# Patient Record
Sex: FEMALE | Race: WHITE | Employment: FULL TIME | URBAN - METROPOLITAN AREA
[De-identification: names, ages, dates, MRNs, and addresses within clinical notes are randomized per-mention and may not be internally consistent; named-entity substitution may affect disease eponyms.]

---

## 2017-03-07 ENCOUNTER — HOSPITAL ENCOUNTER (EMERGENCY)
Age: 42
Discharge: HOME | End: 2017-03-07
Attending: EMERGENCY MEDICINE
Payer: COMMERCIAL

## 2017-03-07 ENCOUNTER — HOSPITAL ENCOUNTER (EMERGENCY)
Age: 42
Discharge: STILL ADMITTED OR ADMINISTRATIVE | End: 2017-03-07
Attending: FAMILY MEDICINE

## 2017-03-07 VITALS
HEIGHT: 66 IN | TEMPERATURE: 98.8 F | DIASTOLIC BLOOD PRESSURE: 83 MMHG | HEART RATE: 102 BPM | OXYGEN SATURATION: 99 % | WEIGHT: 195.4 LBS | SYSTOLIC BLOOD PRESSURE: 139 MMHG | RESPIRATION RATE: 16 BRPM | BODY MASS INDEX: 31.4 KG/M2

## 2017-03-07 VITALS
WEIGHT: 196.2 LBS | SYSTOLIC BLOOD PRESSURE: 143 MMHG | RESPIRATION RATE: 16 BRPM | TEMPERATURE: 98.7 F | BODY MASS INDEX: 31.53 KG/M2 | DIASTOLIC BLOOD PRESSURE: 78 MMHG | HEIGHT: 66 IN | OXYGEN SATURATION: 96 % | HEART RATE: 95 BPM

## 2017-03-07 DIAGNOSIS — R51.9 NONINTRACTABLE HEADACHE, UNSPECIFIED CHRONICITY PATTERN, UNSPECIFIED HEADACHE TYPE: ICD-10-CM

## 2017-03-07 DIAGNOSIS — J10.1 INFLUENZA B: Primary | ICD-10-CM

## 2017-03-07 LAB
ALBUMIN SERPL BCP-MCNC: 3.6 G/DL (ref 3.5–5)
ALBUMIN/GLOB SERPL: 0.9 {RATIO} (ref 1.1–2.2)
ALP SERPL-CCNC: 54 U/L (ref 45–117)
ALT SERPL-CCNC: 49 U/L (ref 12–78)
ANION GAP BLD CALC-SCNC: 7 MMOL/L (ref 5–15)
APPEARANCE UR: CLEAR
AST SERPL W P-5'-P-CCNC: 38 U/L (ref 15–37)
BACTERIA URNS QL MICRO: NEGATIVE /HPF
BASOPHILS # BLD AUTO: 0.1 K/UL (ref 0–0.1)
BASOPHILS # BLD: 1 % (ref 0–1)
BILIRUB SERPL-MCNC: 0.7 MG/DL (ref 0.2–1)
BILIRUB UR QL: NEGATIVE
BUN SERPL-MCNC: 13 MG/DL (ref 6–20)
BUN/CREAT SERPL: 14 (ref 12–20)
CALCIUM SERPL-MCNC: 8.7 MG/DL (ref 8.5–10.1)
CHLORIDE SERPL-SCNC: 100 MMOL/L (ref 97–108)
CO2 SERPL-SCNC: 24 MMOL/L (ref 21–32)
COLOR UR: ABNORMAL
CREAT SERPL-MCNC: 0.91 MG/DL (ref 0.55–1.02)
DIFFERENTIAL METHOD BLD: ABNORMAL
EOSINOPHIL # BLD: 0.2 K/UL (ref 0–0.4)
EOSINOPHIL NFR BLD: 3 % (ref 0–7)
EPITH CASTS URNS QL MICRO: ABNORMAL /LPF
ERYTHROCYTE [DISTWIDTH] IN BLOOD BY AUTOMATED COUNT: 12.3 % (ref 11.5–14.5)
FLUAV AG NPH QL IA: NEGATIVE
FLUBV AG NOSE QL IA: POSITIVE
GLOBULIN SER CALC-MCNC: 3.9 G/DL (ref 2–4)
GLUCOSE SERPL-MCNC: 87 MG/DL (ref 65–100)
GLUCOSE UR STRIP.AUTO-MCNC: NEGATIVE MG/DL
HCT VFR BLD AUTO: 41.3 % (ref 35–47)
HGB BLD-MCNC: 14.2 G/DL (ref 11.5–16)
HGB UR QL STRIP: NEGATIVE
HYALINE CASTS URNS QL MICRO: ABNORMAL /LPF (ref 0–5)
KETONES UR QL STRIP.AUTO: ABNORMAL MG/DL
LEUKOCYTE ESTERASE UR QL STRIP.AUTO: NEGATIVE
LYMPHOCYTES # BLD AUTO: 7 % (ref 12–49)
LYMPHOCYTES # BLD: 0.5 K/UL (ref 0.8–3.5)
MCH RBC QN AUTO: 30.7 PG (ref 26–34)
MCHC RBC AUTO-ENTMCNC: 34.4 G/DL (ref 30–36.5)
MCV RBC AUTO: 89.4 FL (ref 80–99)
MONOCYTES # BLD: 0.8 K/UL (ref 0–1)
MONOCYTES NFR BLD AUTO: 12 % (ref 5–13)
NEUTS SEG # BLD: 5.2 K/UL (ref 1.8–8)
NEUTS SEG NFR BLD AUTO: 77 % (ref 32–75)
NITRITE UR QL STRIP.AUTO: NEGATIVE
PH UR STRIP: 6 [PH] (ref 5–8)
PLATELET # BLD AUTO: 192 K/UL (ref 150–400)
POTASSIUM SERPL-SCNC: 4.1 MMOL/L (ref 3.5–5.1)
PROT SERPL-MCNC: 7.5 G/DL (ref 6.4–8.2)
PROT UR STRIP-MCNC: NEGATIVE MG/DL
RBC # BLD AUTO: 4.62 M/UL (ref 3.8–5.2)
RBC #/AREA URNS HPF: ABNORMAL /HPF (ref 0–5)
RBC MORPH BLD: ABNORMAL
SODIUM SERPL-SCNC: 131 MMOL/L (ref 136–145)
SP GR UR REFRACTOMETRY: 1.01 (ref 1–1.03)
UROBILINOGEN UR QL STRIP.AUTO: 0.2 EU/DL (ref 0.2–1)
WBC # BLD AUTO: 6.8 K/UL (ref 3.6–11)
WBC URNS QL MICRO: ABNORMAL /HPF (ref 0–4)

## 2017-03-07 PROCEDURE — 85025 COMPLETE CBC W/AUTO DIFF WBC: CPT | Performed by: EMERGENCY MEDICINE

## 2017-03-07 PROCEDURE — 81001 URINALYSIS AUTO W/SCOPE: CPT | Performed by: EMERGENCY MEDICINE

## 2017-03-07 PROCEDURE — 99284 EMERGENCY DEPT VISIT MOD MDM: CPT

## 2017-03-07 PROCEDURE — 96361 HYDRATE IV INFUSION ADD-ON: CPT

## 2017-03-07 PROCEDURE — 80053 COMPREHEN METABOLIC PANEL: CPT | Performed by: EMERGENCY MEDICINE

## 2017-03-07 PROCEDURE — 74011000250 HC RX REV CODE- 250: Performed by: EMERGENCY MEDICINE

## 2017-03-07 PROCEDURE — 87804 INFLUENZA ASSAY W/OPTIC: CPT | Performed by: EMERGENCY MEDICINE

## 2017-03-07 PROCEDURE — 96375 TX/PRO/DX INJ NEW DRUG ADDON: CPT

## 2017-03-07 PROCEDURE — 96374 THER/PROPH/DIAG INJ IV PUSH: CPT

## 2017-03-07 PROCEDURE — 74011250636 HC RX REV CODE- 250/636: Performed by: EMERGENCY MEDICINE

## 2017-03-07 RX ORDER — OSELTAMIVIR PHOSPHATE 75 MG/1
75 CAPSULE ORAL 2 TIMES DAILY
Qty: 10 CAP | Refills: 0 | Status: SHIPPED | OUTPATIENT
Start: 2017-03-07 | End: 2019-11-11 | Stop reason: SDUPTHER

## 2017-03-07 RX ORDER — PROCHLORPERAZINE EDISYLATE 5 MG/ML
10 INJECTION INTRAMUSCULAR; INTRAVENOUS
Status: DISCONTINUED | OUTPATIENT
Start: 2017-03-07 | End: 2017-03-07 | Stop reason: SDUPTHER

## 2017-03-07 RX ORDER — OSELTAMIVIR PHOSPHATE 75 MG/1
75 CAPSULE ORAL 2 TIMES DAILY
Qty: 10 CAP | Refills: 0 | Status: SHIPPED | OUTPATIENT
Start: 2017-03-07 | End: 2017-03-12

## 2017-03-07 RX ORDER — DIPHENHYDRAMINE HYDROCHLORIDE 50 MG/ML
25 INJECTION, SOLUTION INTRAMUSCULAR; INTRAVENOUS
Status: COMPLETED | OUTPATIENT
Start: 2017-03-07 | End: 2017-03-07

## 2017-03-07 RX ORDER — KETOROLAC TROMETHAMINE 30 MG/ML
30 INJECTION, SOLUTION INTRAMUSCULAR; INTRAVENOUS
Status: COMPLETED | OUTPATIENT
Start: 2017-03-07 | End: 2017-03-07

## 2017-03-07 RX ADMIN — SODIUM CHLORIDE 10 MG: 9 INJECTION INTRAMUSCULAR; INTRAVENOUS; SUBCUTANEOUS at 21:32

## 2017-03-07 RX ADMIN — SODIUM CHLORIDE 1000 ML: 900 INJECTION, SOLUTION INTRAVENOUS at 21:31

## 2017-03-07 RX ADMIN — DIPHENHYDRAMINE HYDROCHLORIDE 25 MG: 50 INJECTION, SOLUTION INTRAMUSCULAR; INTRAVENOUS at 21:32

## 2017-03-07 RX ADMIN — KETOROLAC TROMETHAMINE 30 MG: 30 INJECTION, SOLUTION INTRAMUSCULAR at 21:32

## 2017-03-08 ENCOUNTER — TELEPHONE (OUTPATIENT)
Dept: INTERNAL MEDICINE CLINIC | Age: 42
End: 2017-03-08

## 2017-03-08 NOTE — DISCHARGE INSTRUCTIONS
Influenza (Flu): Care Instructions  Your Care Instructions  Influenza (flu) is an infection in the lungs and breathing passages. It is caused by the influenza virus. There are different strains, or types, of the flu virus from year to year. Unlike the common cold, the flu comes on suddenly and the symptoms, such as a cough, congestion, fever, chills, fatigue, aches, and pains, are more severe. These symptoms may last up to 10 days. Although the flu can make you feel very sick, it usually doesn't cause serious health problems. Home treatment is usually all you need for flu symptoms. But your doctor may prescribe antiviral medicine to prevent other health problems, such as pneumonia, from developing. Older people and those who have a long-term health condition, such as lung disease, are most at risk for having pneumonia or other health problems. Follow-up care is a key part of your treatment and safety. Be sure to make and go to all appointments, and call your doctor if you are having problems. Its also a good idea to know your test results and keep a list of the medicines you take. How can you care for yourself at home? · Get plenty of rest.  · Drink plenty of fluids, enough so that your urine is light yellow or clear like water. If you have kidney, heart, or liver disease and have to limit fluids, talk with your doctor before you increase the amount of fluids you drink. · Take an over-the-counter pain medicine if needed, such as acetaminophen (Tylenol), ibuprofen (Advil, Motrin), or naproxen (Aleve), to relieve fever, headache, and muscle aches. Read and follow all instructions on the label. No one younger than 20 should take aspirin. It has been linked to Reye syndrome, a serious illness. · Do not smoke. Smoking can make the flu worse. If you need help quitting, talk to your doctor about stop-smoking programs and medicines. These can increase your chances of quitting for good.   · Breathe moist air from a hot shower or from a sink filled with hot water to help clear a stuffy nose. · Before you use cough and cold medicines, check the label. These medicines may not be safe for young children or for people with certain health problems. · If the skin around your nose and lips becomes sore, put some petroleum jelly on the area. · To ease coughing:  ¨ Drink fluids to soothe a scratchy throat. ¨ Suck on cough drops or plain hard candy. ¨ Take an over-the-counter cough medicine that contains dextromethorphan to help you get some sleep. Read and follow all instructions on the label. ¨ Raise your head at night with an extra pillow. This may help you rest if coughing keeps you awake. · Take any prescribed medicine exactly as directed. Call your doctor if you think you are having a problem with your medicine. To avoid spreading the flu  · Wash your hands regularly, and keep your hands away from your face. · Stay home from school, work, and other public places until you are feeling better and your fever has been gone for at least 24 hours. The fever needs to have gone away on its own without the help of medicine. · Ask people living with you to talk to their doctors about preventing the flu. They may get antiviral medicine to keep from getting the flu from you. · To prevent the flu in the future, get a flu vaccine every fall. Encourage people living with you to get the vaccine. · Cover your mouth when you cough or sneeze. When should you call for help? Call 911 anytime you think you may need emergency care. For example, call if:  · You have severe trouble breathing. Call your doctor now or seek immediate medical care if:  · You have new or worse trouble breathing. · You seem to be getting much sicker. · You feel very sleepy or confused. · You have a new or higher fever. · You get a new rash.   Watch closely for changes in your health, and be sure to contact your doctor if:  · You begin to get better and then get worse. · You are not getting better after 1 week. Where can you learn more? Go to http://temi-james.info/. Enter B181 in the search box to learn more about \"Influenza (Flu): Care Instructions. \"  Current as of: May 23, 2016  Content Version: 11.1  © 7054-5385 [employer]. Care instructions adapted under license by [employer] (which disclaims liability or warranty for this information). If you have questions about a medical condition or this instruction, always ask your healthcare professional. [address] any warranty or liability for your use of this information. Headache: Care Instructions  Your Care Instructions    Headaches have many possible causes. Most headaches aren't a sign of a more serious problem, and they will get better on their own. Home treatment may help you feel better faster. The doctor has checked you carefully, but problems can develop later. If you notice any problems or new symptoms, get medical treatment right away. Follow-up care is a key part of your treatment and safety. Be sure to make and go to all appointments, and call your doctor if you are having problems. It's also a good idea to know your test results and keep a list of the medicines you take. How can you care for yourself at home? · Do not drive if you have taken a prescription pain medicine. · Rest in a quiet, dark room until your headache is gone. Close your eyes and try to relax or go to sleep. Don't watch TV or read. · Put a cold, moist cloth or cold pack on the painful area for 10 to 20 minutes at a time. Put a thin cloth between the cold pack and your skin. · Use a warm, moist towel or a heating pad set on low to relax tight shoulder and neck muscles. · Have someone gently massage your neck and shoulders. · Take pain medicines exactly as directed.   ¨ If the doctor gave you a prescription medicine for pain, take it as prescribed. ¨ If you are not taking a prescription pain medicine, ask your doctor if you can take an over-the-counter medicine. · Be careful not to take pain medicine more often than the instructions allow, because you may get worse or more frequent headaches when the medicine wears off. · Do not ignore new symptoms that occur with a headache, such as a fever, weakness or numbness, vision changes, or confusion. These may be signs of a more serious problem. To prevent headaches  · Keep a headache diary so you can figure out what triggers your headaches. Avoiding triggers may help you prevent headaches. Record when each headache began, how long it lasted, and what the pain was like (throbbing, aching, stabbing, or dull). Write down any other symptoms you had with the headache, such as nausea, flashing lights or dark spots, or sensitivity to bright light or loud noise. Note if the headache occurred near your period. List anything that might have triggered the headache, such as certain foods (chocolate, cheese, wine) or odors, smoke, bright light, stress, or lack of sleep. · Find healthy ways to deal with stress. Headaches are most common during or right after stressful times. Take time to relax before and after you do something that has caused a headache in the past.  · Try to keep your muscles relaxed by keeping good posture. Check your jaw, face, neck, and shoulder muscles for tension, and try relaxing them. When sitting at a desk, change positions often, and stretch for 30 seconds each hour. · Get plenty of sleep and exercise. · Eat regularly and well. Long periods without food can trigger a headache. · Treat yourself to a massage. Some people find that regular massages are very helpful in relieving tension. · Limit caffeine by not drinking too much coffee, tea, or soda. But don't quit caffeine suddenly, because that can also give you headaches.   · Reduce eyestrain from computers by blinking frequently and looking away from the computer screen every so often. Make sure you have proper eyewear and that your monitor is set up properly, about an arm's length away. · Seek help if you have depression or anxiety. Your headaches may be linked to these conditions. Treatment can both prevent headaches and help with symptoms of anxiety or depression. When should you call for help? Call 911 anytime you think you may need emergency care. For example, call if:  · You have signs of a stroke. These may include:  ¨ Sudden numbness, paralysis, or weakness in your face, arm, or leg, especially on only one side of your body. ¨ Sudden vision changes. ¨ Sudden trouble speaking. ¨ Sudden confusion or trouble understanding simple statements. ¨ Sudden problems with walking or balance. ¨ A sudden, severe headache that is different from past headaches. Call your doctor now or seek immediate medical care if:  · You have a new or worse headache. · Your headache gets much worse. Where can you learn more? Go to http://temi-james.info/. Enter M271 in the search box to learn more about \"Headache: Care Instructions. \"  Current as of: February 19, 2016  Content Version: 11.1  © 2839-0281 [employer]. Care instructions adapted under license by [employer] (which disclaims liability or warranty for this information). If you have questions about a medical condition or this instruction, always ask your healthcare professional. [place] [address] any warranty or liability for your use of this information. We hope that we have addressed all of your medical concerns. The examination and treatment you received in the Emergency Department were for an emergent problem and were not intended as complete care. It is important that you follow up with your healthcare provider(s) for ongoing care.  If your symptoms worsen or do not improve as expected, and you are unable to reach your usual health care provider(s), you should return to the Emergency Department. Today's healthcare is undergoing tremendous change, and patient satisfaction surveys are one of the many tools to assess the quality of medical care. You may receive a survey from the [employer] regarding your experience in the Emergency Department. I hope that your experience has been completely positive, particularly the medical care that I provided. As such, please participate in the survey; anything less than excellent does not meet my expectations or intentions. [facility] and [facility] participate in nationally recognized quality of care measures. If your blood pressure is greater than 120/80, as reported below, we urge that you seek medical care to address the potential of high blood pressure, commonly known as hypertension. Hypertension can be hereditary or can be caused by certain medical conditions, pain, stress, or \"white coat syndrome. \"       Please make an appointment with your health care provider(s) for follow up of your Emergency Department visit. VITALS:   Patient Vitals for the past 8 hrs:   Temp Pulse Resp BP SpO2   03/07/17 2211 98.7 °F (37.1 °C) 95 16 143/78 96 %   03/07/17 2138 99.7 °F (37.6 °C) 99 20 138/79 97 %   03/07/17 1923 98.8 °F (37.1 °C) (!) 114 18 - 99 %          Thank you for allowing us to provide you with medical care today. We realize that you have many choices for your emergency care needs. Please choose us in the future for any continued health care needs.       Elle Armstrong MD    [facility], [facility].   Office: [phone]            Recent Results (from the past 24 hour(s))   CBC WITH AUTOMATED DIFF    Collection Time: 03/07/17  7:58 PM   Result Value Ref Range    WBC 6.8 3.6 - 11.0 K/uL    RBC 4.62 3.80 - 5.20 M/uL    HGB 14.2 11.5 - 16.0 g/dL    HCT 41.3 35.0 - 47.0 %    MCV 89.4 80.0 - 99.0 FL    MCH 30.7 26.0 - 34.0 PG    MCHC 34.4 30.0 - 36.5 g/dL    RDW 12.3 11.5 - 14.5 %    PLATELET 554 113 - 019 K/uL    NEUTROPHILS 77 (H) 32 - 75 %    LYMPHOCYTES 7 (L) 12 - 49 %    MONOCYTES 12 5 - 13 %    EOSINOPHILS 3 0 - 7 %    BASOPHILS 1 0 - 1 %    ABS. NEUTROPHILS 5.2 1.8 - 8.0 K/UL    ABS. LYMPHOCYTES 0.5 (L) 0.8 - 3.5 K/UL    ABS. MONOCYTES 0.8 0.0 - 1.0 K/UL    ABS. EOSINOPHILS 0.2 0.0 - 0.4 K/UL    ABS. BASOPHILS 0.1 0.0 - 0.1 K/UL    DF SMEAR SCANNED      RBC COMMENTS NORMOCYTIC, NORMOCHROMIC     METABOLIC PANEL, COMPREHENSIVE    Collection Time: 03/07/17  7:58 PM   Result Value Ref Range    Sodium 131 (L) 136 - 145 mmol/L    Potassium 4.1 3.5 - 5.1 mmol/L    Chloride 100 97 - 108 mmol/L    CO2 24 21 - 32 mmol/L    Anion gap 7 5 - 15 mmol/L    Glucose 87 65 - 100 mg/dL    BUN 13 6 - 20 MG/DL    Creatinine 0.91 0.55 - 1.02 MG/DL    BUN/Creatinine ratio 14 12 - 20      GFR est AA >60 >60 ml/min/1.73m2    GFR est non-AA >60 >60 ml/min/1.73m2    Calcium 8.7 8.5 - 10.1 MG/DL    Bilirubin, total 0.7 0.2 - 1.0 MG/DL    ALT (SGPT) 49 12 - 78 U/L    AST (SGOT) 38 (H) 15 - 37 U/L    Alk.  phosphatase 54 45 - 117 U/L    Protein, total 7.5 6.4 - 8.2 g/dL    Albumin 3.6 3.5 - 5.0 g/dL    Globulin 3.9 2.0 - 4.0 g/dL    A-G Ratio 0.9 (L) 1.1 - 2.2     INFLUENZA A & B AG (RAPID TEST)    Collection Time: 03/07/17  9:38 PM   Result Value Ref Range    Influenza A Antigen NEGATIVE  NEG      Influenza B Antigen POSITIVE (A) NEG     URINALYSIS W/MICROSCOPIC    Collection Time: 03/07/17 10:12 PM   Result Value Ref Range    Color YELLOW/STRAW      Appearance CLEAR CLEAR      Specific gravity 1.011 1.003 - 1.030      pH (UA) 6.0 5.0 - 8.0      Protein NEGATIVE  NEG mg/dL    Glucose NEGATIVE  NEG mg/dL    Ketone TRACE (A) NEG mg/dL    Bilirubin NEGATIVE  NEG      Blood NEGATIVE  NEG      Urobilinogen 0.2 0.2 - 1.0 EU/dL    Nitrites NEGATIVE  NEG      Leukocyte Esterase NEGATIVE  NEG      WBC 0-4 0 - 4 /hpf    RBC 0-5 0 - 5 /hpf    Epithelial cells FEW FEW /lpf    Bacteria NEGATIVE  NEG /hpf    Hyaline cast 0-2 0 - 5 /lpf       No results found.

## 2017-03-08 NOTE — ED PROVIDER NOTES
HPI Comments: 36 y.o. female with past medical history significant for MS, HTN, EDGARD virus positive, chronic pain, double compound of fx who presents with chief complaint of headache. Pt reports onset of a dull headache 21.5 hours ago that had progressed to a sharper more migraine-like headache overnight and not relieved by advil and aleve which she was alternating throughout today, not the worst of her life. She describes it as \"like a drill is going through my head\" and that it is worse with light exposure, movement and that when she sits up she feels \"unstable, but not dizzy like vertigo, because I've had that before\". Pt also reports nausea, sore throat, body aches, and chills that began this afternoon. She states that she called her neurologist who is concerned about pt's immunocompromised state due to MS meds, and wanted pt to come to an ED. Pt reports hx of optic neuritis in her right eye and scarring in her left eye. She also notes right leg swelling that is chronic due to multiple compound leg fx in the right leg. Pt denies rash, vomiting, vision changes, difficulty urinating, abdominal pain, fever, cough, diarrhea. There are no other acute medical concerns at this time. PCP: Trisha García MD  Neurologist: Jake Marie MD    Note written by Jessy Cristina, as dictated by Yahaira Torres MD 9:24 PM      The history is provided by the patient.         Past Medical History:   Diagnosis Date    Ankle fracture     Asthma     Autoimmune disease ([address]     MS    Chronic pain     MS    Depression     Diverticulosis of sigmoid colon     Dysplastic colon polyp     Dr. Lenora Alex - last colonoscopy 11/7/12 - single polyp    Essential hypertension     Gestasional    GERD (gastroesophageal reflux disease)     EDGARD virus antibody positive     Kidney stones     Miscarriage     11/13    MS (multiple sclerosis) (HCC)     Dr. Tanisha Yusuf Ovarian cyst     Routine gynecological examination      [facility] Sleep apnea     pt states she no longer has the problem since wt loss - intentional wt loss    Tubular adenoma of colon 16    Uterine fibroid     Vitamin D deficiency 2011       Past Surgical History:   Procedure Laterality Date    HX ANKLE FRACTURE TX      double compound fx of right lower leg and dislocated heel - has a plate and 13 screws    HX  SECTION      2015    HX GYN      fibroid tumor ovarian cyst     HX ORTHOPAEDIC      1992 Left shoulder surgery         Family History:   Problem Relation Age of Onset    Cancer Father      appendix/cancerous colon polyps    Heart Disease Father     Cancer Maternal Uncle      prostate/melanoma    Cancer Maternal Grandmother      cancerous colon polyps    Cancer Maternal Grandfather      prostate    Heart Disease Paternal Grandmother     Cancer Paternal Grandmother      cancerous colon polyps    No Known Problems Daughter     Colon Polyps Mother      precancerous    Heart Disease Paternal Aunt        Social History     Social History    Marital status:      Spouse name: N/A    Number of children: N/A    Years of education: N/A     Occupational History    Not on file. Social History Main Topics    Smoking status: Former Smoker     Quit date: 2000    Smokeless tobacco: Former User    Alcohol use No      Comment: rare    Drug use: No    Sexual activity: No     Other Topics Concern    Not on file     Social History Narrative         ALLERGIES: Latex and Adhesive    Review of Systems   Constitutional: Positive for chills. Negative for fever. HENT: Positive for sore throat. Negative for facial swelling. Eyes: Negative for visual disturbance. Respiratory: Negative for chest tightness. Cardiovascular: Negative for chest pain. Gastrointestinal: Positive for nausea. Negative for abdominal pain. Genitourinary: Negative for dysuria. Musculoskeletal: Positive for myalgias (body aches).  Negative for arthralgias. Right leg swelling (chronic)   Skin: Negative for rash. Neurological: Positive for headaches. Negative for dizziness. Hematological: Negative for adenopathy. Psychiatric/Behavioral: Negative for suicidal ideas. All other systems reviewed and are negative. Vitals:    03/07/17 1923 03/07/17 2138   BP:  138/79   Pulse: (!) 114 99   Resp: 18 20   Temp: 98.8 °F (37.1 °C) 99.7 °F (37.6 °C)   SpO2: 99% 97%   Weight: 89 kg (196 lb 3.2 oz)    Height: 5' 6\" (1.676 m)             Physical Exam   Constitutional: She is oriented to person, place, and time. She appears well-developed and well-nourished. No distress. HENT:   Head: Normocephalic and atraumatic. Mouth/Throat: Oropharynx is clear and moist.   Eyes: Pupils are equal, round, and reactive to light. No scleral icterus. Neck: Normal range of motion. Neck supple. No thyromegaly present. Cardiovascular: Normal rate, regular rhythm, normal heart sounds and intact distal pulses. No murmur heard. Pulmonary/Chest: Effort normal and breath sounds normal. No respiratory distress. Abdominal: Soft. Bowel sounds are normal. She exhibits no distension. There is no tenderness. Musculoskeletal: Normal range of motion. She exhibits no edema. Neurological: She is alert and oriented to person, place, and time. Skin: Skin is warm and dry. No rash noted. She is not diaphoretic. Nursing note and vitals reviewed. Note written by Jessy Magana, as dictated by Deanna Brady MD 9:24 PM      [facility]  ED Course       Procedures    A:  45yo F with chills, body aches, and headache. No fever documented. No known sick contacts. Pt is immunocompromised on medications for MS. No meningeal signs or worrisome signs of meningitis. P:  Labs  toradol  ivf  Compazine  Benadryl  Reassess    10:38 PM  Patient resting comfortably with no complaints at this time. VS remain stable. Repeat physical exam is unremarkable.   Pt ambulatory without difficulty and tolerating po's well.

## 2017-03-08 NOTE — TELEPHONE ENCOUNTER
Pt called back checking the status of the Tamiflu prescription for her daughter and . Informed the pt that the message was submitted pt would like if  could please call the prescription's into [facility] [place] there # [phone]. Pharmacy informed pt that a lot of the pharmacies are out of it and that [facility] is getting in a shipment of the Tamiflu today. Pt would like a call once prescription has been submitted pt's # [phone].

## 2017-03-08 NOTE — TELEPHONE ENCOUNTER
Pt was seen last night at [facility] ER pt tested positive for the flu. Pt was given Tamiflu pt would like to know if it would benefit for her  and two year old son to be prescribed Tamiflu. Pt use's the [employer] there # [phone] pt's # [phone].

## 2017-03-08 NOTE — TELEPHONE ENCOUNTER
Patient called this morning requesting tamiflu for her  ,  Sirisha Domínguez   1983    & daughter     Brian Harp  2016    To the [facility] listed int he chart

## 2017-03-08 NOTE — ED TRIAGE NOTES
TRIAGE NOTE:  Patient arrives with c/o \"I've had an extreme migraine that's been getting worse, and i'm having body aches and nausea\".

## 2017-03-12 ENCOUNTER — TELEPHONE (OUTPATIENT)
Dept: INTERNAL MEDICINE CLINIC | Age: 42
End: 2017-03-12

## 2017-03-13 NOTE — TELEPHONE ENCOUNTER
On call note - late entry    Called 7:40 am 3/11/17. Pt reports dx influenza and on tamiflu    But, sx persist including a HA that has only been mildly responsive to ibuprofen. Pt reports hx migraine and that current HA is c/w her migraines. But, pt has not previously taken any migraine specific meds, ie triptans. Nor has she ever taken fioricet. Agreed to call in fioricet for relief and encouraged to complete tamiflu. Reviewed other OTC symptom relief options as well    If HAs recur or persist, plan to follow up to review further migraine/HA management.

## 2017-05-10 ENCOUNTER — HOSPITAL ENCOUNTER (EMERGENCY)
Age: 42
Discharge: HOME | End: 2017-05-10
Attending: FAMILY MEDICINE

## 2017-05-10 ENCOUNTER — HOSPITAL ENCOUNTER (OUTPATIENT)
Dept: LAB | Age: 42
Discharge: HOME | End: 2017-05-10

## 2017-05-10 VITALS
SYSTOLIC BLOOD PRESSURE: 143 MMHG | OXYGEN SATURATION: 97 % | WEIGHT: 200 LBS | RESPIRATION RATE: 18 BRPM | DIASTOLIC BLOOD PRESSURE: 86 MMHG | HEIGHT: 66 IN | TEMPERATURE: 98.7 F | HEART RATE: 87 BPM | BODY MASS INDEX: 32.14 KG/M2

## 2017-05-10 DIAGNOSIS — B34.9 VIRAL SYNDROME: Primary | ICD-10-CM

## 2017-05-10 LAB — S PYO AG THROAT QL: NEGATIVE

## 2017-05-10 PROCEDURE — 87070 CULTURE OTHR SPECIMN AEROBIC: CPT | Performed by: FAMILY MEDICINE

## 2017-05-10 RX ORDER — METHYLPREDNISOLONE 4 MG/1
TABLET ORAL
Qty: 1 DOSE PACK | Refills: 0 | Status: SHIPPED | OUTPATIENT
Start: 2017-05-10 | End: 2017-09-27

## 2017-05-11 NOTE — DISCHARGE INSTRUCTIONS

## 2017-05-11 NOTE — UC PROVIDER NOTE
Patient is a 36 y.o. female presenting with sore throat. The history is provided by the patient. Sore Throat    This is a new problem. The current episode started more than 2 days ago. The problem has not changed since onset. Patient reports a subjective fever - was not measured. Associated symptoms include congestion. Pertinent negatives include no plugged ear sensation, no swollen glands, no trouble swallowing and no cough. Associated symptoms comments: Generalized body ache and achy- tired. She has had no exposure to strep. She has tried nothing for the symptoms.         Past Medical History:   Diagnosis Date    Ankle fracture     Asthma     Autoimmune disease ([address]     MS    Chronic pain     MS    Depression     Diverticulosis of sigmoid colon     Dysplastic colon polyp     Dr. Raj Parisi - last colonoscopy 12 - single polyp    Essential hypertension     Gestasional    GERD (gastroesophageal reflux disease)     Influenza B 2017    EDGARD virus antibody positive     Kidney stones     Miscarriage         MS (multiple sclerosis) (HCC)     Dr. Tico Gee Ovarian cyst     Routine gynecological examination     Dr. Ferro Dural Sleep apnea     pt states she no longer has the problem since wt loss - intentional wt loss    Tubular adenoma of colon 16    Uterine fibroid     Vitamin D deficiency 2011        Past Surgical History:   Procedure Laterality Date    HX ANKLE FRACTURE TX      double compound fx of right lower leg and dislocated heel - has a plate and 13 screws    HX  SECTION          HX GYN      fibroid tumor ovarian cyst     HX ORTHOPAEDIC      1992 Left shoulder surgery         Family History   Problem Relation Age of Onset    Cancer Father      appendix/cancerous colon polyps    Heart Disease Father     Cancer Maternal Uncle      prostate/melanoma    Cancer Maternal Grandmother      cancerous colon polyps    Cancer Maternal Grandfather      prostate    Heart Disease Paternal Grandmother     Cancer Paternal Grandmother      cancerous colon polyps    No Known Problems Daughter     Colon Polyps Mother      precancerous    Heart Disease Paternal Aunt         Social History     Social History    Marital status:      Spouse name: N/A    Number of children: N/A    Years of education: N/A     Occupational History    Not on file. Social History Main Topics    Smoking status: Former Smoker     Quit date: 7/20/2000    Smokeless tobacco: Former User    Alcohol use No      Comment: rare    Drug use: No    Sexual activity: No     Other Topics Concern    Not on file     Social History Narrative                ALLERGIES: Latex and Adhesive    Review of Systems   HENT: Positive for congestion and sore throat. Negative for trouble swallowing. Respiratory: Negative for cough. Vitals:    05/10/17 1938   BP: 143/86   Pulse: 87   Resp: 18   Temp: 98.7 °F (37.1 °C)   SpO2: 97%   Weight: 90.7 kg (200 lb)   Height: 5' 6\" (1.676 m)       Physical Exam   Constitutional: No distress. HENT:   Right Ear: Tympanic membrane and ear canal normal.   Left Ear: Tympanic membrane and ear canal normal.   Nose: Nose normal.   Mouth/Throat: No oropharyngeal exudate, posterior oropharyngeal edema or posterior oropharyngeal erythema. Eyes: Conjunctivae are normal. Right eye exhibits no discharge. Left eye exhibits no discharge. Neck: Neck supple. Pulmonary/Chest: Effort normal and breath sounds normal. No respiratory distress. She has no wheezes. She has no rales. Lymphadenopathy:     She has no cervical adenopathy. Skin: No rash noted. Nursing note and vitals reviewed.       MDM     Differential Diagnosis; Clinical Impression; Plan:     CLINICAL IMPRESSION:  Viral syndrome  (primary encounter diagnosis)        Plan:    Fluids/ gargles  Claritin/ allegra   Tylenol cold-sinus - max strength 1-2 tab 4 times/ day    with Advil as needed    If not better in 4-5 days - may use medrol    If throat culture + start antibiotics      Amount and/or Complexity of Data Reviewed:   Clinical lab tests:  Ordered and reviewed   Review and summarize past medical records:  Yes  Risk of Significant Complications, Morbidity, and/or Mortality:   Presenting problems:  Low  Diagnostic procedures:  Low  Management options:  Low  Progress:   Patient progress:  Stable      Procedures

## 2017-05-13 LAB
BACTERIA SPEC CULT: NORMAL
SERVICE CMNT-IMP: NORMAL

## 2017-05-15 ENCOUNTER — OFFICE VISIT (OUTPATIENT)
Dept: INTERNAL MEDICINE CLINIC | Age: 42
End: 2017-05-15

## 2017-05-15 VITALS
OXYGEN SATURATION: 97 % | DIASTOLIC BLOOD PRESSURE: 87 MMHG | TEMPERATURE: 98.1 F | RESPIRATION RATE: 20 BRPM | WEIGHT: 201.2 LBS | HEART RATE: 84 BPM | SYSTOLIC BLOOD PRESSURE: 136 MMHG | BODY MASS INDEX: 32.33 KG/M2 | HEIGHT: 66 IN

## 2017-05-15 DIAGNOSIS — J01.00 ACUTE NON-RECURRENT MAXILLARY SINUSITIS: Primary | ICD-10-CM

## 2017-05-15 DIAGNOSIS — D84.9 IMMUNOSUPPRESSED STATUS (HCC): ICD-10-CM

## 2017-05-15 DIAGNOSIS — R05.9 COUGH: ICD-10-CM

## 2017-05-15 DIAGNOSIS — J30.1 SEASONAL ALLERGIC RHINITIS DUE TO POLLEN: ICD-10-CM

## 2017-05-15 RX ORDER — AMOXICILLIN AND CLAVULANATE POTASSIUM 875; 125 MG/1; MG/1
1 TABLET, FILM COATED ORAL EVERY 12 HOURS
Qty: 20 TAB | Refills: 0 | Status: SHIPPED | OUTPATIENT
Start: 2017-05-15 | End: 2017-05-25

## 2017-05-15 RX ORDER — BENZONATATE 100 MG/1
100 CAPSULE ORAL
Qty: 16 CAP | Refills: 0 | Status: SHIPPED | OUTPATIENT
Start: 2017-05-15 | End: 2017-05-22

## 2017-05-15 NOTE — PROGRESS NOTES
ACUTE VISIT     HPI:   Juan José Karimi is a 36 y.o. female, she presents today for:     Has been having congestion for ~ 1 month. Started taking zyrtec and prn advil ~ 2 weeks ago. Seen last Tuesday at urgent care. Advised that she had a viral infection. Was given prednisone to help with throat pain. Wonders if she has flu because neurologist mentioned that. Cough is mostly dry, sometimes productive, feels like she is constantly coughing. 2year old with \"allergy stuff\" that responded to singularie and zyrtec. ROS: Tmax: 99 , + body aches, + cough, no specific headache. Medications used for acute illness: zyrtec, prdnisone    Current Outpatient Prescriptions on File Prior to Visit   Medication Sig    methylPREDNISolone (MEDROL, JOSE,) 4 mg tablet As directed    AUBAGIO 14 mg tab     albuterol (PROVENTIL HFA, VENTOLIN HFA, PROAIR HFA) 90 mcg/actuation inhaler Take 2 Puffs by inhalation every four (4) hours as needed for Wheezing or Shortness of Breath.  cholecalciferol, vitamin D3, (VITAMIN D3) 2,000 unit tab Take  by mouth.  biotin 10,000 mcg cap Take  by mouth.  LO LOESTRIN FE 1 mg-10 mcg (24)/10 mcg (2) tab      No current facility-administered medications on file prior to visit. Allergies   Allergen Reactions    Latex Rash    Adhesive Rash       PMH/PSH/FH: reviewed and updated    Sochx:   reports that she quit smoking about 16 years ago. She has quit using smokeless tobacco. She reports that she does not drink alcohol or use illicit drugs. PE:  Blood pressure 136/87, pulse 84, temperature 98.1 °F (36.7 °C), temperature source Oral, resp. rate 20, height 5' 6\" (1.676 m), weight 201 lb 3.2 oz (91.3 kg), last menstrual period 04/27/2017, SpO2 97 %, not currently breastfeeding. Body mass index is 32.47 kg/(m^2). Physical Exam   Constitutional: She is oriented to person, place, and time. She appears well-developed and well-nourished. No distress.    HENT: Head: Normocephalic. Mouth/Throat: Oropharynx is clear and moist.   Left TM retracted, right TM wnl. OP with cobblestoning, no erythema. + bilateral nasal edema with drainage. Eyes: Conjunctivae and EOM are normal.   Neck: Neck supple. Cardiovascular: Normal rate, regular rhythm and normal heart sounds. No murmur heard. Pulmonary/Chest: Effort normal and breath sounds normal. No respiratory distress. She has no wheezes. Neurological: She is alert and oriented to person, place, and time. Skin: Skin is warm and dry. Nursing note and vitals reviewed. A/P  Oris [place]. Shree Bae was seen today for had concerns including Cough; Generalized Body Aches; Chills; and Nasal Discharge. .  The diagnosis and plan was discussed including:        ICD-10-CM ICD-9-CM    1. Acute non-recurrent maxillary sinusitis J01.00 461.0 amoxicillin-clavulanate (AUGMENTIN) 875-125 mg per tablet   2. Cough R05 786.2 benzonatate (TESSALON) 100 mg capsule     Acute bacterial sinusitis, with underlying allergic rhinitis: continue zyrtec, add in antibiotic since symptoms ongoing for > 2 weeks. Cough, with some relief with albuterol. Continue using as needed, trial benzonatate to help with pm cough and rest.     - I advised her to call back or return to office if symptoms worsen/change/persist.  - She was given AVS and expressed understanding with the diagnosis and plan as discussed. Follow-up Disposition:  Return if symptoms worsen or fail to improve.

## 2017-05-15 NOTE — PROGRESS NOTES
Rm 1    Chief Complaint   Patient presents with    Cough     occassionally productivre    Generalized Body Aches    Chills    Nasal Discharge         1. Have you been to the ER, urgent care clinic since your last visit? Hospitalized since your last visit? Went to urgent care clinic for chills, cold symptoms. 2. Have you seen or consulted any other health care providers outside of the [address] [place] [address] since your last visit? Include any pap smears or colon screening.  No

## 2017-05-15 NOTE — MR AVS SNAPSHOT
Visit Information Date & Time Provider Department Dept. Phone Encounter #  
 5/15/2017  9:45 AM Reema Galvan MD [facility] [phone] [id number] Follow-up Instructions Return if symptoms worsen or fail to improve. Upcoming Health Maintenance Date Due INFLUENZA AGE 9 TO ADULT 8/1/2017 PAP AKA CERVICAL CYTOLOGY 3/23/2019 COLONOSCOPY 4/18/2021 DTaP/Tdap/Td series (2 - Td) 7/20/2021 Allergies as of 5/15/2017  Review Complete On: 5/15/2017 By: Garret Gayle Severity Noted Reaction Type Reactions Latex High 07/20/2011    Rash Adhesive  10/20/2012    Rash Current Immunizations  Reviewed on 12/29/2013 Name Date Influenza Vaccine PF 12/1/2015, 12/29/2013  1:30 PM  
 Influenza Vaccine Split 10/19/2011 Pneumococcal Polysaccharide (PPSV-23) 6/24/2016 TDAP Vaccine 7/20/2011 Not reviewed this visit You Were Diagnosed With   
  
 Codes Comments Acute non-recurrent maxillary sinusitis    -  Primary ICD-10-CM: J01.00 ICD-9-CM: 461.0 Cough     ICD-10-CM: R05 ICD-9-CM: 095. 2 Vitals BP Pulse Temp Resp Height(growth percentile) Weight(growth percentile) 136/87 84 98.1 °F (36.7 °C) (Oral) 20 5' 6\" (1.676 m) 201 lb 3.2 oz (91.3 kg) LMP SpO2 BMI OB Status Smoking Status 04/27/2017 97% 32.47 kg/m2 Having regular periods Former Smoker Vitals History BMI and BSA Data Body Mass Index Body Surface Area  
 32.47 kg/m 2 2.06 m 2 Preferred Pharmacy Pharmacy Name Phone [facility] [address] [facility], [address] [place] [address] Avenue [phone] Your Updated Medication List  
  
   
This list is accurate as of: 5/15/17 10:31 AM.  Always use your most recent med list.  
  
  
  
  
 albuterol 90 mcg/actuation inhaler Commonly known as:  PROVENTIL HFA, VENTOLIN HFA, PROAIR HFA Take 2 Puffs by inhalation every four (4) hours as needed for Wheezing or Shortness of Breath. amoxicillin-clavulanate 875-125 mg per tablet Commonly known as:  AUGMENTIN Take 1 Tab by mouth every twelve (12) hours for 10 days. AUBAGIO 14 mg Tab Generic drug:  teriflunomide  
  
 benzonatate 100 mg capsule Commonly known as:  TESSALON Take 1 Cap by mouth nightly as needed for Cough for up to 7 days. biotin 10,000 mcg Cap Take  by mouth. LO LOESTRIN FE 1 mg-10 mcg (24)/10 mcg (2) Tab Generic drug:  norethindrone-e.estradiol-iron  
  
 methylPREDNISolone 4 mg tablet Commonly known as:  MEDROL (JOSE) As directed VITAMIN D3 2,000 unit Tab Generic drug:  cholecalciferol (vitamin D3) Take  by mouth. Prescriptions Sent to Pharmacy Refills  
 amoxicillin-clavulanate (AUGMENTIN) 875-125 mg per tablet 0 Sig: Take 1 Tab by mouth every twelve (12) hours for 10 days. Class: Normal  
 Pharmacy: [place] [address], [address] [facility] Ph #: [phone] Route: Oral  
 benzonatate (TESSALON) 100 mg capsule 0 Sig: Take 1 Cap by mouth nightly as needed for Cough for up to 7 days. Class: Normal  
 Pharmacy: [place] [address], [address] [facility] Ph #: [phone] Route: Oral  
  
Follow-up Instructions Return if symptoms worsen or fail to improve. Patient Instructions Start augmentin. Continue zyrtec and as needed albuterol. Add benzonatate tablet as needed for cough. Sinusitis: Care Instructions Your Care Instructions Sinusitis is an infection of the lining of the sinus cavities in your head. Sinusitis often follows a cold. It causes pain and pressure in your head and face. In most cases, sinusitis gets better on its own in 1 to 2 weeks. But some mild symptoms may last for several weeks. Sometimes antibiotics are needed. Follow-up care is a key part of your treatment and safety.  Be sure to make and go to all appointments, and call your doctor if you are having problems. It's also a good idea to know your test results and keep a list of the medicines you take. How can you care for yourself at home? · Take an over-the-counter pain medicine, such as acetaminophen (Tylenol), ibuprofen (Advil, Motrin), or naproxen (Aleve). Read and follow all instructions on the label. · If the doctor prescribed antibiotics, take them as directed. Do not stop taking them just because you feel better. You need to take the full course of antibiotics. · Be careful when taking over-the-counter cold or flu medicines and Tylenol at the same time. Many of these medicines have acetaminophen, which is Tylenol. Read the labels to make sure that you are not taking more than the recommended dose. Too much acetaminophen (Tylenol) can be harmful. · Breathe warm, moist air from a steamy shower, a hot bath, or a sink filled with hot water. Avoid cold, dry air. Using a humidifier in your home may help. Follow the directions for cleaning the machine. · Use saline (saltwater) nasal washes to help keep your nasal passages open and wash out mucus and bacteria. You can buy saline nose drops at a grocery store or drugstore. Or you can make your own at home by adding 1 teaspoon of salt and 1 teaspoon of baking soda to 2 cups of distilled water. If you make your own, fill a bulb syringe with the solution, insert the tip into your nostril, and squeeze gently. Abbie Sine your nose. · Put a hot, wet towel or a warm gel pack on your face 3 or 4 times a day for 5 to 10 minutes each time. · Try a decongestant nasal spray like oxymetazoline (Afrin). Do not use it for more than 3 days in a row. Using it for more than 3 days can make your congestion worse. When should you call for help? Call your doctor now or seek immediate medical care if: 
· You have new or worse swelling or redness in your face or around your eyes. · You have a new or higher fever. Watch closely for changes in your health, and be sure to contact your doctor if: 
· You have new or worse facial pain. · The mucus from your nose becomes thicker (like pus) or has new blood in it. · You are not getting better as expected. Where can you learn more? Go to http://temi-james.info/. Enter Z875 in the search box to learn more about \"Sinusitis: Care Instructions. \" Current as of: July 29, 2016 Content Version: 11.2 © 1410-9987 [employer]. Care instructions adapted under license by [employer] (which disclaims liability or warranty for this information). If you have questions about a medical condition or this instruction, always ask your healthcare professional. [address] any warranty or liability for your use of this information. Introducing [facility]! Dear Tanesha De Jesus: 
Thank you for requesting a [employer] account. Our records indicate that you already have an active [employer] account. You can access your account anytime at [email or web address] Did you know that you can access your hospital and ER discharge instructions at any time in [employer]? You can also review all of your test results from your hospital stay or ER visit. Additional Information If you have questions, please visit the Frequently Asked Questions section of the [employer] website at [email or web address] Remember, [employer] is NOT to be used for urgent needs. For medical emergencies, dial 911. Now available from your iPhone and Android! Please provide this summary of care documentation to your next provider. Your primary care clinician is listed as [address] If you have any questions after today's visit, please call [phone].

## 2017-05-15 NOTE — PATIENT INSTRUCTIONS
Start augmentin. Continue zyrtec and as needed albuterol. Add benzonatate tablet as needed for cough. Sinusitis: Care Instructions  Your Care Instructions    Sinusitis is an infection of the lining of the sinus cavities in your head. Sinusitis often follows a cold. It causes pain and pressure in your head and face. In most cases, sinusitis gets better on its own in 1 to 2 weeks. But some mild symptoms may last for several weeks. Sometimes antibiotics are needed. Follow-up care is a key part of your treatment and safety. Be sure to make and go to all appointments, and call your doctor if you are having problems. It's also a good idea to know your test results and keep a list of the medicines you take. How can you care for yourself at home? · Take an over-the-counter pain medicine, such as acetaminophen (Tylenol), ibuprofen (Advil, Motrin), or naproxen (Aleve). Read and follow all instructions on the label. · If the doctor prescribed antibiotics, take them as directed. Do not stop taking them just because you feel better. You need to take the full course of antibiotics. · Be careful when taking over-the-counter cold or flu medicines and Tylenol at the same time. Many of these medicines have acetaminophen, which is Tylenol. Read the labels to make sure that you are not taking more than the recommended dose. Too much acetaminophen (Tylenol) can be harmful. · Breathe warm, moist air from a steamy shower, a hot bath, or a sink filled with hot water. Avoid cold, dry air. Using a humidifier in your home may help. Follow the directions for cleaning the machine. · Use saline (saltwater) nasal washes to help keep your nasal passages open and wash out mucus and bacteria. You can buy saline nose drops at a grocery store or drugstore. Or you can make your own at home by adding 1 teaspoon of salt and 1 teaspoon of baking soda to 2 cups of distilled water.  If you make your own, fill a bulb syringe with the solution, insert the tip into your nostril, and squeeze gently. Berneice Isabella your nose. · Put a hot, wet towel or a warm gel pack on your face 3 or 4 times a day for 5 to 10 minutes each time. · Try a decongestant nasal spray like oxymetazoline (Afrin). Do not use it for more than 3 days in a row. Using it for more than 3 days can make your congestion worse. When should you call for help? Call your doctor now or seek immediate medical care if:  · You have new or worse swelling or redness in your face or around your eyes. · You have a new or higher fever. Watch closely for changes in your health, and be sure to contact your doctor if:  · You have new or worse facial pain. · The mucus from your nose becomes thicker (like pus) or has new blood in it. · You are not getting better as expected. Where can you learn more? Go to http://temi-james.info/. Enter T432 in the search box to learn more about \"Sinusitis: Care Instructions. \"  Current as of: July 29, 2016  Content Version: 11.2  © 0875-6816 [employer]. Care instructions adapted under license by [employer] (which disclaims liability or warranty for this information). If you have questions about a medical condition or this instruction, always ask your healthcare professional. [address] any warranty or liability for your use of this information.

## 2017-09-27 ENCOUNTER — HOSPITAL ENCOUNTER (EMERGENCY)
Age: 42
Discharge: HOME | End: 2017-09-27
Attending: FAMILY MEDICINE

## 2017-09-27 VITALS
WEIGHT: 213 LBS | OXYGEN SATURATION: 97 % | BODY MASS INDEX: 34.23 KG/M2 | DIASTOLIC BLOOD PRESSURE: 91 MMHG | TEMPERATURE: 98.4 F | RESPIRATION RATE: 18 BRPM | HEIGHT: 66 IN | SYSTOLIC BLOOD PRESSURE: 178 MMHG | HEART RATE: 93 BPM

## 2017-09-27 DIAGNOSIS — J45.20 RAD (REACTIVE AIRWAY DISEASE), MILD INTERMITTENT, UNCOMPLICATED: ICD-10-CM

## 2017-09-27 DIAGNOSIS — J06.9 VIRAL URI WITH COUGH: Primary | ICD-10-CM

## 2017-09-27 RX ORDER — METHYLPREDNISOLONE 4 MG/1
TABLET ORAL
Qty: 1 DOSE PACK | Refills: 0 | Status: SHIPPED | OUTPATIENT
Start: 2017-09-27 | End: 2018-03-06 | Stop reason: ALTCHOICE

## 2017-09-27 RX ORDER — ALBUTEROL SULFATE 90 UG/1
2 AEROSOL, METERED RESPIRATORY (INHALATION)
Qty: 1 INHALER | Refills: 1 | Status: SHIPPED | OUTPATIENT
Start: 2017-09-27

## 2017-09-27 RX ORDER — CODEINE PHOSPHATE AND GUAIFENESIN 10; 100 MG/5ML; MG/5ML
5 SOLUTION ORAL
Qty: 120 ML | Refills: 0 | Status: SHIPPED | OUTPATIENT
Start: 2017-09-27 | End: 2017-10-24

## 2017-09-27 RX ORDER — BENZONATATE 200 MG/1
200 CAPSULE ORAL
Qty: 21 CAP | Refills: 0 | Status: SHIPPED | OUTPATIENT
Start: 2017-09-27 | End: 2017-10-04

## 2017-09-27 NOTE — DISCHARGE INSTRUCTIONS
Saline Nasal Washes: Care Instructions  Your Care Instructions  Saline nasal washes help keep the nasal passages open by washing out thick or dried mucus. This simple remedy can help relieve symptoms of allergies, sinusitis, and colds. It also can make the nose feel more comfortable by keeping the mucous membranes moist. You may notice a little burning sensation in your nose the first few times you use the solution, but this usually gets better in a few days. Follow-up care is a key part of your treatment and safety. Be sure to make and go to all appointments, and call your doctor if you are having problems. It's also a good idea to know your test results and keep a list of the medicines you take. How can you care for yourself at home? · You can buy premixed saline solution in a squeeze bottle or other sinus rinse products at a drugstore. Read and follow the instructions on the label. · You also can make your own saline solution by adding 1 teaspoon of salt and 1 teaspoon of baking soda to 2 cups of distilled water. · If you use a homemade solution, pour a small amount into a clean bowl. Using a rubber bulb syringe, squeeze the syringe and place the tip in the salt water. Pull a small amount of the salt water into the syringe by relaxing your hand. · Sit down with your head tilted slightly back. Do not lie down. Put the tip of the bulb syringe or the squeeze bottle a little way into one of your nostrils. Gently drip or squirt a few drops into the nostril. Repeat with the other nostril. Some sneezing and gagging are normal at first.  · Gently blow your nose. · Wipe the syringe or bottle tip clean after each use. · Repeat this 2 or 3 times a day. · Use nasal washes gently if you have nosebleeds often. When should you call for help? Watch closely for changes in your health, and be sure to contact your doctor if:  · You often get nosebleeds. · You have problems doing the nasal washes.   Where can you learn more? Go to http://temi-james.info/. Enter 071 [id number] [id number] in the search box to learn more about \"Saline Nasal Washes: Care Instructions. \"  Current as of: May 4, 2017  Content Version: 11.3  © 4880-5722 Publ[employer]. Care instructions adapted under license by [employer] (which disclaims liability or warranty for this information). If you have questions about a medical condition or this instruction, always ask your healthcare professional. [address] any warranty or liability for your use of this information. Viral Respiratory Infection: Care Instructions  Your Care Instructions  Viruses are very small organisms. They grow in number after they enter your body. There are many types that cause different illnesses, such as colds and the mumps. The symptoms of a viral respiratory infection often start quickly. They include a fever, sore throat, and runny nose. You may also just not feel well. Or you may not want to eat much. Most viral respiratory infections are not serious. They usually get better with time and self-care. Antibiotics are not used to treat a viral infection. That's because antibiotics will not help cure a viral illness. In some cases, antiviral medicine can help your body fight a serious viral infection. Follow-up care is a key part of your treatment and safety. Be sure to make and go to all appointments, and call your doctor if you are having problems. It's also a good idea to know your test results and keep a list of the medicines you take. How can you care for yourself at home? · Rest as much as possible until you feel better. · Be safe with medicines. Take your medicine exactly as prescribed. Call your doctor if you think you are having a problem with your medicine. You will get more details on the specific medicine your doctor prescribes.   · Take an over-the-counter pain medicine, such as acetaminophen (Tylenol), ibuprofen (Advil, Motrin), or naproxen (Aleve), as needed for pain and fever. Read and follow all instructions on the label. Do not give aspirin to anyone younger than 20. It has been linked to Reye syndrome, a serious illness. · Drink plenty of fluids, enough so that your urine is light yellow or clear like water. Hot fluids, such as tea or soup, may help relieve congestion in your nose and throat. If you have kidney, heart, or liver disease and have to limit fluids, talk with your doctor before you increase the amount of fluids you drink. · Try to clear mucus from your lungs by breathing deeply and coughing. · Gargle with warm salt water once an hour. This can help reduce swelling and throat pain. Use 1 teaspoon of salt mixed in 1 cup of warm water. · Do not smoke or allow others to smoke around you. If you need help quitting, talk to your doctor about stop-smoking programs and medicines. These can increase your chances of quitting for good. To avoid spreading the virus  · Cough or sneeze into a tissue. Then throw the tissue away. · If you don't have a tissue, use your hand to cover your cough or sneeze. Then clean your hand. You can also cough into your sleeve. · Wash your hands often. Use soap and warm water. Wash for 15 to 20 seconds each time. · If you don't have soap and water near you, you can clean your hands with alcohol wipes or gel. When should you call for help? Call your doctor now or seek immediate medical care if:  · You have a new or higher fever. · Your fever lasts more than 48 hours. · You have trouble breathing. · You have a fever with a stiff neck or a severe headache. · You are sensitive to light. · You feel very sleepy or confused. Watch closely for changes in your health, and be sure to contact your doctor if:  · You do not get better as expected. Where can you learn more? Go to http://niall.info/.   Enter F376 in the search box to learn more about \"Viral Respiratory Infection: Care Instructions. \"  Current as of: March 25, 2017  Content Version: 11.3  © 1377-7914 [employer]Dealio. Care instructions adapted under license by [employer] (which disclaims liability or warranty for this information). If you have questions about a medical condition or this instruction, always ask your healthcare professional. [address] any warranty or liability for your use of this information.

## 2017-09-27 NOTE — UC PROVIDER NOTE
Patient is a 36 y.o. female presenting with cough. The history is provided by the patient. Cough   This is a new problem. The current episode started more than 2 days ago. The problem occurs every few minutes. The problem has not changed since onset. The cough is productive of sputum. There has been no fever. Pertinent negatives include no chest pain, no chills, no rhinorrhea, no sore throat, no shortness of breath and no wheezing. She has tried nothing for the symptoms. She is not a smoker. Her past medical history is significant for bronchitis and asthma.         Past Medical History:   Diagnosis Date    Ankle fracture     Asthma     Autoimmune disease ([facility] [address]     MS    Chronic pain     MS    Depression     Diverticulosis of sigmoid colon     Dysplastic colon polyp     Dr. Rodger Louis - last colonoscopy 12 - single polyp    Essential hypertension     Gestasional    GERD (gastroesophageal reflux disease)     Influenza B 2017    EDGARD virus antibody positive     Kidney stones     Miscarriage         MS (multiple sclerosis) ([facility] [address]     Dr. Vadnana Francis Ovarian cyst     Routine gynecological examination     Dr. Topete Situ Sleep apnea     pt states she no longer has the problem since wt loss - intentional wt loss    Tubular adenoma of colon 16    Uterine fibroid     Vitamin D deficiency 2011        Past Surgical History:   Procedure Laterality Date    HX ANKLE FRACTURE TX      double compound fx of right lower leg and dislocated heel - has a plate and 13 screws    HX  SECTION          HX GYN      fibroid tumor ovarian cyst     HX ORTHOPAEDIC      1992 Left shoulder surgery         Family History   Problem Relation Age of Onset    Cancer Father      appendix/cancerous colon polyps    Heart Disease Father     Cancer Maternal Uncle      prostate/melanoma    Cancer Maternal Grandmother      cancerous colon polyps    Cancer Maternal Grandfather      prostate    Heart Disease Paternal Grandmother     Cancer Paternal Grandmother      cancerous colon polyps    No Known Problems Daughter     Colon Polyps Mother      precancerous    Heart Disease Paternal Aunt         Social History     Social History    Marital status:      Spouse name: N/A    Number of children: N/A    Years of education: N/A     Occupational History    Not on file. Social History Main Topics    Smoking status: Former Smoker     Quit date: 7/20/2000    Smokeless tobacco: Former User    Alcohol use No      Comment: rare    Drug use: No    Sexual activity: No     Other Topics Concern    Not on file     Social History Narrative                ALLERGIES: Latex and Adhesive    Review of Systems   Constitutional: Negative for chills. HENT: Negative for rhinorrhea and sore throat. Respiratory: Positive for cough. Negative for shortness of breath and wheezing. Cardiovascular: Negative for chest pain. All other systems reviewed and are negative. Vitals:    09/27/17 0821   BP: (!) 178/91   Pulse: 93   Resp: 18   Temp: 98.4 °F (36.9 °C)   SpO2: 97%   Weight: 96.6 kg (213 lb)   Height: 5' 6\" (1.676 m)       Physical Exam   Constitutional: No distress. HENT:   Right Ear: Tympanic membrane and ear canal normal.   Left Ear: Tympanic membrane and ear canal normal.   Nose: Nose normal.   Mouth/Throat: No oropharyngeal exudate, posterior oropharyngeal edema or posterior oropharyngeal erythema. Eyes: Conjunctivae are normal. Right eye exhibits no discharge. Left eye exhibits no discharge. Neck: Neck supple. Pulmonary/Chest: Effort normal and breath sounds normal. No respiratory distress. She has no wheezes. She has no rales. Lymphadenopathy:     She has no cervical adenopathy. Skin: No rash noted. Nursing note and vitals reviewed.       MDM     Differential Diagnosis; Clinical Impression; Plan:     CLINICAL IMPRESSION:  Viral URI with cough  (primary encounter diagnosis)  RAD (reactive airway disease), mild intermittent, uncomplicated      DDX    Plan:    Fluids/ gargles  Claritin/ allegra   Tylenol cold-sinus - max strength 1-2 tab 4 times/ day    with Advil as needed    If not better in 4-5 days - may use prednisone  Tessalon and robitussin AC   Use Mucinex DM twice a day  Amount and/or Complexity of Data Reviewed:    Review and summarize past medical records:  Yes  Risk of Significant Complications, Morbidity, and/or Mortality:   Presenting problems: Moderate  Management options:   Moderate  Progress:   Patient progress:  Stable      Procedures

## 2017-10-24 ENCOUNTER — OFFICE VISIT (OUTPATIENT)
Dept: INTERNAL MEDICINE CLINIC | Age: 42
End: 2017-10-24

## 2017-10-24 VITALS
HEART RATE: 106 BPM | TEMPERATURE: 97.4 F | WEIGHT: 211 LBS | DIASTOLIC BLOOD PRESSURE: 89 MMHG | RESPIRATION RATE: 16 BRPM | BODY MASS INDEX: 33.91 KG/M2 | SYSTOLIC BLOOD PRESSURE: 149 MMHG | OXYGEN SATURATION: 96 % | HEIGHT: 66 IN

## 2017-10-24 DIAGNOSIS — J06.9 VIRAL URI: Primary | ICD-10-CM

## 2017-10-24 LAB
FLUAV+FLUBV AG NOSE QL IA.RAPID: NEGATIVE POS/NEG
FLUAV+FLUBV AG NOSE QL IA.RAPID: NEGATIVE POS/NEG
VALID INTERNAL CONTROL?: YES

## 2017-10-24 RX ORDER — GABAPENTIN 300 MG/1
600 CAPSULE ORAL 2 TIMES DAILY
COMMUNITY
Start: 2017-10-20

## 2017-10-24 RX ORDER — DICLOFENAC SODIUM 75 MG/1
TABLET, DELAYED RELEASE ORAL
COMMUNITY
Start: 2017-10-02 | End: 2019-08-02 | Stop reason: SINTOL

## 2017-10-24 NOTE — MR AVS SNAPSHOT
Visit Information Date & Time Provider Department Dept. Phone Encounter #  
 10/24/2017 11:15 AM Rogers Ruiz MD [address] [facility] [phone] [id number] Follow-up Instructions Return if symptoms worsen or fail to improve. Upcoming Health Maintenance Date Due Pneumococcal 19-64 Highest Risk (2 of 3 - PCV13) 6/24/2017 INFLUENZA AGE 9 TO ADULT 8/1/2017 PAP AKA CERVICAL CYTOLOGY 3/23/2019 COLONOSCOPY 4/18/2021 DTaP/Tdap/Td series (2 - Td) 7/20/2021 Allergies as of 10/24/2017  Review Complete On: 10/24/2017 By: Jakob Land LPN Severity Noted Reaction Type Reactions Latex High 07/20/2011    Rash Adhesive  10/20/2012    Rash Current Immunizations  Reviewed on 12/29/2013 Name Date Influenza Vaccine PF 12/1/2015, 12/29/2013  1:30 PM  
 Influenza Vaccine Split 10/19/2011 Pneumococcal Polysaccharide (PPSV-23) 6/24/2016 TDAP Vaccine 7/20/2011 Not reviewed this visit You Were Diagnosed With   
  
 Codes Comments Viral URI    -  Primary ICD-10-CM: J06.9, B97.89 ICD-9-CM: 465.9 Vitals BP Pulse Temp Resp Height(growth percentile) Weight(growth percentile) 149/89 (BP 1 Location: Left arm, BP Patient Position: Sitting) (!) 106 97.4 °F (36.3 °C) (Temporal) 16 5' 6\" (1.676 m) 211 lb (95.7 kg) LMP SpO2 BMI OB Status Smoking Status 09/12/2017 (Exact Date) 96% 34.06 kg/m2 Having regular periods Former Smoker BMI and BSA Data Body Mass Index Body Surface Area 34.06 kg/m 2 2.11 m 2 Preferred Pharmacy Pharmacy Name Phone [id number] [address] [facility] [phone] Your Updated Medication List  
  
   
This list is accurate as of: 10/24/17 12:30 PM.  Always use your most recent med list.  
  
  
  
  
 albuterol 90 mcg/actuation inhaler Commonly known as:  PROVENTIL HFA, VENTOLIN HFA, PROAIR HFA  
 Take 2 Puffs by inhalation every four (4) hours as needed for Wheezing or Shortness of Breath. AUBAGIO 14 mg Tab Generic drug:  teriflunomide  
  
 biotin 10,000 mcg Cap Take  by mouth. diclofenac EC 75 mg EC tablet Commonly known as:  VOLTAREN  
  
 gabapentin 300 mg capsule Commonly known as:  NEURONTIN  
  
 guaiFENesin-dextromethorphan -30 mg per tablet Commonly known as:  HUMIBID DM Take 1 Tab by mouth every twelve (12) hours as needed for Cough or Congestion. LO LOESTRIN FE 1 mg-10 mcg (24)/10 mcg (2) Tab Generic drug:  norethindrone-e.estradiol-iron  
  
 methylPREDNISolone 4 mg tablet Commonly known as:  MEDROL (JOSE) As directed VITAMIN D3 2,000 unit Tab Generic drug:  cholecalciferol (vitamin D3) Take  by mouth. Prescriptions Sent to Pharmacy Refills  
 guaiFENesin-dextromethorphan SR (HUMIBID DM) 600-30 mg per tablet 0 Sig: Take 1 Tab by mouth every twelve (12) hours as needed for Cough or Congestion. Class: Normal  
 Pharmacy: [id number] [address] [place] [address] #: [phone] Route: Oral  
  
We Performed the Following AMB POC BETO INFLUENZA A/B TEST [14855 CPT(R)] Follow-up Instructions Return if symptoms worsen or fail to improve. Patient Instructions Upper Respiratory Infection (Cold): Care Instructions Your Care Instructions An upper respiratory infection, or URI, is an infection of the nose, sinuses, or throat. URIs are spread by coughs, sneezes, and direct contact. The common cold is the most frequent kind of URI. The flu and sinus infections are other kinds of URIs. Almost all URIs are caused by viruses. Antibiotics won't cure them. But you can treat most infections with home care. This may include drinking lots of fluids and taking over-the-counter pain medicine. You will probably feel better in 4 to 10 days. The doctor has checked you carefully, but problems can develop later. If you notice any problems or new symptoms, get medical treatment right away. Follow-up care is a key part of your treatment and safety. Be sure to make and go to all appointments, and call your doctor if you are having problems. It's also a good idea to know your test results and keep a list of the medicines you take. How can you care for yourself at home? · To prevent dehydration, drink plenty of fluids, enough so that your urine is light yellow or clear like water. Choose water and other caffeine-free clear liquids until you feel better. If you have kidney, heart, or liver disease and have to limit fluids, talk with your doctor before you increase the amount of fluids you drink. · Take an over-the-counter pain medicine, such as acetaminophen (Tylenol), ibuprofen (Advil, Motrin), or naproxen (Aleve). Read and follow all instructions on the label. · Before you use cough and cold medicines, check the label. These medicines may not be safe for young children or for people with certain health problems. · Be careful when taking over-the-counter cold or flu medicines and Tylenol at the same time. Many of these medicines have acetaminophen, which is Tylenol. Read the labels to make sure that you are not taking more than the recommended dose. Too much acetaminophen (Tylenol) can be harmful. · Get plenty of rest. 
· Do not smoke or allow others to smoke around you. If you need help quitting, talk to your doctor about stop-smoking programs and medicines. These can increase your chances of quitting for good. When should you call for help? Call 911 anytime you think you may need emergency care. For example, call if: 
· You have severe trouble breathing. Call your doctor now or seek immediate medical care if: 
· You seem to be getting much sicker. · You have new or worse trouble breathing. · You have a new or higher fever. · You have a new rash. Watch closely for changes in your health, and be sure to contact your doctor if: 
· You have a new symptom, such as a sore throat, an earache, or sinus pain. · You cough more deeply or more often, especially if you notice more mucus or a change in the color of your mucus. · You do not get better as expected. Where can you learn more? Go to http://temi-james.info/. Enter J998 in the search box to learn more about \"Upper Respiratory Infection (Cold): Care Instructions. \" Current as of: March 25, 2017 Content Version: 11.3 © 2265-0975 Websense. Care instructions adapted under license by [employer] (which disclaims liability or warranty for this information). If you have questions about a medical condition or this instruction, always ask your healthcare professional. [address] any warranty or liability for your use of this information. Introducing [facility]! Dear Alanis Alston: 
Thank you for requesting a Yugma account. Our records indicate that you already have an active Yugma account. You can access your account anytime at [email or web address]. [email or web address] Did you know that you can access your hospital and ER discharge instructions at any time in Yugma? You can also review all of your test results from your hospital stay or ER visit. Additional Information If you have questions, please visit the Frequently Asked Questions section of the Yugma website at [email or web address] Remember, Yugma is NOT to be used for urgent needs. For medical emergencies, dial 911. Now available from your iPhone and Android! Please provide this summary of care documentation to your next provider. Your primary care clinician is listed as [id number] If you have any questions after today's visit, please call [phone].

## 2017-10-24 NOTE — PROGRESS NOTES
ACUTE VISIT     HPI:   Nerissa Breaux is a 36 y.o. female, she presents today for:     Daughter (3year old) sick for 1 week with congestion and sore thorat. Then amy started feeling sick in last few days. Notes cough and congestion. Also some anterior throat pain. Fever yesterday to 80 F.     ROS: no N/V/D, no rash, no shortness of breath, no chest pain    Medications used for acute illness: pseudoephedrine, last dose yesterday    Current Outpatient Prescriptions on File Prior to Visit   Medication Sig    albuterol (PROVENTIL HFA, VENTOLIN HFA, PROAIR HFA) 90 mcg/actuation inhaler Take 2 Puffs by inhalation every four (4) hours as needed for Wheezing or Shortness of Breath.  AUBAGIO 14 mg tab     cholecalciferol, vitamin D3, (VITAMIN D3) 2,000 unit tab Take  by mouth.  biotin 10,000 mcg cap Take  by mouth.  LO LOESTRIN FE 1 mg-10 mcg (24)/10 mcg (2) tab     guaiFENesin-codeine (ROBITUSSIN AC) 100-10 mg/5 mL solution Take 5 mL by mouth three (3) times daily as needed for Cough. Max Daily Amount: 15 mL.  methylPREDNISolone (MEDROL, JOSE,) 4 mg tablet As directed     No current facility-administered medications on file prior to visit. Allergies   Allergen Reactions    Latex Rash    Adhesive Rash     PMH/PSH/FH: reviewed and updated    Sochx:   reports that she quit smoking about 17 years ago. She has quit using smokeless tobacco. She reports that she does not drink alcohol or use illicit drugs. PE:  Blood pressure 149/89, pulse (!) 106, temperature 97.4 °F (36.3 °C), temperature source Temporal, resp. rate 16, height 5' 6\" (1.676 m), weight 211 lb (95.7 kg), last menstrual period 09/12/2017, SpO2 96 %, not currently breastfeeding. Body mass index is 34.06 kg/(m^2). Physical Exam   Constitutional: She is oriented to person, place, and time. She appears well-developed and well-nourished. No distress. HENT:   Head: Normocephalic.    Mouth/Throat: Oropharynx is clear and moist.   TM wnl bilaterally   Eyes: Conjunctivae and EOM are normal.   Neck: Neck supple. No thyromegaly present. Cardiovascular: Normal rate, regular rhythm and normal heart sounds. Pulmonary/Chest: Effort normal and breath sounds normal. No respiratory distress. She has no wheezes. Abdominal: She exhibits no distension. Lymphadenopathy:     She has no cervical adenopathy. Neurological: She is alert and oriented to person, place, and time. Skin: Skin is warm and dry. Nursing note and vitals reviewed. Labs:  No results found for any visits on 10/24/17. A/P  Lugo Sessions. Wang Beal was seen today for had concerns including Cold. .  The diagnosis and plan was discussed including:        ICD-10-CM ICD-9-CM    1. Viral URI J06.9 465.9 AMB POC BETO INFLUENZA A/B TEST    B97.89  guaiFENesin-dextromethorphan SR (HUMIBID DM) 600-30 mg per tablet     Flu test negative. Discussed supportive care. - I advised her to call back or return to office if symptoms worsen/change/persist.  - She was given AVS and expressed understanding with the diagnosis and plan as discussed. Follow-up Disposition:  Return if symptoms worsen or fail to improve.

## 2017-10-24 NOTE — PROGRESS NOTES
Rm#2  Chief Complaint   Patient presents with    Cold     sore throat, gland swelling, fever-taking tylenol, non-productive, post nasal drainage, nasal drainage, hot/cold chills     1. Have you been to the ER, urgent care clinic since your last visit? Hospitalized since your last visit? No    2. Have you seen or consulted any other health care providers outside of the [facility] since your last visit? Include any pap smears or colon screening.  No  Health Maintenance Due   Topic Date Due    Pneumococcal 19-64 Highest Risk (2 of 3 - PCV13) 06/24/2017    INFLUENZA AGE 9 TO ADULT  08/01/2017

## 2017-10-24 NOTE — PATIENT INSTRUCTIONS

## 2017-10-25 ENCOUNTER — TELEPHONE (OUTPATIENT)
Dept: INTERNAL MEDICINE CLINIC | Age: 42
End: 2017-10-25

## 2017-10-25 DIAGNOSIS — B96.89 ACUTE BACTERIAL SINUSITIS: Primary | ICD-10-CM

## 2017-10-25 DIAGNOSIS — J01.90 ACUTE BACTERIAL SINUSITIS: Primary | ICD-10-CM

## 2017-10-25 RX ORDER — AMOXICILLIN AND CLAVULANATE POTASSIUM 875; 125 MG/1; MG/1
1 TABLET, FILM COATED ORAL EVERY 12 HOURS
Qty: 20 TAB | Refills: 0 | Status: SHIPPED | OUTPATIENT
Start: 2017-10-25 | End: 2017-11-04

## 2017-10-25 NOTE — TELEPHONE ENCOUNTER
Called patient back. Reports that she started having blood streaked in her mucous starting around 5 hours ago. Is concerned that she may have sinus infection. Taking prescribed medicines and ibuprofen/acetaminophen. Given blood streaked in mucous sent in rx for augmentin. Discussed with patient that this does not treat viral infection, which she definitely already has.      Navi Zeng MD

## 2017-10-25 NOTE — TELEPHONE ENCOUNTER
Patient is following up with email sent earlier this morning. Would like a nurse or Dr. Natalie Jasso to call her back in regards to \"being able to taste infection\" and is not able to blow or cough any mucus. Patient can be reached at: [phone].

## 2018-03-06 ENCOUNTER — OFFICE VISIT (OUTPATIENT)
Dept: INTERNAL MEDICINE CLINIC | Age: 43
End: 2018-03-06

## 2018-03-06 VITALS
HEIGHT: 66 IN | RESPIRATION RATE: 16 BRPM | DIASTOLIC BLOOD PRESSURE: 94 MMHG | TEMPERATURE: 98.6 F | WEIGHT: 218 LBS | SYSTOLIC BLOOD PRESSURE: 145 MMHG | BODY MASS INDEX: 35.03 KG/M2 | HEART RATE: 99 BPM | OXYGEN SATURATION: 97 %

## 2018-03-06 DIAGNOSIS — E55.9 VITAMIN D DEFICIENCY: ICD-10-CM

## 2018-03-06 DIAGNOSIS — I10 HYPERTENSION, UNSPECIFIED TYPE: Primary | ICD-10-CM

## 2018-03-06 DIAGNOSIS — R00.2 PALPITATIONS: ICD-10-CM

## 2018-03-06 DIAGNOSIS — G35 MS (MULTIPLE SCLEROSIS) (HCC): ICD-10-CM

## 2018-03-06 DIAGNOSIS — R06.09 DOE (DYSPNEA ON EXERTION): ICD-10-CM

## 2018-03-06 RX ORDER — DESOGESTREL AND ETHINYL ESTRADIOL 0.15-0.03
KIT ORAL
Refills: 4 | COMMUNITY
Start: 2018-03-04 | End: 2018-06-21 | Stop reason: ALTCHOICE

## 2018-03-06 RX ORDER — METOPROLOL SUCCINATE 25 MG/1
25 TABLET, EXTENDED RELEASE ORAL DAILY
Qty: 30 TAB | Refills: 5 | Status: SHIPPED | OUTPATIENT
Start: 2018-03-06 | End: 2018-04-24 | Stop reason: SDUPTHER

## 2018-03-06 NOTE — MR AVS SNAPSHOT
[address] [address] [place] [address] 
[phone] Patient: Merissa Beckett MRN: [id number] OGR:0/38/1922 Visit Information Date & Time Provider Department Dept. Phone Encounter #  
 3/6/2018  1:30 PM [facility] [phone] [id number] Follow-up Instructions Return in about 3 weeks (around 3/27/2018), or if symptoms worsen or fail to improve, for blood pressure. Upcoming Health Maintenance Date Due Pneumococcal 19-64 Highest Risk (3 of 3 - PCV13) 3/6/2019 PAP AKA CERVICAL CYTOLOGY 3/23/2019 COLONOSCOPY 4/18/2021 DTaP/Tdap/Td series (2 - Td) 7/20/2021 Allergies as of 3/6/2018  Review Complete On: 3/6/2018 By: Jesse Ag MD  
  
 Severity Noted Reaction Type Reactions Latex High 07/20/2011    Rash Adhesive  10/20/2012    Rash Current Immunizations  Reviewed on 3/6/2018 Name Date Influenza Vaccine 11/18/2017 Influenza Vaccine PF 12/1/2015, 12/29/2013  1:30 PM  
 Influenza Vaccine Split 10/19/2011 Pneumococcal Polysaccharide (PPSV-23) 6/24/2016 TDAP Vaccine 7/20/2011 Reviewed by Jesse Ag MD on 3/6/2018 at  2:18 PM  
You Were Diagnosed With   
  
 Codes Comments Hypertension, unspecified type    -  Primary ICD-10-CM: I10 
ICD-9-CM: 401.9 OWENS (dyspnea on exertion)     ICD-10-CM: R06.09 
ICD-9-CM: 786.09 Palpitations     ICD-10-CM: R00.2 ICD-9-CM: 785.1 Vitamin D deficiency     ICD-10-CM: E55.9 ICD-9-CM: 268.9 MS [facility]multiple [facility] ([facility]     ICD-10-CM: G35 
ICD-9-CM: 176 Vitals BP Pulse Temp Resp Height(growth percentile) Weight(growth percentile) (!) 145/94 (BP 1 Location: Right arm, BP Patient Position: Sitting) 99 98.6 °F (37 °C) (Oral) 16 5' 6\" (1.676 m) 218 lb (98.9 kg) SpO2 BMI OB Status Smoking Status 97% 35.19 kg/m2 Having regular periods Former Smoker BMI and BSA Data Body Mass Index Body Surface Area  
 35.19 kg/m 2 2.15 m 2 Preferred Pharmacy [facility]acy Name [id number] Viry S[address], [address] [phone] Your Updated Medication List  
  
   
This list is accurate as of 3/6/18  2:22 PM.  Always use your most recent med list.  
  
  
  
  
 albuterol 90 mcg/actuation inhaler Commonly known as:  PROVENTIL HFA, VENTOLIN HFA, PROAIR HFA Take 2 Puffs by inhalation every four (4) hours as needed for Wheezing or Shortness of Breath. AUBAGIO 14 mg Tab Generic drug:  teriflunomide  
  
 biotin 10,000 mcg Cap Take  by mouth. diclofenac EC 75 mg EC tablet Commonly known as:  VOLTAREN  
  
 ENSKYCE 0.15-0.03 mg Tab Generic drug:  desogestrel-ethinyl estradiol TAKE ONE TABLET BY MOUTH ONE TIME DAILY  
  
 gabapentin 300 mg capsule Commonly known as:  NEURONTIN  
  
 guaiFENesin-dextromethorphan -30 mg per tablet Commonly known as:  HUMIBID DM Take 1 Tab by mouth every twelve (12) hours as needed for Cough or Congestion. metoprolol succinate 25 mg XL tablet Commonly known as:  TOPROL-XL Take 1 Tab by mouth daily. VITAMIN D3 2,000 unit Tab Generic drug:  cholecalciferol (vitamin D3) Take  by mouth. Prescriptions Sent to Pharmacy Refills  
 metoprolol succinate (TOPROL-XL) 25 mg XL tablet 5 Sig: Take 1 Tab by mouth daily. Class: Normal  
 Pharmacy: [id number] [address] [place] [address] #: [phone] Route: Oral  
  
We Performed the Following AMB POC EKG ROUTINE W/ 12 LEADS, INTER & REP [19027 CPT(R)] C REACTIVE PROTEIN, QT [22250 CPT(R)] CBC WITH AUTOMATED DIFF [21397 CPT(R)] CORTISOL [id number] CPT(R)] MAGNESIUM [id number] CPT(R)] METABOLIC PANEL, COMPREHENSIVE [51615 CPT(R)] SED RATE (ESR) [id number] CPT(R)] T4, FREE [id number] CPT(R)] THYROID PEROXIDASE (TPO) AB [42441 CPT(R)] TSH 3RD GENERATION [33031 CPT(R)] VITAMIN D, 25 HYDROXY [id number] CPT(R)] Follow-up Instructions Return in about 3 weeks (around 3/27/2018), or if symptoms worsen or fail to improve, for blood pressure. To-Do List   
 03/13/2018 ECHO:  2D ECHO COMPLETE ADULT (TTE) W OR WO CONTR Introducing [facility]! Dear Julius Barbosa: 
Thank you for requesting a [employer] account. Our records indicate that you already have an active [employer] account. You can access your account anytime at [email or web address] Did you know that you can access your hospital and ER discharge instructions at any time in [employer]? You can also review all of your test results from your hospital stay or ER visit. Additional Information If you have questions, please visit the Frequently Asked Questions section of the [employer] website at [email or web address] Remember, [employer] is NOT to be used for urgent needs. For medical emergencies, dial 911. Now available from your iPhone and Android! Please provide this summary of care documentation to your next provider. Your primary care clinician is listed as [address] If you have any questions after today's visit, please call [phone].

## 2018-03-06 NOTE — PROGRESS NOTES
HPI:  Presents for f/u elevated blood pressures    BPs in the 140-150's/'s   At home and at drug store    +palpitations and OWENS as well  No chest pain    Pt taking diclofenac bid recently - x a month or so    No recent systemic steroid therapy   Last epidural steroid injection in 12/2017    Regular menstrual cycles  Taking OCP  But, +heat intolerance vs hot flashes    +family hist thyroid disease - sister and father    Asthma stable - no albuterol in > 6 months    Mother has HTN. Past medical, Social, and Family history reviewed    Prior to Admission medications    Medication Sig Start Date End Date Taking? Authorizing Provider   gabapentin (NEURONTIN) 300 mg capsule  10/20/17  Yes Historical Provider   diclofenac EC (VOLTAREN) 75 mg EC tablet  10/2/17  Yes Historical Provider   albuterol (PROVENTIL HFA, VENTOLIN HFA, PROAIR HFA) 90 mcg/actuation inhaler Take 2 Puffs by inhalation every four (4) hours as needed for Wheezing or Shortness of Breath. 9/27/17  Yes Thom Bustamante MD   AUBAGIO 14 mg tab  12/17/16  Yes Historical Provider   cholecalciferol, vitamin D3, (VITAMIN D3) 2,000 unit tab Take  by mouth. Yes Historical Provider   biotin 10,000 mcg cap Take  by mouth. Yes Historical Provider   LO LOESTRIN FE 1 mg-10 mcg (24)/10 mcg (2) tab  5/19/16  Yes Historical Provider   guaiFENesin-dextromethorphan SR (HUMIBID DM) 600-30 mg per tablet Take 1 Tab by mouth every twelve (12) hours as needed for Cough or Congestion. 10/24/17   Any Lynn MD   methylPREDNISolone (MEDROL, JOSE,) 4 mg tablet As directed 9/27/17   Thom Bustamante MD          ROS  Complete ROS reviewed and negative or stable except as noted in HPI. Physical Exam   Constitutional: She is oriented to person, place, and time. She appears well-nourished. No distress. HENT:   Head: Normocephalic and atraumatic. Mouth/Throat: Oropharynx is clear and moist.   Eyes: EOM are normal. Pupils are equal, round, and reactive to light.  No scleral icterus. Neck: Normal range of motion. Neck supple. No JVD present. Cardiovascular: Normal rate, regular rhythm and normal heart sounds. Exam reveals no gallop and no friction rub. No murmur heard. Pulmonary/Chest: Effort normal and breath sounds normal. No respiratory distress. She has no wheezes. She has no rales. Abdominal: Soft. Bowel sounds are normal. She exhibits no distension. There is no tenderness. Musculoskeletal: Normal range of motion. She exhibits no edema. Lymphadenopathy:     She has no cervical adenopathy. Neurological: She is alert and oriented to person, place, and time. She exhibits normal muscle tone. Skin: Skin is warm. No rash noted. Psychiatric: She has a normal mood and affect. Nursing note and vitals reviewed. Prior labs reviewed. EKG - reviewed - normal    Assessment/Plan:  HTN - NSAIDs likely contributing  R/o other source    ICD-10-CM ICD-9-CM    1. Hypertension, unspecified type I10 401.9 CBC WITH AUTOMATED DIFF      METABOLIC PANEL, COMPREHENSIVE      TSH 3RD GENERATION      T4, FREE      CORTISOL      THYROID PEROXIDASE (TPO) AB      2D ECHO COMPLETE ADULT (TTE) W OR WO CONTR      metoprolol succinate (TOPROL-XL) 25 mg XL tablet      ECG HOLTER MONITOR, ANALYSIS 48 HR   2. OWENS (dyspnea on exertion) R06.09 786.09 2D ECHO COMPLETE ADULT (TTE) W OR WO CONTR      ECG HOLTER MONITOR, ANALYSIS 48 HR   3. Palpitations R00.2 785.1 CBC WITH AUTOMATED DIFF      METABOLIC PANEL, COMPREHENSIVE      TSH 3RD GENERATION      T4, FREE      THYROID PEROXIDASE (TPO) AB      AMB POC EKG ROUTINE W/ 12 LEADS, INTER & REP      MAGNESIUM      2D ECHO COMPLETE ADULT (TTE) W OR WO CONTR      metoprolol succinate (TOPROL-XL) 25 mg XL tablet      ECG HOLTER MONITOR, ANALYSIS 48 HR   4.  Vitamin D deficiency E55.9 268.9 VITAMIN D, 25 HYDROXY   5. MS (multiple sclerosis) ([facility]) G35 340 SED RATE (ESR)      C REACTIVE PROTEIN, QT     Follow-up Disposition:  Return in about 3 weeks (around 3/27/2018), or if symptoms worsen or fail to improve, for blood pressure.   results and schedule of future studies reviewed with patient  reviewed diet, exercise and weight   cardiovascular risk and specific lipid/LDL goals reviewed  reviewed medications and side effects in detail   Check labs and thyroid  Echo   Holter monitor  Start beta blocker   Reduce NSAID if able  Titrate gabapentin to allow for less diclofenac

## 2018-03-06 NOTE — PROGRESS NOTES
RM 13    Chief Complaint   Patient presents with    Blood Pressure Check     occuring over past 2 weeks      1. Have you been to the ER, urgent care clinic since your last visit? Hospitalized since your last visit? No    2. Have you seen or consulted any other health care providers outside of the [address] [place] [address] since your last visit? Include any pap smears or colon screening.     [facility]th[facility], Oct/2017 for herniated disc  Health Maintenance Due   Topic Date Due    Pneumococcal 19-64 Highest Risk (2 of 3 - PCV13) 06/24/2017    Influenza Age 9 to Adult  08/01/2017   Pt received flu shot 11/2017    Learning Assessment 3/6/2018   PRIMARY LEARNER Patient   HIGHEST LEVEL OF EDUCATION - PRIMARY LEARNER  4 YEARS OF COLLEGE   BARRIERS PRIMARY LEARNER NONE   PRIMARY LANGUAGE ENGLISH   LEARNER PREFERENCE PRIMARY VIDEOS     READING   ANSWERED BY patient   RELATIONSHIP SELF

## 2018-03-07 LAB
25(OH)D3+25(OH)D2 SERPL-MCNC: 55 NG/ML (ref 30–100)
ALBUMIN SERPL-MCNC: 3.9 G/DL (ref 3.5–5.5)
ALBUMIN/GLOB SERPL: 1.4 {RATIO} (ref 1.2–2.2)
ALP SERPL-CCNC: 49 IU/L (ref 39–117)
ALT SERPL-CCNC: 31 IU/L (ref 0–32)
AST SERPL-CCNC: 19 IU/L (ref 0–40)
BASOPHILS # BLD AUTO: 0 X10E3/UL (ref 0–0.2)
BASOPHILS NFR BLD AUTO: 1 %
BILIRUB SERPL-MCNC: 0.4 MG/DL (ref 0–1.2)
BUN SERPL-MCNC: 14 MG/DL (ref 6–24)
BUN/CREAT SERPL: 21 (ref 9–23)
CALCIUM SERPL-MCNC: 9.4 MG/DL (ref 8.7–10.2)
CHLORIDE SERPL-SCNC: 103 MMOL/L (ref 96–106)
CO2 SERPL-SCNC: 24 MMOL/L (ref 18–29)
CORTIS SERPL-MCNC: 14.9 UG/DL
CREAT SERPL-MCNC: 0.68 MG/DL (ref 0.57–1)
CRP SERPL-MCNC: 12.5 MG/L (ref 0–4.9)
EOSINOPHIL # BLD AUTO: 0.3 X10E3/UL (ref 0–0.4)
EOSINOPHIL NFR BLD AUTO: 4 %
ERYTHROCYTE [DISTWIDTH] IN BLOOD BY AUTOMATED COUNT: 13.6 % (ref 12.3–15.4)
ERYTHROCYTE [SEDIMENTATION RATE] IN BLOOD BY WESTERGREN METHOD: 22 MM/HR (ref 0–32)
GLOBULIN SER CALC-MCNC: 2.7 G/DL (ref 1.5–4.5)
GLUCOSE SERPL-MCNC: 107 MG/DL (ref 65–99)
HCT VFR BLD AUTO: 40.5 % (ref 34–46.6)
HGB BLD-MCNC: 13.6 G/DL (ref 11.1–15.9)
IMM GRANULOCYTES # BLD: 0 X10E3/UL (ref 0–0.1)
IMM GRANULOCYTES NFR BLD: 0 %
LYMPHOCYTES # BLD AUTO: 2.4 X10E3/UL (ref 0.7–3.1)
LYMPHOCYTES NFR BLD AUTO: 31 %
MAGNESIUM SERPL-MCNC: 2.2 MG/DL (ref 1.6–2.3)
MCH RBC QN AUTO: 30 PG (ref 26.6–33)
MCHC RBC AUTO-ENTMCNC: 33.6 G/DL (ref 31.5–35.7)
MCV RBC AUTO: 89 FL (ref 79–97)
MONOCYTES # BLD AUTO: 0.8 X10E3/UL (ref 0.1–0.9)
MONOCYTES NFR BLD AUTO: 10 %
NEUTROPHILS # BLD AUTO: 4.3 X10E3/UL (ref 1.4–7)
NEUTROPHILS NFR BLD AUTO: 54 %
PLATELET # BLD AUTO: 259 X10E3/UL (ref 150–379)
POTASSIUM SERPL-SCNC: 3.9 MMOL/L (ref 3.5–5.2)
PROT SERPL-MCNC: 6.6 G/DL (ref 6–8.5)
RBC # BLD AUTO: 4.54 X10E6/UL (ref 3.77–5.28)
SODIUM SERPL-SCNC: 144 MMOL/L (ref 134–144)
T4 FREE SERPL-MCNC: 1.05 NG/DL (ref 0.82–1.77)
THYROPEROXIDASE AB SERPL-ACNC: 20 IU/ML (ref 0–34)
TSH SERPL DL<=0.005 MIU/L-ACNC: 0.84 UIU/ML (ref 0.45–4.5)
WBC # BLD AUTO: 7.8 X10E3/UL (ref 3.4–10.8)

## 2018-03-08 NOTE — PROGRESS NOTES
Inflammatory markers are slightly elevated, but this is non-specific. Other labs are all normal.  Continue current medication for now.

## 2018-03-27 ENCOUNTER — HOSPITAL ENCOUNTER (OUTPATIENT)
Dept: NON INVASIVE DIAGNOSTICS | Age: 43
Discharge: HOME | End: 2018-03-27
Attending: INTERNAL MEDICINE
Payer: COMMERCIAL

## 2018-03-27 DIAGNOSIS — R00.2 PALPITATIONS: ICD-10-CM

## 2018-03-27 DIAGNOSIS — R06.09 DOE (DYSPNEA ON EXERTION): ICD-10-CM

## 2018-03-27 DIAGNOSIS — I10 HYPERTENSION, UNSPECIFIED TYPE: ICD-10-CM

## 2018-03-27 PROCEDURE — 93306 TTE W/DOPPLER COMPLETE: CPT

## 2018-04-24 ENCOUNTER — OFFICE VISIT (OUTPATIENT)
Dept: INTERNAL MEDICINE CLINIC | Age: 43
End: 2018-04-24

## 2018-04-24 VITALS
HEIGHT: 66 IN | SYSTOLIC BLOOD PRESSURE: 143 MMHG | HEART RATE: 75 BPM | WEIGHT: 218.8 LBS | BODY MASS INDEX: 35.17 KG/M2 | OXYGEN SATURATION: 98 % | RESPIRATION RATE: 16 BRPM | TEMPERATURE: 97.9 F | DIASTOLIC BLOOD PRESSURE: 75 MMHG

## 2018-04-24 DIAGNOSIS — E55.9 VITAMIN D DEFICIENCY: ICD-10-CM

## 2018-04-24 DIAGNOSIS — G35 MS (MULTIPLE SCLEROSIS) (HCC): ICD-10-CM

## 2018-04-24 DIAGNOSIS — I10 HYPERTENSION, UNSPECIFIED TYPE: ICD-10-CM

## 2018-04-24 DIAGNOSIS — F41.8 DEPRESSION WITH ANXIETY: ICD-10-CM

## 2018-04-24 DIAGNOSIS — T22.211A PARTIAL THICKNESS BURN OF RIGHT FOREARM, INITIAL ENCOUNTER: ICD-10-CM

## 2018-04-24 DIAGNOSIS — R00.2 PALPITATIONS: ICD-10-CM

## 2018-04-24 DIAGNOSIS — I10 ESSENTIAL HYPERTENSION: Primary | ICD-10-CM

## 2018-04-24 RX ORDER — METOPROLOL SUCCINATE 50 MG/1
50 TABLET, EXTENDED RELEASE ORAL DAILY
Qty: 30 TAB | Refills: 5 | Status: SHIPPED | OUTPATIENT
Start: 2018-04-24 | End: 2018-05-21 | Stop reason: SDUPTHER

## 2018-04-24 RX ORDER — ESCITALOPRAM OXALATE 10 MG/1
10 TABLET ORAL DAILY
Qty: 30 TAB | Refills: 5 | Status: SHIPPED | OUTPATIENT
Start: 2018-04-24 | End: 2018-10-21 | Stop reason: SDUPTHER

## 2018-04-24 RX ORDER — SILVER SULFADIAZINE 10 G/1000G
CREAM TOPICAL 2 TIMES DAILY
Qty: 50 G | Refills: 1 | Status: SHIPPED | OUTPATIENT
Start: 2018-04-24 | End: 2019-08-09 | Stop reason: ALTCHOICE
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
## 2018-04-24 NOTE — PATIENT INSTRUCTIONS
Learning About Living Hildred Faster  What is a living will? A living will is a legal form you use to write down the kind of care you want at the end of your life. It is used by the health professionals who will treat you if you aren't able to decide for yourself. If you put your wishes in writing, your loved ones and others will know what kind of care you want. They won't need to guess. This can ease your mind and be helpful to others. A living will is not the same as an estate or property will. An estate will explains what you want to happen with your money and property after you die. Is a living will a legal document? A living will is a legal document. Each state has its own laws about living urias. If you move to another state, make sure that your living will is legal in the state where you now live. Or you might use a universal form that has been approved by many states. This kind of form can sometimes be completed and stored online. Your electronic copy will then be available wherever you have a connection to the Internet. In most cases, doctors will respect your wishes even if you have a form from a different state. · You don't need an  to complete a living will. But legal advice can be helpful if your state's laws are unclear, your health history is complicated, or your family can't agree on what should be in your living will. · You can change your living will at any time. Some people find that their wishes about end-of-life care change as their health changes. · In addition to making a living will, think about completing a medical power of  form. This form lets you name the person you want to make end-of-life treatment decisions for you (your \"health care agent\") if you're not able to. Many hospitals and nursing homes will give you the forms you need to complete a living will and a medical power of .   · Your living will is used only if you can't make or communicate decisions for yourself anymore. If you become able to make decisions again, you can accept or refuse any treatment, no matter what you wrote in your living will. · Your state may offer an online registry. This is a place where you can store your living will online so the doctors and nurses who need to treat you can find it right away. What should you think about when creating a living will? Talk about your end-of-life wishes with your family members and your doctor. Let them know what you want. That way the people making decisions for you won't be surprised by your choices. Think about these questions as you make your living will:  · Do you know enough about life support methods that might be used? If not, talk to your doctor so you know what might be done if you can't breathe on your own, your heart stops, or you're unable to swallow. · What things would you still want to be able to do after you receive life-support methods? Would you want to be able to walk? To speak? To eat on your own? To live without the help of machines? · If you have a choice, where do you want to be cared for? In your home? At a hospital or nursing home? · Do you want certain Yazdanism practices performed if you become very ill? · If you have a choice at the end of your life, where would you prefer to die? At home? In a hospital or nursing home? Somewhere else? · Would you prefer to be buried or cremated? · Do you want your organs to be donated after you die? What should you do with your living will? · Make sure that your family members and your health care agent have copies of your living will. · Give your doctor a copy of your living will to keep in your medical record. If you have more than one doctor, make sure that each one has a copy. · You may want to put a copy of your living will where it can be easily found. Where can you learn more? Go to http://temi-james.info/.   Enter U296 in the search box to learn more about \"Learning About Living [place]. \"  Current as of: September 24, 2016  Content Version: 11.4  © 9016-9474 Healthwise, Incorporated. Care instructions adapted under license by [employer] (which disclaims liability or warranty for this information). If you have questions about a medical condition or this instruction, always ask your healthcare professional. [address] any warranty or liability for your use of this information.

## 2018-04-24 NOTE — PROGRESS NOTES
RM 14    Pt is fasting this morning    Pt has 2 weeks of BP reading   Chief Complaint   Patient presents with    Hypertension     follow up       1. Have you been to the ER, urgent care clinic since your last visit? Hospitalized since your last visit? No    2. Have you seen or consulted any other health care providers outside of the [facility] since your last visit? Include any pap smears or colon screening.  No    Living Will sent to pt AVS
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
## 2018-04-25 LAB
25(OH)D3+25(OH)D2 SERPL-MCNC: 67.6 NG/ML (ref 30–100)
ALBUMIN SERPL-MCNC: 4.1 G/DL (ref 3.5–5.5)
ALBUMIN/GLOB SERPL: 1.6 {RATIO} (ref 1.2–2.2)
ALP SERPL-CCNC: 45 IU/L (ref 39–117)
ALT SERPL-CCNC: 17 IU/L (ref 0–32)
AST SERPL-CCNC: 18 IU/L (ref 0–40)
BASOPHILS # BLD AUTO: 0 X10E3/UL (ref 0–0.2)
BASOPHILS NFR BLD AUTO: 0 %
BILIRUB SERPL-MCNC: 0.5 MG/DL (ref 0–1.2)
BUN SERPL-MCNC: 11 MG/DL (ref 6–24)
BUN/CREAT SERPL: 15 (ref 9–23)
CALCIUM SERPL-MCNC: 9.2 MG/DL (ref 8.7–10.2)
CHLORIDE SERPL-SCNC: 104 MMOL/L (ref 96–106)
CHOLEST SERPL-MCNC: 188 MG/DL (ref 100–199)
CO2 SERPL-SCNC: 23 MMOL/L (ref 18–29)
CREAT SERPL-MCNC: 0.74 MG/DL (ref 0.57–1)
EOSINOPHIL # BLD AUTO: 0.2 X10E3/UL (ref 0–0.4)
EOSINOPHIL NFR BLD AUTO: 3 %
ERYTHROCYTE [DISTWIDTH] IN BLOOD BY AUTOMATED COUNT: 14 % (ref 12.3–15.4)
EST. AVERAGE GLUCOSE BLD GHB EST-MCNC: 94 MG/DL
GLOBULIN SER CALC-MCNC: 2.5 G/DL (ref 1.5–4.5)
GLUCOSE SERPL-MCNC: 76 MG/DL (ref 65–99)
HBA1C MFR BLD: 4.9 % (ref 4.8–5.6)
HCT VFR BLD AUTO: 40.3 % (ref 34–46.6)
HDLC SERPL-MCNC: 70 MG/DL
HGB BLD-MCNC: 13.1 G/DL (ref 11.1–15.9)
IMM GRANULOCYTES # BLD: 0 X10E3/UL (ref 0–0.1)
IMM GRANULOCYTES NFR BLD: 0 %
LDLC SERPL CALC-MCNC: 65 MG/DL (ref 0–99)
LYMPHOCYTES # BLD AUTO: 2.1 X10E3/UL (ref 0.7–3.1)
LYMPHOCYTES NFR BLD AUTO: 29 %
MCH RBC QN AUTO: 29.2 PG (ref 26.6–33)
MCHC RBC AUTO-ENTMCNC: 32.5 G/DL (ref 31.5–35.7)
MCV RBC AUTO: 90 FL (ref 79–97)
MONOCYTES # BLD AUTO: 0.5 X10E3/UL (ref 0.1–0.9)
MONOCYTES NFR BLD AUTO: 7 %
NEUTROPHILS # BLD AUTO: 4.4 X10E3/UL (ref 1.4–7)
NEUTROPHILS NFR BLD AUTO: 61 %
PLATELET # BLD AUTO: 246 X10E3/UL (ref 150–379)
POTASSIUM SERPL-SCNC: 4.4 MMOL/L (ref 3.5–5.2)
PROT SERPL-MCNC: 6.6 G/DL (ref 6–8.5)
RBC # BLD AUTO: 4.49 X10E6/UL (ref 3.77–5.28)
SODIUM SERPL-SCNC: 141 MMOL/L (ref 134–144)
TRIGL SERPL-MCNC: 267 MG/DL (ref 0–149)
VLDLC SERPL CALC-MCNC: 53 MG/DL (ref 5–40)
WBC # BLD AUTO: 7.2 X10E3/UL (ref 3.4–10.8)

## 2018-04-26 NOTE — PROGRESS NOTES
Triglycerides are up a little, but the rest of the cholesterol panel is OK. Work on a low saturated fat diet and exercise to lower this.   Other labs are normal.

## 2018-05-02 ENCOUNTER — TELEPHONE (OUTPATIENT)
Dept: INTERNAL MEDICINE CLINIC | Age: 43
End: 2018-05-02

## 2018-05-02 NOTE — TELEPHONE ENCOUNTER
----- Message from Tresa Lord LPN sent at 6/4/4360  9:30 AM EDT -----  Regarding: FW: Visit Follow-Up Question  Contact: [phone]      ----- Message -----     From: Bill Hinojosa     Sent: 5/2/2018   2:07 AM       To: [facility] Nurse Pool  Subject: Visit Follow-Up Question                         I sent a message on 4/24/2018 in reference to a follow up appointment. I attempted to get scheduled prior to leaving and you requested a 4 week follow-up. That would have been around 5/23. The earliest that they can get me in for a follow-up in the morning, before noon is after June 18!! Thus far I haven't seen much difference in my blood pressure with increasing the medicine, so I'm not sure I should wait that long to come back. Please let me know what I should do. I may be contacted at [phone].

## 2018-05-03 NOTE — TELEPHONE ENCOUNTER
Please review schedule and arrange for pt appt around the week of may 21-25 or the following week. It appears that there should be available appts around 11 am each day to accommodate my original recommendation to f/u in 1 month.

## 2018-05-21 ENCOUNTER — OFFICE VISIT (OUTPATIENT)
Dept: INTERNAL MEDICINE CLINIC | Age: 43
End: 2018-05-21

## 2018-05-21 VITALS
BODY MASS INDEX: 35.74 KG/M2 | DIASTOLIC BLOOD PRESSURE: 86 MMHG | OXYGEN SATURATION: 99 % | RESPIRATION RATE: 16 BRPM | TEMPERATURE: 98.3 F | SYSTOLIC BLOOD PRESSURE: 136 MMHG | WEIGHT: 222.4 LBS | HEIGHT: 66 IN | HEART RATE: 70 BPM

## 2018-05-21 DIAGNOSIS — G35 MS (MULTIPLE SCLEROSIS) (HCC): ICD-10-CM

## 2018-05-21 DIAGNOSIS — F41.8 DEPRESSION WITH ANXIETY: ICD-10-CM

## 2018-05-21 DIAGNOSIS — D12.6 TUBULAR ADENOMA OF COLON: ICD-10-CM

## 2018-05-21 DIAGNOSIS — I10 HYPERTENSION, UNSPECIFIED TYPE: Primary | ICD-10-CM

## 2018-05-21 DIAGNOSIS — R00.2 PALPITATIONS: ICD-10-CM

## 2018-05-21 RX ORDER — METOPROLOL SUCCINATE 100 MG/1
100 TABLET, EXTENDED RELEASE ORAL DAILY
Qty: 30 TAB | Refills: 5 | Status: SHIPPED | OUTPATIENT
Start: 2018-05-21 | End: 2018-11-21 | Stop reason: SDUPTHER

## 2018-05-21 NOTE — PROGRESS NOTES
HPI:  Presents for f/u HTN, mood    BP at home generally 140/90's  Occasionally higher into 150-160 range    Pt more calm and mood improved with lexapro  Pt notices improvement. Pt reports her  has an eye condition with potential progression to blindness. Following with [facility]    This and other life stressors contribute to her mood and BP. Going to [facility] for exercise. OCP dc'd x 1 month to see how much influence it may have on BP    Seeing Dr. Rik Yap, GYN  Had mammogram and biopsy - benign. Past medical, Social, and Family history reviewed    Prior to Admission medications    Medication Sig Start Date End Date Taking? Authorizing Provider   escitalopram oxalate (LEXAPRO) 10 mg tablet Take 1 Tab by mouth daily. 4/24/18  Yes Hafsa Sifuentes MD   metoprolol succinate (TOPROL-XL) 50 mg XL tablet Take 1 Tab by mouth daily. 4/24/18  Yes Hafsa Sifuentes MD   silver sulfADIAZINE (SILVADENE) 1 % topical cream Apply  to affected area two (2) times a day. 4/24/18  Yes Hafsa Sifuentes MD   gabapentin (NEURONTIN) 300 mg capsule  10/20/17  Yes Historical Provider   diclofenac EC (VOLTAREN) 75 mg EC tablet  10/2/17  Yes Historical Provider   albuterol (PROVENTIL HFA, VENTOLIN HFA, PROAIR HFA) 90 mcg/actuation inhaler Take 2 Puffs by inhalation every four (4) hours as needed for Wheezing or Shortness of Breath. 9/27/17  Yes Moncho Lal MD   AUBAGIO 14 mg tab  12/17/16  Yes Historical Provider   cholecalciferol, vitamin D3, (VITAMIN D3) 2,000 unit tab Take  by mouth. Yes Historical Provider   biotin 10,000 mcg cap Take  by mouth. Yes Historical Provider   ENSKYCE 0.15-0.03 mg tab TAKE ONE TABLET BY MOUTH ONE TIME DAILY 3/4/18   Historical Provider   guaiFENesin-dextromethorphan SR (HUMIBID DM) 600-30 mg per tablet Take 1 Tab by mouth every twelve (12) hours as needed for Cough or Congestion.  10/24/17   Nils Jimenez MD          ROS  Complete ROS reviewed and negative or stable except as noted in HPI. Physical Exam   Constitutional: She is oriented to person, place, and time. She appears well-nourished. No distress. HENT:   Head: Normocephalic and atraumatic. Mouth/Throat: Oropharynx is clear and moist.   Eyes: EOM are normal. Pupils are equal, round, and reactive to light. No scleral icterus. Neck: Normal range of motion. Neck supple. No JVD present. Cardiovascular: Normal rate, regular rhythm and normal heart sounds. Exam reveals no gallop and no friction rub. No murmur heard. Pulmonary/Chest: Effort normal and breath sounds normal. No respiratory distress. She has no wheezes. She has no rales. Abdominal: Soft. Bowel sounds are normal. She exhibits no distension. There is no tenderness. Musculoskeletal: Normal range of motion. She exhibits no edema. Lymphadenopathy:     She has no cervical adenopathy. Neurological: She is alert and oriented to person, place, and time. She exhibits normal muscle tone. Skin: Skin is warm. No rash noted. Psychiatric: She has a normal mood and affect. Nursing note and vitals reviewed. Prior labs reviewed. Assessment/Plan:    ICD-10-CM ICD-9-CM    1. Hypertension, unspecified type I10 401.9 metoprolol succinate (TOPROL-XL) 100 mg tablet   2. Palpitations R00.2 785.1    3. [facility] G35 340    4. Tubular adenoma of colon D12.6 211.3    5. Depression with anxiety F41.8 300.4      Follow-up Disposition:  Return in about 1 month (around 6/21/2018), or if symptoms worsen or fail to improve, for blood pressure.   results and schedule of future studies reviewed with patient  reviewed diet, exercise and weight    reviewed medications and side effects in detail   Continue lexapro at current dose  Increase Toprol XL to 100 mg

## 2018-05-21 NOTE — PROGRESS NOTES
Rm 14    Chief Complaint   Patient presents with    Blood Pressure Check    Follow-up     Mood     1. Have you been to the ER, urgent care clinic since your last visit? Hospitalized since your last visit? No    2. Have you seen or consulted any other health care providers outside of the [address] [place] [address] since your last visit? Include any pap smears or colon screening. No    There are no preventive care reminders to display for this patient.     Learning Assessment 3/6/2018   PRIMARY LEARNER Patient   HIGHEST LEVEL OF EDUCATION - PRIMARY LEARNER  4 YEARS OF COLLEGE   BARRIERS PRIMARY LEARNER NONE   PRIMARY LANGUAGE ENGLISH   LEARNER PREFERENCE PRIMARY VIDEOS     READING   ANSWERED BY patient   RELATIONSHIP SELF

## 2018-05-21 NOTE — MR AVS SNAPSHOT
[facility] [address] [facility] [address] 
[phone] Patient: Emeli Portillo MRN: [id number] BTP:[id number]/38/[id number] Visit Information Date & Time Provider Department Dept. Phone Encounter #  
 5/21/2018 10:30 AM Nico Calderon MD [facility] Pediatric[facility] and Internal Medicine [phone] [id number] Follow-up Instructions Return in about 1 month (around 6/21/2018), or if symptoms worsen or fail to improve, for blood pressure. Upcoming Health Maintenance Date Due Influenza Age 5 to Adult 8/1/2018 Pneumococcal 19-64 Highest Risk (3 of 3 - PCV13) 3/6/2019 PAP AKA CERVICAL CYTOLOGY 3/23/2019 COLONOSCOPY 4/18/2021 DTaP/Tdap/Td series (2 - Td) 7/20/2021 Allergies as of 5/21/2018  Review Complete On: 5/21/2018 By: Nico Calderon MD  
  
 Severity Noted Reaction Type Reactions Latex High 07/20/2011    Rash Adhesive  10/20/2012    Rash Current Immunizations  Reviewed on 3/6/2018 Name Date Influenza Vaccine 11/18/2017 Influenza Vaccine PF 12/1/2015, 12/29/2013  1:30 PM  
 Influenza Vaccine Split 10/19/2011 Pneumococcal Polysaccharide (PPSV-23) 6/24/2016 TDAP Vaccine 7/20/2011 Not reviewed this visit You Were Diagnosed With   
  
 Codes Comments Hypertension, unspecified type    -  Primary ICD-10-CM: I10 
ICD-9-CM: 401.9 Palpitations     ICD-10-CM: R00.2 ICD-9-CM: 833. 1 MS [facility]multiple sclerosis) ([facility]     ICD-10-CM: G35 
ICD-9-CM: 193 Tubular adenoma of colon     ICD-10-CM: D12.6 ICD-9-CM: 211.3 Depression with anxiety     ICD-10-CM: F41.8 ICD-9-CM: 300.4 Vitals BP Pulse Temp Resp Height(growth percentile) Weight(growth percentile) 136/86 70 98.3 °F (36.8 °C) (Oral) 16 5' 6\" (1.676 m) 222 lb 6.4 oz (100.9 kg) SpO2 BMI OB Status Smoking Status 99% 35.9 kg/m2 Having regular periods Former Smoker Vitals History BMI and BSA Data Body Mass Index Body Surface Area 35.9 kg/m 2 2.17 m 2 Preferred Pharmacy Pharmacy Name Phone [id number] [address] [facility] [phone] Your Updated Medication List  
  
   
This list is accurate as of 5/21/18 11:22 AM.  Always use your most recent med list.  
  
  
  
  
 albuterol 90 mcg/actuation inhaler Commonly known as:  PROVENTIL HFA, VENTOLIN HFA, PROAIR HFA Take 2 Puffs by inhalation every four (4) hours as needed for Wheezing or Shortness of Breath. AUBAGIO 14 mg Tab Generic drug:  teriflunomide  
  
 biotin 10,000 mcg Cap Take  by mouth. diclofenac EC 75 mg EC tablet Commonly known as:  VOLTAREN  
  
 ENSKYCE 0.15-0.03 mg Tab Generic drug:  desogestrel-ethinyl estradiol TAKE ONE TABLET BY MOUTH ONE TIME DAILY  
  
 escitalopram oxalate 10 mg tablet Commonly known as:  Jordon Beers Take 1 Tab by mouth daily. gabapentin 300 mg capsule Commonly known as:  NEURONTIN  
  
 guaiFENesin-dextromethorphan -30 mg per tablet Commonly known as:  HUMIBID DM Take 1 Tab by mouth every twelve (12) hours as needed for Cough or Congestion. metoprolol succinate 100 mg tablet Commonly known as:  TOPROL-XL Take 1 Tab by mouth daily. silver sulfADIAZINE 1 % topical cream  
Commonly known as:  SILVADENE Apply  to affected area two (2) times a day. VITAMIN D3 2,000 unit Tab Generic drug:  cholecalciferol (vitamin D3) Take  by mouth. Prescriptions Sent to Pharmacy Refills  
 metoprolol succinate (TOPROL-XL) 100 mg tablet 5 Sig: Take 1 Tab by mouth daily. Class: Normal  
 Pharmacy: [id number] [address] [place] [address] #: [phone] Route: Oral  
  
Follow-up Instructions Return in about 1 month (around 6/21/2018), or if symptoms worsen or fail to improve, for blood pressure. Introducing [facility]! Dear Fer Pisano: 
Thank you for requesting a [facility] account. Our records indicate that you already have an active [facility] account. You can access your account anytime at [email or web address] Did you know that you can access your hospital and ER discharge instructions at any time in [facility]? You can also review all of your test results from your hospital stay or ER visit. Additional Information If you have questions, please visit the Frequently Asked Questions section of the [facility] website at [email or web address] Remember, [facility] is NOT to be used for urgent needs. For medical emergencies, dial 911. Now available from your iPhone and Android! Please provide this summary of care documentation to your next provider. Your primary care clinician is listed as [id number] If you have any questions after today's visit, please call [phone].

## 2018-06-21 ENCOUNTER — OFFICE VISIT (OUTPATIENT)
Dept: INTERNAL MEDICINE CLINIC | Age: 43
End: 2018-06-21

## 2018-06-21 VITALS
RESPIRATION RATE: 16 BRPM | WEIGHT: 223.4 LBS | OXYGEN SATURATION: 97 % | DIASTOLIC BLOOD PRESSURE: 84 MMHG | HEART RATE: 74 BPM | TEMPERATURE: 98.8 F | HEIGHT: 66 IN | BODY MASS INDEX: 35.9 KG/M2 | SYSTOLIC BLOOD PRESSURE: 139 MMHG

## 2018-06-21 DIAGNOSIS — J45.909 UNCOMPLICATED ASTHMA, UNSPECIFIED ASTHMA SEVERITY, UNSPECIFIED WHETHER PERSISTENT: ICD-10-CM

## 2018-06-21 DIAGNOSIS — I10 ESSENTIAL HYPERTENSION: Primary | ICD-10-CM

## 2018-06-21 DIAGNOSIS — G35 MS (MULTIPLE SCLEROSIS) (HCC): ICD-10-CM

## 2018-06-21 DIAGNOSIS — E55.9 VITAMIN D DEFICIENCY: ICD-10-CM

## 2018-06-21 DIAGNOSIS — F41.8 DEPRESSION WITH ANXIETY: ICD-10-CM

## 2018-06-21 RX ORDER — FLUCONAZOLE 100 MG/1
TABLET ORAL
Refills: 0 | COMMUNITY
Start: 2018-06-16 | End: 2018-11-13 | Stop reason: ALTCHOICE

## 2018-06-21 RX ORDER — AMOXICILLIN AND CLAVULANATE POTASSIUM 875; 125 MG/1; MG/1
TABLET, FILM COATED ORAL
Refills: 0 | COMMUNITY
Start: 2018-06-16 | End: 2019-04-22 | Stop reason: ALTCHOICE

## 2018-06-21 RX ORDER — MINERAL OIL
180 ENEMA (ML) RECTAL EVERY EVENING
COMMUNITY

## 2018-06-21 NOTE — PROGRESS NOTES
HPI:  Presents for f/u HTN, etc    BPs better at home - log reviewed  Tolerating increased toprol    Off OCP at this point. Stressors are at least stable. Seen at [facility] for resp sx and dx sinusitis  On augmentin  Pt acknowledges increased allergy sx  Increased after grass cut    Using allegra and flonase  Acknowledges not using flonase as regularly as she should. Past medical, Social, and Family history reviewed    Prior to Admission medications    Medication Sig Start Date End Date Taking? Authorizing Provider   amoxicillin-clavulanate (AUGMENTIN) 875-125 mg per tablet TAKE ONE TABLET BY MOUTH EVERY 12 HOURS WITH MEAL(S) FOR 10 DAYS 6/16/18  Yes Historical Provider   fluconazole (DIFLUCAN) 100 mg tablet TAKE 1 TABLET BY MOUTH ON THE FIRST DAY OF ANTIBIOTIC AND TAKE 1 TABLET ON THE LAST DAY OF ANTIBIOTIC 6/16/18  Yes Historical Provider   metoprolol succinate (TOPROL-XL) 100 mg tablet Take 1 Tab by mouth daily. 5/21/18  Yes Shoaib Dickson MD   escitalopram oxalate (LEXAPRO) 10 mg tablet Take 1 Tab by mouth daily. 4/24/18  Yes Shoaib Dickson MD   silver sulfADIAZINE (SILVADENE) 1 % topical cream Apply  to affected area two (2) times a day. 4/24/18  Yes Shoaib Dickson MD   ENSKYCE 0.15-0.03 mg tab TAKE ONE TABLET BY MOUTH ONE TIME DAILY 3/4/18  Yes Historical Provider   gabapentin (NEURONTIN) 300 mg capsule  10/20/17  Yes Historical Provider   diclofenac EC (VOLTAREN) 75 mg EC tablet  10/2/17  Yes Historical Provider   guaiFENesin-dextromethorphan SR (HUMIBID DM) 600-30 mg per tablet Take 1 Tab by mouth every twelve (12) hours as needed for Cough or Congestion. 10/24/17  Yes Layla Murray MD   albuterol (PROVENTIL HFA, VENTOLIN HFA, PROAIR HFA) 90 mcg/actuation inhaler Take 2 Puffs by inhalation every four (4) hours as needed for Wheezing or Shortness of Breath.  9/27/17  Yes Marge Stern MD   AUBAGIO 14 mg tab  12/17/16  Yes Historical Provider   cholecalciferol, vitamin D3, (VITAMIN D3) 2,000 unit tab Take  by mouth. Yes Historical Provider   biotin 10,000 mcg cap Take  by mouth. Yes Historical Provider          ROS  Complete ROS reviewed and negative or stable except as noted in HPI. Physical Exam   Constitutional: She is oriented to person, place, and time. She appears well-nourished. No distress. HENT:   Head: Normocephalic and atraumatic. Mouth/Throat: Oropharynx is clear and moist.   Eyes: EOM are normal. Pupils are equal, round, and reactive to light. No scleral icterus. Neck: Normal range of motion. Neck supple. No JVD present. Cardiovascular: Normal rate, regular rhythm and normal heart sounds. Exam reveals no gallop and no friction rub. No murmur heard. Pulmonary/Chest: Effort normal and breath sounds normal. No respiratory distress. She has no wheezes. She has no rales. Abdominal: Soft. Bowel sounds are normal. She exhibits no distension. There is no tenderness. Musculoskeletal: Normal range of motion. She exhibits no edema. Lymphadenopathy:     She has no cervical adenopathy. Neurological: She is alert and oriented to person, place, and time. She exhibits normal muscle tone. Skin: Skin is warm. No rash noted. Psychiatric: She has a normal mood and affect. Nursing note and vitals reviewed. Prior labs reviewed. Assessment/Plan:    ICD-10-CM ICD-9-CM    1. Essential hypertension I10 401.9 [facility] FL[facility]   2. MS (multiple sclerosis) ([facility]) G35 340    3. Depression with anxiety F41.8 300.4    4. Uncomplicated asthma, unspecified asthma severity, unspecified whether persistent J45.909 493.90    5. Vitamin D deficiency E55.9 268.9      Follow-up Disposition:  Return in about 4 months (around 10/21/2018), or if symptoms worsen or fail to improve, for blood pressure, cholesterol.     results and schedule of future  studies reviewed with patient  reviewed diet, exercise and weight   cardiovascular risk and specific lipid/LDL goals reviewed  reviewed medications and side effects in detail   Encouraged daily dosing of flonase  Complete abx course  Continue current medication dosing  Encouraged exercise

## 2018-06-21 NOTE — PROGRESS NOTES
Rm 13    Prudence Augustine is a 39 y.o. female    Chief Complaint   Patient presents with    Hypertension     1. Have you been to the ER, urgent care clinic since your last visit? Hospitalized since your last visit? No     2. Have you seen or consulted any other health care providers outside of the [address] [place] [address] since your last visit? Include any pap smears or colon screening.   No      Visit Vitals    /84    Pulse 74    Temp 98.8 °F (37.1 °C) (Oral)    Resp 16    Ht 5' 6\" (1.676 m)    Wt 223 lb 6.4 oz (101.3 kg)    SpO2 97%    BMI 36.06 kg/m2

## 2018-06-21 NOTE — MR AVS SNAPSHOT
[address]  [address] [facility] [address] 
[phone] Patient: Fabio Moore MRN: [id number] EIM:3/51/7742 Visit Information Date & Time Provider Department Dept. Phone Encounter #  
 6/21/2018 11:00 AM Hafsa Sifuentes, [address] [place] [address], Ne and Internal Medicine [phone] [id number] Follow-up Instructions Return in about 4 months (around 10/21/2018), or if symptoms worsen or fail to improve, for blood pressure, cholesterol. Your Appointments 7/6/2018  8:15 AM  
ROUTINE CARE with Hafsa Sifuentes MD  
[facility] Pediatrics and Internal Medicine [address] [facility]) Appt Note: BP f/u  
 [address] [facility] [address] [facility] [address]  
[address] [facility] [address] Upcoming Health Maintenance Date Due Influenza Age 5 to Adult 8/1/2018 PAP AKA CERVICAL CYTOLOGY 3/23/2019 BREAST CANCER SCRN MAMMOGRAM 5/9/2019 COLONOSCOPY 4/18/2021 DTaP/Tdap/Td series (2 - Td) 7/20/2021 Allergies as of 6/21/2018  Review Complete On: 6/21/2018 By: Hafsa Sifuentes MD  
  
 Severity Noted Reaction Type Reactions Latex High 07/20/2011    Rash Adhesive  10/20/2012    Rash Current Immunizations  Reviewed on 3/6/2018 Name Date Influenza Vaccine 11/18/2017 Influenza Vaccine PF 12/1/2015, 12/29/2013  1:30 PM  
 Influenza Vaccine Split 10/19/2011 Pneumococcal Polysaccharide (PPSV-23) 6/24/2016 TDAP Vaccine 7/20/2011 Not reviewed this visit You Were Diagnosed With   
  
 Codes Comments Essential hypertension    -  Primary ICD-10-CM: I10 
ICD-9-CM: 401.9 MS [facility]) ([facility]     ICD-10-CM: G35 
ICD-9-CM: 481 Depression with anxiety     ICD-10-CM: F41.8 ICD-9-CM: 300.4 Uncomplicated asthma, unspecified asthma severity, unspecified whether persistent     ICD-10-CM: J45.909 ICD-9-CM: 493.90   
 Vitamin D deficiency     ICD-10-CM: E55.9 ICD-9-CM: 268.9 Vitals BP Pulse Temp Resp Height(growth percentile) Weight(growth percentile) 139/84 74 98.8 °F (37.1 °C) (Oral) 16 5' 6\" (1.676 m) 223 lb 6.4 oz (101.3 kg) SpO2 BMI OB Status Smoking Status 97% 36.06 kg/m2 Having regular periods Former Smoker Vitals History BMI and BSA Data Body Mass Index Body Surface Area 36.06 kg/m 2 2.17 m 2 Preferred Pharmacy Pharmacy Name Phone [id number]y Hillman [facility] [phone] Your Updated Medication List  
  
   
This list is accurate as of 6/21/18 11:19 AM.  Always use your most recent med list.  
  
  
  
  
 albuterol 90 mcg/actuation inhaler Commonly known as:  PROVENTIL HFA, VENTOLIN HFA, PROAIR HFA Take 2 Puffs by inhalation every four (4) hours as needed for Wheezing or Shortness of Breath. amoxicillin-clavulanate 875-125 mg per tablet Commonly known as:  AUGMENTIN  
TAKE ONE TABLET BY MOUTH EVERY 12 HOURS WITH MEAL(S) FOR 10 DAYS AUBAGIO 14 mg Tab Generic drug:  teriflunomide  
  
 biotin 10,000 mcg Cap Take  by mouth. diclofenac EC 75 mg EC tablet Commonly known as:  VOLTAREN  
  
 escitalopram oxalate 10 mg tablet Commonly known as:  Fredick Herring Take 1 Tab by mouth daily. fexofenadine 180 mg tablet Commonly known as:  Wilburt Saunas Take  by mouth. FLONASE NA  
by Nasal route. fluconazole 100 mg tablet Commonly known as:  DIFLUCAN  
TAKE 1 TABLET BY MOUTH ON THE FIRST DAY OF ANTIBIOTIC AND TAKE 1 TABLET ON THE LAST DAY OF ANTIBIOTIC  
  
 gabapentin 300 mg capsule Commonly known as:  NEURONTIN  
  
 guaiFENesin-dextromethorphan -30 mg per tablet Commonly known as:  HUMIBID DM Take 1 Tab by mouth every twelve (12) hours as needed for Cough or Congestion. metoprolol succinate 100 mg tablet Commonly known as:  TOPROL-XL  
 Take 1 Tab by mouth daily. silver sulfADIAZINE 1 % topical cream  
Commonly known as:  SILVADENE Apply  to affected area two (2) times a day. VITAMIN D3 2,000 unit Tab Generic drug:  cholecalciferol (vitamin D3) Take  by mouth. We Performed the Following [facility] [employer] BP FLOWSHEET [[id number] CPT(R)] Follow-up Instructions Return in about 4 months (around 10/21/2018), or if symptoms worsen or fail to improve, for blood pressure, cholesterol. Introducing [facility]! Dear Fer iPsano: 
Thank you for requesting a [employer] account. Our records indicate that you already have an active [employer] account. You can access your account anytime at [email or web address] Did you know that you can access your hospital and ER discharge instructions at any time in [employer]? You can also review all of your test results from your hospital stay or ER visit. Additional Information If you have questions, please visit the Frequently Asked Questions section of the [employer] website at [email or web address] Remember, [employer] is NOT to be used for urgent needs. For medical emergencies, dial 911. Now available from your iPhone and Android! Please provide this summary of care documentation to your next provider. Your primary care clinician is listed as [address] If you have any questions after today's visit, please call [phone].

## 2018-06-30 PROBLEM — E66.01 SEVERE OBESITY (BMI 35.0-39.9): Status: ACTIVE | Noted: 2018-06-30

## 2018-09-23 ENCOUNTER — OFFICE VISIT (OUTPATIENT)
Dept: URGENT CARE | Age: 43
End: 2018-09-23

## 2018-10-21 DIAGNOSIS — F41.8 DEPRESSION WITH ANXIETY: ICD-10-CM

## 2018-10-21 RX ORDER — ESCITALOPRAM OXALATE 10 MG/1
TABLET ORAL
Qty: 30 TAB | Refills: 5 | Status: SHIPPED | OUTPATIENT
Start: 2018-10-21 | End: 2018-11-13 | Stop reason: SDUPTHER

## 2018-10-22 ENCOUNTER — OFFICE VISIT (OUTPATIENT)
Dept: INTERNAL MEDICINE CLINIC | Age: 43
End: 2018-10-22

## 2018-10-22 VITALS
SYSTOLIC BLOOD PRESSURE: 124 MMHG | WEIGHT: 228.6 LBS | BODY MASS INDEX: 36.74 KG/M2 | DIASTOLIC BLOOD PRESSURE: 86 MMHG | HEIGHT: 66 IN | TEMPERATURE: 98.2 F | RESPIRATION RATE: 16 BRPM | HEART RATE: 67 BPM | OXYGEN SATURATION: 95 %

## 2018-10-22 DIAGNOSIS — G89.29 CHRONIC BILATERAL LOW BACK PAIN WITHOUT SCIATICA: ICD-10-CM

## 2018-10-22 DIAGNOSIS — I10 ESSENTIAL HYPERTENSION: Primary | ICD-10-CM

## 2018-10-22 DIAGNOSIS — M54.50 CHRONIC BILATERAL LOW BACK PAIN WITHOUT SCIATICA: ICD-10-CM

## 2018-10-22 DIAGNOSIS — F41.8 DEPRESSION WITH ANXIETY: ICD-10-CM

## 2018-10-22 DIAGNOSIS — G35 MS (MULTIPLE SCLEROSIS) (HCC): ICD-10-CM

## 2018-10-22 DIAGNOSIS — E55.9 VITAMIN D DEFICIENCY: ICD-10-CM

## 2018-10-22 DIAGNOSIS — R73.9 HYPERGLYCEMIA: ICD-10-CM

## 2018-10-22 DIAGNOSIS — R42 VERTIGO: ICD-10-CM

## 2018-10-22 DIAGNOSIS — E78.5 DYSLIPIDEMIA: ICD-10-CM

## 2018-10-22 RX ORDER — MECLIZINE HYDROCHLORIDE 25 MG/1
25 TABLET ORAL
Qty: 30 TAB | Refills: 1 | Status: SHIPPED | OUTPATIENT
Start: 2018-10-22 | End: 2019-03-26 | Stop reason: SDUPTHER

## 2018-10-22 RX ORDER — FLUTICASONE PROPIONATE 50 MCG
2 SPRAY, SUSPENSION (ML) NASAL DAILY
Qty: 1 BOTTLE | Refills: 5 | Status: SHIPPED | OUTPATIENT
Start: 2018-10-22 | End: 2022-11-02

## 2018-10-22 NOTE — PROGRESS NOTES
HPI:  Presents for f/u HTN, dyslipidemia, etc    BPs at home 130's/70's  Taking and tolerating metoprolol    Taking lexapro  Pt feels like it is not fully controlled    Pt reports some mild vertigo  +ear fullness  Pt ?'s whether could be related to lexapro   Off flonase     Persistent, intermittent back pain  Has seen PT and PM&R in the past  Limited benefit from epidural injections  Fair benefit from PT in the past        Past medical, Social, and Family history reviewed    Prior to Admission medications    Medication Sig Start Date End Date Taking? Authorizing Provider   meclizine (ANTIVERT) 25 mg tablet Take 1 Tab by mouth three (3) times daily as needed. For vertigo 10/22/18  Yes Bria Estrada MD   fluticasone [facility]) 50 mcg/actuation nasal spray 2 Sprays by Both Nostrils route daily. 10/22/18  Yes Bria Estrada MD   escitalopram oxalate (LEXAPRO) 10 mg tablet TAKE ONE TABLET BY MOUTH ONE TIME DAILY 10/21/18  Yes Bria Estrada MD   fluconazole (DIFLUCAN) 100 mg tablet TAKE 1 TABLET BY MOUTH ON THE FIRST DAY OF ANTIBIOTIC AND TAKE 1 TABLET ON THE LAST DAY OF ANTIBIOTIC 6/16/18  Yes Provider, Historical   fexofenadine (ALLEGRA) 180 mg tablet Take  by mouth. Yes Provider, Historical   fluticasone propionate (FLONASE NA) by Nasal route. Yes Provider, Historical   metoprolol succinate (TOPROL-XL) 100 mg tablet Take 1 Tab by mouth daily. 5/21/18  Yes Bria Estrada MD   silver sulfADIAZINE (SILVADENE) 1 % topical cream Apply  to affected area two (2) times a day. 4/24/18  Yes Bria Estrada MD   gabapentin (NEURONTIN) 300 mg capsule  10/20/17  Yes Provider, Historical   diclofenac EC (VOLTAREN) 75 mg EC tablet  10/2/17  Yes Provider, Historical   guaiFENesin-dextromethorphan SR (HUMIBID DM) 600-30 mg per tablet Take 1 Tab by mouth every twelve (12) hours as needed for Cough or Congestion.  10/24/17  Yes Carolyn Garcia MD   albuterol (PROVENTIL HFA, VENTOLIN HFA, PROAIR HFA) 90 mcg/actuation inhaler Take 2 Puffs by inhalation every four (4) hours as needed for Wheezing or Shortness of Breath. 9/27/17  Yes Gema Faust MD   AUBAGIO 14 mg tab  12/17/16  Yes Provider, Historical   cholecalciferol, vitamin D3, (VITAMIN D3) 2,000 unit tab Take  by mouth. Yes Provider, Historical   biotin 10,000 mcg cap Take  by mouth. Yes Provider, Historical   omega 3-dha-epa-fish oil 183.3 mg-75 mg -91.6 mg-306 mg cap Take 4 Caps by mouth daily. 10/25/18   Vivian Soto MD   amoxicillin-clavulanate (AUGMENTIN) 875-125 mg per tablet TAKE ONE TABLET BY MOUTH EVERY 12 HOURS WITH MEAL(S) FOR 10 DAYS 6/16/18   Provider, Historical          ROS  Complete ROS reviewed and negative or stable except as noted in HPI. Physical Exam   Constitutional: She is oriented to person, place, and time. She appears well-nourished. No distress. HENT:   Head: Normocephalic and atraumatic. Left Ear: A middle ear effusion (serous) is present. Mouth/Throat: Oropharynx is clear and moist.   Eyes: EOM are normal. Pupils are equal, round, and reactive to light. No scleral icterus. Neck: Normal range of motion. Neck supple. No JVD present. Cardiovascular: Normal rate, regular rhythm and normal heart sounds. Exam reveals no gallop and no friction rub. No murmur heard. Pulmonary/Chest: Effort normal and breath sounds normal. No respiratory distress. She has no wheezes. She has no rales. Abdominal: Soft. Bowel sounds are normal. She exhibits no distension. There is no tenderness. Musculoskeletal: Normal range of motion. She exhibits no edema. Lymphadenopathy:     She has no cervical adenopathy. Neurological: She is alert and oriented to person, place, and time. She exhibits normal muscle tone. Skin: Skin is warm. No rash noted. Psychiatric: She has a normal mood and affect. Nursing note and vitals reviewed. Prior labs reviewed. Assessment/Plan:    ICD-10-CM ICD-9-CM    1.  Essential hypertension I10 401.9    2. Vitamin D deficiency E55.9 268.9 VITAMIN D, 25 HYDROXY   3. MS (multiple sclerosis) (HCC) G35 340 CBC WITH AUTOMATED DIFF   4. Depression with anxiety F41.8 300.4    5. Vertigo R42 780.4 CBC WITH AUTOMATED DIFF      meclizine (ANTIVERT) 25 mg tablet   6. Dyslipidemia E78.5 272.4 LIPID PANEL      METABOLIC PANEL, COMPREHENSIVE   7. Hyperglycemia R73.9 790.29 HEMOGLOBIN A1C WITH EAG   8. Chronic bilateral low back pain without sciatica M54.5 724.2 REFERRAL TO PHYSICAL THERAPY    G89.29 338.29      Follow-up Disposition:  Return in about 6 months (around 4/22/2019), or if symptoms worsen or fail to improve, for blood pressure, cholesterol.   results and schedule of future studies reviewed with patient  reviewed diet, exercise and weight  cardiovascular risk and specific lipid/LDL goals reviewed  reviewed medications and side effects in detail   Resume flonase  Antihistamine  Meclizine prn  Consider increase in lexapro - pt to try 20mg for 1-2 weeks  If tolerated, then re-Rx at higher 20mg dose  Fasting labs  Ref to PT

## 2018-10-22 NOTE — PROGRESS NOTES
Rm 15    Chief Complaint   Patient presents with    Blood Pressure Check     f/u    Cholesterol Problem     f/u   pt is fasting    1. Have you been to the ER, urgent care clinic since your last visit? Hospitalized since your last visit? UC, last month, Upper respiratory infection. 2. Have you seen or consulted any other health care providers outside of the [address] [place] [address] since your last visit? Include any pap smears or colon screening. No    There are no preventive care reminders to display for this patient.      Learning Assessment 3/6/2018   PRIMARY LEARNER Patient   HIGHEST LEVEL OF EDUCATION - PRIMARY LEARNER  4 YEARS OF COLLEGE   BARRIERS PRIMARY LEARNER NONE   PRIMARY LANGUAGE ENGLISH   LEARNER PREFERENCE PRIMARY VIDEOS     READING   ANSWERED BY patient   RELATIONSHIP SELF

## 2018-10-23 LAB
25(OH)D3+25(OH)D2 SERPL-MCNC: 43.5 NG/ML (ref 30–100)
ALBUMIN SERPL-MCNC: 4.4 G/DL (ref 3.5–5.5)
ALBUMIN/GLOB SERPL: 1.8 {RATIO} (ref 1.2–2.2)
ALP SERPL-CCNC: 68 IU/L (ref 39–117)
ALT SERPL-CCNC: 31 IU/L (ref 0–32)
AST SERPL-CCNC: 19 IU/L (ref 0–40)
BASOPHILS # BLD AUTO: 0 X10E3/UL (ref 0–0.2)
BASOPHILS NFR BLD AUTO: 1 %
BILIRUB SERPL-MCNC: 0.5 MG/DL (ref 0–1.2)
BUN SERPL-MCNC: 11 MG/DL (ref 6–24)
BUN/CREAT SERPL: 13 (ref 9–23)
CALCIUM SERPL-MCNC: 10 MG/DL (ref 8.7–10.2)
CHLORIDE SERPL-SCNC: 102 MMOL/L (ref 96–106)
CHOLEST SERPL-MCNC: 213 MG/DL (ref 100–199)
CO2 SERPL-SCNC: 23 MMOL/L (ref 20–29)
CREAT SERPL-MCNC: 0.82 MG/DL (ref 0.57–1)
EOSINOPHIL # BLD AUTO: 0.4 X10E3/UL (ref 0–0.4)
EOSINOPHIL NFR BLD AUTO: 6 %
ERYTHROCYTE [DISTWIDTH] IN BLOOD BY AUTOMATED COUNT: 13.5 % (ref 12.3–15.4)
EST. AVERAGE GLUCOSE BLD GHB EST-MCNC: 103 MG/DL
GLOBULIN SER CALC-MCNC: 2.5 G/DL (ref 1.5–4.5)
GLUCOSE SERPL-MCNC: 86 MG/DL (ref 65–99)
HBA1C MFR BLD: 5.2 % (ref 4.8–5.6)
HCT VFR BLD AUTO: 41 % (ref 34–46.6)
HDLC SERPL-MCNC: 63 MG/DL
HGB BLD-MCNC: 14 G/DL (ref 11.1–15.9)
IMM GRANULOCYTES # BLD: 0 X10E3/UL (ref 0–0.1)
IMM GRANULOCYTES NFR BLD: 0 %
LDLC SERPL CALC-MCNC: 85 MG/DL (ref 0–99)
LYMPHOCYTES # BLD AUTO: 2.1 X10E3/UL (ref 0.7–3.1)
LYMPHOCYTES NFR BLD AUTO: 31 %
MCH RBC QN AUTO: 29.5 PG (ref 26.6–33)
MCHC RBC AUTO-ENTMCNC: 34.1 G/DL (ref 31.5–35.7)
MCV RBC AUTO: 86 FL (ref 79–97)
MONOCYTES # BLD AUTO: 0.7 X10E3/UL (ref 0.1–0.9)
MONOCYTES NFR BLD AUTO: 11 %
NEUTROPHILS # BLD AUTO: 3.5 X10E3/UL (ref 1.4–7)
NEUTROPHILS NFR BLD AUTO: 51 %
PLATELET # BLD AUTO: 218 X10E3/UL (ref 150–379)
POTASSIUM SERPL-SCNC: 4.7 MMOL/L (ref 3.5–5.2)
PROT SERPL-MCNC: 6.9 G/DL (ref 6–8.5)
RBC # BLD AUTO: 4.75 X10E6/UL (ref 3.77–5.28)
SODIUM SERPL-SCNC: 142 MMOL/L (ref 134–144)
TRIGL SERPL-MCNC: 327 MG/DL (ref 0–149)
VLDLC SERPL CALC-MCNC: 65 MG/DL (ref 5–40)
WBC # BLD AUTO: 6.8 X10E3/UL (ref 3.4–10.8)

## 2018-10-25 DIAGNOSIS — E78.1 HYPERTRIGLYCERIDEMIA: Primary | ICD-10-CM

## 2018-10-26 NOTE — PROGRESS NOTES
Triglycerides are high. Take omega 3 fatty acid/fish oil - 4 pills per day to lower this.   Other labs are normal.  Continue your other current medications

## 2018-10-28 PROBLEM — I10 ESSENTIAL HYPERTENSION: Status: ACTIVE | Noted: 2018-10-28

## 2018-11-13 DIAGNOSIS — F41.8 DEPRESSION WITH ANXIETY: ICD-10-CM

## 2018-11-13 RX ORDER — ESCITALOPRAM OXALATE 20 MG/1
20 TABLET ORAL DAILY
Qty: 30 TAB | Refills: 5 | Status: SHIPPED | OUTPATIENT
Start: 2018-11-13 | End: 2019-05-04 | Stop reason: SDUPTHER

## 2018-11-21 DIAGNOSIS — I10 HYPERTENSION, UNSPECIFIED TYPE: ICD-10-CM

## 2018-11-21 RX ORDER — METOPROLOL SUCCINATE 100 MG/1
TABLET, EXTENDED RELEASE ORAL
Qty: 30 TAB | Refills: 5 | Status: SHIPPED | OUTPATIENT
Start: 2018-11-21 | End: 2019-03-26

## 2019-03-26 DIAGNOSIS — I10 HYPERTENSION, UNSPECIFIED TYPE: ICD-10-CM

## 2019-03-26 DIAGNOSIS — R42 VERTIGO: ICD-10-CM

## 2019-03-26 RX ORDER — METOPROLOL SUCCINATE 100 MG/1
150 TABLET, EXTENDED RELEASE ORAL DAILY
Qty: 45 TAB | Refills: 5 | Status: SHIPPED | OUTPATIENT
Start: 2019-03-26 | End: 2019-09-23 | Stop reason: SDUPTHER

## 2019-03-26 RX ORDER — MECLIZINE HYDROCHLORIDE 25 MG/1
25 TABLET ORAL
Qty: 30 TAB | Refills: 2 | Status: SHIPPED | OUTPATIENT
Start: 2019-03-26 | End: 2020-05-24

## 2019-04-22 ENCOUNTER — OFFICE VISIT (OUTPATIENT)
Dept: INTERNAL MEDICINE CLINIC | Age: 44
End: 2019-04-22

## 2019-04-22 VITALS
SYSTOLIC BLOOD PRESSURE: 115 MMHG | WEIGHT: 237.8 LBS | HEIGHT: 66 IN | BODY MASS INDEX: 38.22 KG/M2 | HEART RATE: 71 BPM | OXYGEN SATURATION: 97 % | TEMPERATURE: 98.4 F | DIASTOLIC BLOOD PRESSURE: 80 MMHG | RESPIRATION RATE: 19 BRPM

## 2019-04-22 DIAGNOSIS — J45.20 MILD INTERMITTENT ASTHMA WITHOUT COMPLICATION: ICD-10-CM

## 2019-04-22 DIAGNOSIS — N92.6 IRREGULAR MENSES: ICD-10-CM

## 2019-04-22 DIAGNOSIS — E55.9 VITAMIN D DEFICIENCY: ICD-10-CM

## 2019-04-22 DIAGNOSIS — D84.9 IMMUNOSUPPRESSED STATUS (HCC): ICD-10-CM

## 2019-04-22 DIAGNOSIS — R73.9 HYPERGLYCEMIA: ICD-10-CM

## 2019-04-22 DIAGNOSIS — I10 ESSENTIAL HYPERTENSION: Primary | ICD-10-CM

## 2019-04-22 DIAGNOSIS — R42 VERTIGO: ICD-10-CM

## 2019-04-22 DIAGNOSIS — G35 MS (MULTIPLE SCLEROSIS) (HCC): ICD-10-CM

## 2019-04-22 DIAGNOSIS — H69.80 DYSFUNCTION OF EUSTACHIAN TUBE, UNSPECIFIED LATERALITY: ICD-10-CM

## 2019-04-22 DIAGNOSIS — J30.2 SEASONAL ALLERGIC RHINITIS, UNSPECIFIED TRIGGER: ICD-10-CM

## 2019-04-22 DIAGNOSIS — E66.01 SEVERE OBESITY WITH BODY MASS INDEX (BMI) OF 35.0 TO 39.9 WITH SERIOUS COMORBIDITY (HCC): ICD-10-CM

## 2019-04-22 DIAGNOSIS — F41.8 DEPRESSION WITH ANXIETY: ICD-10-CM

## 2019-04-22 NOTE — PROGRESS NOTES
HPI:  Presents for f/u HTN, etc    Pt reports some irregularity to menstrual cycles. Pt suspects hormonal and noel-menopausal change may be related to fluctuating BPs. Variable BP  BP seems to have higher BP in the AM   But, also some higher BP int he afternoon as well    Stopped NSAID to test whether it was involved. But, no improvement. +vertigo  Wondering re: ears  Has seen ENT - dx eustachian tube dysfunction    Not currently using flonase    Taking 4 fish oil daily    Functional status re: MS is no better, but not substantially worse either. Seeing neurologist.    Past medical, Social, and Family history reviewed    Prior to Admission medications    Medication Sig Start Date End Date Taking? Authorizing Provider   meclizine (ANTIVERT) 25 mg tablet Take 1 Tab by mouth three (3) times daily as needed (vertigo). For vertigo 3/26/19  Yes Caitlyn Lynn MD   metoprolol succinate (TOPROL-XL) 100 mg tablet Take 1.5 Tabs by mouth daily. 3/26/19  Yes Caitlyn Lynn MD   escitalopram oxalate (LEXAPRO) 20 mg tablet Take 1 Tab by mouth daily. 11/13/18  Yes Caitlyn Lynn MD   omega 9-drw-cix-fish oil 183.3 mg-75 mg -91.6 mg-306 mg cap Take 4 Caps by mouth daily. 10/25/18  Yes Caitlyn Lynn MD   fluticasone (FLONASE) 50 mcg/actuation nasal spray 2 Sprays by Both Nostrils route daily. 10/22/18  Yes Caitlyn Lynn MD   fexofenadine (ALLEGRA) 180 mg tablet Take  by mouth. Yes Provider, Historical   fluticasone propionate (FLONASE NA) by Nasal route. Yes Provider, Historical   silver sulfADIAZINE (SILVADENE) 1 % topical cream Apply  to affected area two (2) times a day.  4/24/18  Yes Caitlyn Lynn MD   gabapentin (NEURONTIN) 300 mg capsule  10/20/17  Yes Provider, Historical   diclofenac EC (VOLTAREN) 75 mg EC tablet  10/2/17  Yes Provider, Historical   albuterol (PROVENTIL HFA, VENTOLIN HFA, PROAIR HFA) 90 mcg/actuation inhaler Take 2 Puffs by inhalation every four (4) hours as needed for Wheezing or Shortness of Breath. 9/27/17  Yes Eudora Bamberger, MD   AUBAGIO 14 mg tab  12/17/16  Yes Provider, Historical   cholecalciferol, vitamin D3, (VITAMIN D3) 2,000 unit tab Take  by mouth. Yes Provider, Historical   biotin 10,000 mcg cap Take  by mouth. Yes Provider, Historical          ROS  Complete ROS reviewed and negative or stable except as noted in HPI. Physical Exam   Constitutional: She is oriented to person, place, and time. She appears well-nourished. No distress. HENT:   Head: Normocephalic and atraumatic. Mouth/Throat: Oropharynx is clear and moist.   Eyes: Pupils are equal, round, and reactive to light. EOM are normal. No scleral icterus. Neck: Normal range of motion. Neck supple. No JVD present. Cardiovascular: Normal rate, regular rhythm and normal heart sounds. Exam reveals no gallop and no friction rub. No murmur heard. Pulmonary/Chest: Effort normal and breath sounds normal. No respiratory distress. She has no wheezes. She has no rales. Abdominal: Soft. Bowel sounds are normal. She exhibits no distension. There is no tenderness. Musculoskeletal: Normal range of motion. She exhibits no edema. Lymphadenopathy:     She has no cervical adenopathy. Neurological: She is alert and oriented to person, place, and time. She exhibits normal muscle tone. Skin: Skin is warm. No rash noted. Psychiatric: She has a normal mood and affect. Nursing note and vitals reviewed. Prior labs reviewed. Assessment/Plan:  Agree that BP fluctuations may be noel-menopausal  Consider KHANG    ICD-10-CM ICD-9-CM    1. Essential hypertension I10 401.9 CBC WITH AUTOMATED DIFF      LIPID PANEL      METABOLIC PANEL, COMPREHENSIVE   2. MS (multiple sclerosis) ([facility]) G35 340    3. Vitamin D deficiency E55.9 268.9 VITAMIN D, 25 HYDROXY   4. Mild intermittent asthma without complication N29.57 638.37    5. Depression with anxiety F41.8 300.4    6.  Hyperglycemia R73.9 790.29 HEMOGLOBIN A1C WITH EAG   7. Irregular menses N92.6 626.4 TSH 3RD GENERATION      T4, FREE      CORTISOL      ESTRADIOL      FSH AND LH      PROLACTIN   8. Immunosuppressed status ([address] D89.9 279.9    9. Severe obesity with body mass index (BMI) of 35.0 to 39.9 with serious comorbidity (HCC) E66.01 278.01    10. Vertigo R42 780.4    11. Dysfunction of Eustachian tube, unspecified laterality H69.80 381.81    12. Seasonal allergic rhinitis, unspecified trigger J30.2 477.9      Follow-up and Dispositions    · Return in about 3 months (around 7/22/2019), or if symptoms worsen or fail to improve, for blood pressure. results and schedule of future studies reviewed with patient  reviewed diet, exercise and weight   cardiovascular risk and specific lipid/LDL goals reviewed  reviewed medications and side effects in detail   Resume flonase   Continue allegra   Consider seeing ENT again  Consider allergy testing - pt has been on allergy shots in the past  Continue current medications but change to 1 toprol XL in the am and 0.5 tab in the evening. Check fasting labs and hormone levels.

## 2019-04-22 NOTE — PROGRESS NOTES
[facility]  [facility] a 38 y.[facility]   Pt is here for BP check  Pt does measure BP consistently and states that it is within 140/80 AM before taking medication  BP during rest of day is as follows according to Pt:  Noon with meds on board- 116/70  [id number]  Time frame : range of 140-150/80-90  Pt states that she stopped taking Voltaren in hopes of getting BP down, and after 12 days states there is no difference. Pt still having dizzy spells, states they are more in early morning or late at night. Pt would like to discuss hereditary probabilities of future AFIB since mother has it, and her last EKG showed some signs       Chief Complaint   Patient presents with    Blood Pressure Check     follow up    Labs     fasting     Visit Vitals  /80 (BP 1 Location: Left arm, BP Patient Position: Sitting)   Pulse 71   Temp 98.4 °F (36.9 °C) (Oral)   Resp 19   Ht 5' 6\" (1.676 m)   Wt 237 lb 12.8 oz (107.9 kg)   SpO2 97%   BMI 38.38 kg/m²     1. Have you been to the ER, urgent care clinic since your last visit? Hospitalized since your last visit? No    2. Have you seen or consulted any other health care providers outside of the [address] [place] [address] since your last visit? Include any pap smears or colon screening.  No  Health Maintenance Due   Topic Date Due    PAP AKA CERVICAL CYTOLOGY  03/23/2019    BREAST CANCER SCRN MAMMOGRAM  05/09/2019     Learning Assessment 3/6/2018   PRIMARY LEARNER Patient   HIGHEST LEVEL OF EDUCATION - PRIMARY LEARNER  4 YEARS OF COLLEGE   BARRIERS PRIMARY LEARNER NONE   PRIMARY LANGUAGE ENGLISH   LEARNER PREFERENCE PRIMARY VIDEOS     READING   ANSWERED BY patient   RELATIONSHIP SELF

## 2019-04-23 LAB
25(OH)D3+25(OH)D2 SERPL-MCNC: 53.9 NG/ML (ref 30–100)
ALBUMIN SERPL-MCNC: 4 G/DL (ref 3.5–5.5)
ALBUMIN/GLOB SERPL: 1.6 {RATIO} (ref 1.2–2.2)
ALP SERPL-CCNC: 71 IU/L (ref 39–117)
ALT SERPL-CCNC: 25 IU/L (ref 0–32)
AST SERPL-CCNC: 17 IU/L (ref 0–40)
BASOPHILS # BLD AUTO: 0 X10E3/UL (ref 0–0.2)
BASOPHILS NFR BLD AUTO: 1 %
BILIRUB SERPL-MCNC: 0.4 MG/DL (ref 0–1.2)
BUN SERPL-MCNC: 11 MG/DL (ref 6–24)
BUN/CREAT SERPL: 15 (ref 9–23)
CALCIUM SERPL-MCNC: 9 MG/DL (ref 8.7–10.2)
CHLORIDE SERPL-SCNC: 105 MMOL/L (ref 96–106)
CHOLEST SERPL-MCNC: 209 MG/DL (ref 100–199)
CO2 SERPL-SCNC: 24 MMOL/L (ref 20–29)
CORTIS SERPL-MCNC: 5.4 UG/DL
CREAT SERPL-MCNC: 0.71 MG/DL (ref 0.57–1)
EOSINOPHIL # BLD AUTO: 0.3 X10E3/UL (ref 0–0.4)
EOSINOPHIL NFR BLD AUTO: 4 %
ERYTHROCYTE [DISTWIDTH] IN BLOOD BY AUTOMATED COUNT: 13.9 % (ref 12.3–15.4)
EST. AVERAGE GLUCOSE BLD GHB EST-MCNC: 108 MG/DL
ESTRADIOL SERPL-MCNC: 49.3 PG/ML
FSH SERPL-ACNC: 4.6 MIU/ML
GLOBULIN SER CALC-MCNC: 2.5 G/DL (ref 1.5–4.5)
GLUCOSE SERPL-MCNC: 94 MG/DL (ref 65–99)
HBA1C MFR BLD: 5.4 % (ref 4.8–5.6)
HCT VFR BLD AUTO: 39 % (ref 34–46.6)
HDLC SERPL-MCNC: 54 MG/DL
HGB BLD-MCNC: 13 G/DL (ref 11.1–15.9)
IMM GRANULOCYTES # BLD AUTO: 0 X10E3/UL (ref 0–0.1)
IMM GRANULOCYTES NFR BLD AUTO: 0 %
LDLC SERPL CALC-MCNC: 110 MG/DL (ref 0–99)
LH SERPL-ACNC: 4.5 MIU/ML
LYMPHOCYTES # BLD AUTO: 2.2 X10E3/UL (ref 0.7–3.1)
LYMPHOCYTES NFR BLD AUTO: 34 %
MCH RBC QN AUTO: 28.7 PG (ref 26.6–33)
MCHC RBC AUTO-ENTMCNC: 33.3 G/DL (ref 31.5–35.7)
MCV RBC AUTO: 86 FL (ref 79–97)
MONOCYTES # BLD AUTO: 0.5 X10E3/UL (ref 0.1–0.9)
MONOCYTES NFR BLD AUTO: 9 %
NEUTROPHILS # BLD AUTO: 3.3 X10E3/UL (ref 1.4–7)
NEUTROPHILS NFR BLD AUTO: 52 %
PLATELET # BLD AUTO: 229 X10E3/UL (ref 150–379)
POTASSIUM SERPL-SCNC: 4.3 MMOL/L (ref 3.5–5.2)
PROLACTIN SERPL-MCNC: 11.9 NG/ML (ref 4.8–23.3)
PROT SERPL-MCNC: 6.5 G/DL (ref 6–8.5)
RBC # BLD AUTO: 4.53 X10E6/UL (ref 3.77–5.28)
SODIUM SERPL-SCNC: 142 MMOL/L (ref 134–144)
T4 FREE SERPL-MCNC: 0.8 NG/DL (ref 0.82–1.77)
TRIGL SERPL-MCNC: 223 MG/DL (ref 0–149)
TSH SERPL DL<=0.005 MIU/L-ACNC: 1.34 UIU/ML (ref 0.45–4.5)
VLDLC SERPL CALC-MCNC: 45 MG/DL (ref 5–40)
WBC # BLD AUTO: 6.3 X10E3/UL (ref 3.4–10.8)

## 2019-04-24 NOTE — PROGRESS NOTES
Not fully menopausal yet based on hormone levels. LDL cholesterol has increased. Work on reducing saturated fat in the diet along with weight loss to lower this again. But, triglycerides are a little better with the fish oil supplements. Continue the fish oil.   Other labs are normal.  Continue your current medications

## 2019-05-04 DIAGNOSIS — F41.8 DEPRESSION WITH ANXIETY: ICD-10-CM

## 2019-05-04 RX ORDER — ESCITALOPRAM OXALATE 20 MG/1
TABLET ORAL
Qty: 30 TAB | Refills: 5 | Status: SHIPPED | OUTPATIENT
Start: 2019-05-04 | End: 2019-09-26 | Stop reason: SDUPTHER

## 2019-06-27 ENCOUNTER — TELEPHONE (OUTPATIENT)
Dept: INTERNAL MEDICINE CLINIC | Age: 44
End: 2019-06-27

## 2019-06-27 NOTE — TELEPHONE ENCOUNTER
Pt helping at S this morning. Pt had to run down the valero for something, after she ran down the valero, pt got a sharp pain in her back and neck which lasted 2 - 3 minutes. Pt still has a headache; even though the sharp pain has gone away. Pt took BP after she got home, BP was 147/96; previous BP has been 117 - 126 / 79-80. Pt states she has high BP, MS and herniated disk, so pt is unsure which of these may have caused the sharp pain. Pt also states she has been retaining fluid which is unusual for her; pt is drinking 64 oz / day. Please advise pt what she should do.  Please call pt, ph# [phone]

## 2019-06-27 NOTE — TELEPHONE ENCOUNTER
Spoke with pt in regards to symptoms. Providers recommendations were given to patient and understanding voiced.

## 2019-06-27 NOTE — TELEPHONE ENCOUNTER
Please advise patient to avoid sodium and sweet beverages (for fluid retention) should just drink water. If pain is persistent/returns then she should be evaluated in the office. Otherwise, it sounds like she is doing a good job of caring for herself.      Maria Del Carmen Owusu MD

## 2019-08-01 ENCOUNTER — HOSPITAL ENCOUNTER (OUTPATIENT)
Age: 44
Setting detail: OUTPATIENT SURGERY
Discharge: HOME | End: 2019-08-01
Attending: INTERNAL MEDICINE | Admitting: INTERNAL MEDICINE
Payer: COMMERCIAL

## 2019-08-01 ENCOUNTER — ANESTHESIA EVENT (OUTPATIENT)
Dept: ENDOSCOPY | Age: 44
End: 2019-08-01
Payer: COMMERCIAL

## 2019-08-01 ENCOUNTER — ANESTHESIA (OUTPATIENT)
Dept: ENDOSCOPY | Age: 44
End: 2019-08-01
Payer: COMMERCIAL

## 2019-08-01 VITALS
BODY MASS INDEX: 37.21 KG/M2 | WEIGHT: 231.5 LBS | HEART RATE: 73 BPM | RESPIRATION RATE: 16 BRPM | DIASTOLIC BLOOD PRESSURE: 86 MMHG | SYSTOLIC BLOOD PRESSURE: 118 MMHG | OXYGEN SATURATION: 98 % | TEMPERATURE: 98.2 F | HEIGHT: 66 IN

## 2019-08-01 PROCEDURE — C1726 CATH, BAL DIL, NON-VASCULAR: HCPCS | Performed by: INTERNAL MEDICINE

## 2019-08-01 PROCEDURE — 88305 TISSUE EXAM BY PATHOLOGIST: CPT

## 2019-08-01 PROCEDURE — 77030018712 HC DEV BLLN INFL BSC -B: Performed by: INTERNAL MEDICINE

## 2019-08-01 PROCEDURE — 76060000032 HC ANESTHESIA 0.5 TO 1 HR: Performed by: INTERNAL MEDICINE

## 2019-08-01 PROCEDURE — 74011250636 HC RX REV CODE- 250/636: Performed by: INTERNAL MEDICINE

## 2019-08-01 PROCEDURE — 74011250636 HC RX REV CODE- 250/636: Performed by: NURSE ANESTHETIST, CERTIFIED REGISTERED

## 2019-08-01 PROCEDURE — 77030021593 HC FCPS BIOP ENDOSC BSC -A: Performed by: INTERNAL MEDICINE

## 2019-08-01 PROCEDURE — 76040000007: Performed by: INTERNAL MEDICINE

## 2019-08-01 RX ORDER — LIDOCAINE HYDROCHLORIDE 20 MG/ML
INJECTION, SOLUTION EPIDURAL; INFILTRATION; INTRACAUDAL; PERINEURAL AS NEEDED
Status: DISCONTINUED | OUTPATIENT
Start: 2019-08-01 | End: 2019-08-01 | Stop reason: HOSPADM

## 2019-08-01 RX ORDER — DEXTROMETHORPHAN/PSEUDOEPHED 2.5-7.5/.8
1.2 DROPS ORAL
Status: DISCONTINUED | OUTPATIENT
Start: 2019-08-01 | End: 2019-08-01 | Stop reason: HOSPADM

## 2019-08-01 RX ORDER — NALOXONE HYDROCHLORIDE 0.4 MG/ML
0.4 INJECTION, SOLUTION INTRAMUSCULAR; INTRAVENOUS; SUBCUTANEOUS
Status: DISCONTINUED | OUTPATIENT
Start: 2019-08-01 | End: 2019-08-01 | Stop reason: HOSPADM

## 2019-08-01 RX ORDER — MIDAZOLAM HYDROCHLORIDE 1 MG/ML
.25-5 INJECTION, SOLUTION INTRAMUSCULAR; INTRAVENOUS
Status: DISCONTINUED | OUTPATIENT
Start: 2019-08-01 | End: 2019-08-01 | Stop reason: HOSPADM

## 2019-08-01 RX ORDER — FENTANYL CITRATE 50 UG/ML
25-200 INJECTION, SOLUTION INTRAMUSCULAR; INTRAVENOUS
Status: DISCONTINUED | OUTPATIENT
Start: 2019-08-01 | End: 2019-08-01 | Stop reason: HOSPADM

## 2019-08-01 RX ORDER — SODIUM CHLORIDE 0.9 % (FLUSH) 0.9 %
5-40 SYRINGE (ML) INJECTION EVERY 8 HOURS
Status: DISCONTINUED | OUTPATIENT
Start: 2019-08-01 | End: 2019-08-01 | Stop reason: HOSPADM

## 2019-08-01 RX ORDER — SODIUM CHLORIDE 9 MG/ML
INJECTION, SOLUTION INTRAVENOUS
Status: DISCONTINUED | OUTPATIENT
Start: 2019-08-01 | End: 2019-08-01 | Stop reason: HOSPADM

## 2019-08-01 RX ORDER — PROPOFOL 10 MG/ML
INJECTION, EMULSION INTRAVENOUS AS NEEDED
Status: DISCONTINUED | OUTPATIENT
Start: 2019-08-01 | End: 2019-08-01 | Stop reason: HOSPADM

## 2019-08-01 RX ORDER — SODIUM CHLORIDE 0.9 % (FLUSH) 0.9 %
5-40 SYRINGE (ML) INJECTION AS NEEDED
Status: DISCONTINUED | OUTPATIENT
Start: 2019-08-01 | End: 2019-08-01 | Stop reason: HOSPADM

## 2019-08-01 RX ORDER — EPINEPHRINE 0.1 MG/ML
1 INJECTION INTRACARDIAC; INTRAVENOUS
Status: DISCONTINUED | OUTPATIENT
Start: 2019-08-01 | End: 2019-08-01 | Stop reason: HOSPADM

## 2019-08-01 RX ORDER — ATROPINE SULFATE 0.1 MG/ML
0.5 INJECTION INTRAVENOUS
Status: DISCONTINUED | OUTPATIENT
Start: 2019-08-01 | End: 2019-08-01 | Stop reason: HOSPADM

## 2019-08-01 RX ORDER — DICYCLOMINE HYDROCHLORIDE 10 MG/1
10 CAPSULE ORAL
Qty: 30 CAP | Refills: 3 | Status: SHIPPED | OUTPATIENT
Start: 2019-08-01 | End: 2019-08-31

## 2019-08-01 RX ORDER — FLUMAZENIL 0.1 MG/ML
0.2 INJECTION INTRAVENOUS
Status: DISCONTINUED | OUTPATIENT
Start: 2019-08-01 | End: 2019-08-01 | Stop reason: HOSPADM

## 2019-08-01 RX ORDER — SODIUM CHLORIDE 9 MG/ML
50 INJECTION, SOLUTION INTRAVENOUS CONTINUOUS
Status: DISCONTINUED | OUTPATIENT
Start: 2019-08-01 | End: 2019-08-01 | Stop reason: HOSPADM

## 2019-08-01 RX ORDER — PANTOPRAZOLE SODIUM 40 MG/1
40 TABLET, DELAYED RELEASE ORAL
Qty: 90 TAB | Refills: 6 | Status: SHIPPED | OUTPATIENT
Start: 2019-08-01 | End: 2019-10-30

## 2019-08-01 RX ADMIN — SODIUM CHLORIDE: 900 INJECTION, SOLUTION INTRAVENOUS at 15:53

## 2019-08-01 RX ADMIN — PROPOFOL 50 MG: 10 INJECTION, EMULSION INTRAVENOUS at 16:14

## 2019-08-01 RX ADMIN — PROPOFOL 100 MG: 10 INJECTION, EMULSION INTRAVENOUS at 16:04

## 2019-08-01 RX ADMIN — PROPOFOL 50 MG: 10 INJECTION, EMULSION INTRAVENOUS at 16:26

## 2019-08-01 RX ADMIN — PROPOFOL 200 MG: 10 INJECTION, EMULSION INTRAVENOUS at 15:57

## 2019-08-01 RX ADMIN — PROPOFOL 50 MG: 10 INJECTION, EMULSION INTRAVENOUS at 15:58

## 2019-08-01 RX ADMIN — LIDOCAINE HYDROCHLORIDE 80 MG: 20 INJECTION, SOLUTION EPIDURAL; INFILTRATION; INTRACAUDAL; PERINEURAL at 15:56

## 2019-08-01 RX ADMIN — PROPOFOL 100 MG: 10 INJECTION, EMULSION INTRAVENOUS at 16:02

## 2019-08-01 RX ADMIN — PROPOFOL 100 MG: 10 INJECTION, EMULSION INTRAVENOUS at 16:00

## 2019-08-01 RX ADMIN — PROPOFOL 150 MG: 10 INJECTION, EMULSION INTRAVENOUS at 16:20

## 2019-08-01 NOTE — PROCEDURES
[address]      Colonoscopy Operative Report    Thania Pimentel  [id number]  1975      Procedure Type:   Colonoscopy with polypectomy (hot biopsy)     Indications:    Abdominal pain, LLQ, Abdominal pain, RLQ       Pre-operative Diagnosis: see indication above    Post-operative Diagnosis:  See findings below    :  Daniel Leach MD    Surgical Assistant: None    Implants:  None    Referring Provider: Jay Lange MD      Sedation:  MAC anesthesia Propofol      Procedure Details:  After informed consent was obtained with all risks and benefits of procedure explained and preoperative exam completed, the patient was taken to the endoscopy suite and placed in the left lateral decubitus position. Upon sequential sedation as per above, a digital rectal exam was performed demonstrating internal hemorrhoids. The Olympus videocolonoscope  was inserted in the rectum and carefully advanced to the cecum, which was identified by the ileocecal valve and appendiceal orifice, terminal ileum. The cecum was identified by the ileocecal valve and appendiceal orifice. The quality of preparation was good. The colonoscope was slowly withdrawn with careful evaluation between folds. Retroflexion in the rectum was completed . Findings:   Rectum: normal  Sigmoid: 9 mm polyp removed by hot biopsy  Descending Colon: normal  Transverse Colon: 9 mm polyp removed by hot biopsy  Ascending Colon: 9 mm polyp removed by hot biopsy  Cecum: normal  Terminal Ileum: normal      Specimen Removed:  as above    Complications: None. EBL:  None. Impression:    see findings    Recommendations: --Await pathology.       Recommendation for next colonscopy in 3 years  Bentyl prn for pain  F/u in 2 months      Signed By: Daniel Leach MD     8/1/2019  4:40 PM

## 2019-08-01 NOTE — ANESTHESIA PREPROCEDURE EVALUATION
Relevant Problems   No relevant active problems       Anesthetic History   No history of anesthetic complications            Review of Systems / Medical History  Patient summary reviewed, nursing notes reviewed and pertinent labs reviewed    Pulmonary  Within defined limits      Sleep apnea    Asthma        Neuro/Psych   Within defined limits          Comments: Multiple sclerosis Cardiovascular  Within defined limits  Hypertension                   GI/Hepatic/Renal  Within defined limits   GERD           Endo/Other  Within defined limits      Morbid obesity     Other Findings              Physical Exam    Airway  Mallampati: II  TM Distance: > 6 cm  Neck ROM: normal range of motion   Mouth opening: Normal     Cardiovascular  Regular rate and rhythm,  S1 and S2 normal,  no murmur, click, rub, or gallop             Dental  No notable dental hx       Pulmonary  Breath sounds clear to auscultation               Abdominal  GI exam deferred       Other Findings            Anesthetic Plan    ASA: 3  Anesthesia type: MAC            Anesthetic plan and risks discussed with: Patient

## 2019-08-01 NOTE — DISCHARGE INSTRUCTIONS
[address]  [place], [address]    EGD and COLON DISCHARGE INSTRUCTIONS    Timothy Rankin  [id number]  1975    Discomfort:  Redness at IV site- apply warm compress to area; if redness or soreness persist- contact your physician  There may be a slight amount of blood passed from the rectum  Gaseous discomfort- walking, belching will help relieve any discomfort  You may not operate a vehicle for 12 hours  You may not engage in an occupation involving machinery or appliances for rest of today  You may not drink alcoholic beverages for at least 12 hours  Avoid making any critical decisions for at least 24 hour  DIET:  You may resume your regular diet - however -  remember your colon is empty and a heavy meal will produce gas. Avoid these foods:  vegetables, fried / greasy foods, carbonated drinks     ACTIVITY:  You may  resume your normal daily activities it is recommended that you spend the remainder of the day resting -  avoid any strenuous activity. CALL M.D. ANY SIGN OF:   Increasing pain, nausea, vomiting  Abdominal distension (swelling)  New increased bleeding (oral or rectal)  Fever (chills)  Pain in chest area  Bloody discharge from nose or mouth  Shortness of breath      Follow-up Instructions:   Call Dr. Odilia Dillon for any questions or problems at [phone] and follow up with him in 2 months   Avoid NSAIDS   Take protonix for 6 months          ENDOSCOPY FINDINGS:   Your colonoscopy showed 3 polyps removed, rest of exam was normal   Your endoscopy showed small hiatal hernia, esophageal ring, dilated and gastric ulcers biopsied.   Telephone # [phone]      Signed By: Odilia Dillon MD     8/1/2019  4:45 PM       DISCHARGE SUMMARY from Nurse    The following personal items collected during your admission are returned to you:   Dental Appliance: Dental Appliances: None  Vision: Visual Aid: Glasses  Hearing Aid:    Jewelry:    Clothing: Other Valuables:    Valuables sent to safe:      Patient Education        Colon Polyps: Care Instructions  Your Care Instructions    Colon polyps are growths in the colon or the rectum. The cause of most colon polyps is not known, and most people who get them do not have any problems. But a certain kind can turn into cancer. For this reason, regular testing for colon polyps is important for people as they get older. It is also important for anyone who has an increased risk for colon cancer. Polyps are usually found through routine colon cancer screening tests. Although most colon polyps are not cancerous, they are usually removed and then tested for cancer. Screening for colon cancer saves lives because the cancer can usually be cured if it is caught early. If you have a polyp that is the type that can turn into cancer, you may need more tests to examine your entire colon. The doctor will remove any other polyps that he or she finds, and you will be tested more often. Follow-up care is a key part of your treatment and safety. Be sure to make and go to all appointments, and call your doctor if you are having problems. It's also a good idea to know your test results and keep a list of the medicines you take. How can you care for yourself at home? Regular exams to look for colon polyps are the best way to prevent polyps from turning into colon cancer. These can include stool tests, sigmoidoscopy, colonoscopy, and CT colonography. Talk with your doctor about a testing schedule that is right for you. To prevent polyps  There is no home treatment that can prevent colon polyps. But these steps may help lower your risk for cancer. · Stay active. Being active can help you get to and stay at a healthy weight. Try to exercise on most days of the week. Walking is a good choice. · Eat well. Choose a variety of vegetables, fruits, legumes (such as peas and beans), fish, poultry, and whole grains. · Do not smoke.  If you need help quitting, talk to your doctor about stop-smoking programs and medicines. These can increase your chances of quitting for good. · If you drink alcohol, limit how much you drink. Limit alcohol to 2 drinks a day for men and 1 drink a day for women. When should you call for help? Call your doctor now or seek immediate medical care if:    · You have severe belly pain.     · Your stools are maroon or very bloody.    Watch closely for changes in your health, and be sure to contact your doctor if:    · You have a fever.     · You have nausea or vomiting.     · You have a change in bowel habits (new constipation or diarrhea).     · Your symptoms get worse or are not improving as expected. Where can you learn more? Go to http://temi-james.info/. Enter [phone] [id number] in the search box to learn more about \"Colon Polyps: Care Instructions. \"  Current as of: December 19, 2018  Content Version: 12.1  © 2935-0423 [employer]. Care instructions adapted under license by [employer] (which disclaims liability or warranty for this information). If you have questions about a medical condition or this instruction, always ask your healthcare professional. [address] any warranty or liability for your use of this information.

## 2019-08-01 NOTE — PROCEDURES
[address]  [address] [facility]        Esophago- Gastroduodenoscopy (EGD) Procedure Note    Paty Saver  1975  [id number]      Procedure: Endoscopic Gastroduodenoscopy with biopsy, esophageal dilation    Indication:  Abdominal pain, epigastric, Dysphagia/odynophagia, GERD     Pre-operative Diagnosis: see indication above    Post-operative Diagnosis: see findings below    : Sandra Méndez MD    Surgical Assistant: None    Implants:  None    Referring Provider:  Sara Kraft MD      Anesthesia/Sedation:  MAC anesthesia Propofol        Procedure Details     After infomed consent was obtained for the procedure, with all risks and benefits of procedure explained the patient was taken to the endoscopy suite and placed in the left lateral decubitus position. Following sequential administration of sedation as per above, the endoscope was inserted into the mouth and advanced under direct vision to third portion of the duodenum. A careful inspection was made as the gastroscope was withdrawn, including a retroflexed view of the proximal stomach; findings and interventions are described below. Findings:   Esophagus:hiatal hernia 3 cm in size     schatzki ring at G-E junction, dilated with CRE balloon ( 15, 16.5 then 18 mm) kept at 18 mm for 1 minute    Random esophageal biopsies taken  Stomach: gastritis with multiple non bleeding ulcers in antrum, largest one around 1 cm, biopsied  Duodenum/jejunum: normal, random biopsies taken      Therapies:  As above    Specimens: as above         EBL: None      Complications:   None; patient tolerated the procedure well. Impression:    -See post-procedure diagnoses.     Recommendations:  -No NSAIDS, -  -protonix 40 mg/d for 6 months  Colonoscopy today    Signed By: Sandra Méndez MD     8/1/2019  4:37 PM

## 2019-08-01 NOTE — ANESTHESIA POSTPROCEDURE EVALUATION
Post-Anesthesia Evaluation and Assessment    Patient: Corey Adamson MRN: [id number]  SSN: [id number]    YOB: 1975  Age: 38 y.o. Sex: female      I have evaluated the patient and they are stable and ready for discharge from the PACU. Cardiovascular Function/Vital Signs  Visit Vitals  /66   Pulse 81   Temp 36.7 °C (98.1 °F)   Resp 14   Ht 5' 6\" (1.676 m)   Wt 105 kg (231 lb 8 oz)   SpO2 96%   Breastfeeding? No   BMI 37.37 kg/m²       Patient is status post MAC anesthesia for Procedure(s):  COLONOSCOPY/EGD (LATEX ALLERGY)  ESOPHAGOGASTRODUODENOSCOPY (EGD)  ESOPHAGOGASTRODUODENAL (EGD) BIOPSY  ESOPHAGEAL DILATION  ENDOSCOPIC POLYPECTOMY. Nausea/Vomiting: None    Postoperative hydration reviewed and adequate. Pain:  Pain Scale 1: Numeric (0 - 10) (08/01/19 1517)  Pain Intensity 1: 3 (08/01/19 1517)   Managed    Neurological Status: At baseline    Mental Status, Level of Consciousness: Alert and  oriented to person, place, and time    Pulmonary Status:   O2 Device: Nasal cannula (08/01/19 1631)   Adequate oxygenation and airway patent    Complications related to anesthesia: None    Post-anesthesia assessment completed. No concerns    Signed By: Eliceo Angel MD     August 1, 2019              Procedure(s):  COLONOSCOPY/EGD (LATEX ALLERGY)  ESOPHAGOGASTRODUODENOSCOPY (EGD)  ESOPHAGOGASTRODUODENAL (EGD) BIOPSY  ESOPHAGEAL DILATION  ENDOSCOPIC POLYPECTOMY. MAC    <BSHSIANPOST>    Vitals Value Taken Time   /70 8/1/2019  4:37 PM   Temp     Pulse 77 8/1/2019  4:38 PM   Resp 16 8/1/2019  4:38 PM   SpO2 95 % 8/1/2019  4:38 PM   Vitals shown include unvalidated device data.

## 2019-08-01 NOTE — H&P
The patient is a 38year old female who presents with a complaint of Abdominal Pain. The patient presents for new symptoms. The onset of the abdominal pain has been gradual and has been occurring for months. The abdominal pain is described as is described as being located in the lower abdomen .  The symptoms have been associated with abdominal distention, diarrhea and heartburn,  while the symptoms have not been associated with amenorrhea, anorexia, bloating, bloody stools, black stools, bulky stools, bone pain, chest pain, constipation, dark urine, dysuria, early satiety, family history of colon cancer, fever, hematemesis, hematuria, jaundice, melena, nausea, passing worms, pica, use of alcohol, vaginal bleeding, vaginal discharge, vomiting or weight loss. Note for \"Abdominal Pain\": she has [address] [facility] of colon cancer, her dad, has h/o colon polyps, last colonoscopy in , referred to me by her sister, Loy Bell. she c/o more of loose  stool and lower abdominal pain. she also c/o heartburn and intermittent dysphagia      Problem List/Past Medical (Soren Fry; 2019 10:14 AM)  Colon Cancer    Migraine Headache    Asthma    Depression    Colonic Polyps    MS  [2002]: Irritable bowel syndrome (564.1) (564.1  K58.9)    History of colonic polyps (V12.72  Z86.010)    Tubular adenoma (229.9  D36.9)      Past Surgical History (Lisseth Lozano; 2019 10:14 AM)   Delivery    Polypectomy    Shoulder Surgery  []: Right.   Fibriods Cyst  [8810]:   Elbow fracture, left (812.40  S42.402A)    Fracture of leg (827.0  S82.90XA)   Right. Allergies (Soren Fry; 2019 10:14 AM)  Latex    Adhesive Tape   Rash. Medication History (Soren Fry; 2019 10:18 AM)  Teriflunomide  (14MG Tablet, Oral daily) Active. Gabapentin  (300MG Capsule, 4 caps Oral two times daily) Active. Diclofenac Sodium  (75MG Tablet DR, Oral two times daily) Active.   Metoprolol Succinate ER  (100MG Tablet ER 24HR, Oral daily) Active. Escitalopram Oxalate  (20MG Tablet, Oral daily) Active. Vitamin D3  (5000UNIT Tablet Chewable, Oral daily) Active. Omega-3  (500MG Capsule, Oral daily) Active. Medications Reconciled     Family History (Euna Jeans; 7/18/2019 10:14 AM)  Colon Cancer   First Degree Relatives. Hypertension   Mother, First Degree Relatives. Lung Cancer   First Degree Relatives. Skin Cancer   First Degree Relatives. Thyroid Disease   Father. Crohn's Disease   Father. Cancer   First Degree Relatives. Colon Polyps   Mother. Social History (Euna Jeans; 7/18/2019 10:14 AM)  Blood Transfusion   No.  Tobacco Use   Has been smoking for 8 years, Former smoker, Light tobacco smoker. Alcohol Use   Occasional alcohol use, Drinks beer, Drinks wine. once a week  Employment status   Disabled. Marital status   . Diagnostic Studies History (Euna Jeans; 7/18/2019 10:14 AM)  Colonoscopy  [2012]: Health Maintenance History (Euna Jeans; 7/18/2019 10:14 AM)  Flu Vaccine  [2018]:  Pneumovax  [2016]:        Review of Systems (Euna Jeans; 7/18/2019 10:14 AM)  General Not Present- Chronic Fatigue, Poor Appetite, Weight Gain and Weight Loss. Skin Not Present- Itching, Rash and Skin Color Changes. HEENT Not Present- Hearing Loss and Vertigo. Respiratory Not Present- Difficulty Breathing and TB exposure. Cardiovascular Not Present- Chest Pain, Use of Antibiotics before Dental Procedures and Use of Blood Thinners. Gastrointestinal Present- See HPI. Musculoskeletal Present- Arthritis and Joint Pain. Not Present- Hip Replacement Surgery and Knee Replacement Surgery. Neurological Not Present- Weakness. Psychiatric Not Present- Depression. Endocrine Not Present- Diabetes and Thyroid Problems. Hematology Not Present- Anemia.     Vitals (Euna Jeans; 7/18/2019 10:17 AM)  7/18/2019 10:14 AM  Weight: 237.38 lb   Height: 66 in   Body Surface Area: 2.15 m²   Body Mass Index: 38.31 kg/m²    Temp.: 97.9° F (Temporal)    Pulse: 74 (Regular)    Resp. : 16 (Unlabored)     BP: 162/96 (Sitting, Left Arm, Standard)              Physical Exam Claudia Hernandez MD; 7/18/2019 6:35 PM)  General  Mental Status - Alert. General Appearance - Cooperative, Pleasant, Not in acute distress. Orientation - Oriented X3. Build & Nutrition - Well nourished and Well developed. Integumentary  General Characteristics  Overall examination of the patient's skin reveals - no rashes, no bruises and no spider angiomas. Color - normal coloration of skin. Head and Neck  Neck  Global Assessment - full range of motion and supple, no bruit auscultated on the right, no bruit auscultated on the left, non-tender, no lymphadenopathy. Thyroid  Gland Characteristics - normal size and consistency. Eye  Eyeball - Left - No Exophthalmos. Eyeball - Right - No Exophthalmos. Sclera/Conjunctiva - Left - No Jaundice. Sclera/Conjunctiva - Right - No Jaundice. Chest and Lung Exam  Chest and lung exam reveals  - quiet, even and easy respiratory effort with no use of accessory muscles. Auscultation  Breath sounds - Normal. Adventitious sounds - No Adventitious sounds. Cardiovascular  Auscultation  Rhythm - Regular, No Tachycardia, No Bradycardia . Heart Sounds - Normal heart sounds , S1 WNL and S2 WNL. Murmurs & Other Heart Sounds - Auscultation of the heart reveals - No Murmurs. Abdomen  Palpation/Percussion  Tenderness - Non-Tender. Rebound tenderness - No rebound. Rigidity (guarding) - No Rigidity. Dullness to percussion - No abnormal dullness to percussion. Liver - No hepatosplenomegaly. Abdominal Mass Palpable - No masses. Other Characteristics - No Ascites. Auscultation  Auscultation of the abdomen reveals - Bowel sounds normal, No Abdominal bruits and No Succussion splash. Rectal - Did not examine.     Peripheral Vascular  Upper Extremity  Inspection - Left - Normal - No Clubbing, No Cyanosis, No Edema, Pulses Intact. Right - Normal - No Clubbing, No Cyanosis, No Edema, Pulses Intact. Palpation - Edema - Left - No edema. Right - No edema. Lower Extremity  Inspection - Left - Inspection Normal. Right - Inspection Normal. Palpation - Edema - Left - No edema. Right - No edema. Neurologic  Neurologic evaluation reveals  - Cranial nerves grossly intact, no focal neurologic deficits. Motor  Involuntary Movements - Asterixis - not present. Musculoskeletal  Global Assessment  Gait and Station - normal gait and station. Assessment & Plan Stacey Hatfield MD; 7/18/2019 6:38 PM)  Left lower quadrant pain (789.04  R10.32)  Impression: she has FHX of colon cancer, her dad, has h/o colon polyps, last colonoscopy in 2016, referred to me by her sister, Ramiro Carbajal. she c/o more of loose stool and lower abdominal pain. she also c/o heartburn and intermittent dysphagia  colonoscopy to look for any colitis, IBD and colonic neoplasm  given sample of clenpiq  Current Plans  Colonoscopy (37245) (Discussed risks and benefits with the patient to include:; perforation, post polypectomy, or post biopsy bleeding, missed lesions, and sedation reactions.)  Pt Education - How to access health information online: discussed with patient and provided information. Patient is to call me for any questions or concerns. Bloating (787.3  R14.0)  Impression: trial of low FOADMAP diet and align for 3 months  Current Plans  CELIAC DISEASE, COMP (52271)  Heartburn (787.1  R12)  Current Plans  Pt Education - GERD-Reflux Esophagitis: discussed with patient and provided information.   Dysphagia (787.20  R13.10)  Impression: EGD to look for any esophageal stricture  Current Plans  Endoscopy (47326) (Discussed risks and benefits with the patient to include: perforation, post polypectomy, or post biopsy bleeding, missed lesions, and sedation reactions.)  Abdominal pain (789.00  R10.9)  Right lower quadrant abdominal pain (789.03 R10.31)  Tubular adenoma (229.9  D36.9)  Future Plans  3/18/2021: Colonoscopy (90351) - one time  Family history of cancer of GI tract (V16.0  Z80.0)  Current Plans  Discussed the risks and benefits of colonoscopy with the patient. Date of Surgery Update:  Domingo Nissen was seen and examined. History and physical has been reviewed. The patient has been examined.  There have been no significant clinical changes since the completion of the originally dated History and Physical.    Signed By: Sean Harp MD     August 1, 2019 3:53 PM

## 2019-08-01 NOTE — PROGRESS NOTES
CRE balloon dilatation of the esophagus   15 mm Balloon inflated to 3 ATMs and held for 5 seconds. 16.5 Balloon inflated to 4.5 ATMs and held for 6 seconds. 18 mm Balloon inflated to 7 ATMs and held for 60 seconds. No subcutaneous crepitus of the chest or cervical region was noted post dilatation.

## 2019-08-01 NOTE — PROGRESS NOTES
Endoscope was pre-cleaned at bedside immediately following procedure by [facility]. Angie Gastelum

## 2019-08-01 NOTE — ROUTINE PROCESS
Breanne Kaylie 1975 
[id number] Situation: 
Verbal report received from: Jenna Shelton RN Procedure: Procedure(s): 
COLONOSCOPY/EGD (LATEX ALLERGY) ESOPHAGOGASTRODUODENOSCOPY (EGD) ESOPHAGOGASTRODUODENAL (EGD) BIOPSY 
ESOPHAGEAL DILATION 
ENDOSCOPIC POLYPECTOMY Background: 
 
Preoperative diagnosis: CHANGE IN BOWEL HABITS Postoperative diagnosis: hiatal hernia, esophageal ring , gastritis, gastric ulcers, COLON POLYPS :  Dr. Jessica Mendoza Assistant(s): Endoscopy Technician-1: Mely Kimball Endoscopy RN-1: Genaro Earl RN Specimens:  
ID Type Source Tests Collected by Time Destination 1 : pathology Preservative Duodenum  Sabra Lopez MD 8/1/2019 1604 Pathology 2 : pathology Preservative Gastric  Sabra Lopez MD 8/1/2019 1606 Pathology 3 : RANDOM ESOPHAGUS Preservative   Sabra Lopez MD 8/1/2019 1614 Pathology 4 : PATHOLOGY Preservative Colon, Ascending  Sabra Lopez MD 8/1/2019 1627 Pathology 5 : PATHOLOGY Preservative Colon, Transverse  Sabra Lopez MD 8/1/2019 1627 Pathology 6 : PATHOLOGY Preservative Sigmoid  Sabra Lopez MD 8/1/2019 1628 Pathology H. Pylori  no Assessment: 
Intra-procedure medications Anesthesia gave intra-procedure sedation and medications, see anesthesia flow sheet yes Intravenous fluids: NS@ Lethaniel Haagensen Vital signs stable Abdominal assessment: round and soft Recommendation: 
Discharge patient per MD order. Family or Friend Permission to share finding with family or friend yes

## 2019-08-02 ENCOUNTER — OFFICE VISIT (OUTPATIENT)
Dept: INTERNAL MEDICINE CLINIC | Age: 44
End: 2019-08-02

## 2019-08-02 VITALS
WEIGHT: 233.5 LBS | HEIGHT: 66 IN | SYSTOLIC BLOOD PRESSURE: 121 MMHG | HEART RATE: 71 BPM | OXYGEN SATURATION: 98 % | TEMPERATURE: 98.5 F | RESPIRATION RATE: 16 BRPM | DIASTOLIC BLOOD PRESSURE: 85 MMHG | BODY MASS INDEX: 37.52 KG/M2

## 2019-08-02 DIAGNOSIS — R00.2 PALPITATIONS: ICD-10-CM

## 2019-08-02 DIAGNOSIS — E55.9 VITAMIN D DEFICIENCY: ICD-10-CM

## 2019-08-02 DIAGNOSIS — F41.8 DEPRESSION WITH ANXIETY: ICD-10-CM

## 2019-08-02 DIAGNOSIS — R73.9 HYPERGLYCEMIA: ICD-10-CM

## 2019-08-02 DIAGNOSIS — R09.89 LABILE BLOOD PRESSURE: Primary | ICD-10-CM

## 2019-08-02 DIAGNOSIS — M25.559 ARTHRALGIA OF HIP, UNSPECIFIED LATERALITY: ICD-10-CM

## 2019-08-02 DIAGNOSIS — D12.6 TUBULAR ADENOMA OF COLON: ICD-10-CM

## 2019-08-02 DIAGNOSIS — G35 MS (MULTIPLE SCLEROSIS) (HCC): ICD-10-CM

## 2019-08-02 DIAGNOSIS — E78.5 DYSLIPIDEMIA: ICD-10-CM

## 2019-08-02 DIAGNOSIS — R44.2 HYPNOPOMPIC HALLUCINATION: ICD-10-CM

## 2019-08-02 DIAGNOSIS — I10 ESSENTIAL HYPERTENSION: ICD-10-CM

## 2019-08-02 DIAGNOSIS — J45.909 UNCOMPLICATED ASTHMA, UNSPECIFIED ASTHMA SEVERITY, UNSPECIFIED WHETHER PERSISTENT: ICD-10-CM

## 2019-08-02 DIAGNOSIS — K27.9 PUD (PEPTIC ULCER DISEASE): ICD-10-CM

## 2019-08-02 RX ORDER — BUPROPION HYDROCHLORIDE 150 MG/1
150 TABLET ORAL
Qty: 30 TAB | Refills: 5 | Status: SHIPPED | OUTPATIENT
Start: 2019-08-02 | End: 2020-02-02

## 2019-08-02 NOTE — PROGRESS NOTES
HPI:  Presents for f/u HTN, other    Pt had EGD and colonoscopy  Dx gastric ulcers and esophagus Schatki's ring was dilated. But, pt is not reporting stomach pain. Pt had been taking diclofenac bid. [place] pain. [place]  Has gotten [place]ural injections    Pt is concerned re: the variability of her BP  She feels it is \"bouncing around\"    Pt acknowledges palpitations at times. Pt describes hypnopompic hallucinations. Past medical, Social, and Family history reviewed    Prior to Admission medications    Medication Sig Start Date End Date Taking? Authorizing Provider   pantoprazole (PROTONIX) 40 mg tablet Take 1 Tab by mouth Daily (before breakfast) for 90 days. 8/1/19 10/30/19 Yes Lisa Harding MD   dicyclomine (BENTYL) 10 mg capsule Take 1 Cap by mouth three (3) times daily as needed for Pain or Diarrhea for up to 30 days. 8/1/19 8/31/19 Yes Lisa Harding MD   escitalopram oxalate (LEXAPRO) 20 mg tablet TAKE ONE TABLET BY MOUTH ONE TIME DAILY 5/4/19  Yes Alla Hardin MD   meclizine (ANTIVERT) 25 mg tablet Take 1 Tab by mouth three (3) times daily as needed (vertigo). For vertigo 3/26/19  Yes Alla Hardin MD   metoprolol succinate (TOPROL-XL) 100 mg tablet Take 1.5 Tabs by mouth daily. 3/26/19  Yes Alla Hardin MD   omega 0-evl-dda-fish oil 183.3 mg-75 mg -91.6 mg-306 mg cap Take 4 Caps by mouth daily. 10/25/18  Yes Alla Hardin MD   fluticasone (FLONASE) 50 mcg/actuation nasal spray 2 Sprays by Both Nostrils route daily. Patient taking differently: 2 Sprays by Both Nostrils route as needed. 10/22/18  Yes Alla Hardin MD   fexofenadine (ALLEGRA) 180 mg tablet Take  by mouth. Yes Provider, Historical   gabapentin (NEURONTIN) 300 mg capsule  10/20/17  Yes Provider, Historical   albuterol (PROVENTIL HFA, VENTOLIN HFA, PROAIR HFA) 90 mcg/actuation inhaler Take 2 Puffs by inhalation every four (4) hours as needed for Wheezing or Shortness of Breath. 9/27/17  Yes Ginette Lopez MD   AUBAGIO 14 mg tab  12/17/16  Yes Provider, Historical   cholecalciferol, vitamin D3, (VITAMIN D3) 2,000 unit tab Take 5,000 Units by mouth. Yes Provider, Historical   silver sulfADIAZINE (SILVADENE) 1 % topical cream Apply  to affected area two (2) times a day. 4/24/18   Lilibeth Hardin MD   diclofenac EC (VOLTAREN) 75 mg EC tablet  10/2/17   Provider, Historical   biotin 10,000 mcg cap Take  by mouth. Provider, Historical          ROS  Complete ROS reviewed and negative or stable except as noted in HPI. Physical Exam   Constitutional: She is oriented to person, place, and time. She appears well-nourished. No distress. HENT:   Head: Normocephalic and atraumatic. Mouth/Throat: Oropharynx is clear and moist.   Eyes: Pupils are equal, round, and reactive to light. EOM are normal. No scleral icterus. Neck: Normal range of motion. Neck supple. No JVD present. Cardiovascular: Normal rate, regular rhythm and normal heart sounds. Exam reveals no gallop and no friction rub. No murmur heard. Pulmonary/Chest: Effort normal and breath sounds normal. No respiratory distress. She has no wheezes. She has no rales. Abdominal: Soft. Bowel sounds are normal. She exhibits no distension. There is no tenderness. Musculoskeletal: Normal range of motion. She exhibits no edema. Lymphadenopathy:     She has no cervical adenopathy. Neurological: She is alert and oriented to person, place, and time. She exhibits normal muscle tone. Skin: Skin is warm. No rash noted. Psychiatric: She has a normal mood and affect. Nursing note and vitals reviewed. Prior labs reviewed. Assessment/Plan:  Favor anxiety and stress induced PUD and reactive HTN    ICD-10-CM ICD-9-CM    1. Labile blood pressure R09.89 796.2 REFERRAL TO CARDIOLOGY   2. MS (multiple sclerosis) ([facility]) G35 340    3. PUD (peptic ulcer disease) K27.9 533.90    4.  Vitamin D deficiency E55.9 268.9 VITAMIN D, 25 HYDROXY   5. Tubular adenoma of colon D12.6 211.3    6. Essential hypertension I10 401.9 CBC WITH AUTOMATED DIFF   7. Depression with anxiety F41.8 300.4 buPROPion XL (WELLBUTRIN XL) 150 mg tablet   8. Uncomplicated asthma, unspecified asthma severity, unspecified whether persistent J45.909 493.90    9. Palpitations R00.2 785.1 REFERRAL TO CARDIOLOGY   10. Hyperglycemia R73.9 790.29 HEMOGLOBIN A1C WITH EAG   11. Dyslipidemia E78.5 272.4 CK      LIPID PANEL      METABOLIC PANEL, COMPREHENSIVE   12. Hypnopompic hallucination R44.2 780.1    13. Arthralgia of hip, unspecified laterality M25.559 719.45 SED RATE (ESR)      C REACTIVE PROTEIN, QT      RA + CCP ABS      KRISTIE W/REFLEX     Follow-up and Dispositions    · Return in about 3 months (around 11/2/2019), or if symptoms worsen or fail to improve, for blood pressure, other. lab results and schedule of future lab studies reviewed with patient  reviewed diet, exercise and weight control  cardiovascular risk and specific lipid/LDL goals reviewed  reviewed medications and side effects in detail   Ref to Cards   See ortho for further pain management re: back pain  see ortho hip specialist - Dr. Peg Menjivar has seen her before  Stop all NSAIDs  PPI per GI  Repeat serologies - consider Rheum as indicated.   Add wellbutrin XL  Consider lexapro reduction to limit hallucinations      >40 minutes time spent with >50% in counseling and coordination of care

## 2019-08-02 NOTE — PROGRESS NOTES
RM 13    Patient fasting at this time. Patient has concerns with seeing a cardiologist.     Chief Complaint   Patient presents with    Blood Pressure Check     follow up      1. Have you been to the ER, urgent care clinic since your last visit? Hospitalized since your last visit? Yes Reason for visit: 6/17/19, UC, URI    2. Have you seen or consulted any other health care providers outside of the [address] [place] [address] since your last visit? Include any pap smears or colon screening. Yes Reason for visit: 5/13/19[facility], [facility] -end of June for epidural       Health Maintenance Due   Topic Date Due    Pneumococcal 0-64 years (2 of 3 - PCV13) 06/24/2017    PAP AKA CERVICAL CYTOLOGY  03/23/2019    BREAST CANCER SCRN MAMMOGRAM  05/09/2019    Influenza Age 9 to Adult  08/01/2019     Abuse Screening Questionnaire 8/2/2019   Do you ever feel afraid of your partner? N   Are you in a relationship with someone who physically or mentally threatens you? N   Is it safe for you to go home?  Y       3 most recent PHQ Screens 8/2/2019   Little interest or pleasure in doing things Not at all   Feeling down, depressed, irritable, or hopeless Several days   Total Score PHQ 2 1     Learning Assessment 8/2/2019   PRIMARY LEARNER Patient   HIGHEST LEVEL OF EDUCATION - PRIMARY LEARNER  -   BARRIERS PRIMARY LEARNER NONE   PRIMARY LANGUAGE ENGLISH   LEARNER PREFERENCE PRIMARY VIDEOS     DEMONSTRATION   ANSWERED BY patient   RELATIONSHIP SELF

## 2019-08-04 LAB
25(OH)D3+25(OH)D2 SERPL-MCNC: 55.4 NG/ML (ref 30–100)
ALBUMIN SERPL-MCNC: 4.1 G/DL (ref 3.5–5.5)
ALBUMIN/GLOB SERPL: 1.7 {RATIO} (ref 1.2–2.2)
ALP SERPL-CCNC: 68 IU/L (ref 39–117)
ALT SERPL-CCNC: 31 IU/L (ref 0–32)
ANA SER QL: NEGATIVE
AST SERPL-CCNC: 19 IU/L (ref 0–40)
BASOPHILS # BLD AUTO: 0.1 X10E3/UL (ref 0–0.2)
BASOPHILS NFR BLD AUTO: 1 %
BILIRUB SERPL-MCNC: 0.5 MG/DL (ref 0–1.2)
BUN SERPL-MCNC: 9 MG/DL (ref 6–24)
BUN/CREAT SERPL: 12 (ref 9–23)
CALCIUM SERPL-MCNC: 8.9 MG/DL (ref 8.7–10.2)
CCP IGA+IGG SERPL IA-ACNC: 4 UNITS (ref 0–19)
CHLORIDE SERPL-SCNC: 105 MMOL/L (ref 96–106)
CHOLEST SERPL-MCNC: 200 MG/DL (ref 100–199)
CK SERPL-CCNC: 88 U/L (ref 24–173)
CO2 SERPL-SCNC: 22 MMOL/L (ref 20–29)
CREAT SERPL-MCNC: 0.73 MG/DL (ref 0.57–1)
CRP SERPL-MCNC: 9 MG/L (ref 0–10)
EOSINOPHIL # BLD AUTO: 0.2 X10E3/UL (ref 0–0.4)
EOSINOPHIL NFR BLD AUTO: 3 %
ERYTHROCYTE [DISTWIDTH] IN BLOOD BY AUTOMATED COUNT: 13 % (ref 12.3–15.4)
ERYTHROCYTE [SEDIMENTATION RATE] IN BLOOD BY WESTERGREN METHOD: 20 MM/HR (ref 0–32)
EST. AVERAGE GLUCOSE BLD GHB EST-MCNC: 114 MG/DL
GLOBULIN SER CALC-MCNC: 2.4 G/DL (ref 1.5–4.5)
GLUCOSE SERPL-MCNC: 97 MG/DL (ref 65–99)
HBA1C MFR BLD: 5.6 % (ref 4.8–5.6)
HCT VFR BLD AUTO: 40.6 % (ref 34–46.6)
HDLC SERPL-MCNC: 51 MG/DL
HGB BLD-MCNC: 13.3 G/DL (ref 11.1–15.9)
IMM GRANULOCYTES # BLD AUTO: 0 X10E3/UL (ref 0–0.1)
IMM GRANULOCYTES NFR BLD AUTO: 0 %
LDLC SERPL CALC-MCNC: 92 MG/DL (ref 0–99)
LYMPHOCYTES # BLD AUTO: 1.8 X10E3/UL (ref 0.7–3.1)
LYMPHOCYTES NFR BLD AUTO: 25 %
MCH RBC QN AUTO: 27.9 PG (ref 26.6–33)
MCHC RBC AUTO-ENTMCNC: 32.8 G/DL (ref 31.5–35.7)
MCV RBC AUTO: 85 FL (ref 79–97)
MONOCYTES # BLD AUTO: 0.6 X10E3/UL (ref 0.1–0.9)
MONOCYTES NFR BLD AUTO: 9 %
NEUTROPHILS # BLD AUTO: 4.4 X10E3/UL (ref 1.4–7)
NEUTROPHILS NFR BLD AUTO: 62 %
PLATELET # BLD AUTO: 228 X10E3/UL (ref 150–450)
POTASSIUM SERPL-SCNC: 4.2 MMOL/L (ref 3.5–5.2)
PROT SERPL-MCNC: 6.5 G/DL (ref 6–8.5)
RBC # BLD AUTO: 4.77 X10E6/UL (ref 3.77–5.28)
RHEUMATOID FACT SERPL-ACNC: <10 IU/ML (ref 0–13.9)
SODIUM SERPL-SCNC: 141 MMOL/L (ref 134–144)
TRIGL SERPL-MCNC: 287 MG/DL (ref 0–149)
VLDLC SERPL CALC-MCNC: 57 MG/DL (ref 5–40)
WBC # BLD AUTO: 7.1 X10E3/UL (ref 3.4–10.8)

## 2019-08-05 NOTE — PROGRESS NOTES
Labs are all normal except for elevated triglycerides. Reduce saturated fat in your diet. Continue your current medications as discussed.   See the orthopedist about the joint pain rather than a rheumatologist.

## 2019-08-09 ENCOUNTER — OFFICE VISIT (OUTPATIENT)
Dept: CARDIOLOGY CLINIC | Age: 44
End: 2019-08-09

## 2019-08-09 VITALS
DIASTOLIC BLOOD PRESSURE: 80 MMHG | SYSTOLIC BLOOD PRESSURE: 106 MMHG | OXYGEN SATURATION: 98 % | RESPIRATION RATE: 16 BRPM | HEIGHT: 66 IN | WEIGHT: 232.8 LBS | BODY MASS INDEX: 37.41 KG/M2 | HEART RATE: 64 BPM

## 2019-08-09 DIAGNOSIS — R00.2 PALPITATIONS: ICD-10-CM

## 2019-08-09 DIAGNOSIS — I10 ESSENTIAL HYPERTENSION: Primary | ICD-10-CM

## 2019-08-09 NOTE — PROGRESS NOTES
HISTORY OF PRESENT ILLNESS  Ria Hernandez is a 38 y.o. female     SUMMARY:   Problem List  Date Reviewed: 2019          Codes Class Noted    PUD (peptic ulcer disease) ICD-10-CM: K27.9  ICD-9-CM: 533.90  Unknown        Essential hypertension ICD-10-CM: I10  ICD-9-CM: 401.9  10/28/2018        Severe obesity (BMI 35.0-39. 9) ICD-10-CM: E66.01  ICD-9-CM: 278.01  2018        Depression with anxiety ICD-10-CM: F41.8  ICD-9-CM: 300.4  2018        Kidney stones ICD-10-CM: N20.0  ICD-9-CM: 592.0  Unknown        EDGARD virus antibody positive ICD-10-CM: R76.8  ICD-9-CM: 795.79  Unknown        Tubular adenoma of colon ICD-10-CM: D12.6  ICD-9-CM: 211.3  Unknown        Single delivery by  ICD-10-CM: O82  ICD-9-CM: 669.71  2015        Gestational hypertension ICD-10-CM: O13.9  ICD-9-CM: 642.30  2/10/2015        AMA (advanced maternal age) primigravida 33+ ICD-10-CM: O09.519  ICD-9-CM: 659.50  2/10/2015        Multiple sclerosis exacerbation ([facility] ICD-10-CM: G35  ICD-9-CM: 340  2013        Optic neuritis, right ICD-10-CM: H46.9  ICD-9-CM: 377.30  2013        Dehydration, moderate ICD-10-CM: E86.0  ICD-9-CM: 276.51  2013        Dysphagia ICD-10-CM: R13.10  ICD-9-CM: 787.20  2013        Meralgia paresthetica of right side ICD-10-CM: G57.11  ICD-9-CM: 355.1  2013        Migraine with aura, without mention of intractable migraine without mention of status migrainosus ICD-10-CM: G43.109  ICD-9-CM: 346.00  2013        Dysplastic colon polyp ICD-10-CM: K63.5  ICD-9-CM: 211.3  Unknown        MS (multiple sclerosis) ([facility] ICD-10-CM: G35  ICD-9-CM: 340  Unknown        Depression ICD-10-CM: F32.9  ICD-9-CM: 386  Unknown        Asthma ICD-10-CM: J45.909  ICD-9-CM: 493.90  Unknown        Elevated blood pressure ICD-10-CM: R03.0  ICD-9-CM: Grisel Agosto  2011        Vitamin D deficiency ICD-10-CM: E55.9  ICD-9-CM: 268.9  2011        Depression ICD-10-CM: F32.9  ICD-9-CM: 311  Unknown              Current Outpatient Medications on File Prior to Visit   Medication Sig    Bifidobacterium Infantis (ALIGN) 4 mg cap Take  by mouth.  buPROPion XL (WELLBUTRIN XL) 150 mg tablet Take 1 Tab by mouth every morning.  pantoprazole (PROTONIX) 40 mg tablet Take 1 Tab by mouth Daily (before breakfast) for 90 days.  dicyclomine (BENTYL) 10 mg capsule Take 1 Cap by mouth three (3) times daily as needed for Pain or Diarrhea for up to 30 days.  escitalopram oxalate (LEXAPRO) 20 mg tablet TAKE ONE TABLET BY MOUTH ONE TIME DAILY    meclizine (ANTIVERT) 25 mg tablet Take 1 Tab by mouth three (3) times daily as needed (vertigo). For vertigo    metoprolol succinate (TOPROL-XL) 100 mg tablet Take 1.5 Tabs by mouth daily.  omega 3-dha-epa-fish oil 183.3 mg-75 mg -91.6 mg-306 mg cap Take 4 Caps by mouth daily.  fluticasone (FLONASE) 50 mcg/actuation nasal spray 2 Sprays by Both Nostrils route daily. (Patient taking differently: 2 Sprays by Both Nostrils route as needed.)    fexofenadine (ALLEGRA) 180 mg tablet Take  by mouth.  gabapentin (NEURONTIN) 300 mg capsule Take 300 mg by mouth two (2) times a day. 2 caps twice a day    albuterol (PROVENTIL HFA, VENTOLIN HFA, PROAIR HFA) 90 mcg/actuation inhaler Take 2 Puffs by inhalation every four (4) hours as needed for Wheezing or Shortness of Breath.  AUBAGIO 14 mg tab     cholecalciferol, vitamin D3, (VITAMIN D3) 2,000 unit tab Take 5,000 Units by mouth.  biotin 10,000 mcg cap Take  by mouth. No current facility-administered medications on file prior to visit. CARDIOLOGY STUDIES TO DATE:  3/18 48 hr holter, 1pvc, 10 pac's  3/18 normal echo    Chief Complaint   Patient presents with    Hypertension     HPI :  Ms. Russ Gomez is a 38year-old referred by Dr. Shawn Haider for cardiac evaluation. About a year and a half ago, she started having trouble with some palpitations and high blood pressure.   She had a Holter and echocardiogram, which I have reviewed and summarized above and she was started on Metoprolol. Since the dose was increased, she is complaining of fatigue. She still has occasional palpitations, but they are very infrequent and as typical they are cyclical.  It worries her a little bit because her mom has atrial fibrillation. There is no history of diabetes. Family history is negative for premature coronary disease. She quit smoking in 2014 and her cholesterol has been okay. She has gained weight and has some mild dyspnea on exertion and occasional problems with dependent edema. She has been screened for sleep apnea and had a negative sleep study in the past.  She gets just very light exercise because of chronic back issues. She has heartburn and indigestion, fairly well controlled with Protonix and was recently diagnosed with some ulcer disease. She is in the middle of menopause. She suffers from depression.         CARDIAC ROS:   negative for chest pain, syncope, orthopnea, paroxysmal nocturnal dyspnea, exertional chest pressure/discomfort, claudication    Family History   Problem Relation Age of Onset    Cancer Father         appendix/cancerous colon polyps    Heart Disease Father     Cancer Maternal Uncle         prostate/melanoma    Cancer Maternal Grandmother         cancerous colon polyps    Cancer Maternal Grandfather         prostate    Heart Disease Paternal Grandmother     Cancer Paternal Grandmother         cancerous colon polyps    No Known Problems Daughter     Colon Polyps Mother         precancerous    Heart Disease Paternal Aunt        Past Medical History:   Diagnosis Date    Ankle fracture     Asthma     Autoimmune disease ([address]     MS    Celiac disease     Chronic pain     MS    Depression     Diverticulosis of sigmoid colon     Dysplastic colon polyp     Dr. Austyn Wright - last colonoscopy 11/7/12 - single polyp    Essential hypertension     Gestasional    GERD (gastroesophageal reflux disease)     Influenza B 03/07/2017    EDGARD virus antibody positive     Kidney stones     Miscarriage     11/13    MS [facility] sclerosis) [facility])     Dr. Noé Greene Ovarian cyst     PUD (peptic ulcer disease)     Routine gynecological examination     Dr. Boone Cotto Sleep apnea     pt states she no longer has the problem since wt loss - intentional wt loss    Tubular adenoma of colon 04/18/2016    Uterine fibroid     Vitamin D deficiency 7/20/2011       GENERAL ROS:  A comprehensive review of systems was negative except for: Neurological: positive for paresthesia    Visit Vitals  /80 (BP 1 Location: Left arm, BP Patient Position: Sitting)   Pulse 64   Resp 16   Ht 5' 6\" (1.676 m)   Wt 232 lb 12.8 oz (105.6 kg)   SpO2 98%   BMI 37.57 kg/m²       Wt Readings from Last 3 Encounters:   08/09/19 232 lb 12.8 oz (105.6 kg)   08/02/19 233 lb 8 oz (105.9 kg)   08/01/19 231 lb 8 oz (105 kg)            BP Readings from Last 3 Encounters:   08/09/19 106/80   08/02/19 121/85   08/01/19 118/86       PHYSICAL EXAM  General appearance: alert, cooperative, no distress, appears stated age  Neurologic: Alert and oriented X 3  Neck: supple, symmetrical, trachea midline, no adenopathy, no carotid bruit and no JVD  Lungs: clear to auscultation bilaterally  Heart: regular rate and rhythm, S1, S2 normal, no murmur, click, rub or gallop  Abdomen: soft, non-tender.  Bowel sounds normal. No masses,  no organomegaly  Extremities: extremities normal, atraumatic, no cyanosis or edema  Pulses: 2+ and symmetric    Lab Results   Component Value Date/Time    Cholesterol, total 200 (H) 08/02/2019 09:10 AM    Cholesterol, total 209 (H) 04/22/2019 09:40 AM    Cholesterol, total 213 (H) 10/22/2018 10:59 AM    Cholesterol, total 188 04/24/2018 11:30 AM    Cholesterol, total 160 06/15/2016 11:25 AM    HDL Cholesterol 51 08/02/2019 09:10 AM    HDL Cholesterol 54 04/22/2019 09:40 AM    HDL Cholesterol 63 10/22/2018 10:59 AM    HDL Cholesterol 70 04/24/2018 11:30 AM    HDL Cholesterol 67 06/15/2016 11:25 AM    LDL, calculated 92 08/02/2019 09:10 AM    LDL, calculated 110 (H) 04/22/2019 09:40 AM    LDL, calculated 85 10/22/2018 10:59 AM    LDL, calculated 65 04/24/2018 11:30 AM    LDL, calculated 62 06/15/2016 11:25 AM    Triglyceride 287 (H) 08/02/2019 09:10 AM    Triglyceride 223 (H) 04/22/2019 09:40 AM    Triglyceride 327 (H) 10/22/2018 10:59 AM    Triglyceride 267 (H) 04/24/2018 11:30 AM    Triglyceride 154 (H) 06/15/2016 11:25 AM     ASSESSMENT  I reassured Ms. Morales that the palpitations she has been experiencing up to this point are completely benign. That being said should they increase in frequency or duration, we can always put a 30 day monitor on her to make sure there is nothing else going on. In terms of her blood pressure and side effects, I think we should start by stopping her a.m. dose of Metoprolol. She will keep a log of her blood pressures and frequency and duration of palpitations and depending on how that all goes, we can determine whether we should decrease the Metoprolol further and add something else for blood pressure if necessary or just continue the way we are.         current treatment plan is effective, no change in therapy  lab results and schedule of future lab studies reviewed with patient  reviewed diet, exercise and weight control    Encounter Diagnoses   Name Primary?  Essential hypertension Yes    Palpitations      Orders Placed This Encounter    AMB POC EKG ROUTINE W/ 12 LEADS, INTER & REP    Bifidobacterium Infantis (ALIGN) 4 mg cap       Follow-up and Dispositions    · Return in about 1 month (around 9/6/2019).          Ana Laura Wallace MD  8/9/2019

## 2019-09-13 ENCOUNTER — OFFICE VISIT (OUTPATIENT)
Dept: CARDIOLOGY CLINIC | Age: 44
End: 2019-09-13

## 2019-09-13 VITALS
HEART RATE: 76 BPM | SYSTOLIC BLOOD PRESSURE: 135 MMHG | RESPIRATION RATE: 18 BRPM | OXYGEN SATURATION: 96 % | WEIGHT: 232 LBS | BODY MASS INDEX: 37.45 KG/M2 | DIASTOLIC BLOOD PRESSURE: 85 MMHG

## 2019-09-13 DIAGNOSIS — R00.2 PALPITATIONS: ICD-10-CM

## 2019-09-13 DIAGNOSIS — I10 ESSENTIAL HYPERTENSION: Primary | ICD-10-CM

## 2019-09-13 RX ORDER — BENZONATATE 100 MG/1
100 CAPSULE ORAL
COMMUNITY
End: 2019-11-01 | Stop reason: ALTCHOICE

## 2019-09-13 NOTE — PROGRESS NOTES
HISTORY OF PRESENT ILLNESS  Andreea Renteria is a 38 y.o. female     SUMMARY:   Problem List  Date Reviewed: 2019          Codes Class Noted    PUD (peptic ulcer disease) ICD-10-CM: K27.9  ICD-9-CM: 533.90  Unknown        Essential hypertension ICD-10-CM: I10  ICD-9-CM: 401.9  10/28/2018        Severe obesity (BMI 35.0-39. 9) ICD-10-CM: E66.01  ICD-9-CM: 278.01  2018        Depression with anxiety ICD-10-CM: F41.8  ICD-9-CM: 300.4  2018        Kidney stones ICD-10-CM: N20.0  ICD-9-CM: 592.0  Unknown        EDGARD virus antibody positive ICD-10-CM: R76.8  ICD-9-CM: 795.79  Unknown        Tubular adenoma of colon ICD-10-CM: D12.6  ICD-9-CM: 211.3  Unknown        Single delivery by  ICD-10-CM: O82  ICD-9-CM: 669.71  2015        Gestational hypertension ICD-10-CM: O13.9  ICD-9-CM: 642.30  2/10/2015        AMA (advanced maternal age) primigravida 33+ ICD-10-CM: O09.519  ICD-9-CM: 659.50  2/10/2015        [facility] ([facility] ICD-10-CM: G35  ICD-9-CM: 340  2013        Optic neuritis, right ICD-10-CM: H46.9  ICD-9-CM: 377.30  2013        Dehydration, moderate ICD-10-CM: E86.0  ICD-9-CM: 276.51  2013        Dysphagia ICD-10-CM: R13.10  ICD-9-CM: 787.20  2013        Meralgia paresthetica of right side ICD-10-CM: G57.11  ICD-9-CM: 355.1  2013        Migraine with aura, without mention of intractable migraine without mention of status migrainosus ICD-10-CM: G43.109  ICD-9-CM: 346.00  2013        Dysplastic colon polyp ICD-10-CM: K63.5  ICD-9-CM: 211.3  Unknown        MS (multiple sclerosis) ([facility] ICD-10-CM: G35  ICD-9-CM: 340  Unknown        Depression ICD-10-CM: F32.9  ICD-9-CM: 240  Unknown        Asthma ICD-10-CM: J45.909  ICD-9-CM: 493.90  Unknown        Elevated blood pressure ICD-10-CM: R03.0  ICD-9-CM: Gogo Mu  2011        Vitamin D deficiency ICD-10-CM: E55.9  ICD-9-CM: 268.9  2011        Depression ICD-10-CM: F32.9  ICD-9-CM: 311  Unknown              Current Outpatient Medications on File Prior to Visit   Medication Sig    benzonatate (TESSALON) 100 mg capsule Take 100 mg by mouth three (3) times daily as needed for Cough.  Bifidobacterium Infantis (ALIGN) 4 mg cap Take  by mouth.  buPROPion XL (WELLBUTRIN XL) 150 mg tablet Take 1 Tab by mouth every morning.  pantoprazole (PROTONIX) 40 mg tablet Take 1 Tab by mouth Daily (before breakfast) for 90 days.  escitalopram oxalate (LEXAPRO) 20 mg tablet TAKE ONE TABLET BY MOUTH ONE TIME DAILY    meclizine (ANTIVERT) 25 mg tablet Take 1 Tab by mouth three (3) times daily as needed (vertigo). For vertigo    metoprolol succinate (TOPROL-XL) 100 mg tablet Take 1.5 Tabs by mouth daily. (Patient taking differently: Take 100 mg by mouth daily.)    omega 3-dha-epa-fish oil 183.3 mg-75 mg -91.6 mg-306 mg cap Take 4 Caps by mouth daily.  fluticasone (FLONASE) 50 mcg/actuation nasal spray 2 Sprays by Both Nostrils route daily. (Patient taking differently: 2 Sprays by Both Nostrils route as needed.)    fexofenadine (ALLEGRA) 180 mg tablet Take  by mouth.  gabapentin (NEURONTIN) 300 mg capsule Take 300 mg by mouth two (2) times a day. 2 caps twice a day    albuterol (PROVENTIL HFA, VENTOLIN HFA, PROAIR HFA) 90 mcg/actuation inhaler Take 2 Puffs by inhalation every four (4) hours as needed for Wheezing or Shortness of Breath.  AUBAGIO 14 mg tab     cholecalciferol, vitamin D3, (VITAMIN D3) 2,000 unit tab Take 5,000 Units by mouth.  biotin 10,000 mcg cap Take  by mouth. No current facility-administered medications on file prior to visit. CARDIOLOGY STUDIES TO DATE:  3/18 48 hr holter, 1pvc, 10 pac's  3/18 normal echo    Chief Complaint   Patient presents with    Hypertension     HPI :  Ms. Morales definitely feels better in terms of fatigue by cutting her Metoprolol dose, but she has noticed it has resulted in a few more palpitations.   Her blood pressure is borderline today and she is getting some variable readings at home on her wrist cuff. She hopes to start more regular exercise now that the school year has started. CARDIAC ROS:   negative for chest pain, dyspnea, syncope, orthopnea, paroxysmal nocturnal dyspnea, exertional chest pressure/discomfort, claudication, lower extremity edema    Family History   Problem Relation Age of Onset    Cancer Father         appendix/cancerous colon polyps    Heart Disease Father         cabg age early 68's    Cancer Maternal Uncle         prostate/melanoma    Cancer Maternal Grandmother         cancerous colon polyps    Cancer Maternal Grandfather         prostate    Heart Disease Paternal Grandmother     Cancer Paternal Grandmother         cancerous colon polyps    No Known Problems Daughter     Colon Polyps Mother         precancerous    Atrial Fibrillation Mother     Heart Disease Paternal Aunt        Past Medical History:   Diagnosis Date    Ankle fracture     Asthma     Autoimmune disease ([facility]     MS    Celiac disease     Chronic pain     MS    Depression     Diverticulosis of sigmoid colon     Dysplastic colon polyp     Dr. Pauline Lindsay - last colonoscopy 11/7/12 - single polyp    Essential hypertension     Gestasional    GERD (gastroesophageal reflux disease)     Influenza B 03/07/2017    EDGARD virus antibody positive     Kidney stones     Miscarriage     11/13    MS (multiple sclerosis) ([facility]     Dr. Jing High    Ovarian cyst     PUD (peptic ulcer disease)     Routine gynecological examination     Dr. Jayro Pulliam Sleep apnea     pt states she no longer has the problem since wt loss - intentional wt loss    Tubular adenoma of colon 04/18/2016    Uterine fibroid     Vitamin D deficiency 7/20/2011       GENERAL ROS:  A comprehensive review of systems was negative except for that written in the HPI.     Visit Vitals  BP (!) 138/96 (BP 1 Location: Left arm, BP Patient Position: Sitting) Pulse 76   Resp 18   Wt 232 lb (105.2 kg)   SpO2 96%   BMI 37.45 kg/m²       Wt Readings from Last 3 Encounters:   09/13/19 232 lb (105.2 kg)   08/09/19 232 lb 12.8 oz (105.6 kg)   08/02/19 233 lb 8 oz (105.9 kg)            BP Readings from Last 3 Encounters:   09/13/19 (!) 138/96   08/09/19 106/80   08/02/19 121/85       PHYSICAL EXAM  General appearance: alert, cooperative, no distress, appears stated age  Neurologic: Alert and oriented X 3  Neck: supple, symmetrical, trachea midline, no adenopathy, no carotid bruit and no JVD  Lungs: clear to auscultation bilaterally  Heart: regular rate and rhythm, S1, S2 normal, no murmur, click, rub or gallop  Extremities: extremities normal, atraumatic, no cyanosis or edema    Lab Results   Component Value Date/Time    Cholesterol, total 200 (H) 08/02/2019 09:10 AM    Cholesterol, total 209 (H) 04/22/2019 09:40 AM    Cholesterol, total 213 (H) 10/22/2018 10:59 AM    Cholesterol, total 188 04/24/2018 11:30 AM    Cholesterol, total 160 06/15/2016 11:25 AM    HDL Cholesterol 51 08/02/2019 09:10 AM    HDL Cholesterol 54 04/22/2019 09:40 AM    HDL Cholesterol 63 10/22/2018 10:59 AM    HDL Cholesterol 70 04/24/2018 11:30 AM    HDL Cholesterol 67 06/15/2016 11:25 AM    LDL, calculated 92 08/02/2019 09:10 AM    LDL, calculated 110 (H) 04/22/2019 09:40 AM    LDL, calculated 85 10/22/2018 10:59 AM    LDL, calculated 65 04/24/2018 11:30 AM    LDL, calculated 62 06/15/2016 11:25 AM    Triglyceride 287 (H) 08/02/2019 09:10 AM    Triglyceride 223 (H) 04/22/2019 09:40 AM    Triglyceride 327 (H) 10/22/2018 10:59 AM    Triglyceride 267 (H) 04/24/2018 11:30 AM    Triglyceride 154 (H) 06/15/2016 11:25 AM     ASSESSMENT  I think it is reasonable to continue her Metoprolol since her fatigue is better and she can live with the palpitations. We are going to recheck her blood pressure and check her cuff and then go from there should she need additional medication.          current treatment plan is effective, no change in therapy  lab results and schedule of future lab studies reviewed with patient  reviewed diet, exercise and weight control    Encounter Diagnoses   Name Primary?  Essential hypertension Yes    Palpitations      Orders Placed This Encounter    benzonatate (TESSALON) 100 mg capsule       Follow-up and Dispositions    · Return in about 1 week (around 9/20/2019).          Emilee Kohli MD  9/13/2019

## 2019-09-23 DIAGNOSIS — I10 HYPERTENSION, UNSPECIFIED TYPE: ICD-10-CM

## 2019-09-23 RX ORDER — METOPROLOL SUCCINATE 100 MG/1
TABLET, EXTENDED RELEASE ORAL
Qty: 45 TAB | Refills: 5 | Status: SHIPPED | OUTPATIENT
Start: 2019-09-23 | End: 2020-05-24

## 2019-09-26 DIAGNOSIS — F41.8 DEPRESSION WITH ANXIETY: ICD-10-CM

## 2019-09-26 RX ORDER — ESCITALOPRAM OXALATE 20 MG/1
TABLET ORAL
Qty: 30 TAB | Refills: 5 | Status: SHIPPED | OUTPATIENT
Start: 2019-09-26 | End: 2020-04-27

## 2019-10-25 ENCOUNTER — OFFICE VISIT (OUTPATIENT)
Dept: CARDIOLOGY CLINIC | Age: 44
End: 2019-10-25

## 2019-10-25 VITALS
WEIGHT: 238 LBS | HEART RATE: 70 BPM | RESPIRATION RATE: 16 BRPM | OXYGEN SATURATION: 97 % | SYSTOLIC BLOOD PRESSURE: 118 MMHG | BODY MASS INDEX: 38.25 KG/M2 | DIASTOLIC BLOOD PRESSURE: 70 MMHG | HEIGHT: 66 IN

## 2019-10-25 DIAGNOSIS — I10 ESSENTIAL HYPERTENSION: Primary | ICD-10-CM

## 2019-10-25 DIAGNOSIS — R00.2 PALPITATIONS: ICD-10-CM

## 2019-10-25 DIAGNOSIS — F41.8 DEPRESSION WITH ANXIETY: ICD-10-CM

## 2019-10-25 RX ORDER — ESCITALOPRAM OXALATE 20 MG/1
TABLET ORAL
Qty: 30 TAB | Refills: 5 | Status: SHIPPED | OUTPATIENT
Start: 2019-10-25 | End: 2019-11-01 | Stop reason: SDUPTHER

## 2019-10-25 RX ORDER — DICYCLOMINE HYDROCHLORIDE 10 MG/1
CAPSULE ORAL
COMMUNITY
Start: 2019-09-23 | End: 2020-05-24

## 2019-10-25 NOTE — PROGRESS NOTES
HISTORY OF PRESENT ILLNESS  Mirna Berrios is a 38 y.o. female     SUMMARY:   Problem List  Date Reviewed: 10/24/2019          Codes Class Noted    PUD (peptic ulcer disease) ICD-10-CM: K27.9  ICD-9-CM: 533.90  Unknown        Essential hypertension ICD-10-CM: I10  ICD-9-CM: 401.9  10/28/2018        Severe obesity (BMI 35.0-39. 9) ICD-10-CM: E66.01  ICD-9-CM: 278.01  2018        Depression with anxiety ICD-10-CM: F41.8  ICD-9-CM: 300.4  2018        Kidney stones ICD-10-CM: N20.0  ICD-9-CM: 592.0  Unknown        EDGARD virus antibody positive ICD-10-CM: R76.8  ICD-9-CM: 795.79  Unknown        Tubular adenoma of colon ICD-10-CM: D12.6  ICD-9-CM: 211.3  Unknown        Single delivery by  ICD-10-CM: O82  ICD-9-CM: 669.71  2015        Gestational hypertension ICD-10-CM: O13.9  ICD-9-CM: 642.30  2/10/2015        AMA (advanced maternal age) primigravida 33+ ICD-10-CM: O09.519  ICD-9-CM: 659.50  2/10/2015        Multiple sclerosis exacerbation ([facility] ICD-10-CM: G35  ICD-9-CM: 340  2013        Optic neuritis, right ICD-10-CM: H46.9  ICD-9-CM: 377.30  2013        Dehydration, moderate ICD-10-CM: E86.0  ICD-9-CM: 276.51  2013        Dysphagia ICD-10-CM: R13.10  ICD-9-CM: 787.20  2013        Meralgia paresthetica of right side ICD-10-CM: G57.11  ICD-9-CM: 355.1  2013        Migraine with aura, without mention of intractable migraine without mention of status migrainosus ICD-10-CM: G43.109  ICD-9-CM: 346.00  2013        Dysplastic colon polyp ICD-10-CM: K63.5  ICD-9-CM: 211.3  Unknown        MS (multiple sclerosis) ([facility] ICD-10-CM: G35  ICD-9-CM: 340  Unknown        Depression ICD-10-CM: F32.9  ICD-9-CM: 237  Unknown        Asthma ICD-10-CM: J45.909  ICD-9-CM: 493.90  Unknown        Elevated blood pressure ICD-10-CM: R03.0  ICD-9-CM: Rachid Scooba  2011        Vitamin D deficiency ICD-10-CM: E55.9  ICD-9-CM: 268.9  2011        Depression ICD-10-CM: F32.9  ICD-9-CM: 311  Unknown              Current Outpatient Medications on File Prior to Visit   Medication Sig    dicyclomine (BENTYL) 10 mg capsule As needed    escitalopram oxalate (LEXAPRO) 20 mg tablet TAKE ONE TABLET BY MOUTH ONE TIME DAILY    metoprolol succinate (TOPROL-XL) 100 mg tablet TAKE ONE AND ONE-HALF TABLETS BY MOUTH ONE TIME DAILY (Patient taking differently: Pt reports taking 1 tab daily)    benzonatate (TESSALON) 100 mg capsule Take 100 mg by mouth three (3) times daily as needed for Cough.  Bifidobacterium Infantis (ALIGN) 4 mg cap Take  by mouth.  buPROPion XL (WELLBUTRIN XL) 150 mg tablet Take 1 Tab by mouth every morning.  pantoprazole (PROTONIX) 40 mg tablet Take 1 Tab by mouth Daily (before breakfast) for 90 days.  meclizine (ANTIVERT) 25 mg tablet Take 1 Tab by mouth three (3) times daily as needed (vertigo). For vertigo    omega 3-dha-epa-fish oil 183.3 mg-75 mg -91.6 mg-306 mg cap Take 4 Caps by mouth daily.  fluticasone (FLONASE) 50 mcg/actuation nasal spray 2 Sprays by Both Nostrils route daily. (Patient taking differently: 2 Sprays by Both Nostrils route as needed.)    fexofenadine (ALLEGRA) 180 mg tablet Take  by mouth.  gabapentin (NEURONTIN) 300 mg capsule Take 300 mg by mouth two (2) times a day. 2 caps twice a day    albuterol (PROVENTIL HFA, VENTOLIN HFA, PROAIR HFA) 90 mcg/actuation inhaler Take 2 Puffs by inhalation every four (4) hours as needed for Wheezing or Shortness of Breath.  AUBAGIO 14 mg tab     cholecalciferol, vitamin D3, (VITAMIN D3) 2,000 unit tab Take 5,000 Units by mouth.  biotin 10,000 mcg cap Take  by mouth. No current facility-administered medications on file prior to visit. CARDIOLOGY STUDIES TO DATE:  3/18 48 hr holter, 1pvc, 10 pac's  3/18 normal echo    Chief Complaint   Patient presents with    Hypertension     HPI :  She is doing pretty well. Her blood pressure is doing great.   Unfortunately, she has not really been exercising and she has actually gained a few pounds. Palpitations are only really bothering her when she tries to fall asleep at night. Still with lots of GI issues. CARDIAC ROS:   negative for chest pain, dyspnea, syncope, orthopnea, paroxysmal nocturnal dyspnea, exertional chest pressure/discomfort, claudication, lower extremity edema    Family History   Problem Relation Age of Onset    Cancer Father         appendix/cancerous colon polyps    Heart Disease Father         cabg age early 68's    Cancer Maternal Uncle         prostate/melanoma    Cancer Maternal Grandmother         cancerous colon polyps    Cancer Maternal Grandfather         prostate    Heart Disease Paternal Grandmother     Cancer Paternal Grandmother         cancerous colon polyps    No Known Problems Daughter     Colon Polyps Mother         precancerous    Atrial Fibrillation Mother     Heart Disease Paternal Aunt        Past Medical History:   Diagnosis Date    Ankle fracture     Asthma     Autoimmune disease ([facility]     MS    Celiac disease     Chronic pain     MS    Depression     Diverticulosis of sigmoid colon     Dysplastic colon polyp     Dr. Marisela Ashford - last colonoscopy 11/7/12 - single polyp    Essential hypertension     Gestasional    GERD (gastroesophageal reflux disease)     Influenza B 03/07/2017    EDGARD virus antibody positive     Kidney stones     Miscarriage     11/13    MS (multiple sclerosis) ([facility]     Dr. Art Cotter    Ovarian cyst     PUD (peptic ulcer disease)     Routine gynecological examination     Dr. Vini Jiang Sleep apnea     pt states she no longer has the problem since wt loss - intentional wt loss    Tubular adenoma of colon 04/18/2016    Uterine fibroid     Vitamin D deficiency 7/20/2011       GENERAL ROS:  A comprehensive review of systems was negative except for that written in the HPI.     Visit Vitals  /70 (BP 1 Location: Left arm, BP Patient Position: Sitting) Pulse 70   Resp 16   Ht 5' 6\" (1.676 m)   Wt 238 lb (108 kg)   SpO2 97%   BMI 38.41 kg/m²       Wt Readings from Last 3 Encounters:   10/25/19 238 lb (108 kg)   09/13/19 232 lb (105.2 kg)   08/09/19 232 lb 12.8 oz (105.6 kg)            BP Readings from Last 3 Encounters:   10/25/19 118/70   09/13/19 135/85   08/09/19 106/80       PHYSICAL EXAM  General appearance: alert, cooperative, no distress, appears stated age  Neurologic: Alert and oriented X 3  Neck: supple, symmetrical, trachea midline, no adenopathy, no carotid bruit and no JVD  Lungs: clear to auscultation bilaterally  Heart: regular rate and rhythm, S1, S2 normal, no murmur, click, rub or gallop  Extremities: extremities normal, atraumatic, no cyanosis or edema    Lab Results   Component Value Date/Time    Cholesterol, total 200 (H) 08/02/2019 09:10 AM    Cholesterol, total 209 (H) 04/22/2019 09:40 AM    Cholesterol, total 213 (H) 10/22/2018 10:59 AM    Cholesterol, total 188 04/24/2018 11:30 AM    Cholesterol, total 160 06/15/2016 11:25 AM    HDL Cholesterol 51 08/02/2019 09:10 AM    HDL Cholesterol 54 04/22/2019 09:40 AM    HDL Cholesterol 63 10/22/2018 10:59 AM    HDL Cholesterol 70 04/24/2018 11:30 AM    HDL Cholesterol 67 06/15/2016 11:25 AM    LDL, calculated 92 08/02/2019 09:10 AM    LDL, calculated 110 (H) 04/22/2019 09:40 AM    LDL, calculated 85 10/22/2018 10:59 AM    LDL, calculated 65 04/24/2018 11:30 AM    LDL, calculated 62 06/15/2016 11:25 AM    Triglyceride 287 (H) 08/02/2019 09:10 AM    Triglyceride 223 (H) 04/22/2019 09:40 AM    Triglyceride 327 (H) 10/22/2018 10:59 AM    Triglyceride 267 (H) 04/24/2018 11:30 AM    Triglyceride 154 (H) 06/15/2016 11:25 AM     ASSESSMENT  She is stable and minimally symptomatic at this point on a reasonable medical regimen and needs no cardiac testing at this time.          current treatment plan is effective, no change in therapy  lab results and schedule of future lab studies reviewed with patient  reviewed diet, exercise and weight control    Encounter Diagnoses   Name Primary?  Essential hypertension Yes    Palpitations      Orders Placed This Encounter    dicyclomine (BENTYL) 10 mg capsule       Follow-up and Dispositions    · Return in about 3 months (around 1/25/2020).          Olamide Renee MD  10/25/2019

## 2019-11-01 ENCOUNTER — OFFICE VISIT (OUTPATIENT)
Dept: INTERNAL MEDICINE CLINIC | Age: 44
End: 2019-11-01

## 2019-11-01 VITALS
HEART RATE: 65 BPM | SYSTOLIC BLOOD PRESSURE: 108 MMHG | TEMPERATURE: 97.9 F | OXYGEN SATURATION: 96 % | BODY MASS INDEX: 38.15 KG/M2 | RESPIRATION RATE: 16 BRPM | DIASTOLIC BLOOD PRESSURE: 73 MMHG | WEIGHT: 237.4 LBS | HEIGHT: 66 IN

## 2019-11-01 DIAGNOSIS — K27.9 PUD (PEPTIC ULCER DISEASE): ICD-10-CM

## 2019-11-01 DIAGNOSIS — J45.909 UNCOMPLICATED ASTHMA, UNSPECIFIED ASTHMA SEVERITY, UNSPECIFIED WHETHER PERSISTENT: ICD-10-CM

## 2019-11-01 DIAGNOSIS — G35 MS (MULTIPLE SCLEROSIS) (HCC): Primary | ICD-10-CM

## 2019-11-01 DIAGNOSIS — E55.9 VITAMIN D DEFICIENCY: ICD-10-CM

## 2019-11-01 DIAGNOSIS — J30.2 SEASONAL ALLERGIC RHINITIS, UNSPECIFIED TRIGGER: ICD-10-CM

## 2019-11-01 DIAGNOSIS — F41.8 DEPRESSION WITH ANXIETY: ICD-10-CM

## 2019-11-01 DIAGNOSIS — I10 ESSENTIAL HYPERTENSION: ICD-10-CM

## 2019-11-01 DIAGNOSIS — K20.0 EOSINOPHILIC ESOPHAGITIS: ICD-10-CM

## 2019-11-01 RX ORDER — PANTOPRAZOLE SODIUM 40 MG/1
40 TABLET, DELAYED RELEASE ORAL DAILY
COMMUNITY
End: 2021-12-13

## 2019-11-01 NOTE — PROGRESS NOTES
HPI:  Presents for f/u HTN, etc    Seeing Cards - stable. Seeing GI - Abou Assi - ?celiac dz    Seeing neuro ophtho - Dr Christelle Palafox. Pt reports stable on Aubagio  No change/new lesions on most recent MRI. Eating egg and chicken, protein based diet. No CP, SOB, new neuro sx      Past medical, Social, and Family history reviewed    Prior to Admission medications    Medication Sig Start Date End Date Taking? Authorizing Provider   pantoprazole (PROTONIX) 40 mg tablet Take 40 mg by mouth daily. Yes Provider, Historical   escitalopram oxalate (LEXAPRO) 20 mg tablet TAKE ONE TABLET BY MOUTH ONE TIME DAILY 9/26/19  Yes Keon Mccabe NP   metoprolol succinate (TOPROL-XL) 100 mg tablet TAKE ONE AND ONE-HALF TABLETS BY MOUTH ONE TIME DAILY  Patient taking differently: Pt reports taking 1 tab daily 9/23/19  Yes Keon Mccabe NP   Bifidobacterium Infantis (ALIGN) 4 mg cap Take  by mouth. Yes Provider, Historical   buPROPion XL (WELLBUTRIN XL) 150 mg tablet Take 1 Tab by mouth every morning. 8/2/19  Yes Josemanuel Jarquin MD   omega 3-bqf-zoh-fish oil 183.3 mg-75 mg -91.6 mg-306 mg cap Take 4 Caps by mouth daily. 10/25/18  Yes Josemanuel Jarquin MD   fluticasone (FLONASE) 50 mcg/actuation nasal spray 2 Sprays by Both Nostrils route daily. Patient taking differently: 2 Sprays by Both Nostrils route as needed. 10/22/18  Yes Josemanuel Jarquin MD   fexofenadine (ALLEGRA) 180 mg tablet Take  by mouth. Yes Provider, Historical   gabapentin (NEURONTIN) 300 mg capsule Take 300 mg by mouth two (2) times a day. 2 caps twice a day 10/20/17  Yes Provider, Historical   AUBAGIO 14 mg tab  12/17/16  Yes Provider, Historical   cholecalciferol, vitamin D3, (VITAMIN D3) 2,000 unit tab Take 5,000 Units by mouth. Yes Provider, Historical   dicyclomine (BENTYL) 10 mg capsule As needed 9/23/19   Provider, Historical   meclizine (ANTIVERT) 25 mg tablet Take 1 Tab by mouth three (3) times daily as needed (vertigo).  For vertigo 3/26/19 Adan Robertson MD   albuterol (PROVENTIL HFA, VENTOLIN HFA, PROAIR HFA) 90 mcg/actuation inhaler Take 2 Puffs by inhalation every four (4) hours as needed for Wheezing or Shortness of Breath. 9/27/17   Karolyn Shanks MD          ROS  Complete ROS reviewed and negative or stable except as noted in HPI. Physical Exam   Constitutional: She is oriented to person, place, and time. She appears well-nourished. No distress. HENT:   Head: Normocephalic and atraumatic. Mouth/Throat: Oropharynx is clear and moist.   Eyes: Pupils are equal, round, and reactive to light. EOM are normal. No scleral icterus. Neck: Normal range of motion. Neck supple. No JVD present. Cardiovascular: Normal rate, regular rhythm and normal heart sounds. Exam reveals no gallop and no friction rub. No murmur heard. Pulmonary/Chest: Effort normal and breath sounds normal. No respiratory distress. She has no wheezes. She has no rales. Abdominal: Soft. Bowel sounds are normal. She exhibits no distension. There is no tenderness. Musculoskeletal: Normal range of motion. She exhibits no edema. Lymphadenopathy:     She has no cervical adenopathy. Neurological: She is alert and oriented to person, place, and time. She exhibits normal muscle tone. Skin: Skin is warm. No rash noted. Psychiatric: She has a normal mood and affect. Nursing note and vitals reviewed. Prior labs reviewed. Reviewed path report from EGD 8/1/19      Assessment/Plan:    ICD-10-CM ICD-9-CM    1. MS (multiple sclerosis) ([facility] G35 340    2. PUD (peptic ulcer disease) K27.9 533.90    3. Vitamin D deficiency E55.9 268.9    4. Essential hypertension I10 401.9    5. Depression with anxiety F41.8 300.4    6. Uncomplicated asthma, unspecified asthma severity, unspecified whether persistent J45.909 493.90    7. Seasonal allergic rhinitis, unspecified trigger J30.2 477.9    8.  Eosinophilic esophagitis N82.1 530.13 REFERRAL TO ALLERGY     Follow-up and Dispositions    · Return in about 6 months (around 5/1/2020), or if symptoms worsen or fail to improve, for blood pressure, specialist follow up, cholesterol.         results and schedule of future studies reviewed with patient  reviewed diet, exercise and weight    cardiovascular risk and specific lipid/LDL goals reviewed  reviewed medications and side effects in detail  Ref to allergy  See GI as scheduled - consider esophageal budesonide - defer to GI  Continue current medications

## 2019-11-01 NOTE — PROGRESS NOTES
Room 14    Chief Complaint   Patient presents with    Hypertension     f/u     1. Have you been to the ER, urgent care clinic since your last visit? Hospitalized since your last visit? No    2. Have you seen or consulted any other health care providers outside of the [address] [place] [address] since your last visit? Include any pap smears or colon screening.  Yes When: 10/28/19 Where: Neurologist Reason for visit: f/u for MS    Health Maintenance Due   Topic Date Due    Pneumococcal 0-64 years (2 of 3 - PCV13) 06/24/2017    PAP AKA CERVICAL CYTOLOGY  03/23/2019    BREAST CANCER SCRN MAMMOGRAM  05/09/2019

## 2019-11-11 DIAGNOSIS — J11.1 INFLUENZA-LIKE ILLNESS: Primary | ICD-10-CM

## 2019-11-11 RX ORDER — OSELTAMIVIR PHOSPHATE 75 MG/1
75 CAPSULE ORAL 2 TIMES DAILY
Qty: 10 CAP | Refills: 0 | Status: SHIPPED | OUTPATIENT
Start: 2019-11-11 | End: 2019-11-16

## 2020-01-24 ENCOUNTER — OFFICE VISIT (OUTPATIENT)
Dept: CARDIOLOGY CLINIC | Age: 45
End: 2020-01-24

## 2020-01-24 VITALS
HEIGHT: 66 IN | WEIGHT: 232.6 LBS | OXYGEN SATURATION: 98 % | SYSTOLIC BLOOD PRESSURE: 126 MMHG | RESPIRATION RATE: 16 BRPM | BODY MASS INDEX: 37.38 KG/M2 | HEART RATE: 78 BPM | DIASTOLIC BLOOD PRESSURE: 84 MMHG

## 2020-01-24 DIAGNOSIS — R00.2 PALPITATIONS: Primary | ICD-10-CM

## 2020-01-24 DIAGNOSIS — I10 ESSENTIAL HYPERTENSION: ICD-10-CM

## 2020-01-24 NOTE — PROGRESS NOTES
HISTORY OF PRESENT ILLNESS  Beryle Cella is a 38 y.o. female     SUMMARY:   Problem List  Date Reviewed: 2020          Codes Class Noted    PUD (peptic ulcer disease) ICD-10-CM: K27.9  ICD-9-CM: 533.90  Unknown        Essential hypertension ICD-10-CM: I10  ICD-9-CM: 401.9  10/28/2018        Severe obesity (BMI 35.0-39. 9) ICD-10-CM: E66.01  ICD-9-CM: 278.01  2018        Depression with anxiety ICD-10-CM: F41.8  ICD-9-CM: 300.4  2018        Kidney stones ICD-10-CM: N20.0  ICD-9-CM: 592.0  Unknown        EDGARD virus antibody positive ICD-10-CM: R76.8  ICD-9-CM: 795.79  Unknown        Tubular adenoma of colon ICD-10-CM: D12.6  ICD-9-CM: 211.3  Unknown        Single delivery by  ICD-10-CM: O82  ICD-9-CM: 669.71  2015        Gestational hypertension ICD-10-CM: O13.9  ICD-9-CM: 642.30  2/10/2015        AMA (advanced maternal age) primigravida 33+ ICD-10-CM: O09.519  ICD-9-CM: 659.50  2/10/2015        Multiple sclerosis exacerbation ([facility] ICD-10-CM: G35  ICD-9-CM: 340  2013        Optic neuritis, right ICD-10-CM: H46.9  ICD-9-CM: 377.30  2013        Dehydration, moderate ICD-10-CM: E86.0  ICD-9-CM: 276.51  2013        Dysphagia ICD-10-CM: R13.10  ICD-9-CM: 787.20  2013        Meralgia paresthetica of right side ICD-10-CM: G57.11  ICD-9-CM: 355.1  2013        Migraine with aura, without mention of intractable migraine without mention of status migrainosus ICD-10-CM: G43.109  ICD-9-CM: 346.00  2013        Dysplastic colon polyp ICD-10-CM: K63.5  ICD-9-CM: 211.3  Unknown        MS (multiple sclerosis) ([facility] ICD-10-CM: G35  ICD-9-CM: 340  Unknown        Depression ICD-10-CM: F32.9  ICD-9-CM: 061  Unknown        Asthma ICD-10-CM: J45.909  ICD-9-CM: 493.90  Unknown        Elevated blood pressure ICD-10-CM: R03.0  ICD-9-CM: Katelyn Lupe  2011        Vitamin D deficiency ICD-10-CM: E55.9  ICD-9-CM: 268.9  2011        Depression ICD-10-CM: F32.9  ICD-9-CM: 311  Unknown              Current Outpatient Medications on File Prior to Visit   Medication Sig    pantoprazole (PROTONIX) 40 mg tablet Take 40 mg by mouth daily.  dicyclomine (BENTYL) 10 mg capsule As needed    escitalopram oxalate (LEXAPRO) 20 mg tablet TAKE ONE TABLET BY MOUTH ONE TIME DAILY    metoprolol succinate (TOPROL-XL) 100 mg tablet TAKE ONE AND ONE-HALF TABLETS BY MOUTH ONE TIME DAILY (Patient taking differently: Pt reports taking 1 tab daily)    Bifidobacterium Infantis (ALIGN) 4 mg cap Take  by mouth.  buPROPion XL (WELLBUTRIN XL) 150 mg tablet Take 1 Tab by mouth every morning.  meclizine (ANTIVERT) 25 mg tablet Take 1 Tab by mouth three (3) times daily as needed (vertigo). For vertigo    omega 3-dha-epa-fish oil 183.3 mg-75 mg -91.6 mg-306 mg cap Take 4 Caps by mouth daily.  fluticasone (FLONASE) 50 mcg/actuation nasal spray 2 Sprays by Both Nostrils route daily. (Patient taking differently: 2 Sprays by Both Nostrils route as needed.)    fexofenadine (ALLEGRA) 180 mg tablet Take 180 mg by mouth daily.  gabapentin (NEURONTIN) 300 mg capsule Take 300 mg by mouth two (2) times a day. 1caps twice a day    albuterol (PROVENTIL HFA, VENTOLIN HFA, PROAIR HFA) 90 mcg/actuation inhaler Take 2 Puffs by inhalation every four (4) hours as needed for Wheezing or Shortness of Breath.  AUBAGIO 14 mg tab     cholecalciferol, vitamin D3, (VITAMIN D3) 2,000 unit tab Take 5,000 Units by mouth. No current facility-administered medications on file prior to visit. CARDIOLOGY STUDIES TO DATE:  3/18 48 hr holter, 1pvc, 10 pac's  3/18 normal echo    Chief Complaint   Patient presents with    Irregular Heart Beat     HPI :  Ms. Yasmine Zelaya doing fairly well. Blood pressures just fine and she is having a few palpitations which in retrospect she thinks mostly related to stress.   She is not really exercising but she is changed her diet because she has been diagnosed with some sort of GI condition as a result she has lost a few pounds which is great  CARDIAC ROS:   negative for chest pain, dyspnea, syncope, orthopnea, paroxysmal nocturnal dyspnea, exertional chest pressure/discomfort, claudication, lower extremity edema    Family History   Problem Relation Age of Onset    Cancer Father         appendix/cancerous colon polyps    Heart Disease Father         cabg age early 68's    Cancer Maternal Uncle         prostate/melanoma    Cancer Maternal Grandmother         cancerous colon polyps    Cancer Maternal Grandfather         prostate    Heart Disease Paternal Grandmother     Cancer Paternal Grandmother         cancerous colon polyps    No Known Problems Daughter     Colon Polyps Mother         precancerous    Atrial Fibrillation Mother     Heart Disease Paternal Aunt        Past Medical History:   Diagnosis Date    Ankle fracture     Asthma     Auto[facility] disease ([facility]     MS    Celiac disease     Chronic pain     MS    Depression     Diverticulosis of sigmoid colon     Dysplastic colon polyp     Dr. Tommy Lindquist - last colonoscopy 11/7/12 - single polyp    Essential hypertension     Gestasional    GERD (gastroesophageal reflux disease)     Influenza B 03/07/2017    EDGARD virus antibody positive     Kidney stones     Miscarriage     11/13    MS (multiple sclerosis) ([facility]     Dr. Richmond Duane    Ovarian cyst     PUD (peptic ulcer disease)     Routine gynecological examination     Dr. Giselle Mays Sleep apnea     pt states she no longer has the problem since wt loss - intentional wt loss    Tubular adenoma of colon 04/18/2016    Uterine fibroid     Vitamin D deficiency 7/20/2011       GENERAL ROS:  A comprehensive review of systems was negative except for that written in the HPI.     Visit Vitals  /84 (BP 1 Location: Left arm, BP Patient Position: Sitting)   Pulse 78   Resp 16   Ht 5' 6\" (1.676 m)   Wt 232 lb 9.6 oz (105.5 kg)   SpO2 98%   BMI 37.54 kg/m²       Wt Readings from Last 3 Encounters:   01/24/20 232 lb 9.6 oz (105.5 kg)   11/01/19 237 lb 6.4 oz (107.7 kg)   10/25/19 238 lb (108 kg)            BP Readings from Last 3 Encounters:   01/24/20 126/84   11/01/19 108/73   10/25/19 118/70       PHYSICAL EXAM  General appearance: alert, cooperative, no distress, appears stated age  Neurologic: Alert and oriented X 3  Neck: supple, symmetrical, trachea midline, no adenopathy, no carotid bruit and no JVD  Lungs: clear to auscultation bilaterally  Heart: regular rate and rhythm, S1, S2 normal, no murmur, click, rub or gallop  Extremities: extremities normal, atraumatic, no cyanosis or edema    Lab Results   Component Value Date/Time    Cholesterol, total 200 (H) 08/02/2019 09:10 AM    Cholesterol, total 209 (H) 04/22/2019 09:40 AM    Cholesterol, total 213 (H) 10/22/2018 10:59 AM    Cholesterol, total 188 04/24/2018 11:30 AM    Cholesterol, total 160 06/15/2016 11:25 AM    HDL Cholesterol 51 08/02/2019 09:10 AM    HDL Cholesterol 54 04/22/2019 09:40 AM    HDL Cholesterol 63 10/22/2018 10:59 AM    HDL Cholesterol 70 04/24/2018 11:30 AM    HDL Cholesterol 67 06/15/2016 11:25 AM    LDL, calculated 92 08/02/2019 09:10 AM    LDL, calculated 110 (H) 04/22/2019 09:40 AM    LDL, calculated 85 10/22/2018 10:59 AM    LDL, calculated 65 04/24/2018 11:30 AM    LDL, calculated 62 06/15/2016 11:25 AM    Triglyceride 287 (H) 08/02/2019 09:10 AM    Triglyceride 223 (H) 04/22/2019 09:40 AM    Triglyceride 327 (H) 10/22/2018 10:59 AM    Triglyceride 267 (H) 04/24/2018 11:30 AM    Triglyceride 154 (H) 06/15/2016 11:25 AM     ASSESSMENT :      Ms. Peggy Coates is stable and minimally symptomatic on a reasonable medical regimen and needs no cardiac testing. current treatment plan is effective, no change in therapy  lab results and schedule of future lab studies reviewed with patient  reviewed diet, exercise and weight control    Encounter Diagnoses   Name Primary?     Palpitations Yes    Essential hypertension      No orders of the defined types were placed in this encounter. Follow-up and Dispositions    · Return in about 6 months (around 7/24/2020).          Patito Velez MD  1/24/2020

## 2020-01-28 DIAGNOSIS — F41.8 DEPRESSION WITH ANXIETY: ICD-10-CM

## 2020-02-02 RX ORDER — BUPROPION HYDROCHLORIDE 150 MG/1
TABLET ORAL
Qty: 30 TAB | Refills: 5 | Status: SHIPPED | OUTPATIENT
Start: 2020-02-02 | End: 2020-06-16

## 2020-03-21 ENCOUNTER — TELEPHONE (OUTPATIENT)
Dept: INTERNAL MEDICINE CLINIC | Age: 45
End: 2020-03-21

## 2020-04-27 DIAGNOSIS — F41.8 DEPRESSION WITH ANXIETY: ICD-10-CM

## 2020-04-27 RX ORDER — ESCITALOPRAM OXALATE 20 MG/1
TABLET ORAL
Qty: 30 TAB | Refills: 5 | Status: SHIPPED | OUTPATIENT
Start: 2020-04-27 | End: 2020-11-09

## 2020-05-24 ENCOUNTER — APPOINTMENT (OUTPATIENT)
Dept: CT IMAGING | Age: 45
DRG: 690 | End: 2020-05-24
Attending: EMERGENCY MEDICINE
Payer: COMMERCIAL

## 2020-05-24 ENCOUNTER — APPOINTMENT (OUTPATIENT)
Dept: ULTRASOUND IMAGING | Age: 45
DRG: 690 | End: 2020-05-24
Attending: EMERGENCY MEDICINE
Payer: COMMERCIAL

## 2020-05-24 ENCOUNTER — HOSPITAL ENCOUNTER (INPATIENT)
Age: 45
LOS: 3 days | Discharge: HOME | DRG: 690 | End: 2020-05-27
Attending: EMERGENCY MEDICINE | Admitting: STUDENT IN AN ORGANIZED HEALTH CARE EDUCATION/TRAINING PROGRAM
Payer: COMMERCIAL

## 2020-05-24 DIAGNOSIS — N23 RENAL COLIC: ICD-10-CM

## 2020-05-24 DIAGNOSIS — K85.00 IDIOPATHIC ACUTE PANCREATITIS WITHOUT INFECTION OR NECROSIS: Primary | ICD-10-CM

## 2020-05-24 DIAGNOSIS — N39.0 URINARY TRACT INFECTION WITHOUT HEMATURIA, SITE UNSPECIFIED: ICD-10-CM

## 2020-05-24 PROBLEM — R74.8 ELEVATED LIPASE: Status: ACTIVE | Noted: 2020-05-24

## 2020-05-24 LAB
ALBUMIN SERPL-MCNC: 3.5 G/DL (ref 3.5–5)
ALBUMIN/GLOB SERPL: 0.9 {RATIO} (ref 1.1–2.2)
ALP SERPL-CCNC: 80 U/L (ref 45–117)
ALT SERPL-CCNC: 30 U/L (ref 12–78)
ANION GAP SERPL CALC-SCNC: 6 MMOL/L (ref 5–15)
APPEARANCE UR: ABNORMAL
AST SERPL-CCNC: 13 U/L (ref 15–37)
BACTERIA URNS QL MICRO: ABNORMAL /HPF
BASOPHILS # BLD: 0.1 K/UL (ref 0–0.1)
BASOPHILS NFR BLD: 1 % (ref 0–1)
BILIRUB SERPL-MCNC: 0.7 MG/DL (ref 0.2–1)
BILIRUB UR QL: NEGATIVE
BUN SERPL-MCNC: 18 MG/DL (ref 6–20)
BUN/CREAT SERPL: 19 (ref 12–20)
CALCIUM SERPL-MCNC: 9.2 MG/DL (ref 8.5–10.1)
CHLORIDE SERPL-SCNC: 108 MMOL/L (ref 97–108)
CO2 SERPL-SCNC: 22 MMOL/L (ref 21–32)
COLOR UR: ABNORMAL
COMMENT, HOLDF: NORMAL
CREAT SERPL-MCNC: 0.94 MG/DL (ref 0.55–1.02)
DIFFERENTIAL METHOD BLD: ABNORMAL
EOSINOPHIL # BLD: 0 K/UL (ref 0–0.4)
EOSINOPHIL NFR BLD: 0 % (ref 0–7)
EPITH CASTS URNS QL MICRO: ABNORMAL /LPF
ERYTHROCYTE [DISTWIDTH] IN BLOOD BY AUTOMATED COUNT: 13.6 % (ref 11.5–14.5)
GLOBULIN SER CALC-MCNC: 3.8 G/DL (ref 2–4)
GLUCOSE SERPL-MCNC: 115 MG/DL (ref 65–100)
GLUCOSE UR STRIP.AUTO-MCNC: NEGATIVE MG/DL
HCG UR QL: NEGATIVE
HCT VFR BLD AUTO: 42.3 % (ref 35–47)
HGB BLD-MCNC: 13.7 G/DL (ref 11.5–16)
HGB UR QL STRIP: ABNORMAL
IMM GRANULOCYTES # BLD AUTO: 0.1 K/UL (ref 0–0.04)
IMM GRANULOCYTES NFR BLD AUTO: 1 % (ref 0–0.5)
KETONES UR QL STRIP.AUTO: NEGATIVE MG/DL
LEUKOCYTE ESTERASE UR QL STRIP.AUTO: ABNORMAL
LIPASE SERPL-CCNC: 858 U/L (ref 73–393)
LYMPHOCYTES # BLD: 1.2 K/UL (ref 0.8–3.5)
LYMPHOCYTES NFR BLD: 7 % (ref 12–49)
MCH RBC QN AUTO: 28 PG (ref 26–34)
MCHC RBC AUTO-ENTMCNC: 32.4 G/DL (ref 30–36.5)
MCV RBC AUTO: 86.5 FL (ref 80–99)
MONOCYTES # BLD: 1.2 K/UL (ref 0–1)
MONOCYTES NFR BLD: 8 % (ref 5–13)
NEUTS SEG # BLD: 13.3 K/UL (ref 1.8–8)
NEUTS SEG NFR BLD: 83 % (ref 32–75)
NITRITE UR QL STRIP.AUTO: NEGATIVE
NRBC # BLD: 0 K/UL (ref 0–0.01)
NRBC BLD-RTO: 0 PER 100 WBC
PH UR STRIP: 5 [PH] (ref 5–8)
PLATELET # BLD AUTO: 210 K/UL (ref 150–400)
PMV BLD AUTO: 11.5 FL (ref 8.9–12.9)
POTASSIUM SERPL-SCNC: 4 MMOL/L (ref 3.5–5.1)
PROT SERPL-MCNC: 7.3 G/DL (ref 6.4–8.2)
PROT UR STRIP-MCNC: ABNORMAL MG/DL
RBC # BLD AUTO: 4.89 M/UL (ref 3.8–5.2)
RBC #/AREA URNS HPF: ABNORMAL /HPF (ref 0–5)
SAMPLES BEING HELD,HOLD: NORMAL
SODIUM SERPL-SCNC: 136 MMOL/L (ref 136–145)
SP GR UR REFRACTOMETRY: 1.02 (ref 1–1.03)
UR CULT HOLD, URHOLD: NORMAL
UROBILINOGEN UR QL STRIP.AUTO: 0.2 EU/DL (ref 0.2–1)
WBC # BLD AUTO: 15.9 K/UL (ref 3.6–11)
WBC URNS QL MICRO: ABNORMAL /HPF (ref 0–4)

## 2020-05-24 PROCEDURE — 80053 COMPREHEN METABOLIC PANEL: CPT

## 2020-05-24 PROCEDURE — 87077 CULTURE AEROBIC IDENTIFY: CPT

## 2020-05-24 PROCEDURE — 74011000258 HC RX REV CODE- 258: Performed by: EMERGENCY MEDICINE

## 2020-05-24 PROCEDURE — 76705 ECHO EXAM OF ABDOMEN: CPT

## 2020-05-24 PROCEDURE — 81001 URINALYSIS AUTO W/SCOPE: CPT

## 2020-05-24 PROCEDURE — 74011636320 HC RX REV CODE- 636/320: Performed by: RADIOLOGY

## 2020-05-24 PROCEDURE — 83690 ASSAY OF LIPASE: CPT

## 2020-05-24 PROCEDURE — 99284 EMERGENCY DEPT VISIT MOD MDM: CPT

## 2020-05-24 PROCEDURE — 85025 COMPLETE CBC W/AUTO DIFF WBC: CPT

## 2020-05-24 PROCEDURE — 96376 TX/PRO/DX INJ SAME DRUG ADON: CPT

## 2020-05-24 PROCEDURE — 74011250636 HC RX REV CODE- 250/636: Performed by: EMERGENCY MEDICINE

## 2020-05-24 PROCEDURE — 87086 URINE CULTURE/COLONY COUNT: CPT

## 2020-05-24 PROCEDURE — 96365 THER/PROPH/DIAG IV INF INIT: CPT

## 2020-05-24 PROCEDURE — 87186 SC STD MICRODIL/AGAR DIL: CPT

## 2020-05-24 PROCEDURE — 36415 COLL VENOUS BLD VENIPUNCTURE: CPT

## 2020-05-24 PROCEDURE — 65660000000 HC RM CCU STEPDOWN

## 2020-05-24 PROCEDURE — 74177 CT ABD & PELVIS W/CONTRAST: CPT

## 2020-05-24 PROCEDURE — 96375 TX/PRO/DX INJ NEW DRUG ADDON: CPT

## 2020-05-24 PROCEDURE — 81025 URINE PREGNANCY TEST: CPT

## 2020-05-24 PROCEDURE — 74011250637 HC RX REV CODE- 250/637: Performed by: STUDENT IN AN ORGANIZED HEALTH CARE EDUCATION/TRAINING PROGRAM

## 2020-05-24 PROCEDURE — 74011250636 HC RX REV CODE- 250/636: Performed by: STUDENT IN AN ORGANIZED HEALTH CARE EDUCATION/TRAINING PROGRAM

## 2020-05-24 RX ORDER — BUPROPION HYDROCHLORIDE 150 MG/1
150 TABLET, EXTENDED RELEASE ORAL DAILY
Status: DISCONTINUED | OUTPATIENT
Start: 2020-05-25 | End: 2020-05-27 | Stop reason: HOSPADM

## 2020-05-24 RX ORDER — IPRATROPIUM BROMIDE AND ALBUTEROL SULFATE 2.5; .5 MG/3ML; MG/3ML
3 SOLUTION RESPIRATORY (INHALATION)
Status: DISCONTINUED | OUTPATIENT
Start: 2020-05-24 | End: 2020-05-27 | Stop reason: HOSPADM

## 2020-05-24 RX ORDER — HYDRALAZINE HYDROCHLORIDE 20 MG/ML
10 INJECTION INTRAMUSCULAR; INTRAVENOUS
Status: DISCONTINUED | OUTPATIENT
Start: 2020-05-24 | End: 2020-05-27 | Stop reason: HOSPADM

## 2020-05-24 RX ORDER — FENTANYL CITRATE 50 UG/ML
50 INJECTION, SOLUTION INTRAMUSCULAR; INTRAVENOUS
Status: COMPLETED | OUTPATIENT
Start: 2020-05-24 | End: 2020-05-24

## 2020-05-24 RX ORDER — PANTOPRAZOLE SODIUM 40 MG/1
40 TABLET, DELAYED RELEASE ORAL
Status: DISCONTINUED | OUTPATIENT
Start: 2020-05-25 | End: 2020-05-27 | Stop reason: HOSPADM

## 2020-05-24 RX ORDER — ONDANSETRON 2 MG/ML
4 INJECTION INTRAMUSCULAR; INTRAVENOUS
Status: DISCONTINUED | OUTPATIENT
Start: 2020-05-24 | End: 2020-05-27 | Stop reason: HOSPADM

## 2020-05-24 RX ORDER — SODIUM CHLORIDE 0.9 % (FLUSH) 0.9 %
5-40 SYRINGE (ML) INJECTION EVERY 8 HOURS
Status: DISCONTINUED | OUTPATIENT
Start: 2020-05-24 | End: 2020-05-27 | Stop reason: HOSPADM

## 2020-05-24 RX ORDER — SODIUM CHLORIDE 0.9 % (FLUSH) 0.9 %
5-40 SYRINGE (ML) INJECTION AS NEEDED
Status: DISCONTINUED | OUTPATIENT
Start: 2020-05-24 | End: 2020-05-27 | Stop reason: HOSPADM

## 2020-05-24 RX ORDER — NALOXONE HYDROCHLORIDE 0.4 MG/ML
0.4 INJECTION, SOLUTION INTRAMUSCULAR; INTRAVENOUS; SUBCUTANEOUS AS NEEDED
Status: DISCONTINUED | OUTPATIENT
Start: 2020-05-24 | End: 2020-05-27 | Stop reason: HOSPADM

## 2020-05-24 RX ORDER — ACETAMINOPHEN 325 MG/1
650 TABLET ORAL
Status: DISCONTINUED | OUTPATIENT
Start: 2020-05-24 | End: 2020-05-27 | Stop reason: HOSPADM

## 2020-05-24 RX ORDER — MELATONIN
5000 DAILY
Status: DISCONTINUED | OUTPATIENT
Start: 2020-05-25 | End: 2020-05-27 | Stop reason: HOSPADM

## 2020-05-24 RX ORDER — MORPHINE SULFATE 2 MG/ML
2 INJECTION, SOLUTION INTRAMUSCULAR; INTRAVENOUS
Status: DISCONTINUED | OUTPATIENT
Start: 2020-05-24 | End: 2020-05-27 | Stop reason: HOSPADM

## 2020-05-24 RX ORDER — SODIUM CHLORIDE, SODIUM LACTATE, POTASSIUM CHLORIDE, CALCIUM CHLORIDE 600; 310; 30; 20 MG/100ML; MG/100ML; MG/100ML; MG/100ML
100 INJECTION, SOLUTION INTRAVENOUS CONTINUOUS
Status: DISCONTINUED | OUTPATIENT
Start: 2020-05-24 | End: 2020-05-27 | Stop reason: HOSPADM

## 2020-05-24 RX ORDER — METOPROLOL SUCCINATE 50 MG/1
100 TABLET, EXTENDED RELEASE ORAL DAILY
Status: DISCONTINUED | OUTPATIENT
Start: 2020-05-25 | End: 2020-05-27 | Stop reason: HOSPADM

## 2020-05-24 RX ORDER — METOPROLOL SUCCINATE 100 MG/1
100 TABLET, EXTENDED RELEASE ORAL EVERY EVENING
COMMUNITY
End: 2020-07-11

## 2020-05-24 RX ORDER — SODIUM CHLORIDE 0.9 % (FLUSH) 0.9 %
10 SYRINGE (ML) INJECTION
Status: COMPLETED | OUTPATIENT
Start: 2020-05-24 | End: 2020-05-24

## 2020-05-24 RX ORDER — GABAPENTIN 300 MG/1
300 CAPSULE ORAL 2 TIMES DAILY
Status: DISCONTINUED | OUTPATIENT
Start: 2020-05-24 | End: 2020-05-27 | Stop reason: HOSPADM

## 2020-05-24 RX ORDER — SUCRALFATE 1 G/10ML
1 SUSPENSION ORAL 4 TIMES DAILY
COMMUNITY
End: 2020-07-29

## 2020-05-24 RX ADMIN — SODIUM CHLORIDE, SODIUM LACTATE, POTASSIUM CHLORIDE, AND CALCIUM CHLORIDE 100 ML/HR: 600; 310; 30; 20 INJECTION, SOLUTION INTRAVENOUS at 16:44

## 2020-05-24 RX ADMIN — Medication 5 ML: at 22:00

## 2020-05-24 RX ADMIN — ONDANSETRON 4 MG: 2 INJECTION INTRAMUSCULAR; INTRAVENOUS at 17:13

## 2020-05-24 RX ADMIN — Medication 10 ML: at 11:45

## 2020-05-24 RX ADMIN — GABAPENTIN 300 MG: 300 CAPSULE ORAL at 20:28

## 2020-05-24 RX ADMIN — SODIUM CHLORIDE 1000 ML: 900 INJECTION, SOLUTION INTRAVENOUS at 12:04

## 2020-05-24 RX ADMIN — CEFTRIAXONE SODIUM 1 G: 1 INJECTION, POWDER, FOR SOLUTION INTRAMUSCULAR; INTRAVENOUS at 14:39

## 2020-05-24 RX ADMIN — FENTANYL CITRATE 50 MCG: 50 INJECTION INTRAMUSCULAR; INTRAVENOUS at 13:19

## 2020-05-24 RX ADMIN — IOPAMIDOL 100 ML: 755 INJECTION, SOLUTION INTRAVENOUS at 11:44

## 2020-05-24 RX ADMIN — FENTANYL CITRATE 50 MCG: 50 INJECTION INTRAMUSCULAR; INTRAVENOUS at 09:58

## 2020-05-24 RX ADMIN — Medication 10 ML: at 17:17

## 2020-05-24 NOTE — PROGRESS NOTES
Primary Nurse Pat Mora RN and Gvaiota Duke RN performed a dual skin assessment on this patient No impairment noted  Maxwell score is 23.

## 2020-05-24 NOTE — H&P
HISTORY AND PHYSICAL  Gustavo Cohn MD        PCP: Miracle Joyce MD    Please note that this dictation was completed with Apps[employer], the computer voice recognition software. Quite often unanticipated grammatical, syntax, homophones, and other interpretive errors are inadvertently transcribed by the computer software. Please disregard these errors. Please excuse any errors that have escaped final proofreading. CHIEF COMPLAINTS   Abdominal pain       HISTORY OF PRESENT ILLNESS  Patient is a 58-year-old female with medical history significant for asthma, multiple sclerosis, hypertension and depression who presents to the emergency department with chief complaint of abdominal pain. Patient reports the pain began suddenly around 2:00 a.m. this morning, described as sharp, more on the left lower quadrant, constant, nonradiating, rated 6-9/10 and was associated with nausea. She also reports similar pain a week ago on the right side lasting for few minutes. She denies any associated fever, chills, vomiting, shortness of breath, chest pain, hematemesis, dark stool or melena, urinary or any other bowel complaints. In the emergency department, V/S were unremarkable. Lab work-ups: Leukocytosis with left shift but no lactic acidosis, lipase 858. CT of the abdomen was remarkable for left hydronephrosis and hydroureter, obstructing calculi 4 mm and mild left perinephric stranding.        PMH/PSH:  Past Medical History:   Diagnosis Date    Ankle fracture     Asthma     Autoimmune disease (HCC)     MS    Celiac disease     Chronic pain     MS    Congenital sucrose isomaltose malabsorption     Depression     Diverticulosis of sigmoid colon     Dysplastic colon polyp     Dr. Liv Bates - last colonoscopy 11/7/12 - single polyp    Essential hypertension     Gestasional    GERD (gastroesophageal reflux disease)     Influenza B 03/07/2017    EDGARD virus antibody positive     Kidney stones     Miscarriage     MS (multiple sclerosis) ([facility])     Dr. Iván Olivares    Ovarian cyst     PUD (peptic ulcer disease)     Routine gynecological examination     Dr. Patricia Westfall Sleep apnea     pt states she no longer has the problem since wt loss - intentional wt loss    Tubular adenoma of colon 2016    Uterine fibroid     Vitamin D deficiency 2011     Past Surgical History:   Procedure Laterality Date    COLONOSCOPY N/A 2019    COLONOSCOPY/EGD (LATEX ALLERGY) performed by Wilian Alcantar MD at [id number] [facility]      double compound fx of right lower leg and dislocated heel - has a plate and 13 screws    HX  SECTION          HX COLONOSCOPY      HX ENDOSCOPY      HX GYN      fibroid tumor ovarian cyst     HX ORTHOPAEDIC       Left shoulder surgery    UT BIOPSY OF BREAST, INCISIONAL         Home meds:   Prior to Admission medications    Medication Sig Start Date End Date Taking? Authorizing Provider   sucralfate (CARAFATE) 100 mg/mL suspension Take 1 tsp by mouth four (4) times daily. Yes Other, MD Neelima   escitalopram oxalate (LEXAPRO) 20 mg tablet TAKE ONE TABLET BY MOUTH ONE TIME DAILY 20   Modesto Cobb MD   buPROPion XL (WELLBUTRIN XL) 150 mg tablet TAKE ONE TABLET BY MOUTH EVERY MORNING 20   Baylee Lozada MD   pantoprazole (PROTONIX) 40 mg tablet Take 40 mg by mouth daily. Provider, Historical   dicyclomine (BENTYL) 10 mg capsule As needed 19   Provider, Historical   metoprolol succinate (TOPROL-XL) 100 mg tablet TAKE ONE AND ONE-HALF TABLETS BY MOUTH ONE TIME DAILY  Patient taking differently: Pt reports taking 1 tab daily 19   John Lav T, NP   Bifidobacterium Infantis (ALIGN) 4 mg cap Take  by mouth. Provider, Historical   meclizine (ANTIVERT) 25 mg tablet Take 1 Tab by mouth three (3) times daily as needed (vertigo).  For vertigo 3/26/19   Modesto Cobb MD   omega 8-dlx-kca-fish oil 183.3 mg-75 mg -91.6 mg-306 mg cap Take 4 Caps by mouth daily. 10/25/18   Kaushik Pimentel MD   fluticasone (FLONASE) 50 mcg/actuation nasal spray 2 Sprays by Both Nostrils route daily. Patient taking differently: 2 Sprays by Both Nostrils route as needed. 10/22/18   Kaushik Pimentel MD   fexofenadine (ALLEGRA) 180 mg tablet Take 180 mg by mouth daily. Provider, Historical   gabapentin (NEURONTIN) 300 mg capsule Take 300 mg by mouth two (2) times a day. 1caps twice a day 10/20/17   Provider, Historical   albuterol (PROVENTIL HFA, VENTOLIN HFA, PROAIR HFA) 90 mcg/actuation inhaler Take 2 Puffs by inhalation every four (4) hours as needed for Wheezing or Shortness of Breath. 17   Sayra Boyle MD   AUBAGIO 14 mg tab  16   Provider, Historical   cholecalciferol, vitamin D3, (VITAMIN D3) 2,000 unit tab Take 5,000 Units by mouth. Provider, Historical       Allergies: Allergies   Allergen Reactions    Latex Rash    Adhesive Rash       FH:  Family History   Problem Relation Age of Onset   [facility] Cancer Father         appendix/cancerous colon polyps    Heart Disease Father         cabg age early 68's    Cancer Maternal Uncle         prostate/melanoma    Cancer Maternal Grandmother         cancerous colon polyps    Cancer Maternal Grandfather         prostate    Heart Disease Paternal Grandmother     Cancer Paternal Grandmother         cancerous colon polyps    No Known Problems Daughter     Colon Polyps Mother         precancerous    Atrial Fibrillation Mother     Heart Disease Paternal Aunt        SH:  Social History     Tobacco Use    Smoking status: Former Smoker     Last attempt to quit: 2000     Years since quittin.8    Smokeless tobacco: Former User   Substance Use Topics    Alcohol use: No     Comment: rare       ROS: A comprehensive review of systems was negative except for that written in the HPI.       PHYSICAL EXAM:  Visit Vitals  BP (!) 150/113 (BP 1 Location: Left arm, BP Patient Position: At rest) Pulse 75   Temp 99.6 °F (37.6 °C)   Resp 18   Ht 5' 6\" (1.676 m)   Wt 104.3 kg (230 lb)   SpO2 98%   BMI 37.12 kg/m²       General:          Alert, cooperative, no distress, appears stated age. HEENT:           Atraumatic, anicteric sclerae, pink conjunctivae                          No oral ulcers, mucosa moist, throat clear, dentition fair  Neck:               Supple, symmetrical,  thyroid: non tender  Lungs:             Clear to auscultation bilaterally. No Wheezing or Rhonchi. No rales. Chest wall:      No tenderness  No Accessory muscle use. Heart:              Regular  rhythm,  No  murmur   No edema  Abdomen:        Soft, mild left lower quadrant tenderness, no rebound tenderness/rigidity/guarding. Not distended. Bowel sounds                           Normal                            Mild left CVA tenderness  Extremities:     No cyanosis. No clubbing,                            Skin turgor normal, Capillary refill normal, Radial dial pulse 2+  Skin:                Not pale. Not Jaundiced  No rashes   Psych:             Not anxious or agitated. Neurologic:     Alert and oriented to PPT, CNII-XII intact. Motor and sensory exam grossly intact.   Labs/Imaging:  Recent Results (from the past 24 hour(s))   URINALYSIS W/MICROSCOPIC    Collection Time: 05/24/20  9:31 AM   Result Value Ref Range    Color YELLOW/STRAW      Appearance CLOUDY (A) CLEAR      Specific gravity 1.020 1.003 - 1.030      pH (UA) 5.0 5.0 - 8.0      Protein TRACE (A) NEG mg/dL    Glucose Negative NEG mg/dL    Ketone Negative NEG mg/dL    Bilirubin Negative NEG      Blood SMALL (A) NEG      Urobilinogen 0.2 0.2 - 1.0 EU/dL    Nitrites Negative NEG      Leukocyte Esterase MODERATE (A) NEG      WBC 10-20 0 - 4 /hpf    RBC 0-5 0 - 5 /hpf    Epithelial cells MODERATE (A) FEW /lpf    Bacteria 4+ (A) NEG /hpf   URINE CULTURE HOLD SAMPLE    Collection Time: 05/24/20  9:31 AM   Result Value Ref Range    Urine culture hold        Urine on hold in Microbiology dept for 2 days. If unpreserved urine is submitted, it cannot be used for addtional testing after 24 hours, recollection will be required. SAMPLES BEING HELD    Collection Time: 05/24/20  9:31 AM   Result Value Ref Range    SAMPLES BEING HELD [id number]     COMMENT        Add-on orders for these samples will be processed based on acceptable specimen integrity and analyte stability, which may vary by analyte. CBC WITH AUTOMATED DIFF    Collection Time: 05/24/20  9:31 AM   Result Value Ref Range    WBC 15.9 (H) 3.6 - 11.0 K/uL    RBC 4.89 3.80 - 5.20 M/uL    HGB 13.7 11.5 - 16.0 g/dL    HCT 42.3 35.0 - 47.0 %    MCV 86.5 80.0 - 99.0 FL    MCH 28.0 26.0 - 34.0 PG    MCHC 32.4 30.0 - 36.5 g/dL    RDW 13.6 11.5 - 14.5 %    PLATELET 010 187 - 871 K/uL    MPV 11.5 8.9 - 12.9 FL    NRBC 0.0 0  WBC    ABSOLUTE NRBC 0.00 0.00 - 0.01 K/uL    NEUTROPHILS 83 (H) 32 - 75 %    LYMPHOCYTES 7 (L) 12 - 49 %    MONOCYTES 8 5 - 13 %    EOSINOPHILS 0 0 - 7 %    BASOPHILS 1 0 - 1 %    IMMATURE GRANULOCYTES 1 (H) 0.0 - 0.5 %    ABS. NEUTROPHILS 13.3 (H) 1.8 - 8.0 K/UL    ABS. LYMPHOCYTES 1.2 0.8 - 3.5 K/UL    ABS. MONOCYTES 1.2 (H) 0.0 - 1.0 K/UL    ABS. EOSINOPHILS 0.0 0.0 - 0.4 K/UL    ABS. BASOPHILS 0.1 0.0 - 0.1 K/UL    ABS. IMM. GRANS. 0.1 (H) 0.00 - 0.04 K/UL    DF AUTOMATED     METABOLIC PANEL, COMPREHENSIVE    Collection Time: 05/24/20  9:31 AM   Result Value Ref Range    Sodium 136 136 - 145 mmol/L    Potassium 4.0 3.5 - 5.1 mmol/L    Chloride 108 97 - 108 mmol/L    CO2 22 21 - 32 mmol/L    Anion gap 6 5 - 15 mmol/L    Glucose 115 (H) 65 - 100 mg/dL    BUN 18 6 - 20 MG/DL    Creatinine 0.94 0.55 - 1.02 MG/DL    BUN/Creatinine ratio 19 12 - 20      GFR est AA >60 >60 ml/min/1.73m2    GFR est non-AA >60 >60 ml/min/1.73m2    Calcium 9.2 8.5 - 10.1 MG/DL    Bilirubin, total 0.7 0.2 - 1.0 MG/DL    ALT (SGPT) 30 12 - 78 U/L    AST (SGOT) 13 (L) 15 - 37 U/L    Alk.  phosphatase 80 45 - 117 U/L    Protein, total 7.3 6.4 - 8.2 g/dL    Albumin 3.5 3.5 - 5.0 g/dL    Globulin 3.8 2.0 - 4.0 g/dL    A-G Ratio 0.9 (L) 1.1 - 2.2     LIPASE    Collection Time: 05/24/20  9:31 AM   Result Value Ref Range    Lipase 858 (H) 73 - 393 U/L   HCG URINE, QL    Collection Time: 05/24/20  9:31 AM   Result Value Ref Range    HCG urine, QL Negative NEG         Recent Labs     05/24/20  0931   WBC 15.9*   HGB 13.7   HCT 42.3        Recent Labs     05/24/20  0931      K 4.0      CO2 22   BUN 18   CREA 0.94   *   CA 9.2     Recent Labs     05/24/20  0931   SGOT 13*   ALT 30   AP 80   TBILI 0.7   TP 7.3   ALB 3.5   GLOB 3.8   LPSE 858*       No results for input(s): CPK, CKNDX, TROIQ in the last 72 hours. No lab exists for component: CPKMB    No results for input(s): INR, PTP, APTT, INREXT in the last 72 hours. No results for input(s): PH, PCO2, PO2 in the last 72 hours. US ABD LTD  Narrative: INDICATION: Right upper quadrant abdominal pain, elevated lipase. Exam: Right upper quadrant ultrasound was performed. FINDINGS:     Gallbladder: There is sludge within the gallbladder. There is no pericholecystic  fluid, gallbladder wall thickening or gallbladder distention. The common bile  duct is within normal limits measuring 4 mm in diameter. Liver: The liver is diffusely echogenic. The portal vein is patent and  demonstrates appropriate directional hepatopedal flow. Main portal vein diameter  is 8 mm. Portal vein velocity is 28.9 cm/s. Right kidney: The right kidney measures 12.1 cm in sagittal length. There is no  hydronephrosis. Right kidney is echogenic. Pancreas: Visualized portions of the pancreas are normal.   Impression: IMPRESSION:   1. Gallbladder sludge. No specific evidence of acute cholecystitis. 2. Diffuse hepatic steatosis. 3. Echogenic right kidney, suggesting intrinsic renal disease. No  hydronephrosis.   CT ABD PELV W CONT  Narrative: INDICATION: Left upper quadrant abdominal pain.    CT of the abdomen and pelvis is performed with 5 mm collimation. Study is  performed with 100 cc of nonionic Isovue 370. Sagittal and coronal reformatted  images were also performed. CT dose reduction was achieved with the use of the standardized protocol  tailored for this examination and automatic exposure control for dose  modulation. Adaptive statistical iterative reconstruction (ASIR) was utilized. Direct comparison is made to prior CT dated August 2016. Findings:    Lung bases: The visualized lung bases are clear. Liver: There is diffuse fatty infiltration of the liver. Adrenals: Adrenal glands are normal.    Pancreas: The pancreas is normal.    Gallbladder: The gallbladder is normal.    Kidneys: There is mild left perinephric stranding, left hydronephrosis and left  hydroureter to level of the distal left ureter where there is a 4 mm calculus. Spleen: The spleen is normal.    Lymph nodes. There is no zachary hepatitis, mesenteric, retroperitoneal or pelvic  lymphadenopathy. Bowel: No thickened or dilated loop of large or small bowel is visualized. Appendix: The appendix is normal.    Urinary bladder: Urinary bladder is partially filled and grossly normal.    Miscellaneous: There is no free intraperitoneal fluid or gas. There is no focal  fluid collection to suggest abscess. There is a 3.2 cm x 4.1 cm left ovarian  cyst.  Impression: IMPRESSION: Mild left perinephric stranding, left hydronephrosis and left  hydroureter to level of the distal left ureter where there is a 4 mm calculus. Assessment & Plan:  ===================  Left nephrolithiasis with hydronephrosis and hydroureter  UTI  Leukocytosis with left shift , UA remarkable for bacteriuria and leukocyte esterase  CT:Mild left perinephric stranding, left hydronephrosis and left  hydroureter to level of the distal left ureter where there is a 4 mm calculus.   IV hydration  IV ceftriaxone  Analgesics  Urology consult    Elevated lipase  Does not meet criteria for acute pancreatitis (not presenting with typical presentation of acute pancreatitis, lipase not elevated 3 times above the normal limits and imaging study does not suggest acute pancreatitis)    Hypertension  Continue home metoprolol  Hydralazine as needed    History of multiple sclerosis  Not in acute exacerbation  Continue home meds    History of asthma  Not in acute exacerbation  BT as needed    Patient's Baseline: Ambulates with walking  DVT ppx: SCD  Code status: Full   disposition: TBD  Care plan discussed with patient/family and nurse.               Signed By: Chio Alexis MD     May 24, 2020

## 2020-05-24 NOTE — PROGRESS NOTES
Admission Medication Reconciliation:        Spoke with patient by telephone @ [phone], unable to speak with patient face to face at this time due to general isolation precautions in the ED related to COVID-19 pandemic. Patient is a reliable historian. RX query is minimally available at this time. Interview included questions regarding use of:  PTA medications including prescription/OTC, vitamins, supplements, inhaled, topical, injectable, otic and ophthalmic medications  Alcohol, nicotine products, THC and related compounds, illicit drugs, stimulant use (i.e., Red Bull), agents used to assist with sleep and/or pain control issues, and whether patient uses other people's medications of any kind    Notes:  CBD: Vapes BID to control pain    Medication changes (since last review):  Revised:  Aubagio: takes nightly  Gabapentin: takes 600 mg twice daily  Metoprolol succinate: takes 100 mg qpm  O3FA/fish oil: takes 2 capsules twice daily    Deleted:  Dicyclomine  Meclizine      Thank you for allowing me to participate in the care of your patient. Dominic Bojorquez PharmD, RN #[id number] benefit data reflects medications filled and processed through the patient's insurance, however   this data does NOT capture whether the medication was picked up or is currently being taken by the patient.     Allergies:  Latex and Adhesive    Significant PMH/Disease States:   Past Medical History:   Diagnosis Date    Ankle fracture     Asthma     Autoimmune disease ([facility] [address]     MS    Celiac disease     Chronic pain     MS    Congenital sucrose isomaltose malabsorption     Depression     Diverticulosis of sigmoid colon     Dysplastic colon polyp     Dr. Mary Lou Boswell - last colonoscopy 11/7/12 - single polyp    Essential hypertension     Gestasional    GERD (gastroesophageal reflux disease)     Influenza B 03/07/2017    EDGARD virus antibody positive     Kidney stones     Miscarriage     11/13    MS (multiple sclerosis) ([facility] [address]      NOECamillaNoam    Ovarian cyst     PUD (peptic ulcer disease)     Routine gynecological examination     Dr. Vinayak Grewal    Sleep apnea     pt states she no longer has the problem since wt loss - intentional wt loss    Tubular adenoma of colon 2016    Uterine fibroid     Vitamin D deficiency 2011     Chief Complaint for this Admission:    Chief Complaint   Patient presents with    Abdominal Pain     Prior to Admission Medications:   Prior to Admission Medications   Prescriptions Last Dose Informant Taking? Bifidobacterium Infantis (ALIGN) 4 mg cap 2020 at Unknown time  Yes   Sig: Take 1 Cap by mouth nightly. albuterol (PROVENTIL HFA, VENTOLIN HFA, PROAIR HFA) 90 mcg/actuation inhaler 2020 at Unknown time  Yes   Sig: Take 2 Puffs by inhalation every four (4) hours as needed for Wheezing or Shortness of Breath. buPROPion XL (WELLBUTRIN XL) 150 mg tablet 2020 at Unknown time  Yes   Sig: TAKE ONE TABLET BY MOUTH EVERY MORNING   cholecalciferol, vitamin D3, (VITAMIN D3) 2,000 unit tab 2020 at Unknown time  Yes   Sig: Take 5,000 Units by mouth daily. escitalopram oxalate (LEXAPRO) 20 mg tablet 2020 at Unknown time  Yes   Sig: TAKE ONE TABLET BY MOUTH ONE TIME DAILY   fexofenadine (ALLEGRA) 180 mg tablet 2020 at Unknown time  Yes   Sig: Take 180 mg by mouth every evening. fluticasone (FLONASE) 50 mcg/actuation nasal spray 2020 at Unknown time  Yes   Si Sprays by Both Nostrils route daily. Patient taking differently: 2 Sprays by Both Nostrils route as needed. gabapentin (NEURONTIN) 300 mg capsule 2020 at Unknown time  Yes   Sig: Take 600 mg by mouth two (2) times a day. 1caps twice a day   metoprolol succinate (TOPROL-XL) 100 mg tablet 2020 at Unknown time  Yes   Sig: Take 100 mg by mouth every evening. omega 3-dha-epa-fish oil 183.3 mg-75 mg -91.6 mg-306 mg cap 2020 at Unknown time  Yes   Sig: Take 4 Caps by mouth daily.    Patient taking differently: Take 2 Caps by mouth two (2) times a day. pantoprazole (PROTONIX) 40 mg tablet 5/24/2020 at Unknown time  Yes   Sig: Take 40 mg by mouth daily. sucralfate (CARAFATE) 100 mg/mL suspension 5/24/2020 at Unknown time  Yes   Sig: Take 1 tsp by mouth four (4) times daily. teriflunomide (Aubagio) 14 mg tablet 5/23/2020 at Unknown time  Yes   Sig: Take 14 mg by mouth every evening. Facility-Administered Medications: None       Please contact the main inpatient pharmacy with any questions or concerns at [phone] and we will direct you to the clinical pharmacist covering this patient's care while in-house.    RICKIE Mendoza

## 2020-05-24 NOTE — ED PROVIDER NOTES
HPI .  Patient has a history of depression, sleep apnea, asthma, hypertension, ovarian cyst, fibroids, multiple sclerosis, biologic therapy, kidney stones, GERD, peptic ulcer disease, celiac disease, colonic polyps, vitamin D deficiency, open leg fracture, heel dislocation, and polypectomy. Patient presents with 7 hours of constant left upper quadrant pain that sometimes radiates to the right abdomen. Tylenol did not decrease the pain significantly. Initially the pain was sharper and more severe. Patient has some nausea. She endorses anorexia. Patient denies fever, cough, dysuria, frequency, and hematuria.   Pain is not very similar to the right renal colic patient had in the past.    Past Medical History:   Diagnosis Date    Ankle fracture     Asthma     Autoimmune disease ([facility] [address]     MS    Celiac disease     Chronic pain     MS    Congenital sucrose isomaltose malabsorption     Depression     Diverticulosis of sigmoid colon     Dysplastic colon polyp     Dr. Cyn Trevino - last colonoscopy 12 - single polyp    Essential hypertension     Gestasional    GERD (gastroesophageal reflux disease)     Influenza B 2017    EDGARD virus antibody positive     Kidney stones     Miscarriage         MS (multiple sclerosis) (HCC)     Dr. Reshma Benitez Ovarian cyst     PUD (peptic ulcer disease)     Routine gynecological examination     Dr. Remy Solomon Sleep apnea     pt states she no longer has the problem since wt loss - intentional wt loss    Tubular adenoma of colon 2016    Uterine fibroid     Vitamin D deficiency 2011       Past Surgical History:   Procedure Laterality Date    COLONOSCOPY N/A 2019    COLONOSCOPY/EGD ([facility]) performed by Pelon Yoder MD at [address] [place] [address]      double compound fx of right lower leg and dislocated heel - has a plate and 13 screws    HX  SECTION          HX COLONOSCOPY      HX ENDOSCOPY      HX GYN fibroid tumor ovarian cyst     HX ORTHOPAEDIC      1992 Left shoulder surgery    IN BIOPSY OF BREAST, INCISIONAL           Family History:   Problem Relation Age of Onset   Ti Adhikaritz Cancer Father         appendix/cancerous colon polyps    Heart Disease Father         cabg age early 68's    Cancer Maternal Uncle         prostate/melanoma    Cancer Maternal Grandmother         cancerous colon polyps    Cancer Maternal Grandfather         prostate    Heart Disease Paternal Grandmother     Cancer Paternal Grandmother         cancerous colon polyps    No Known Problems Daughter     Colon Polyps Mother         precancerous    Atrial Fibrillation Mother     Heart Disease Paternal Aunt        Social History     Socioeconomic History    Marital status:      Spouse name: Not on file    Number of children: Not on file    Years of education: Not on file    Highest education level: Not on file   Occupational History    Not on file   Social Needs    Financial resource strain: Not on file    Food insecurity     Worry: Not on file     Inability: Not on file    Transportation needs     Medical: Not on file     Non-medical: Not on file   Tobacco Use    Smoking status: Former Smoker     Last attempt to quit: 2000     Years since quittin.8    Smokeless tobacco: Former User   Substance and Sexual Activity    Alcohol use: No     Comment: rare    Drug use: No    Sexual activity: Never   Lifestyle    Physical activity     Days per week: Not on file     Minutes per session: Not on file    Stress: Not on file   Relationships    Social connections     Talks on phone: Not on file     Gets together: Not on file     Attends Yarsanism service: Not on file     Active member of club or organization: Not on file     Attends meetings of clubs or organizations: Not on file     Relationship status: Not on file    Intimate partner violence     Fear of current or ex partner: Not on file     Emotionally abused: Not on file     Physically abused: Not on file     Forced sexual activity: Not on file   Other Topics Concern    Not on file   Social History Narrative    Not on file         ALLERGIES: Latex and Adhesive    Review of Systems   Constitutional: Negative for fever. HENT: Negative for congestion and postnasal drip. Respiratory: Negative for cough. Cardiovascular: Negative for chest pain. Gastrointestinal: Positive for abdominal pain. Negative for abdominal distention. Genitourinary: Negative for difficulty urinating, dysuria, frequency and hematuria. Neurological: Negative for dizziness and speech difficulty. Psychiatric/Behavioral: Negative for agitation and behavioral problems. The patient is not nervous/anxious. Vitals:    05/24/20 0919   BP: 138/88   Pulse: 71   Resp: 16   Temp: 98.2 °F (36.8 °C)   SpO2: 96%   Weight: 104.3 kg (230 lb)   Height: 5' 6\" (1.676 m)            Physical Exam  Vitals signs and nursing note reviewed. Constitutional:       Appearance: She is well-developed. HENT:      Head: Normocephalic and atraumatic. Eyes:      Pupils: Pupils are equal, round, and reactive to light. Neck:      Musculoskeletal: Normal range of motion and neck supple. Cardiovascular:      Rate and Rhythm: Normal rate and regular rhythm. Heart sounds: Normal heart sounds. No murmur. No friction rub. No gallop. Pulmonary:      Effort: Pulmonary effort is normal. No respiratory distress. Breath sounds: No wheezing or rales. Abdominal:      Palpations: Abdomen is soft. Tenderness: There is no abdominal tenderness. There is no rebound. Comments: Mild left upper quadrant tenderness   Musculoskeletal: Normal range of motion. General: No tenderness. Skin:     Findings: No erythema. Neurological:      Mental Status: She is alert. Cranial Nerves: No cranial nerve deficit.       Comments: Motor; symmetric   Psychiatric:         Behavior: Behavior normal. MDM       Procedures      Note: Lipase is elevated. Pain is largely left-sided. Patient is not much of a drinker of ethanol and does not have symptoms consistent with biliary colic today. Yesterday patient was having some right upper quadrant pain. Urine shows pyuria and bacteriuria. CT scan shows a left ureteral stone. 4 mm. Elevated lipase may be a red herring (or from acute pancreatitis addendum 1:42 PM  ). Plan is to consult urology. 12:39 PM  Gracia Horton MD         CONSULT NOTE:  Spoke to Dr Ameena Munoz concerning the patient. The patient's history, presentation, physical findings, and results were all discussed. Renal colic and pyuria would not  necessitate admission without signs of systemic infection like fever or shivering weakness or hypotension. Plan is to give the patient IV antibiotics. Patient's lipase is markedly high and patient may have 2 acute processes simultaneously. Ultrasound right upper quadrant is pending. 1:41 PM  Gracia Horton MD      Hospitalist [place] Text for Admission  2:09 PM    ED Room Number: R37/R37  Patient Name and age:  Alissa Reyna 54 y.o.  female  Working Diagnosis:   1. Idiopathic acute pancreatitis without infection or necrosis    2. Renal colic    3.  Urinary tract infection without hematuria, site unspecified      Readmission: no  Isolation Requirements:  no  Recommended Level of Care:  med/surg  Code Status:  FULL  Other:    Department:[facility] Adult ED - 21

## 2020-05-24 NOTE — ED NOTES
TRANSFER - OUT REPORT:    Verbal report given to Brenna Conn RN (name) on Maria Elena Shelley  being transferred to [address] [place] [address] (unit) for routine progression of care       Report consisted of patients Situation, Background, Assessment and   Recommendations(SBAR). Information from the following report(s) SBAR, ED Summary and MAR was reviewed with the receiving nurse. Lines:   Peripheral IV 05/24/20 Right Hand (Active)   Site Assessment Clean, dry, & intact 5/24/2020  9:32 AM   Phlebitis Assessment 0 5/24/2020  9:32 AM   Infiltration Assessment 0 5/24/2020  9:32 AM   Dressing Status Clean, dry, & intact 5/24/2020  9:32 AM        Opportunity for questions and clarification was provided.       Patient transported with:   [employer]

## 2020-05-24 NOTE — ED TRIAGE NOTES
Arrives ambulatory with mask for c/o left sided abdominal/flank pain that awoke pt around 0200 this morning. Pain comes in waves, accompanied with nausea. Took tylenol around 0330 that took edge off. Denies fevers or urinary symptoms. Hx kidney stones.

## 2020-05-25 ENCOUNTER — APPOINTMENT (OUTPATIENT)
Dept: GENERAL RADIOLOGY | Age: 45
DRG: 690 | End: 2020-05-25
Attending: UROLOGY
Payer: COMMERCIAL

## 2020-05-25 LAB
ANION GAP SERPL CALC-SCNC: 9 MMOL/L (ref 5–15)
BASOPHILS # BLD: 0 K/UL (ref 0–0.1)
BASOPHILS NFR BLD: 0 % (ref 0–1)
BUN SERPL-MCNC: 9 MG/DL (ref 6–20)
BUN/CREAT SERPL: 11 (ref 12–20)
CALCIUM SERPL-MCNC: 8.6 MG/DL (ref 8.5–10.1)
CHLORIDE SERPL-SCNC: 103 MMOL/L (ref 97–108)
CO2 SERPL-SCNC: 22 MMOL/L (ref 21–32)
CREAT SERPL-MCNC: 0.85 MG/DL (ref 0.55–1.02)
DIFFERENTIAL METHOD BLD: ABNORMAL
EOSINOPHIL # BLD: 0 K/UL (ref 0–0.4)
EOSINOPHIL NFR BLD: 0 % (ref 0–7)
ERYTHROCYTE [DISTWIDTH] IN BLOOD BY AUTOMATED COUNT: 13.7 % (ref 11.5–14.5)
GLUCOSE SERPL-MCNC: 109 MG/DL (ref 65–100)
HCT VFR BLD AUTO: 38.4 % (ref 35–47)
HGB BLD-MCNC: 12.4 G/DL (ref 11.5–16)
IMM GRANULOCYTES # BLD AUTO: 0.1 K/UL (ref 0–0.04)
IMM GRANULOCYTES NFR BLD AUTO: 1 % (ref 0–0.5)
LIPASE SERPL-CCNC: 100 U/L (ref 73–393)
LYMPHOCYTES # BLD: 1.2 K/UL (ref 0.8–3.5)
LYMPHOCYTES NFR BLD: 10 % (ref 12–49)
MCH RBC QN AUTO: 27.8 PG (ref 26–34)
MCHC RBC AUTO-ENTMCNC: 32.3 G/DL (ref 30–36.5)
MCV RBC AUTO: 86.1 FL (ref 80–99)
MONOCYTES # BLD: 1.1 K/UL (ref 0–1)
MONOCYTES NFR BLD: 10 % (ref 5–13)
NEUTS SEG # BLD: 9.4 K/UL (ref 1.8–8)
NEUTS SEG NFR BLD: 79 % (ref 32–75)
NRBC # BLD: 0 K/UL (ref 0–0.01)
NRBC BLD-RTO: 0 PER 100 WBC
PLATELET # BLD AUTO: 155 K/UL (ref 150–400)
PMV BLD AUTO: 11 FL (ref 8.9–12.9)
POTASSIUM SERPL-SCNC: 3.8 MMOL/L (ref 3.5–5.1)
RBC # BLD AUTO: 4.46 M/UL (ref 3.8–5.2)
SODIUM SERPL-SCNC: 134 MMOL/L (ref 136–145)
WBC # BLD AUTO: 11.8 K/UL (ref 3.6–11)

## 2020-05-25 PROCEDURE — 65660000000 HC RM CCU STEPDOWN

## 2020-05-25 PROCEDURE — 83690 ASSAY OF LIPASE: CPT

## 2020-05-25 PROCEDURE — 74011250636 HC RX REV CODE- 250/636: Performed by: STUDENT IN AN ORGANIZED HEALTH CARE EDUCATION/TRAINING PROGRAM

## 2020-05-25 PROCEDURE — 80048 BASIC METABOLIC PNL TOTAL CA: CPT

## 2020-05-25 PROCEDURE — 74018 RADEX ABDOMEN 1 VIEW: CPT

## 2020-05-25 PROCEDURE — 74011000258 HC RX REV CODE- 258: Performed by: STUDENT IN AN ORGANIZED HEALTH CARE EDUCATION/TRAINING PROGRAM

## 2020-05-25 PROCEDURE — 74011250637 HC RX REV CODE- 250/637: Performed by: STUDENT IN AN ORGANIZED HEALTH CARE EDUCATION/TRAINING PROGRAM

## 2020-05-25 PROCEDURE — 85025 COMPLETE CBC W/AUTO DIFF WBC: CPT

## 2020-05-25 PROCEDURE — 36415 COLL VENOUS BLD VENIPUNCTURE: CPT

## 2020-05-25 PROCEDURE — 74011250637 HC RX REV CODE- 250/637: Performed by: UROLOGY

## 2020-05-25 RX ORDER — TAMSULOSIN HYDROCHLORIDE 0.4 MG/1
0.4 CAPSULE ORAL
Status: DISCONTINUED | OUTPATIENT
Start: 2020-05-25 | End: 2020-05-27 | Stop reason: HOSPADM

## 2020-05-25 RX ADMIN — BUPROPION HYDROCHLORIDE 150 MG: 150 TABLET, EXTENDED RELEASE ORAL at 10:27

## 2020-05-25 RX ADMIN — GABAPENTIN 300 MG: 300 CAPSULE ORAL at 10:26

## 2020-05-25 RX ADMIN — Medication 5 ML: at 06:00

## 2020-05-25 RX ADMIN — ACETAMINOPHEN 650 MG: 325 TABLET, FILM COATED ORAL at 04:20

## 2020-05-25 RX ADMIN — METOPROLOL SUCCINATE 100 MG: 50 TABLET, EXTENDED RELEASE ORAL at 10:26

## 2020-05-25 RX ADMIN — GABAPENTIN 300 MG: 300 CAPSULE ORAL at 19:45

## 2020-05-25 RX ADMIN — SODIUM CHLORIDE, SODIUM LACTATE, POTASSIUM CHLORIDE, AND CALCIUM CHLORIDE 100 ML/HR: 600; 310; 30; 20 INJECTION, SOLUTION INTRAVENOUS at 21:53

## 2020-05-25 RX ADMIN — CEFTRIAXONE SODIUM 1 G: 1 INJECTION, POWDER, FOR SOLUTION INTRAMUSCULAR; INTRAVENOUS at 16:58

## 2020-05-25 RX ADMIN — TAMSULOSIN HYDROCHLORIDE 0.4 MG: 0.4 CAPSULE ORAL at 21:53

## 2020-05-25 RX ADMIN — MELATONIN 5 TABLET: at 10:27

## 2020-05-25 RX ADMIN — PANTOPRAZOLE SODIUM 40 MG: 40 TABLET, DELAYED RELEASE ORAL at 07:10

## 2020-05-25 RX ADMIN — ACETAMINOPHEN 650 MG: 325 TABLET, FILM COATED ORAL at 16:56

## 2020-05-25 NOTE — PROGRESS NOTES
Bedside shift change report given to Kathya Ghosh (oncoming nurse) by Ambreen Reid (offgoing nurse). Report included the following information SBAR, Kardex and MAR.

## 2020-05-25 NOTE — CONSULTS
Urology Consult    Patient: Chuckie Gavin MRN: [id number]  SSN: [id number]    YOB: 1975  Age: 54 y.o. Sex: female          Date of Consultation:  May 25, 2020  Requesting Physician: Lazarus Christian MD  Reason for Consultation:    Pre-existing [facility] Patient:   Physician:               History of Present Illness:  Patient is a 54 y.o. female admitted 5/24/2020 to the hospital for UTI (urinary tract infection) [N39.0]  Elevated lipase [R74.8]. We are consulted due to left ureteral stone. She had sudden onset of left upper and left lower quadrant pain which started acutely yesterday morning and prompted ER visit. Symptoms were severe, intermittent and without modifying factors. She underwent evaluation in the emergency room and was noted to have an elevated white count as well as elevated lipase. She underwent abdominal ultrasound as well as CT. Ultrasound showed gallbladder sludge but no suggestion of cholecystitis. CT was personally reviewed and revealed a 4 mm left distal ureteral stone with mild hydronephrosis. She does have previous kidney stone history and underwent what sounds like ESWL and ureteral stent about 2 years ago for a right-sided stone. She reports subjective chills but has had no fever. Since admission the left-sided symptoms have mostly resolved but she mentions some right lower quadrant pain this morning. Urinalysis showed moderate epithelial cells, white cells and 4+ bacteria. Culture pending. On empiric antibiotics. WBC is down from 15.9-11.8 this morning. Creatinine normal.    Problem: Ureteral stone. Location: Left distal ureter. Duration: Symptoms for 24 hours. Severity: 4 mm. Modifying factors: None. Context: Previous kidney stone history. Past Medical History: Allergies   Allergen Reactions    Latex Rash    Adhesive Rash      Prior to Admission medications    Medication Sig Start Date End Date Taking?  Authorizing Provider   sucralfate (CARAFATE) 100 mg/mL suspension Take 1 tsp by mouth four (4) times daily. Yes Other, MD Neelima   teriflunomide (Aubagio) 14 mg tablet Take 14 mg by mouth every evening. Yes Provider, Historical   metoprolol succinate (TOPROL-XL) 100 mg tablet Take 100 mg by mouth every evening. Yes Provider, Historical   escitalopram oxalate (LEXAPRO) 20 mg tablet TAKE ONE TABLET BY MOUTH ONE TIME DAILY 4/27/20  Yes Nate Wheeler MD   buPROPion XL (WELLBUTRIN XL) 150 mg tablet TAKE ONE TABLET BY MOUTH EVERY MORNING 2/2/20  Yes Tammy Adkins MD   pantoprazole (PROTONIX) 40 mg tablet Take 40 mg by mouth daily. Yes Provider, Historical   Bifidobacterium Infantis (ALIGN) 4 mg cap Take 1 Cap by mouth nightly. Yes Provider, Historical   omega 3-dha-epa-fish oil 183.3 mg-75 mg -91.6 mg-306 mg cap Take 4 Caps by mouth daily. Patient taking differently: Take 2 Caps by mouth two (2) times a day. 10/25/18  Yes Nate Wheeler MD   fluticasone (FLONASE) 50 mcg/actuation nasal spray 2 Sprays by Both Nostrils route daily. Patient taking differently: 2 Sprays by Both Nostrils route as needed. 10/22/18  Yes Nate Wheeler MD   fexofenadine (ALLEGRA) 180 mg tablet Take 180 mg by mouth every evening. Yes Provider, Historical   gabapentin (NEURONTIN) 300 mg capsule Take 600 mg by mouth two (2) times a day. 1caps twice a day 10/20/17  Yes Provider, Historical   albuterol (PROVENTIL HFA, VENTOLIN HFA, PROAIR HFA) 90 mcg/actuation inhaler Take 2 Puffs by inhalation every four (4) hours as needed for Wheezing or Shortness of Breath. 9/27/17  Yes Flor Coelho MD   cholecalciferol, vitamin D3, (VITAMIN D3) 2,000 unit tab Take 5,000 Units by mouth daily.    Yes Provider, Historical      PMHx:  has a past medical history of Ankle fracture, Asthma, Autoimmune disease ([address] Celiac disease, Chronic pain, Congenital sucrose isomaltose malabsorption, Depression, Diverticulosis of sigmoid colon, Dysplastic colon polyp, Essential hypertension, GERD (gastroesophageal reflux disease), Influenza B (2017), EDGARD virus antibody positive, Kidney stones, Miscarriage, MS (multiple sclerosis) ([facility] [address] Ovarian cyst, PUD (peptic ulcer disease), Routine gynecological examination, Sleep apnea, Tubular adenoma of colon (2016), Uterine fibroid, and Vitamin D deficiency (2011). She also has no past medical history of Abnormal Pap smear, Anemia, Chlamydia, Complication of anesthesia, Genital herpes, unspecified, Gonorrhea, Heart abnormality, Herpes simplex without mention of complication, Human immunodeficiency virus (HIV) disease ([facility] [address] OTHER MEDICAL, Infertility, female, Pituitary disorder ([facility] [address] Polycystic disease, ovaries, Postpartum depression, Rhesus isoimmunization unspecified as to episode of care in pregnancy, Sickle-cell disease, unspecified, Syphilis, Trauma, Unspecified breast disorder, or Unspecified diseases of blood and blood-forming organs. PSurgHx:  has a past surgical history that includes hx gyn; hx  section; hx orthopaedic; hx ankle fracture tx; pr biopsy of breast, incisional; hx endoscopy; hx colonoscopy; and colonoscopy (N/A, 2019). PSocHx:  reports that she quit smoking about 19 years ago. She has quit using smokeless tobacco. She reports that she does not drink alcohol or use drugs. ROS:  Admission ROS by Gage Nelson MD from 2020 were reviewed with the patient and changes (other than per HPI) include: none. All 12 systems were reviewed and were otherwise negative. Physical Exam:            Vitals[de-identified]    Temp (24hrs), Av.6 °F (37 °C), Min:98 °F (36.7 °C), Max:99.6 °F (37.6 °C)   Blood pressure 101/57, pulse 71, temperature 98 °F (36.7 °C), resp. rate 16, height 5' 6\" (1.676 m), weight 104.3 kg (230 lb), SpO2 96 %. I&O's:    No intake/output data recorded.    General Well developed, NAD   Conjunctiva/Lids Normal without gross defects   Pupil/Iris Pupils equal, round, reactive   External Ears/Nose No lesions or deformities   Hearing  Grossly intact   Neck Supple without masses   Thyroid No nodules, masses, tenderness, or enlargement   Respiratory Effort Breathing easily   Chest Palpation No tactile fremitus   Abdominal Aorta No enlargement or bruits   Lower extremity No edema   Abdomen / Flank Soft, non tender, without masses, no CVA tenderness   Liver / Spleen No organomegaly   Hernia  No ventral hernia   Lymph No adenopathy   Digits/ Nails No clubbing, cyanosis, petechiae   Skin Inspection Warm and dry   Skin Palpation No subcutaneous nodularity   Senesation Grossly without deficits   Judgement / Insight intact   Mood / Affect normal     Lab Results   Component Value Date/Time    WBC 11.8 (H) 05/25/2020 04:36 AM    HCT 38.4 05/25/2020 04:36 AM    PLATELET 521 50/18/0857 04:36 AM    Sodium 134 (L) 05/25/2020 04:36 AM    Potassium 3.8 05/25/2020 04:36 AM    Chloride 103 05/25/2020 04:36 AM    CO2 22 05/25/2020 04:36 AM    BUN 9 05/25/2020 04:36 AM    Creatinine 0.85 05/25/2020 04:36 AM    Glucose 109 (H) 05/25/2020 04:36 AM    Calcium 8.6 05/25/2020 04:36 AM    Magnesium 2.2 03/06/2018 02:22 PM    INR 1.0 12/28/2013 03:44 PM       UA:   Lab Results   Component Value Date/Time    Color YELLOW/STRAW 05/24/2020 09:31 AM    Appearance CLOUDY (A) 05/24/2020 09:31 AM    Specific gravity 1.020 05/24/2020 09:31 AM    pH (UA) 5.0 05/24/2020 09:31 AM    Protein TRACE (A) 05/24/2020 09:31 AM    Glucose Negative 05/24/2020 09:31 AM    Ketone Negative 05/24/2020 09:31 AM    Bilirubin Negative 05/24/2020 09:31 AM    Urobilinogen 0.2 05/24/2020 09:31 AM    Nitrites Negative 05/24/2020 09:31 AM    Leukocyte Esterase MODERATE (A) 05/24/2020 09:31 AM    Epithelial cells MODERATE (A) 05/24/2020 09:31 AM    Bacteria 4+ (A) 05/24/2020 09:31 AM    WBC 10-20 05/24/2020 09:31 AM    RBC 0-5 05/24/2020 09:31 AM       Cultures:   Xrays:     Assessment/Plan:  4 mm left distal ureteral stone: Treatment options reviewed. I recommend trial of medical expulsive therapy. I will order KUB to have as baseline and start Flomax. Strain urine. Agree with empiric antibiotics pending urine culture. No suggestion of sepsis. Discussed possibility of ureteral stent should things change. Follow-up with me in 1 week. Call if any questions/changes.      Signed By: Patience Chester MD  - May 25, 2020

## 2020-05-25 NOTE — PROGRESS NOTES
Hospitalist Progress Note  Kaushik Schaefer MD  Answering service: [phone] OR [phone] from in house phone      Date of Service:  2020  NAME:  Jovita Dodge  :  1975  MRN:  [id number]      Admission Summary:   Patient is a 65-year-old female with medical history significant for asthma, multiple sclerosis, hypertension and depression who presents to the emergency department with chief complaint of abdominal pain. Patient reports the pain began suddenly around 2:00 a.m. this morning, described as sharp, more on the left lower quadrant, constant, nonradiating, rated 6-9/10 and was associated with nausea. She also reports similar pain a week ago on the right side lasting for few minutes. She denies any associated fever, chills, vomiting, shortness of breath, chest pain, hematemesis, dark stool or melena, urinary or any other bowel complaints. Interval history / Subjective:      Patient seen and examined this morning.  She still has the pain but way better than her arrival. She is tolerating regular diet  Pain well controlled     Assessment & Plan:     # Left nephrolithiasis with hydronephrosis and hydroureter  # UTI  - medical management of stone: will start on Flomax, IVF, encourage oral fluid intake and pain control   - appt Urology input   - follow urine culture   - continue Ceftriaxone   - will obtain KUB      # Elevated lipase  - Patient tolerating regula diet   - no alcohol intake, no gallstones on US, triglyceride moderatetly elevated   - could be related to PPI  - repeat lipase      # Hypertension  - Continue home metoprolol  - Hydralazine as needed     # History of multiple sclerosis  - Not in acute exacerbation  - Continue home meds     # History of asthma  - Not in acute exacerbation  - Nebs PRN      Patient's Baseline: Ambulates with walking  DVT ppx: SCD  Code status: Full   disposition: Today or tomorrow   Care plan discussed with patient/family and nurse. Hospital Problems  Date Reviewed: 1/23/2020          Codes Class Noted POA    UTI (urinary tract infection) ICD-10-CM: N39.0  ICD-9-CM: 599.0  5/24/2020 Unknown        Elevated lipase ICD-10-CM: R74.8  ICD-9-CM: 790.5  5/24/2020 Unknown                Review of Systems:   Pertinent positive mentioned in interval hx/HPI. Other systems reviewed and negative     Vital Signs:    Last 24hrs VS reviewed since prior progress note. Most recent are:  Visit Vitals  /57   Pulse 71   Temp 98 °F (36.7 °C)   Resp 16   Ht 5' 6\" (1.676 m)   Wt 104.3 kg (230 lb)   SpO2 96%   BMI 37.12 kg/m²         Intake/Output Summary (Last 24 hours) at 5/25/2020 1120  Last data filed at 5/24/2020 2351  Gross per 24 hour   Intake    Output 400 ml   Net -400 ml        Physical Examination:             Constitutional:  No acute distress, cooperative, pleasant    ENT:  Oral mucous moist, oropharynx benign. Neck supple,    Resp:  CTA bilaterally. No wheezing/rhonchi/rales. No accessory muscle use   CV:  Regular rhythm, normal rate, no murmurs, gallops, rubs    GI:  Soft, non distended, mild tenderness at the epigastric area and left flank. normoactive bowel sounds, no hepatosplenomegaly     Musculoskeletal:  No edema, warm, 2+ pulses throughout    Neurologic:  Moves all extremities. AAOx3, CN II-XII reviewed            Data Review:     I personally reviewed labs and imaging     Labs:     Recent Labs     05/25/20  0436 05/24/20  0931   WBC 11.8* 15.9*   HGB 12.4 13.7   HCT 38.4 42.3    210     Recent Labs     05/25/20  0436 05/24/20  0931   * 136   K 3.8 4.0    108   CO2 22 22   BUN 9 18   CREA 0.85 0.94   * 115*   CA 8.6 9.2     Recent Labs     05/24/20  0931   SGOT 13*   ALT 30   AP 80   TBILI 0.7   TP 7.3   ALB 3.5   GLOB 3.8   LPSE 858*     No results for input(s): INR, PTP, APTT, INREXT in the last 72 hours.    No results for input(s): FE, TIBC, PSAT, FERR in the last 72 hours. No results found for: FOL, RBCF   No results for input(s): PH, PCO2, PO2 in the last 72 hours. No results for input(s): CPK, CKNDX, TROIQ in the last 72 hours.     No lab exists for component: CPKMB  Lab Results   Component Value Date/Time    Cholesterol, total 200 (H) 08/02/2019 09:10 AM    HDL Cholesterol 51 08/02/2019 09:10 AM    LDL, calculated 92 08/02/2019 09:10 AM    Triglyceride 287 (H) 08/02/2019 09:10 AM     Lab Results   Component Value Date/Time    Glucose (POC) 129 (H) 12/31/2013 04:26 PM    Glucose (POC) 196 (H) 12/31/2013 11:27 AM    Glucose (POC) 148 (H) 12/31/2013 07:39 AM    Glucose (POC) 111 (H) 12/30/2013 10:38 PM    Glucose (POC) 121 (H) 12/30/2013 04:50 PM     Lab Results   Component Value Date/Time    Color YELLOW/STRAW 05/24/2020 09:31 AM    Appearance CLOUDY (A) 05/24/2020 09:31 AM    Specific gravity 1.020 05/24/2020 09:31 AM    pH (UA) 5.0 05/24/2020 09:31 AM    Protein TRACE (A) 05/24/2020 09:31 AM    Glucose Negative 05/24/2020 09:31 AM    Ketone Negative 05/24/2020 09:31 AM    Bilirubin Negative 05/24/2020 09:31 AM    Urobilinogen 0.2 05/24/2020 09:31 AM    Nitrites Negative 05/24/2020 09:31 AM    Leukocyte Esterase MODERATE (A) 05/24/2020 09:31 AM    Epithelial cells MODERATE (A) 05/24/2020 09:31 AM    Bacteria 4+ (A) 05/24/2020 09:31 AM    WBC 10-20 05/24/2020 09:31 AM    RBC 0-5 05/24/2020 09:31 AM         Medications Reviewed:     Current Facility-Administered Medications   Medication Dose Route Frequency    tamsulosin (FLOMAX) capsule 0.4 mg  0.4 mg Oral QHS    albuterol-ipratropium (DUO-NEB) 2.5 MG-0.5 MG/3 ML  3 mL Nebulization Q6H PRN    pantoprazole (PROTONIX) tablet 40 mg  40 mg Oral ACB    metoprolol succinate (TOPROL-XL) XL tablet 100 mg  100 mg Oral DAILY    gabapentin (NEURONTIN) capsule 300 mg  300 mg Oral BID    buPROPion SR (WELLBUTRIN SR) tablet 150 mg  150 mg Oral DAILY    cholecalciferol (VITAMIN D3) (1000 Units /25 mcg) tablet 5 Tab  5,000 Units Oral DAILY    sodium chloride (NS) flush 5-40 mL  5-40 mL IntraVENous Q8H    sodium chloride (NS) flush 5-40 mL  5-40 mL IntraVENous PRN    acetaminophen (TYLENOL) tablet 650 mg  650 mg Oral Q4H PRN    morphine injection 2 mg  2 mg IntraVENous Q4H PRN    ondansetron (ZOFRAN) injection 4 mg  4 mg IntraVENous Q4H PRN    naloxone (NARCAN) injection 0.4 mg  0.4 mg IntraVENous PRN    lactated Ringers infusion  100 mL/hr IntraVENous CONTINUOUS    hydrALAZINE (APRESOLINE) 20 mg/mL injection 10 mg  10 mg IntraVENous Q6H PRN    cefTRIAXone (ROCEPHIN) 1 g in 0.9% sodium chloride (MBP/ADV) 50 mL  1 g IntraVENous Q24H     ______________________________________________________________________  EXPECTED LENGTH OF STAY: - - -  ACTUAL LENGTH OF STAY:          1                 Hamida Decker MD   Patient has given Verbal permission to discuss medical care with   persons present in the room and and also with contact as listed on face sheet.

## 2020-05-25 NOTE — PROGRESS NOTES
Transition of Care Plan  RUR 8%    Home with family--Main contact   Juanito Kirkland    Medical follow up with PCP and Urologist      Reason for Admission:   Abdominal pain-- UTI-- Ureteral  Stone  Hx of MS, asthma, hypertension and depression                   RUR Score:     8%                Plan for utilizing home health:     Not indicated     PCP: First and Last name:  Celio Guadarrama  Name of Practice: [facility]   Are you a current patient: Yes/No: yes   Approximate date of last visit: 11/19                    Current Advanced Directive/Advance Care Plan: not in chart    will make decisions . Patient does not want to complete AMD at this time                          Transition of Care Plan:   Home with  and medical follow up     CM met with patient in her room to introduce self and explain role. Patient was alert and oriented  Confirmed demographics, PCP and insurance   BC[employer] of [facility]. Patient lives at home with , [employer] [phone] and 11year old daughter, Justina Libman. Patient is usually independent --still driving,. Has good family support from parents and sibling. Patient receives SSDI and has [place] [address] [facility] [place]. Patient anticipates returning home when discharged with medical follow up. Cm will assist with any needs that may arise.  will transport home when discharged      Care Management Interventions  PCP Verified by CM: Yes(every 6 months  last visit 11 /19)  Mode of Transport at Discharge: (car)  Transition of Care Consult (CM Consult): Discharge Planning  Discharge Durable Medical Equipment: No  Physical Therapy Consult: No  Occupational Therapy Consult: No  Speech Therapy Consult: No  Current Support Network: Lives with Spouse(lives with  and 11 yr old daughter. . no AMD but  to make decisions)  Confirm Follow Up Transport: Family(self)  Discharge Location  Discharge Placement: Home

## 2020-05-26 PROBLEM — N11.1 OBSTRUCTIVE PYELONEPHRITIS: Status: ACTIVE | Noted: 2020-05-26

## 2020-05-26 LAB
BACTERIA SPEC CULT: ABNORMAL
CC UR VC: ABNORMAL
SERVICE CMNT-IMP: ABNORMAL

## 2020-05-26 PROCEDURE — 74011250637 HC RX REV CODE- 250/637: Performed by: STUDENT IN AN ORGANIZED HEALTH CARE EDUCATION/TRAINING PROGRAM

## 2020-05-26 PROCEDURE — 36415 COLL VENOUS BLD VENIPUNCTURE: CPT

## 2020-05-26 PROCEDURE — 65270000029 HC RM PRIVATE

## 2020-05-26 PROCEDURE — 0107U C DIFF TOX AG DETCJ IA STOOL: CPT

## 2020-05-26 PROCEDURE — 74011000258 HC RX REV CODE- 258: Performed by: STUDENT IN AN ORGANIZED HEALTH CARE EDUCATION/TRAINING PROGRAM

## 2020-05-26 PROCEDURE — 74011250637 HC RX REV CODE- 250/637: Performed by: UROLOGY

## 2020-05-26 PROCEDURE — 87040 BLOOD CULTURE FOR BACTERIA: CPT

## 2020-05-26 PROCEDURE — 74011250636 HC RX REV CODE- 250/636: Performed by: STUDENT IN AN ORGANIZED HEALTH CARE EDUCATION/TRAINING PROGRAM

## 2020-05-26 RX ADMIN — BUPROPION HYDROCHLORIDE 150 MG: 150 TABLET, EXTENDED RELEASE ORAL at 09:08

## 2020-05-26 RX ADMIN — TAMSULOSIN HYDROCHLORIDE 0.4 MG: 0.4 CAPSULE ORAL at 20:43

## 2020-05-26 RX ADMIN — GABAPENTIN 300 MG: 300 CAPSULE ORAL at 09:08

## 2020-05-26 RX ADMIN — GABAPENTIN 300 MG: 300 CAPSULE ORAL at 18:32

## 2020-05-26 RX ADMIN — MORPHINE SULFATE 2 MG: 2 INJECTION, SOLUTION INTRAMUSCULAR; INTRAVENOUS at 10:47

## 2020-05-26 RX ADMIN — ACETAMINOPHEN 650 MG: 325 TABLET, FILM COATED ORAL at 15:55

## 2020-05-26 RX ADMIN — METOPROLOL SUCCINATE 100 MG: 50 TABLET, EXTENDED RELEASE ORAL at 09:08

## 2020-05-26 RX ADMIN — PANTOPRAZOLE SODIUM 40 MG: 40 TABLET, DELAYED RELEASE ORAL at 07:06

## 2020-05-26 RX ADMIN — ACETAMINOPHEN 650 MG: 325 TABLET, FILM COATED ORAL at 09:17

## 2020-05-26 RX ADMIN — CEFTRIAXONE SODIUM 1 G: 1 INJECTION, POWDER, FOR SOLUTION INTRAMUSCULAR; INTRAVENOUS at 14:59

## 2020-05-26 RX ADMIN — MELATONIN 5 TABLET: at 09:08

## 2020-05-26 RX ADMIN — ACETAMINOPHEN 650 MG: 325 TABLET, FILM COATED ORAL at 00:38

## 2020-05-26 RX ADMIN — SODIUM CHLORIDE, SODIUM LACTATE, POTASSIUM CHLORIDE, AND CALCIUM CHLORIDE 100 ML/HR: 600; 310; 30; 20 INJECTION, SOLUTION INTRAVENOUS at 07:06

## 2020-05-26 RX ADMIN — Medication 10 ML: at 22:00

## 2020-05-26 NOTE — PROGRESS NOTES
Urology Progress Note    Patient: Shruthi Kimball MRN: [id number]  SSN: [id number]    YOB: 1975  Age: 54 y.o. Sex: female        ADMITTED: 2020 to Henrik Barlow MD for UTI (urinary tract infection) [N39.0]  Elevated lipase [R74.8]  POD# * No surgery found *     She reports vague bilateral abdominal \"cramping\". No severe pain. Straining urine - no stone retrieved yet. Afebrile. VSS. KUB from yesterday shows small calcification in region of distal ureter c/w stone. Urine culture - >100,000 GNR    Vitals: Temp (24hrs), Av.3 °F (36.8 °C), Min:98.2 °F (36.8 °C), Max:98.5 °F (36.9 °C)                   Blood pressure 132/83, pulse 74, temperature 98.5 °F (36.9 °C), resp. rate 16, height 5' 6\" (1.676 m), weight 104.3 kg (230 lb), SpO2 96 %. Intake and Output:   1901 -  0700  In: -   Out:  [Urine:]            Labs:  Labs:   Lab Results   Component Value Date/Time    WBC 11.8 (H) 2020 04:36 AM    HGB 12.4 2020 04:36 AM    Creatinine 0.85 2020 04:36 AM    Hgb, External Negative  2014         Assessment/Plan:   Continue conservative management for stone. Symptoms minimal.   Hopefully ID and sensitivities will be back today. [id number] Yari Moreira for discharge with flomax, culture directed abx, pain meds and strainer. F/U with me Monday with KUB. She would require stent if fever or worsens.        Signed By: Christiano Barrow MD - May 26, 2020

## 2020-05-26 NOTE — PROGRESS NOTES
I agree with the charting completed by Keisha Calvillo RN. Bedside and Verbal shift change report given to ANDRE Barragan (oncoming nurse) by Keisha Calvillo RN and [facility] ANDRE LAGUNAS (offgoing nurse). Report included the following information SBAR, Kardex, Procedure Summary, Intake/Output, MAR and Recent Results.

## 2020-05-26 NOTE — ROUTINE PROCESS
Bedside shift change report given to [address] and Simran Aquino (oncoming nurse) by Mary Ellen Lopez RN (offgoing nurse). Report included the following information SBAR, Kardex, Intake/Output, MAR and Recent Results.

## 2020-05-26 NOTE — PROGRESS NOTES
Hospitalist Progress Note  Zack Fox MD  Answering service: [phone] OR [phone] from in house phone      Date of Service:  2020  NAME:  Lg Lee  :  1975  MRN:  [id number]      Admission Summary:   Patient is a 41-year-old female with medical history significant for asthma, multiple sclerosis, hypertension and depression who presents to the emergency department with chief complaint of abdominal pain. Patient reports the pain began suddenly around 2:00 a.m. this morning, described as sharp, more on the left lower quadrant, constant, nonradiating, rated 6-9/10 and was associated with nausea. She also reports similar pain a week ago on the right side lasting for few minutes. She denies any associated fever, chills, vomiting, shortness of breath, chest pain, hematemesis, dark stool or melena, urinary or any other bowel complaints. Interval history / Subjective:      Patient seen and examined this morning. Patient c/o intermittent LLQ abdominal pain. She also started to have diarrhea since last night with 6-7 times since mid night.  No fever recorded but patient c/o chills and sweating     Assessment & Plan:     # Left nephrolithiasis with hydronephrosis and hydroureter  # UTI  - medical management of stone: will start on Flomax, IVF, encourage oral fluid intake and pain control   - KUB Unchanged distal left ureteral stone  - appt Urology input   - urine culture growing GNR  - continue Ceftriaxone     # New onset diarrhea   - check C diff, enteric contact isolation      # Elevated lipase  - Patient tolerating regula diet   - no alcohol intake, no gallstones on US, triglyceride moderatetly elevated   - could be related to PPI  - repeat lipase      # Hypertension  - Continue home metoprolol  - Hydralazine as needed     # History of multiple sclerosis  - Not in acute exacerbation  - Continue home meds     # History of asthma  - Not in acute exacerbation  - Nebs PRN      Patient's Baseline: Ambulates with walking  DVT ppx: SCD  Code status: Full   disposition: Today or tomorrow   Care plan discussed with patient/family and nurse. Hospital Problems  Date Reviewed: 1/23/2020          Codes Class Noted POA    UTI (urinary tract infection) ICD-10-CM: N39.0  ICD-9-CM: 599.0  5/24/2020 Unknown        Elevated lipase ICD-10-CM: R74.8  ICD-9-CM: 790.5  5/24/2020 Unknown                Review of Systems:   Pertinent positive mentioned in interval hx/HPI. Other systems reviewed and negative     Vital Signs:    Last 24hrs VS reviewed since prior progress note. Most recent are:  Visit Vitals  /76   Pulse 80   Temp 98.7 °F (37.1 °C)   Resp 18   Ht 5' 6\" (1.676 m)   Wt 104.3 kg (230 lb)   SpO2 100%   BMI 37.12 kg/m²         Intake/Output Summary (Last 24 hours) at 5/26/2020 1154  Last data filed at 5/26/2020 0703  Gross per 24 hour   Intake    Output 2250 ml   Net -2250 ml        Physical Examination:             Constitutional:  No acute distress, cooperative, pleasant    ENT:  Oral mucous moist, oropharynx benign. Neck supple,    Resp:  CTA bilaterally. No wheezing/rhonchi/rales. No accessory muscle use   CV:  Regular rhythm, normal rate, no murmurs, gallops, rubs    GI:  Soft, non distended, mild tenderness at the epigastric area and left flank. normoactive bowel sounds, no hepatosplenomegaly     Musculoskeletal:  No edema, warm, 2+ pulses throughout    Neurologic:  Moves all extremities.   AAOx3, CN II-XII reviewed            Data Review:     I personally reviewed labs and imaging     Labs:     Recent Labs     05/25/20  0436 05/24/20  0931   WBC 11.8* 15.9*   HGB 12.4 13.7   HCT 38.4 42.3    210     Recent Labs     05/25/20  0436 05/24/20  0931   * 136   K 3.8 4.0    108   CO2 22 22   BUN 9 18   CREA 0.85 0.94   * 115*   CA 8.6 9.2     Recent Labs     05/25/20  0436 05/24/20  0931   SGOT  --  13*   ALT --  30   AP  --  80   TBILI  --  0.7   TP  --  7.3   ALB  --  3.5   GLOB  --  3.8   LPSE 100 858*     No results for input(s): INR, PTP, APTT, INREXT, INREXT in the last 72 hours. No results for input(s): FE, TIBC, PSAT, FERR in the last 72 hours. No results found for: FOL, RBCF   No results for input(s): PH, PCO2, PO2 in the last 72 hours. No results for input(s): CPK, CKNDX, TROIQ in the last 72 hours.     No lab exists for component: CPKMB  Lab Results   Component Value Date/Time    Cholesterol, total 200 (H) 08/02/2019 09:10 AM    HDL Cholesterol 51 08/02/2019 09:10 AM    LDL, calculated 92 08/02/2019 09:10 AM    Triglyceride 287 (H) 08/02/2019 09:10 AM     Lab Results   Component Value Date/Time    Glucose (POC) 129 (H) 12/31/2013 04:26 PM    Glucose (POC) 196 (H) 12/31/2013 11:27 AM    Glucose (POC) 148 (H) 12/31/2013 07:39 AM    Glucose (POC) 111 (H) 12/30/2013 10:38 PM    Glucose (POC) 121 (H) 12/30/2013 04:50 PM     Lab Results   Component Value Date/Time    Color YELLOW/STRAW 05/24/2020 09:31 AM    Appearance CLOUDY (A) 05/24/2020 09:31 AM    Specific gravity 1.020 05/24/2020 09:31 AM    pH (UA) 5.0 05/24/2020 09:31 AM    Protein TRACE (A) 05/24/2020 09:31 AM    Glucose Negative 05/24/2020 09:31 AM    Ketone Negative 05/24/2020 09:31 AM    Bilirubin Negative 05/24/2020 09:31 AM    Urobilinogen 0.2 05/24/2020 09:31 AM    Nitrites Negative 05/24/2020 09:31 AM    Leukocyte Esterase MODERATE (A) 05/24/2020 09:31 AM    Epithelial cells MODERATE (A) 05/24/2020 09:31 AM    Bacteria 4+ (A) 05/24/2020 09:31 AM    WBC 10-20 05/24/2020 09:31 AM    RBC 0-5 05/24/2020 09:31 AM         Medications Reviewed:     Current Facility-Administered Medications   Medication Dose Route Frequency    tamsulosin (FLOMAX) capsule 0.4 mg  0.4 mg Oral QHS    albuterol-ipratropium (DUO-NEB) 2.5 MG-0.5 MG/3 ML  3 mL Nebulization Q6H PRN    pantoprazole (PROTONIX) tablet 40 mg  40 mg Oral ACB    metoprolol succinate (TOPROL-XL) XL tablet 100 mg  100 mg Oral DAILY    gabapentin (NEURONTIN) capsule 300 mg  300 mg Oral BID    buPROPion SR (WELLBUTRIN SR) tablet 150 mg  150 mg Oral DAILY    cholecalciferol (VITAMIN D3) (1000 Units /25 mcg) tablet 5 Tab  5,000 Units Oral DAILY    sodium chloride (NS) flush 5-40 mL  5-40 mL IntraVENous Q8H    sodium chloride (NS) flush 5-40 mL  5-40 mL IntraVENous PRN    acetaminophen (TYLENOL) tablet 650 mg  650 mg Oral Q4H PRN    morphine injection 2 mg  2 mg IntraVENous Q4H PRN    ondansetron (ZOFRAN) injection 4 mg  4 mg IntraVENous Q4H PRN    naloxone (NARCAN) injection 0.4 mg  0.4 mg IntraVENous PRN    lactated Ringers infusion  100 mL/hr IntraVENous CONTINUOUS    hydrALAZINE (APRESOLINE) 20 mg/mL injection 10 mg  10 mg IntraVENous Q6H PRN    cefTRIAXone (ROCEPHIN) 1 g in 0.9% sodium chloride (MBP/ADV) 50 mL  1 g IntraVENous Q24H     ______________________________________________________________________  EXPECTED LENGTH OF STAY: - - -  ACTUAL LENGTH OF STAY:          2                 Hamida Decker MD   Patient has given Verbal permission to discuss medical care with   persons present in the room and and also with contact as listed on face sheet.

## 2020-05-27 VITALS
TEMPERATURE: 98.5 F | HEIGHT: 66 IN | OXYGEN SATURATION: 95 % | RESPIRATION RATE: 16 BRPM | SYSTOLIC BLOOD PRESSURE: 131 MMHG | HEART RATE: 69 BPM | DIASTOLIC BLOOD PRESSURE: 86 MMHG | BODY MASS INDEX: 36.96 KG/M2 | WEIGHT: 230 LBS

## 2020-05-27 LAB
C DIFF GDH STL QL: NEGATIVE
C DIFF TOX A+B STL QL IA: NEGATIVE
INTERPRETATION: NORMAL

## 2020-05-27 PROCEDURE — 94760 N-INVAS EAR/PLS OXIMETRY 1: CPT

## 2020-05-27 PROCEDURE — 74011250637 HC RX REV CODE- 250/637: Performed by: STUDENT IN AN ORGANIZED HEALTH CARE EDUCATION/TRAINING PROGRAM

## 2020-05-27 RX ORDER — LOPERAMIDE HCL 2 MG
2 TABLET ORAL
Qty: 40 TAB | Refills: 0 | Status: SHIPPED | OUTPATIENT
Start: 2020-05-27 | End: 2020-06-06

## 2020-05-27 RX ORDER — TAMSULOSIN HYDROCHLORIDE 0.4 MG/1
0.4 CAPSULE ORAL
Qty: 10 CAP | Refills: 0 | Status: SHIPPED | OUTPATIENT
Start: 2020-05-27 | End: 2020-06-06

## 2020-05-27 RX ORDER — CIPROFLOXACIN 500 MG/1
500 TABLET ORAL 2 TIMES DAILY
Qty: 10 TAB | Refills: 0 | Status: SHIPPED | OUTPATIENT
Start: 2020-05-27 | End: 2020-06-01

## 2020-05-27 RX ADMIN — GABAPENTIN 300 MG: 300 CAPSULE ORAL at 08:18

## 2020-05-27 RX ADMIN — PANTOPRAZOLE SODIUM 40 MG: 40 TABLET, DELAYED RELEASE ORAL at 07:02

## 2020-05-27 RX ADMIN — MELATONIN 5 TABLET: at 08:18

## 2020-05-27 RX ADMIN — METOPROLOL SUCCINATE 100 MG: 50 TABLET, EXTENDED RELEASE ORAL at 08:21

## 2020-05-27 RX ADMIN — BUPROPION HYDROCHLORIDE 150 MG: 150 TABLET, EXTENDED RELEASE ORAL at 08:18

## 2020-05-27 RX ADMIN — Medication 10 ML: at 14:00

## 2020-05-27 NOTE — DISCHARGE SUMMARY
Discharge Summary       PATIENT ID: Ibeth Pizano  MRN: [id number]   YOB: 1975    DATE OF ADMISSION: 5/24/2020  9:22 AM    DATE OF DISCHARGE: 05/27/20  PRIMARY CARE PROVIDER: Ariella Valentine MD       DISCHARGING PROVIDER: Sunshine Avelar MD    To contact this individual call [phone] and ask the  to page. If unavailable ask to be transferred the Adult Hospitalist Department. CONSULTATIONS: IP CONSULT TO UROLOGY      PROCEDURES/SURGERIES: * No surgery found *    IMAGING  Xr Abd (kub)    Result Date: 5/25/2020  IMPRESSION: 1. Unchanged distal left ureteral stone     Ct Abd Pelv W Cont    Result Date: 5/24/2020  IMPRESSION: Mild left perinephric stranding, left hydronephrosis and left hydroureter to level of the distal left ureter where there is a 4 mm calculus. [address]    Result Date: 5/24/2020  IMPRESSION: 1. Gallbladder sludge. No specific evidence of acute cholecystitis. 2. Diffuse hepatic steatosis. 3. Echogenic right kidney, suggesting intrinsic renal disease. No hydronephrosis.            [address] [facility] COURSE:   # Left nephrolithiasis with hydronephrosis and hydroureter  # UTI  - medical management of stone:on Flomax, IVF,oral fluid intake and pain control   - KUB Unchanged distal left ureteral stone  - appt Urology input   - urine culture grew KLEBSIELLA PNEUMONIAE   - on Ceftriaxone      # New onset diarrhea   - C diff negative   - start imodium      # Elevated lipase  - Patient tolerating regula diet   - no alcohol intake, no gallstones on US, triglyceride moderatetly elevated   - could be related to PPI  - repeat lipase trended down     # Hypertension  - on home metoprolol  - Hydralazine as needed     # History of multiple sclerosis  - Not in acute exacerbation  -  home meds     # History of asthma  - Not in acute exacerbation  - Nebs PRN         DISCHARGE DIAGNOSES / PLAN:    # Left nephrolithiasis with hydronephrosis and hydroureter  - Follow up with urology. Encouraged water intake and oral pain meds   - ct with flomax    # UTI  - complete oral antibiotic     # New onset diarrhea   # Elevated lipase  # Hypertension  # History of multiple sclerosis  # History of asthma    Patient Active Problem List   Diagnosis Code    MS (multiple sclerosis) ([facility] [address] G35    Depression F32.9    Asthma J45.909    Elevated blood pressure [id number]    Vitamin D deficiency E55.9    Depression F32.9    Dysplastic colon polyp K63.5    Dysphagia R13.10    Meralgia paresthetica of right side G57.11    Migraine with aura, without mention of intractable migraine without mention of status migrainosus G43. 109    Dehydration, moderate E86.0    Multiple sclerosis exacerbation (HCC) G35    Optic neuritis, right H46.9    Gestational hypertension O13.9    AMA (advanced maternal age) primigravida 35+ O46.5    Single delivery by  O80    Tubular adenoma of colon D12.6    EDGARD virus antibody positive R76.8    Kidney stones N20.0    Depression with anxiety F41.8    Severe obesity (BMI 35.0-39. 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    UTI (urinary tract infection) N39.0    Elevated lipase R74.8    Obstructive pyelonephritis N11.1               PENDING TEST RESULTS:   At the time of discharge the following test results are still pending: None     FOLLOW UP APPOINTMENTS:    Follow-up Information     Follow up With Specialties Details Why Chance Price MD Pediatrics, Internal Medicine  Keep already scheduled appointment Camden Gamez [address] [facility]      Michelle Sheppard MD Urology Schedule an appointment as soon as possible for a visit Follow up with Urology  [facility]  [address]  [facility] [address] RECOMMENDATIONS:   · It is important that you take the medication exactly as they are prescribed. · Keep your medication in the bottles provided by the pharmacist and keep a list of the medication names, dosages, and times to be taken in your wallet. · Do not take other medications without consulting your doctor. · No drinking alcohol or driving car or operating machinery if you are on narcotic pain medications. Donot take sedating mediations if you are sleepy or confused. · Fall Precautions  · Keep Well Hydrated  · Report to your medical provider if you feel you have  developed allergies to medications  · Follow up with your PCP or Consultant for medication adjustments and refills  · Monitor for signs of fevers,chills,bleeding,chest pain and seek medical attention if you do so. DIET: Regular cardiac diet     ACTIVITY: As tolerated     WOUND CARE: None    EQUIPMENT needed: None       DISCHARGE MEDICATIONS:  Current Discharge Medication List      START taking these medications    Details   ciprofloxacin HCl (CIPRO) 500 mg tablet Take 1 Tab by mouth two (2) times a day for 5 days. Qty: 10 Tab, Refills: 0      tamsulosin (FLOMAX) 0.4 mg capsule Take 1 Cap by mouth nightly for 10 days. Qty: 10 Cap, Refills: 0      loperamide (Imodium A-D) 2 mg tablet Take 1 Tab by mouth four (4) times daily as needed for Diarrhea for up to 10 days. Qty: 40 Tab, Refills: 0         CONTINUE these medications which have NOT CHANGED    Details   sucralfate (CARAFATE) 100 mg/mL suspension Take 1 tsp by mouth four (4) times daily. teriflunomide (Aubagio) 14 mg tablet Take 14 mg by mouth every evening.       metoprolol succinate (TOPROL-XL) 100 mg tablet Take 100 mg by mouth every evening.      escitalopram oxalate (LEXAPRO) 20 mg tablet TAKE ONE TABLET BY MOUTH ONE TIME DAILY  Qty: 30 Tab, Refills: 5    Associated Diagnoses: Depression with anxiety      buPROPion XL (WELLBUTRIN XL) 150 mg tablet TAKE ONE TABLET BY MOUTH EVERY MORNING  Qty: 30 Tab, Refills: 5    Associated Diagnoses: Depression with anxiety      pantoprazole (PROTONIX) 40 mg tablet Take 40 mg by mouth daily. Bifidobacterium Infantis (ALIGN) 4 mg cap Take 1 Cap by mouth nightly. omega 3-dha-epa-fish oil 183.3 mg-75 mg -91.6 mg-306 mg cap Take 4 Caps by mouth daily. Qty: 120 Cap, Refills: 5    Associated Diagnoses: Hypertriglyceridemia      fluticasone (FLONASE) 50 mcg/actuation nasal spray 2 Sprays by Both Nostrils route daily. Qty: 1 Bottle, Refills: 5      fexofenadine (ALLEGRA) 180 mg tablet Take 180 mg by mouth every evening.      gabapentin (NEURONTIN) 300 mg capsule Take 600 mg by mouth two (2) times a day. 1caps twice a day      albuterol (PROVENTIL HFA, VENTOLIN HFA, PROAIR HFA) 90 mcg/actuation inhaler Take 2 Puffs by inhalation every four (4) hours as needed for Wheezing or Shortness of Breath. Qty: 1 Inhaler, Refills: 1    Associated Diagnoses: RAD (reactive airway disease), mild intermittent, uncomplicated      cholecalciferol, vitamin D3, (VITAMIN D3) 2,000 unit tab Take 5,000 Units by mouth daily.              All Micro Results     Procedure Component Value Units Date/Time    C. DIFFICILE AG & TOXIN A/B [[id number]] Collected:  05/26/20 1115    Order Status:  Completed Specimen:  Stool Updated:  05/27/20 1032     [address] ANTIGEN Negative        C. difficile toxin Negative        INTERPRETATION       NEGATIVE FOR TOXIGENIC C. DIFFICILE          CULTURE, BLOOD, PAIRED [[id number]] Collected:  05/26/20 1205    Order Status:  Completed Specimen:  Blood Updated:  05/27/20 0541     Special Requests: NO SPECIAL REQUESTS        Culture result: NO GROWTH AFTER 16 HOURS       CULTURE, URINE [[id number]]  (Abnormal)  (Susceptibility) Collected:  05/24/20 0931    Order Status:  Completed Specimen:  Urine from Clean catch Updated:  05/26/20 1125     Special Requests: NO SPECIAL REQUESTS        [place] Count --        >100,000  COLONIES/mL       Culture result: KLEBSIELLA PNEUMONIAE       CULTURE, URINE [[id number]]     Order Status:  Canceled Specimen:  Urine from Clean catch     URINE CULTURE HOLD SAMPLE [[id number]] Collected:  05/24/20 0931    Order Status:  Completed Specimen:  Urine from Serum Updated:  05/24/20 1034     Urine culture hold       Urine on hold in Microbiology dept for 2 days. If unpreserved urine is submitted, it cannot be used for addtional testing after 24 hours, recollection will be required. Recent Results (from the past 24 hour(s))   CULTURE, BLOOD, PAIRED    Collection Time: 05/26/20 12:05 PM   Result Value Ref Range    Special Requests: NO SPECIAL REQUESTS      Culture result: NO GROWTH AFTER 16 HOURS             NOTIFY YOUR PHYSICIAN FOR ANY OF THE FOLLOWING:   Fever over 101 degrees for 24 hours. Chest pain, shortness of breath, fever, chills, nausea, vomiting, diarrhea, change in mentation, falling, weakness, bleeding. Severe pain or pain not relieved by medications. Or, any other signs or symptoms that you may have questions about. DISPOSITION:   x Home With:   OT  PT  HH  RN       Long term SNF/Inpatient Rehab    Independent/assisted living    Hospice    Other:       PATIENT CONDITION AT DISCHARGE:     Functional status    Poor     Deconditioned     Independent      Cognition     Lucid     Forgetful     Dementia      Catheters/lines (plus indication)    Martini     PICC     PEG    x None      Code status   x  Full code     DNR      PHYSICAL EXAMINATION AT DISCHARGE:  Gen:  No distress, alert  HEENT:  Normal cephalic atraumatic, extra-occular movements are intact. Neck:  Supple, No JVD  Lungs:  Clear bilaterally, no wheeze, no rales, normal effort  Heart:  Regular Rate and Rhythm, normal S1 and S2, no edema  Abdomen:  Soft, non tender, normal bowel sounds, no guarding.   Extremities:  Well perfused, no cyanosis or edema  Neurological:  Awake and alert, CN's are intact, normal strength throughout extremities  Skin:  No rashes or moles  Mental Status:  Normal thought process, does not appear anxious      CHRONIC MEDICAL DIAGNOSES:  Problem List as of 2020 Date Reviewed: 2020          Codes Class Noted - Resolved    Obstructive pyelonephritis ICD-10-CM: N11.1  ICD-9-CM: 590.80  2020 - Present        UTI (urinary tract infection) ICD-10-CM: N39.0  ICD-9-CM: 599.0  2020 - Present        Elevated lipase ICD-10-CM: R74.8  ICD-9-CM: 790.5  2020 - Present        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 9) ICD-10-CM: E66.01  ICD-9-CM: 278.01  2018 - Present        Depression with anxiety ICD-10-CM: F41.8  ICD-9-CM: 300.4  2018 - Present        Kidney stones ICD-10-CM: N20.0  ICD-9-CM: 592.0  Unknown - Present        EDGARD virus antibody positive ICD-10-CM: R76.8  ICD-9-CM: 795.79  Unknown - Present        Tubular adenoma of colon ICD-10-CM: D12.6  ICD-9-CM: 211.3  Unknown - Present        Single delivery by  ICD-10-CM: O82  ICD-9-CM: 669.71  2015 - Present        Gestational hypertension ICD-10-CM: O13.9  ICD-9-CM: 642.30  2/10/2015 - Present        AMA (advanced maternal age) primigravida 35+ ICD-10-CM: O09.519  ICD-9-CM: 659.50  2/10/2015 - Present        [facility] ICD-10-CM: G35  ICD-9-CM: 414  2013 - Present        Optic neuritis, right ICD-10-CM: H46.9  ICD-9-CM: 377.30  2013 - Present        Dehydration, moderate ICD-10-CM: E86.0  ICD-9-CM: 276.51  2013 - Present        Dysphagia ICD-10-CM: R13.10  ICD-9-CM: 787.20  2013 - Present        Meralgia paresthetica of right side ICD-10-CM: G57.11  ICD-9-CM: 355.1  2013 - Present        Migraine with aura, without mention of intractable migraine without mention of status migrainosus ICD-10-CM: G43.109  ICD-9-CM: 346.00  5/31/2013 - Present        Dysplastic colon polyp ICD-10-CM: K63.5  ICD-9-CM: 211.3  Unknown - Present        MS (multiple sclerosis) ([facility] ICD-10-CM: G35  ICD-9-CM: 340  Unknown - Present        Depression ICD-10-CM: F32.9  ICD-9-CM: 311  Unknown - Present        Asthma ICD-10-CM: J45.909  ICD-9-CM: 493.90  Unknown - Present        Elevated blood pressure ICD-10-CM: [id number]  ICD-9-CM: Anjelica Deshawn  7/20/2011 - Present        Vitamin D deficiency ICD-10-CM: E55.9  ICD-9-CM: 268.9  7/20/2011 - Present        Depression ICD-10-CM: F32.9  ICD-9-CM: 558  Unknown - Present        RESOLVED: Decreased fetal movement ICD-10-CM: Z85.7747  ICD-9-CM: 655.70  2/10/2015 - 2/11/2015        RESOLVED: Decreased fetal movement in pregnancy ICD-10-CM: F36.9795  ICD-9-CM: 655.70  2/10/2015 - 2/11/2015                Discussed with patient and family. Explained the importance of following up, Compliance with medications and recommendations on discharge,Side effect profile of medications were explained. Safety precautions at home and while taking pain medications also explained. All questions answered to the satisfaction of the patient/family. Discussed with consultant(s) who are agreeable to the discharge. Verbal and written instructions on discharge given. Explained about Discharge medications and side effect profile. Advised patient/family to followup with their pcp for medication refills and preauthorization of medications, Home health orders. checkups,screenign programs as appropriate for age. Thank you Maria L Mora MD for taking care of your patient, Please call with any questions.       Greater than 35 minutes were spent with the patient on counseling and coordination of care    Signed:   Abraham Yung MD  5/27/2020  11:33 AM

## 2020-05-27 NOTE — DISCHARGE INSTRUCTIONS
Discharge Instructions       PATIENT ID: Ion Anna  MRN: [id number]   YOB: 1975    DATE OF ADMISSION: 5/24/2020  9:22 AM    DATE OF DISCHARGE: 5/27/2020    PRIMARY CARE PROVIDER: Prema Bean MD     ATTENDING PHYSICIAN: Arturo Medina MD  DISCHARGING PROVIDER: Divina Lundberg MD    To contact this individual call [phone] and ask the  to page. If unavailable ask to be transferred the Adult Hospitalist Department. DISCHARGE DIAGNOSES   # Left nephrolithiasis with hydronephrosis and hydroureter  # Urinary tract infection   # Diarrhea     CONSULTATIONS: IP CONSULT TO UROLOGY    PROCEDURES/SURGERIES: * No surgery found *    PENDING TEST RESULTS:   At the time of discharge the following test results are still pending: None     FOLLOW UP APPOINTMENTS:   Follow-up Information     Follow up With Specialties Details Why Gwyn Ellis MD Pediatrics, Internal Medicine  Keep already scheduled appointment Camden Gamez 11 [address] [facility]      Anthony Payan MD Urology Schedule an appointment as soon as possible for a visit Follow up with Urology  [place] [address]             ADDITIONAL CARE RECOMMENDATIONS:   - Please drink plenty of water a day up to 2-3 Liter      DIET: Cardiac diet     ACTIVITY: As tolerated     WOUND CARE: None     EQUIPMENT needed: None       DISCHARGE MEDICATIONS:   See Medication Reconciliation Form    · It is important that you take the medication exactly as they are prescribed. · Keep your medication in the bottles provided by the pharmacist and keep a list of the medication names, dosages, and times to be taken in your wallet. · Do not take other medications without consulting your doctor. NOTIFY YOUR PHYSICIAN FOR ANY OF THE FOLLOWING:   Fever over 101 degrees for 24 hours.    Chest pain, shortness of breath, fever, chills, nausea, vomiting, diarrhea, change in mentation, falling, weakness, bleeding. Severe pain or pain not relieved by medications. Or, any other signs or symptoms that you may have questions about. DISPOSITION:  x  Home With:   OT  PT  HH  RN       SNF/Inpatient Rehab/LTAC    Independent/assisted living    Hospice    Other:     CDMP Checked:   Yes ***     PROBLEM LIST Updated:  Yes ***       Signed:   Greg Darling MD  5/27/2020  11:28 AM    Call [facility] [phone] to schedule visit with Dr. Dixon Check on Monday  Continue to strain urine. Call for fever >100.5, questions or concerns. Kidney Stone: Care Instructions  Your Care Instructions    Kidney stones are formed when salts, minerals, and other substances normally found in the urine clump together. They can be as small as grains of sand or, rarely, as large as golf balls. While the stone is traveling through the ureter, which is the tube that carries urine from the kidney to the bladder, you will probably feel pain. The pain may be mild or very severe. You may also have some blood in your urine. As soon as the stone reaches the bladder, any intense pain should go away. If a stone is too large to pass on its own, you may need a medical procedure to help you pass the stone. The doctor has checked you carefully, but problems can develop later. If you notice any problems or new symptoms, get medical treatment right away. Follow-up care is a key part of your treatment and safety. Be sure to make and go to all appointments, and call your doctor if you are having problems. It's also a good idea to know your test results and keep a list of the medicines you take. How can you care for yourself at home? · Drink plenty of fluids, enough so that your urine is light yellow or clear like water. If you have kidney, heart, or liver disease and have to limit fluids, talk with your doctor before you increase the amount of fluids you drink.   · Take pain medicines exactly as directed. Call your doctor if you think you are having a problem with your medicine. ? If the doctor gave you a prescription medicine for pain, take it as prescribed. ? If you are not taking a prescription pain medicine, ask your doctor if you can take an over-the-counter medicine. Read and follow all instructions on the label. · Your doctor may ask you to strain your urine so that you can collect your kidney stone when it passes. You can use a kitchen strainer or a tea strainer to catch the stone. Store it in a plastic bag until you see your doctor again. Preventing future kidney stones  Some changes in your diet may help prevent kidney stones. Depending on the cause of your stones, your doctor may recommend that you:  · Drink plenty of fluids, enough so that your urine is light yellow or clear like water. If you have kidney, heart, or liver disease and have to limit fluids, talk with your doctor before you increase the amount of fluids you drink. · Limit coffee, tea, and alcohol. Also avoid grapefruit juice. · Do not take more than the recommended daily dose of vitamins C and D.  · Avoid antacids such as Gaviscon, Maalox, Mylanta, or Tums. · Limit the amount of salt (sodium) in your diet. · Eat a balanced diet that is not too high in protein. · Limit foods that are high in a substance called oxalate, which can cause kidney stones. These foods include dark green vegetables, rhubarb, chocolate, wheat bran, nuts, cranberries, and beans. When should you call for help? Call your doctor now or seek immediate medical care if:    · You cannot keep down fluids.     · Your pain gets worse.     · You have a fever or chills.     · You have new or worse pain in your back just below your rib cage (the flank area).     · You have new or more blood in your urine.    Watch closely for changes in your health, and be sure to contact your doctor if:    · You do not get better as expected.    Where can you learn more?  Go to http://temi-james.info/  Enter H405 in the search box to learn more about \"Kidney Stone: Care Instructions. \"  Current as of: August 11, 2019Content Version: 12.4  © 2798-4772 [employer]. Care instructions adapted under license by [employer] (which disclaims liability or warranty for this information). If you have questions about a medical condition or this instruction, always ask your healthcare professional. [address] any warranty or liability for your use of this information. Diarrhea: Care Instructions  Your Care Instructions    Diarrhea is loose, watery stools (bowel movements). The exact cause is often hard to find. Sometimes diarrhea is your body's way of getting rid of what caused an upset stomach. Viruses, food poisoning, and many medicines can cause diarrhea. Some people get diarrhea in response to emotional stress, anxiety, or certain foods. Almost everyone has diarrhea now and then. It usually isn't serious, and your stools will return to normal soon. The important thing to do is replace the fluids you have lost, so you can prevent dehydration. The doctor has checked you carefully, but problems can develop later. If you notice any problems or new symptoms, get medical treatment right away. Follow-up care is a key part of your treatment and safety. Be sure to make and go to all appointments, and call your doctor if you are having problems. It's also a good idea to know your test results and keep a list of the medicines you take. How can you care for yourself at home? · Watch for signs of dehydration, which means your body has lost too much water. Dehydration is a serious condition and should be treated right away. Signs of dehydration are:  ? Increasing thirst and dry eyes and mouth. ? Feeling faint or lightheaded. ? A smaller amount of urine than normal.  · To prevent dehydration, drink plenty of fluids. Choose water and other caffeine-free clear liquids until you feel better. If you have kidney, heart, or liver disease and have to limit fluids, talk with your doctor before you increase the amount of fluids you drink. · Begin eating small amounts of mild foods the next day, if you feel like it. ? Try yogurt that has live cultures of Lactobacillus. (Check the label.)  ? Avoid spicy foods, fruits, alcohol, and caffeine until 48 hours after all symptoms are gone. ? Avoid chewing gum that contains sorbitol. ? Avoid dairy products (except for yogurt with Lactobacillus) while you have diarrhea and for 3 days after symptoms are gone. · The doctor may recommend that you take over-the-counter medicine, such as loperamide (Imodium), if you still have diarrhea after 6 hours. Read and follow all instructions on the label. Do not use this medicine if you have bloody diarrhea, a high fever, or other signs of serious illness. Call your doctor if you think you are having a problem with your medicine. When should you call for help? Call 911 anytime you think you may need emergency care. For example, call if:    · You passed out (lost consciousness).     · Your stools are maroon or very bloody.    Call your doctor now or seek immediate medical care if:    · You are dizzy or lightheaded, or you feel like you may faint.     · Your stools are black and look like tar, or they have streaks of blood.     · You have new or worse belly pain.     · You have symptoms of dehydration, such as:  ? Dry eyes and a dry mouth. ? Passing only a little dark urine. ? Feeling thirstier than usual.     · You have a new or higher fever.    Watch closely for changes in your health, and be sure to contact your doctor if:    · Your diarrhea is getting worse.     · You see pus in the diarrhea.     · You are not getting better after 2 days (48 hours). Where can you learn more?   Go to http://temi-james.info/  Enter [id number] in the search box to learn more about \"Diarrhea: Care Instructions. \"  Current as of: June 26, 2019Content Version: 12.4  © 3906-4119 [employer]. Care instructions adapted under license by [employer] (which disclaims liability or warranty for this information). If you have questions about a medical condition or this instruction, always ask your healthcare professional. [address] any warranty or liability for your use of this information.

## 2020-05-28 ENCOUNTER — PATIENT OUTREACH (OUTPATIENT)
Dept: CASE MANAGEMENT | Age: 45
End: 2020-05-28

## 2020-05-28 NOTE — PROGRESS NOTES
20 - Care transitions nurse -  Call to and reached pt - who is doing well - mild lower left abdominal pain - she has MS so her pain transmission and location can be skewed at times - she has gotten her meds - and is tx kidney stone - Flomax and UTI - Cipro -   She has follow up with Dr. Gael Funez on  - and pcp on . Patient contacted regarding recent discharge and COVID-19 risk. Discussed COVID-19 related testing which was not done at this time. Test results were not done. Patient informed of results, if available? N/a     Care Transition Nurse/ Ambulatory Care Manager contacted the patient by telephone to perform post discharge assessment. Verified name and  with patient as identifiers. Patient has following risk factors of: M.S./ UTI/ kidney stone. CTN/ACM reviewed discharge instructions, medical action plan and red flags related to discharge diagnosis. Reviewed and educated them on any new and changed medications related to discharge diagnosis. Advised obtaining a 90-day supply of all daily and as-needed medications. Education provided regarding infection prevention, and signs and symptoms of COVID-19 and when to seek medical attention with patient who verbalized understanding. Discussed exposure protocols and quarantine from [address] you at higher risk for severe illness  and given an opportunity for questions and concerns. The patient agrees to contact the COVID-19 hotline [phone] or PCP office for questions related to their healthcare. CTN/ACM provided contact information for future reference. From CDC: Are you at higher risk for severe illness?  Wash your hands often.  Avoid close contact (6 feet, which is about two arm lengths) with people who are sick.  Put distance between yourself and other people if COVID-19 is spreading in your community.  Clean and disinfect frequently touched surfaces.    Avoid all cruise travel and non-essential air travel.  Call your healthcare professional if you have concerns about COVID-19 and your underlying condition or if you are sick. For more information on steps you can take to protect yourself, see CDC's How to Protect Yourself      Patient/family/caregiver given information for GetWell Loop and agrees to enroll yes  Patient's preferred e-mail:  [email or web address]  Patient's preferred phone number: [phone] -  Kacey Frenchr  Based on Loop alert triggers, patient will be contacted by nurse care manager for worsening symptoms. Pt will be further monitored by COVID Loop Team based on severity of symptoms and risk factors. Padma Medina, RN, [facility], CCM  Care transitions nurse [phone]  [address] Coordination Team  _________________________________________________________________________________________  Urology consultation - Dr. Marva Bowen 5/25/20  Assessment/Plan:  4 mm left distal ureteral stone: Treatment options reviewed. I recommend trial of medical expulsive therapy. I will order KUB to have as baseline and start Flomax. Strain urine. Agree with empiric antibiotics pending urine culture. No suggestion of sepsis. Discussed possibility of ureteral stent should things change. Follow-up with me in 1 week. Call if any questions/changes.      Signed By: Shawn Cannon MD  - May 25, 2020    Patient lives at home with , Kacey Frenchr [phone] and 11year old daughter, Cheryl Araujo. Patient is usually independent --still driving,. Has good family support from parents and sibling. Patient receives SSDI and has [employer].

## 2020-05-31 LAB
BACTERIA SPEC CULT: NORMAL
SERVICE CMNT-IMP: NORMAL

## 2020-06-16 ENCOUNTER — VIRTUAL VISIT (OUTPATIENT)
Dept: INTERNAL MEDICINE CLINIC | Age: 45
End: 2020-06-16

## 2020-06-16 DIAGNOSIS — I10 ESSENTIAL HYPERTENSION: Primary | ICD-10-CM

## 2020-06-16 DIAGNOSIS — K80.50 BILIARY COLIC: ICD-10-CM

## 2020-06-16 DIAGNOSIS — J30.2 SEASONAL ALLERGIC RHINITIS, UNSPECIFIED TRIGGER: ICD-10-CM

## 2020-06-16 DIAGNOSIS — N95.1 PERIMENOPAUSAL SYMPTOMS: ICD-10-CM

## 2020-06-16 DIAGNOSIS — E78.5 DYSLIPIDEMIA: ICD-10-CM

## 2020-06-16 DIAGNOSIS — N23 RENAL COLIC: ICD-10-CM

## 2020-06-16 DIAGNOSIS — G35 MS (MULTIPLE SCLEROSIS) (HCC): ICD-10-CM

## 2020-06-16 DIAGNOSIS — F41.8 DEPRESSION WITH ANXIETY: ICD-10-CM

## 2020-06-16 DIAGNOSIS — E55.9 VITAMIN D DEFICIENCY: ICD-10-CM

## 2020-06-16 DIAGNOSIS — N92.6 IRREGULAR MENSES: ICD-10-CM

## 2020-06-16 DIAGNOSIS — R73.9 HYPERGLYCEMIA: ICD-10-CM

## 2020-06-16 DIAGNOSIS — R44.2 HYPNOPOMPIC HALLUCINATION: ICD-10-CM

## 2020-06-16 RX ORDER — HYDROMORPHONE HYDROCHLORIDE 2 MG/1
TABLET ORAL
Status: ON HOLD | COMMUNITY
Start: 2020-06-11 | End: 2020-06-18 | Stop reason: ALTCHOICE

## 2020-06-16 RX ORDER — BUPROPION HYDROCHLORIDE 300 MG/1
300 TABLET ORAL
Qty: 30 TAB | Refills: 5 | Status: SHIPPED | OUTPATIENT
Start: 2020-06-16 | End: 2021-01-08

## 2020-06-16 NOTE — PROGRESS NOTES
[phone]  Will have surgery to have gallbladder taken out -Dr. Lennox Sailor 8    Chief Complaint   Patient presents with   [facility] Follow Up     kidney stone left kidney, surgery 6-11-20 stent placed, removed 6-15-20. Dr. Tiffanie Rizzo w/ [facility]. in increased pain since yesterday. is taking diladid. pain level 8     1. Have you been to the ER, urgent care clinic since your last visit? Hospitalized since your last visit? Yes [facility], 5-28-20, UTI, kidney stone   Pancreatitis, sludge in gallbladder      2. Have you seen or consulted any other health care providers outside of the [address] [place] [address] since your last visit? Include any pap smears or colon screening. [id number]. Dr. Vini Aguillon  -      There are no preventive care reminders to display for this patient. 3 most recent PHQ Screens 6/16/2020   Little interest or pleasure in doing things Not at all   Feeling down, depressed, irritable, or hopeless More than half the days   Total Score PHQ 2 2       Recent Travel Screening and Travel History documentation     Travel Screening       Question Response     In the last month, have you been in contact with someone who was confirmed or suspected to have Coronavirus / COVID-19? No / Unsure     Do you have any of the following symptoms? None of these     Have you traveled internationally in the last month?  No      Travel History   Travel since 05/16/20     No documented travel since 05/16/20

## 2020-06-16 NOTE — PROGRESS NOTES
Adriana Trevino is a 54 y.o. female who was seen by synchronous (real-time) audio-video technology on 6/16/2020. Consent: Adriana Trevino, who was seen by synchronous (real-time) audio-video technology, and/or her healthcare decision maker, is aware that this patient-initiated, Telehealth encounter on 6/16/2020 is a billable service, with coverage as determined by her insurance carrier. She is aware that she may receive a bill and has provided verbal consent to proceed: Yes. I was in the office while conducting this encounter. Subjective:   Adriana Trevino was seen for Hospital Follow Up (kidney stone left kidney, surgery 6-11-20 stent placed, removed 6-15-20. Dr. Susanne agrawal/ [facility]. in increased pain since yesterday. is taking diladid. pain level 8)      Notes:  Recent kidney stone and stent placement  Stent removed yest  A lot of pain overnight  Rx'd Dilaudid  A little better this AM  Heating pad helped. Pt due for cholecystectomy - plan to see Dr. Mary Arce   Right dominant colicky abd pain    BP stable when not in pain    But, some perimenopausal irregular menses  Pt to see GYN next week. Pt inquires re: addressing hormonal, emotional changes      Nursing screenings reviewed by provider at visit. Past medical, Social, and Family history reviewed  Medications reviewed and updated. Allergies   Allergen Reactions    Latex Rash    Adhesive Rash       Prior to Admission medications    Medication Sig Start Date End Date Taking? Authorizing Provider   HYDROmorphone (DILAUDID) 2 mg tablet  6/11/20  Yes Provider, Historical   sucralfate (CARAFATE) 100 mg/mL suspension Take 1 tsp by mouth four (4) times daily. Yes Other, MD Neelima   teriflunomide (Aubagio) 14 mg tablet Take 14 mg by mouth every evening. Yes Provider, Historical   metoprolol succinate (TOPROL-XL) 100 mg tablet Take 100 mg by mouth every evening.    Yes Provider, Historical escitalopram oxalate (LEXAPRO) 20 mg tablet TAKE ONE TABLET BY MOUTH ONE TIME DAILY 4/27/20  Yes Nate Wheeler MD   buPROPion XL (WELLBUTRIN XL) 150 mg tablet TAKE ONE TABLET BY MOUTH EVERY MORNING 2/2/20  Yes Tammy Adkins MD   pantoprazole (PROTONIX) 40 mg tablet Take 40 mg by mouth daily. Yes Provider, Historical   Bifidobacterium Infantis (ALIGN) 4 mg cap Take 1 Cap by mouth nightly. Yes Provider, Historical   omega 3-dha-epa-fish oil 183.3 mg-75 mg -91.6 mg-306 mg cap Take 4 Caps by mouth daily. Patient taking differently: Take 2 Caps by mouth two (2) times a day. 10/25/18  Yes Nate Wheeler MD   fluticasone (FLONASE) 50 mcg/actuation nasal spray 2 Sprays by Both Nostrils route daily. Patient taking differently: 2 Sprays by Both Nostrils route as needed. 10/22/18  Yes Nate Wheeler MD   fexofenadine (ALLEGRA) 180 mg tablet Take 180 mg by mouth every evening. Yes Provider, Historical   gabapentin (NEURONTIN) 300 mg capsule Take 600 mg by mouth two (2) times a day. 1caps twice a day 10/20/17  Yes Provider, Historical   albuterol (PROVENTIL HFA, VENTOLIN HFA, PROAIR HFA) 90 mcg/actuation inhaler Take 2 Puffs by inhalation every four (4) hours as needed for Wheezing or Shortness of Breath. 9/27/17  Yes Flor Coelho MD   cholecalciferol, vitamin D3, (VITAMIN D3) 2,000 unit tab Take 5,000 Units by mouth daily. Yes Provider, Historical         ROS     A complete ROS was performed and negative except as noted in HPI       PHYSICAL EXAMINATION:    Vital Signs: There were no vitals taken for this visit.         Constitutional: [x] Appears well-developed and well-nourished [x] No apparent distress      Mental status: [x] Alert and awake  [x] Oriented [x] Able to follow commands       Eyes:   EOM    [x]  Normal      Sclera  [x]  Normal              Discharge [x]  None visible       HENT: [x] Normocephalic, atraumatic    [x] Mouth/Throat: Mucous membranes are moist    External Ears [x] Normal      Neck: [x] No visualized mass     Pulmonary/Chest: [x] Respiratory effort normal   [x] No visualized signs of difficulty breathing or respiratory distress    Musculoskeletal:  [x] Normal range of motion of neck    Neurological:        [x] No Facial Asymmetry (Cranial nerve 7 motor function) (limited exam due to video visit)          [x] No gaze palsy     Skin:        [x] No significant exanthematous lesions or discoloration noted on facial skin             Psychiatric:       [x] Normal Affect       Other pertinent observable physical exam findings:  None. We discussed the expected course, resolution and complications of the diagnosis(es) in detail. Medication risks, benefits, costs, interactions, and alternatives were discussed as indicated. I advised her to contact the office if her condition worsens, changes or fails to improve as anticipated. She expressed understanding with the diagnosis(es) and plan. Alissa Reyna is a 54 y.o. female who was evaluated by a video visit encounter for concerns as above. Patient identification was verified prior to start of the visit. A caregiver was present when appropriate. Due to this being a TeleHealth encounter (During [place] [address] public health emergency), evaluation of the following organ systems was limited: Vitals/Constitutional/EENT/Resp/CV/GI//MS/Neuro/Skin/Heme-Lymph-Imm. Pursuant to the emergency declaration under the [facility], [facility] waiver authority and the [employer] General Act, this Virtual  Visit was conducted, with patient's (and/or legal guardian's) consent, to reduce the patient's risk of exposure to COVID-19 and provide necessary medical care. Services were provided through a video synchronous discussion virtually to substitute for in-person clinic visit.          Assessment & Plan:   Diagnoses and all orders for this visit:      ICD-10-CM ICD-9-CM    1. Essential hypertension I10 401.9 CBC WITH AUTOMATED DIFF   2. Depression with anxiety F41.8 300.4 buPROPion XL (WELLBUTRIN XL) 300 mg XL tablet   3. MS (multiple sclerosis) ([facility] [address] G35 340    4. Vitamin D deficiency E55.9 268.9 VITAMIN D, 25 HYDROXY   5. Seasonal allergic rhinitis, unspecified trigger J30.2 477.9    6. Hyperglycemia R73.9 790.29 HEMOGLOBIN A1C WITH EAG   7. Dyslipidemia E78.5 272.4 LIPID PANEL      METABOLIC PANEL, COMPREHENSIVE   8. Irregular menses N92.6 626.4 soy aohgfvw-eqfmwas-Skpxst anthony 56- mg cap   9. Perimenopausal symptoms N95.1 627.2 soy busqfpn-pjjwrze-Ufcfcb anthony 56- mg cap   10. Renal colic W76 629.6    11. Biliary colic K43.06 474.38    12. Hypnopompic hallucination R44.2 780.1      Follow-up and Dispositions    · Return in about 6 months (around 12/16/2020), or if symptoms worsen or fail to improve, for blood pressure, cholesterol - IO.       results and schedule of future studies reviewed with patient  reviewed diet, exercise and weight   cardiovascular risk and specific lipid/LDL goals reviewed  reviewed medications and side effects in detail    continue lexapro   Increase wellbutrin XL to 300mg   Soy supplement  Continue other current medications   Follow with GYN, GI, surgeon, urology    AVS:  [x]  Sent to patient as Nextancehart message after visit. []  Mailed to patient after visit. []  Not sent to patient after visit.

## 2020-06-17 ENCOUNTER — APPOINTMENT (OUTPATIENT)
Dept: CT IMAGING | Age: 45
DRG: 872 | End: 2020-06-17
Attending: EMERGENCY MEDICINE
Payer: COMMERCIAL

## 2020-06-17 ENCOUNTER — TELEPHONE (OUTPATIENT)
Dept: SURGERY | Age: 45
End: 2020-06-17

## 2020-06-17 ENCOUNTER — HOSPITAL ENCOUNTER (INPATIENT)
Age: 45
LOS: 6 days | Discharge: HOME | DRG: 872 | End: 2020-06-23
Attending: EMERGENCY MEDICINE | Admitting: INTERNAL MEDICINE
Payer: COMMERCIAL

## 2020-06-17 ENCOUNTER — OFFICE VISIT (OUTPATIENT)
Dept: SURGERY | Age: 45
End: 2020-06-17

## 2020-06-17 ENCOUNTER — APPOINTMENT (OUTPATIENT)
Dept: GENERAL RADIOLOGY | Age: 45
DRG: 872 | End: 2020-06-17
Attending: EMERGENCY MEDICINE
Payer: COMMERCIAL

## 2020-06-17 VITALS
SYSTOLIC BLOOD PRESSURE: 127 MMHG | RESPIRATION RATE: 18 BRPM | HEIGHT: 66 IN | OXYGEN SATURATION: 95 % | TEMPERATURE: 103.1 F | HEART RATE: 89 BPM | DIASTOLIC BLOOD PRESSURE: 89 MMHG | WEIGHT: 237 LBS | BODY MASS INDEX: 38.09 KG/M2

## 2020-06-17 DIAGNOSIS — R50.9 FEVER, UNSPECIFIED FEVER CAUSE: ICD-10-CM

## 2020-06-17 DIAGNOSIS — N20.0 KIDNEY STONES: ICD-10-CM

## 2020-06-17 DIAGNOSIS — N12 PYELONEPHRITIS: Primary | ICD-10-CM

## 2020-06-17 DIAGNOSIS — K80.20 SYMPTOMATIC CHOLELITHIASIS: Primary | ICD-10-CM

## 2020-06-17 DIAGNOSIS — G35 MS (MULTIPLE SCLEROSIS) (HCC): ICD-10-CM

## 2020-06-17 DIAGNOSIS — G35 OPTIC NEURITIS DUE TO MULTIPLE SCLEROSIS (HCC): ICD-10-CM

## 2020-06-17 DIAGNOSIS — H46.9 OPTIC NEURITIS DUE TO MULTIPLE SCLEROSIS (HCC): ICD-10-CM

## 2020-06-17 DIAGNOSIS — N17.9 AKI (ACUTE KIDNEY INJURY) (HCC): ICD-10-CM

## 2020-06-17 PROBLEM — A41.9 SEPSIS (HCC): Status: ACTIVE | Noted: 2020-06-17

## 2020-06-17 LAB
ALBUMIN SERPL-MCNC: 2.9 G/DL (ref 3.5–5)
ALBUMIN/GLOB SERPL: 0.7 {RATIO} (ref 1.1–2.2)
ALP SERPL-CCNC: 77 U/L (ref 45–117)
ALT SERPL-CCNC: 26 U/L (ref 12–78)
ANION GAP SERPL CALC-SCNC: 8 MMOL/L (ref 5–15)
AST SERPL-CCNC: 15 U/L (ref 15–37)
BASOPHILS # BLD: 0.1 K/UL (ref 0–0.1)
BASOPHILS NFR BLD: 0 % (ref 0–1)
BILIRUB SERPL-MCNC: 1.1 MG/DL (ref 0.2–1)
BUN SERPL-MCNC: 18 MG/DL (ref 6–20)
BUN/CREAT SERPL: 13 (ref 12–20)
CALCIUM SERPL-MCNC: 9.1 MG/DL (ref 8.5–10.1)
CHLORIDE SERPL-SCNC: 98 MMOL/L (ref 97–108)
CO2 SERPL-SCNC: 24 MMOL/L (ref 21–32)
COMMENT, HOLDF: NORMAL
CREAT SERPL-MCNC: 1.34 MG/DL (ref 0.55–1.02)
DIFFERENTIAL METHOD BLD: ABNORMAL
EOSINOPHIL # BLD: 0 K/UL (ref 0–0.4)
EOSINOPHIL NFR BLD: 0 % (ref 0–7)
ERYTHROCYTE [DISTWIDTH] IN BLOOD BY AUTOMATED COUNT: 13.8 % (ref 11.5–14.5)
GLOBULIN SER CALC-MCNC: 4.2 G/DL (ref 2–4)
GLUCOSE SERPL-MCNC: 144 MG/DL (ref 65–100)
HCT VFR BLD AUTO: 38.5 % (ref 35–47)
HGB BLD-MCNC: 12.5 G/DL (ref 11.5–16)
IMM GRANULOCYTES # BLD AUTO: 0.1 K/UL (ref 0–0.04)
IMM GRANULOCYTES NFR BLD AUTO: 1 % (ref 0–0.5)
LACTATE SERPL-SCNC: 1.6 MMOL/L (ref 0.4–2)
LYMPHOCYTES # BLD: 1 K/UL (ref 0.8–3.5)
LYMPHOCYTES NFR BLD: 7 % (ref 12–49)
MCH RBC QN AUTO: 28 PG (ref 26–34)
MCHC RBC AUTO-ENTMCNC: 32.5 G/DL (ref 30–36.5)
MCV RBC AUTO: 86.3 FL (ref 80–99)
MONOCYTES # BLD: 1.7 K/UL (ref 0–1)
MONOCYTES NFR BLD: 12 % (ref 5–13)
NEUTS SEG # BLD: 11.1 K/UL (ref 1.8–8)
NEUTS SEG NFR BLD: 80 % (ref 32–75)
NRBC # BLD: 0 K/UL (ref 0–0.01)
NRBC BLD-RTO: 0 PER 100 WBC
PLATELET # BLD AUTO: 197 K/UL (ref 150–400)
PMV BLD AUTO: 10.8 FL (ref 8.9–12.9)
POTASSIUM SERPL-SCNC: 3.6 MMOL/L (ref 3.5–5.1)
PROT SERPL-MCNC: 7.1 G/DL (ref 6.4–8.2)
RBC # BLD AUTO: 4.46 M/UL (ref 3.8–5.2)
SAMPLES BEING HELD,HOLD: NORMAL
SODIUM SERPL-SCNC: 130 MMOL/L (ref 136–145)
WBC # BLD AUTO: 14 K/UL (ref 3.6–11)

## 2020-06-17 PROCEDURE — 85025 COMPLETE CBC W/AUTO DIFF WBC: CPT

## 2020-06-17 PROCEDURE — 99285 EMERGENCY DEPT VISIT HI MDM: CPT

## 2020-06-17 PROCEDURE — 96374 THER/PROPH/DIAG INJ IV PUSH: CPT

## 2020-06-17 PROCEDURE — 74176 CT ABD & PELVIS W/O CONTRAST: CPT

## 2020-06-17 PROCEDURE — 65270000029 HC RM PRIVATE

## 2020-06-17 PROCEDURE — 96361 HYDRATE IV INFUSION ADD-ON: CPT

## 2020-06-17 PROCEDURE — 74011250636 HC RX REV CODE- 250/636: Performed by: EMERGENCY MEDICINE

## 2020-06-17 PROCEDURE — 80053 COMPREHEN METABOLIC PANEL: CPT

## 2020-06-17 PROCEDURE — 36415 COLL VENOUS BLD VENIPUNCTURE: CPT

## 2020-06-17 PROCEDURE — 83605 ASSAY OF LACTIC ACID: CPT

## 2020-06-17 PROCEDURE — 71045 X-RAY EXAM CHEST 1 VIEW: CPT

## 2020-06-17 RX ORDER — SODIUM CHLORIDE 0.9 % (FLUSH) 0.9 %
5-40 SYRINGE (ML) INJECTION EVERY 8 HOURS
Status: DISCONTINUED | OUTPATIENT
Start: 2020-06-17 | End: 2020-06-23 | Stop reason: HOSPADM

## 2020-06-17 RX ORDER — KETOROLAC TROMETHAMINE 30 MG/ML
30 INJECTION, SOLUTION INTRAMUSCULAR; INTRAVENOUS
Status: COMPLETED | OUTPATIENT
Start: 2020-06-17 | End: 2020-06-17

## 2020-06-17 RX ORDER — SODIUM CHLORIDE 0.9 % (FLUSH) 0.9 %
5-40 SYRINGE (ML) INJECTION AS NEEDED
Status: DISCONTINUED | OUTPATIENT
Start: 2020-06-17 | End: 2020-06-23 | Stop reason: HOSPADM

## 2020-06-17 RX ORDER — SODIUM CHLORIDE 9 MG/ML
75 INJECTION, SOLUTION INTRAVENOUS ONCE
Status: COMPLETED | OUTPATIENT
Start: 2020-06-17 | End: 2020-06-18

## 2020-06-17 RX ADMIN — SODIUM CHLORIDE 1000 ML: 900 INJECTION, SOLUTION INTRAVENOUS at 22:52

## 2020-06-17 RX ADMIN — KETOROLAC TROMETHAMINE 30 MG: 30 INJECTION, SOLUTION INTRAMUSCULAR at 22:52

## 2020-06-17 NOTE — TELEPHONE ENCOUNTER
Confirmed surgery information / details with patient; patient verbalizes understanding. Letter sent. HPI


Chief Complaint:  Skin Problem


Time Seen by Provider:  02:01


Travel History


International Travel<30 days:  No


Contact w/Intl Traveler<30days:  No


Traveled to known affect area:  No





History of Present Illness


HPI


Patient comes in stating that she started to develop some hives throughout 

bilateral extremities.  When asked the patient stated that she does have a cat 

and  has just given the cat a Flea  treatment.  Patient states that the lesions 

are very itchy, she denies any fever cough nausea vomiting diarrhea or 

shortness of breath.  Also denies any changes in voice








No known drug allergy


Patient denies any significant medical or surgical history





PFSH


Past Medical History


Medical History:  Denies Significant Hx


Tetanus Vaccination:  Unknown


Pregnant?:  Not Pregnant


LMP:  18


:  0


Para:  0





Past Surgical History


Other Surgery:  Yes (ORAL SURGERY)





Social History


Alcohol Use:  Yes (RARELY)


Tobacco Use:  No


Substance Use:  No





Allergies-Medications


(Allergen,Severity, Reaction):  


Coded Allergies:  


     No Known Allergies (Verified  Allergy, Unknown, 18)


Reported Meds & Prescriptions





Reported Meds & Active Scripts


Active


No Active Prescriptions or Reported Medications    








Review of Systems


General / Constitutional:  No: Fever


Eyes:  No: Visual changes


HENT:  No: Headaches


Cardiovascular:  No: Chest Pain or Discomfort


Respiratory:  No: Shortness of Breath


Gastrointestinal:  No: Abdominal Pain


Genitourinary:  No: Dysuria


Musculoskeletal:  No: Pain


Skin:  Positive Rash, Positive Itching


Neurologic:  No: Weakness


Psychiatric:  No: Depression


Endocrine:  No: Polydipsia


Hematologic/Lymphatic:  No: Easy Bruising





Physical Exam


Narrative


GENERAL: 


SKIN: Warm and dry.  Hives noted on her right ventral aspect of her distal wrist

, she also has other insect like punctures on the left ventral aspect with 

surrounding erythema as well these are all consistent with hives secondary to 

insect bites


HEAD: Atraumatic. Normocephalic. 


EYES: Pupils equal and round. No scleral icterus. No injection or drainage. 


ENT: No nasal bleeding or discharge.  Mucous membranes pink and moist.


NECK: Trachea midline. No JVD. 


CARDIOVASCULAR: Regular rate and rhythm.  


RESPIRATORY: No accessory muscle use. Clear to auscultation. Breath sounds 

equal bilaterally. 


GASTROINTESTINAL: Abdomen soft, non-tender, nondistended. Hepatic and splenic 

margins not palpable. 


MUSCULOSKELETAL: Extremities without clubbing, cyanosis, or edema. No obvious 

deformities. 


NEUROLOGICAL: Awake and alert. No obvious cranial nerve deficits.  Motor 

grossly within normal limits. Five out of 5 muscle strength in the arms and 

legs.  Normal speech.


PSYCHIATRIC: Appropriate mood and affect; insight and judgment normal.





Data


Data


Last Documented VS





Vital Signs








  Date Time  Temp Pulse Resp B/P (MAP) Pulse Ox O2 Delivery O2 Flow Rate FiO2


 


18 01:11 98.2 77 16 142/54 (83) 100   








Orders





 Orders


Sodium Chloride 0.9% Flush (Ns Flush) (18 02:15)


Epinephrine (1:1000) Inj (Adrenalin (1:1 (18 02:15)


Dexamethasone Inj (Decadron Inj) (18 02:15)


Diphenhydramine (Benadryl) (18 02:15)


Famotidine (Pepcid) (18 02:15)








[facility]


Medical Decision Making


Medical Screen Exam Complete:  Yes


Emergency Medical Condition:  Yes


Medical Record Reviewed:  Yes


Differential Diagnosis


Cellulitis versus abscess versus hives versus angioedema


Narrative Course


Clinically the patient has no evidence of any stridor, wheezing, uvular edema, 

or any edema of lips tongue or uvula.


Clinically no evidence of angioedema


Generalized hives patient will be treated with antihistamines steroid and 

EpiPen 1


Patient will be observed and then discharged





Diagnosis





 Primary Impression:  


 Hives


Patient Instructions:  General Allergic Reaction (ED), General Instructions


Scripts


Famotidine (Pepcid) 40 Mg Tab


40 MG PO DAILY, #5 TAB 0 Refills


   Prov: Wilfredo Hogue MD         18 


Loratadine (Claritin) 10 Mg Cap


10 MG PO DAILY for Allergy Management for 5 Days, #5 CAP 0 Refills


   Prov: Wilfredo Hogue MD         18 


Methylprednisolone Dosepak (Medrol Dosepak) 4 Mg Dspk


4 MG PO AS DIRECTED, #1 DSPK 0 Refills


   Per Pharmacist direction


   Prov: Wilfredo Hogue MD         18


Disposition:  01 DISCHARGE HOME


Condition:  Stable











Wilfredo Hogue MD 2018 02:14

## 2020-06-17 NOTE — PROGRESS NOTES
1. Have you been to the ER, urgent care clinic since your last visit? Hospitalized since your last visit? No    2. Have you seen or consulted any other health care providers outside of the [address] [place] [address] since your last visit? Include any pap smears or colon screening.  No

## 2020-06-17 NOTE — LETTER
6/22/20 Patient: Adriana Trevino YOB: 1975 Date of Visit: 6/17/2020 Rachael Lesches, MD 
[address] [facility] [address] [place] [id number] VIA In Basket Dear Rachael Lesches, MD, Thank you for referring Ms. Veronica Morales to [facility] [address] [facility] for evaluation. My notes for this consultation are attached. If you have questions, please do not hesitate to call me. I look forward to following your patient along with you. Sincerely, Carolin Arroyo MD

## 2020-06-17 NOTE — LETTER
6/17/2020 4:17 PM 
 
[facility] [id number] Surgery is scheduled as follows: 
 
Surgery date: 06/25/2020      Arrival time: 9:30 AM     Start time: 11:30 AM 
 
Location: [facility] [address] Main Entrance [address], [facility], [address] DO NOT EAT ANYTHING PAST MIDNIGHT THE NIGHT BEFORE SURGERY - YOU MAY HAVE CLEAR LIQUIDS UP TO ONE HOUR PRIOR TO YOUR ARRIVAL TIME. 
______________________________________________________________________ You will be contacted by the [address] [place] [address] office to go over your Health information and medications. You may also contact them to schedule your PAT testing at [phone]. If it is determined that you will need Labwork, etc. prior to your surgical date, the Pre-Admission Testing Nurse will schedule that with you. Location: [address] [place] [address] [address]    *Located in the [facility], [address] *YOU WILL BE CONTACTED TO HAVE MANDATORY COVID 19 TESTING COMPLETED PRIOR TO YOUR SURGERY - THIS WILL BE COMPLETED 4 DAYS PRIOR TO YOUR SURGERY. PLEASE NOTE THAT IF YOU ARE UNABLE TO COMPLETE THIS TESTING PRIOR TO SURGERY, YOUR SURGERY WILL BE CANCELLED. 1st Post Operative appointment: *THIS WILL BE A VIRTUAL APPT* What to expect for your virtual appt: The  will call you around the appointment time to check you in and then you will be transferred to the nurse to get updated medications, weight, etc. The provider will then send a link via text message and the link will prompt you to do a virtual.  
 
 
07/08/2020 at 9:00 AM with Dr. Liv Carrasco, [address] Office Phone: [phone] For questions regarding this information please contact Dyan Paschal at [phone]. Pre-Operative Instructions **DO NOT EAT ANYTHING AFTER MIDNIGHT THE NIGHT BEFORE YOUR SURGERY** 
**YOU MAY HAVE CLEAR LIQUIDS UP TO ONE HOUR PRIOR TO YOUR ARRIVAL TIME -  This includes water, black coffee, Gatorade, Apple juice, and tea without cream or milk. YOU MAY DRINK UP TO 12 OUNCES OVER A PERIOD OF 4 HOURS. Please report to Patient Registration/Admitting at the time given to you by your Surgeons office  Located at the [address] [place] [address]. 
If  your physical condition changes (fever, cold, flu), please contact your Surgeon as soon as possible. DO BRING your Insurance card and a photo ID, such as a Drivers License. Valuables are to be left at home. DO NOT wear make-up, lotion, powder, perfume/cologne/aftershave, or nail polish (unless clear). You may wear deodorant. DO NOT wear metal objects such as jewelry or hair pins. Remove all piercings/body jewelry. WEAR COMFORTABLE CLOTHING THAT WILL BE EASY TO CHANGE INTO AFTER SURGERY. If you are staying overnight, please pack a bag and leave it in your vehicle until after surgery. We recommend that you pack your toiletry items, a robe/pajamas, slippers or any other items that you need for your comfort. Have a family member deliver your bag to your room after your surgery  the Hospital does not have a secure location to store these personal items. Please bring a hard-sided case for glasses, contact lenses, or hearing aids. Denture cups are provided by the [facility] AFTER YOUR SURGERY. YOU SHOULD NOT OPERATE A VEHICLE FOR 24 HOURS AFTER RECEIVING SEDATION OR ANESTHESIA. Please arrange for a family member or friend to drive you home after your surgery: You will not be allowed to take a cab or drive yourself home.  If you are discharged the day of surgery, it is recommended that you have someone stay with you until the next morning. For questions regarding the above information, please contact the [place] [address] office at [phone]. Sincerely, Aurelia Stone MD

## 2020-06-18 PROBLEM — E87.1 HYPONATREMIA: Status: ACTIVE | Noted: 2020-06-18

## 2020-06-18 LAB
ANION GAP SERPL CALC-SCNC: 10 MMOL/L (ref 5–15)
APPEARANCE UR: CLEAR
BACTERIA URNS QL MICRO: ABNORMAL /HPF
BASOPHILS # BLD: 0.1 K/UL (ref 0–0.1)
BASOPHILS NFR BLD: 0 % (ref 0–1)
BILIRUB UR QL: NEGATIVE
BUN SERPL-MCNC: 23 MG/DL (ref 6–20)
BUN/CREAT SERPL: 16 (ref 12–20)
CALCIUM SERPL-MCNC: 8.1 MG/DL (ref 8.5–10.1)
CHLORIDE SERPL-SCNC: 101 MMOL/L (ref 97–108)
CO2 SERPL-SCNC: 24 MMOL/L (ref 21–32)
COLOR UR: ABNORMAL
CREAT SERPL-MCNC: 1.46 MG/DL (ref 0.55–1.02)
DIFFERENTIAL METHOD BLD: ABNORMAL
EOSINOPHIL # BLD: 0 K/UL (ref 0–0.4)
EOSINOPHIL NFR BLD: 0 % (ref 0–7)
EPITH CASTS URNS QL MICRO: ABNORMAL /LPF
ERYTHROCYTE [DISTWIDTH] IN BLOOD BY AUTOMATED COUNT: 14 % (ref 11.5–14.5)
GLUCOSE SERPL-MCNC: 113 MG/DL (ref 65–100)
GLUCOSE UR STRIP.AUTO-MCNC: NEGATIVE MG/DL
HCT VFR BLD AUTO: 34 % (ref 35–47)
HGB BLD-MCNC: 10.9 G/DL (ref 11.5–16)
HGB UR QL STRIP: ABNORMAL
IMM GRANULOCYTES # BLD AUTO: 0.2 K/UL (ref 0–0.04)
IMM GRANULOCYTES NFR BLD AUTO: 1 % (ref 0–0.5)
KETONES UR QL STRIP.AUTO: ABNORMAL MG/DL
LEUKOCYTE ESTERASE UR QL STRIP.AUTO: ABNORMAL
LYMPHOCYTES # BLD: 0.8 K/UL (ref 0.8–3.5)
LYMPHOCYTES NFR BLD: 5 % (ref 12–49)
MCH RBC QN AUTO: 28.1 PG (ref 26–34)
MCHC RBC AUTO-ENTMCNC: 32.1 G/DL (ref 30–36.5)
MCV RBC AUTO: 87.6 FL (ref 80–99)
MONOCYTES # BLD: 1.3 K/UL (ref 0–1)
MONOCYTES NFR BLD: 8 % (ref 5–13)
NEUTS SEG # BLD: 15 K/UL (ref 1.8–8)
NEUTS SEG NFR BLD: 86 % (ref 32–75)
NITRITE UR QL STRIP.AUTO: NEGATIVE
NRBC # BLD: 0 K/UL (ref 0–0.01)
NRBC BLD-RTO: 0 PER 100 WBC
PH UR STRIP: 5.5 [PH] (ref 5–8)
PLATELET # BLD AUTO: 171 K/UL (ref 150–400)
PMV BLD AUTO: 10.9 FL (ref 8.9–12.9)
POTASSIUM SERPL-SCNC: 3.9 MMOL/L (ref 3.5–5.1)
PROT UR STRIP-MCNC: 30 MG/DL
RBC # BLD AUTO: 3.88 M/UL (ref 3.8–5.2)
RBC #/AREA URNS HPF: ABNORMAL /HPF (ref 0–5)
SODIUM SERPL-SCNC: 135 MMOL/L (ref 136–145)
SP GR UR REFRACTOMETRY: 1.02 (ref 1–1.03)
UROBILINOGEN UR QL STRIP.AUTO: 0.2 EU/DL (ref 0.2–1)
WBC # BLD AUTO: 17.3 K/UL (ref 3.6–11)
WBC URNS QL MICRO: ABNORMAL /HPF (ref 0–4)

## 2020-06-18 PROCEDURE — 74011250637 HC RX REV CODE- 250/637: Performed by: NURSE PRACTITIONER

## 2020-06-18 PROCEDURE — 94760 N-INVAS EAR/PLS OXIMETRY 1: CPT

## 2020-06-18 PROCEDURE — 74011250636 HC RX REV CODE- 250/636: Performed by: INTERNAL MEDICINE

## 2020-06-18 PROCEDURE — 87077 CULTURE AEROBIC IDENTIFY: CPT

## 2020-06-18 PROCEDURE — 74011250636 HC RX REV CODE- 250/636: Performed by: EMERGENCY MEDICINE

## 2020-06-18 PROCEDURE — 36415 COLL VENOUS BLD VENIPUNCTURE: CPT

## 2020-06-18 PROCEDURE — 65270000029 HC RM PRIVATE

## 2020-06-18 PROCEDURE — 87040 BLOOD CULTURE FOR BACTERIA: CPT

## 2020-06-18 PROCEDURE — 74011000258 HC RX REV CODE- 258: Performed by: INTERNAL MEDICINE

## 2020-06-18 PROCEDURE — 74011250637 HC RX REV CODE- 250/637: Performed by: INTERNAL MEDICINE

## 2020-06-18 PROCEDURE — 74011250636 HC RX REV CODE- 250/636: Performed by: NURSE PRACTITIONER

## 2020-06-18 PROCEDURE — 87086 URINE CULTURE/COLONY COUNT: CPT

## 2020-06-18 PROCEDURE — 85025 COMPLETE CBC W/AUTO DIFF WBC: CPT

## 2020-06-18 PROCEDURE — 87186 SC STD MICRODIL/AGAR DIL: CPT

## 2020-06-18 PROCEDURE — 81001 URINALYSIS AUTO W/SCOPE: CPT

## 2020-06-18 PROCEDURE — 80048 BASIC METABOLIC PNL TOTAL CA: CPT

## 2020-06-18 PROCEDURE — 74011000258 HC RX REV CODE- 258: Performed by: EMERGENCY MEDICINE

## 2020-06-18 RX ORDER — TAMSULOSIN HYDROCHLORIDE 0.4 MG/1
0.4 CAPSULE ORAL
Status: DISCONTINUED | OUTPATIENT
Start: 2020-06-18 | End: 2020-06-23 | Stop reason: HOSPADM

## 2020-06-18 RX ORDER — METOPROLOL SUCCINATE 50 MG/1
100 TABLET, EXTENDED RELEASE ORAL EVERY EVENING
Status: DISCONTINUED | OUTPATIENT
Start: 2020-06-18 | End: 2020-06-23 | Stop reason: HOSPADM

## 2020-06-18 RX ORDER — PANTOPRAZOLE SODIUM 40 MG/1
40 TABLET, DELAYED RELEASE ORAL DAILY
Status: DISCONTINUED | OUTPATIENT
Start: 2020-06-18 | End: 2020-06-23 | Stop reason: HOSPADM

## 2020-06-18 RX ORDER — VANCOMYCIN 2 GRAM/500 ML IN 0.9 % SODIUM CHLORIDE INTRAVENOUS
2000 ONCE
Status: COMPLETED | OUTPATIENT
Start: 2020-06-18 | End: 2020-06-18

## 2020-06-18 RX ORDER — SUCRALFATE 1 G/1
0.5 TABLET ORAL
Status: DISCONTINUED | OUTPATIENT
Start: 2020-06-18 | End: 2020-06-23 | Stop reason: HOSPADM

## 2020-06-18 RX ORDER — ESCITALOPRAM OXALATE 10 MG/1
20 TABLET ORAL DAILY
Status: DISCONTINUED | OUTPATIENT
Start: 2020-06-18 | End: 2020-06-23 | Stop reason: HOSPADM

## 2020-06-18 RX ORDER — OXYCODONE AND ACETAMINOPHEN 5; 325 MG/1; MG/1
1 TABLET ORAL
Status: DISCONTINUED | OUTPATIENT
Start: 2020-06-18 | End: 2020-06-23 | Stop reason: HOSPADM

## 2020-06-18 RX ORDER — PANTOPRAZOLE SODIUM 40 MG/1
40 TABLET, DELAYED RELEASE ORAL
Status: DISCONTINUED | OUTPATIENT
Start: 2020-06-18 | End: 2020-06-18

## 2020-06-18 RX ORDER — ACETAMINOPHEN 325 MG/1
650 TABLET ORAL
Status: DISCONTINUED | OUTPATIENT
Start: 2020-06-18 | End: 2020-06-19

## 2020-06-18 RX ORDER — BUPROPION HYDROCHLORIDE 150 MG/1
150 TABLET, EXTENDED RELEASE ORAL 2 TIMES DAILY
Status: DISCONTINUED | OUTPATIENT
Start: 2020-06-18 | End: 2020-06-23 | Stop reason: HOSPADM

## 2020-06-18 RX ORDER — SODIUM CHLORIDE, SODIUM LACTATE, POTASSIUM CHLORIDE, CALCIUM CHLORIDE 600; 310; 30; 20 MG/100ML; MG/100ML; MG/100ML; MG/100ML
75 INJECTION, SOLUTION INTRAVENOUS CONTINUOUS
Status: DISCONTINUED | OUTPATIENT
Start: 2020-06-18 | End: 2020-06-19

## 2020-06-18 RX ORDER — BUTALBITAL, ACETAMINOPHEN AND CAFFEINE 50; 325; 40 MG/1; MG/1; MG/1
2 TABLET ORAL ONCE
Status: COMPLETED | OUTPATIENT
Start: 2020-06-18 | End: 2020-06-18

## 2020-06-18 RX ORDER — GABAPENTIN 300 MG/1
600 CAPSULE ORAL 2 TIMES DAILY
Status: DISCONTINUED | OUTPATIENT
Start: 2020-06-18 | End: 2020-06-23 | Stop reason: HOSPADM

## 2020-06-18 RX ADMIN — BUPROPION HYDROCHLORIDE 150 MG: 150 TABLET, EXTENDED RELEASE ORAL at 21:11

## 2020-06-18 RX ADMIN — TAMSULOSIN HYDROCHLORIDE 0.4 MG: 0.4 CAPSULE ORAL at 21:11

## 2020-06-18 RX ADMIN — ACETAMINOPHEN 650 MG: 325 TABLET, FILM COATED ORAL at 21:12

## 2020-06-18 RX ADMIN — PANTOPRAZOLE SODIUM 40 MG: 40 TABLET, DELAYED RELEASE ORAL at 10:22

## 2020-06-18 RX ADMIN — CEFTRIAXONE SODIUM 1 G: 1 INJECTION, POWDER, FOR SOLUTION INTRAMUSCULAR; INTRAVENOUS at 23:34

## 2020-06-18 RX ADMIN — ACETAMINOPHEN 650 MG: 325 TABLET, FILM COATED ORAL at 03:04

## 2020-06-18 RX ADMIN — OXYCODONE HYDROCHLORIDE AND ACETAMINOPHEN 1 TABLET: 5; 325 TABLET ORAL at 13:26

## 2020-06-18 RX ADMIN — SUCRALFATE 0.5 G: 1 TABLET ORAL at 15:32

## 2020-06-18 RX ADMIN — SUCRALFATE 0.5 G: 1 TABLET ORAL at 21:11

## 2020-06-18 RX ADMIN — Medication 10 ML: at 21:13

## 2020-06-18 RX ADMIN — SODIUM CHLORIDE 75 ML/HR: 900 INJECTION, SOLUTION INTRAVENOUS at 01:23

## 2020-06-18 RX ADMIN — BUTALBITAL, ACETAMINOPHEN, AND CAFFEINE 2 TABLET: 50; 325; 40 TABLET ORAL at 08:49

## 2020-06-18 RX ADMIN — BUPROPION HYDROCHLORIDE 150 MG: 150 TABLET, EXTENDED RELEASE ORAL at 10:22

## 2020-06-18 RX ADMIN — ACETAMINOPHEN 650 MG: 325 TABLET, FILM COATED ORAL at 10:22

## 2020-06-18 RX ADMIN — METOPROLOL SUCCINATE 100 MG: 50 TABLET, EXTENDED RELEASE ORAL at 17:19

## 2020-06-18 RX ADMIN — ESCITALOPRAM OXALATE 20 MG: 10 TABLET ORAL at 10:22

## 2020-06-18 RX ADMIN — VANCOMYCIN HYDROCHLORIDE 2000 MG: 10 INJECTION, POWDER, LYOPHILIZED, FOR SOLUTION INTRAVENOUS at 17:19

## 2020-06-18 RX ADMIN — GABAPENTIN 600 MG: 300 CAPSULE ORAL at 21:11

## 2020-06-18 RX ADMIN — GABAPENTIN 600 MG: 300 CAPSULE ORAL at 10:21

## 2020-06-18 RX ADMIN — OXYCODONE HYDROCHLORIDE AND ACETAMINOPHEN 1 TABLET: 5; 325 TABLET ORAL at 23:39

## 2020-06-18 RX ADMIN — SUCRALFATE 0.5 G: 1 TABLET ORAL at 10:24

## 2020-06-18 RX ADMIN — SODIUM CHLORIDE, SODIUM LACTATE, POTASSIUM CHLORIDE, AND CALCIUM CHLORIDE 75 ML/HR: 600; 310; 30; 20 INJECTION, SOLUTION INTRAVENOUS at 10:24

## 2020-06-18 RX ADMIN — ACETAMINOPHEN 650 MG: 325 TABLET, FILM COATED ORAL at 15:32

## 2020-06-18 RX ADMIN — CEFTRIAXONE SODIUM 1 G: 1 INJECTION, POWDER, FOR SOLUTION INTRAMUSCULAR; INTRAVENOUS at 00:16

## 2020-06-18 NOTE — ROUTINE PROCESS
TRANSFER - OUT REPORT: 
 
Verbal report given to Tiarra (name) on J[address]  being transferred to  (unit) for routine progression of care Report consisted of patients Situation, Background, Assessment and  
Recommendations(SBAR). Information from the following report(s) SBAR, ED Summary and Recent Results was reviewed with the receiving nurse. Lines:  
Peripheral IV 06/17/20 Right; Lower Arm (Active) Site Assessment Clean, dry, & intact 6/17/2020  9:49 PM  
Phlebitis Assessment 0 6/17/2020  9:49 PM  
Infiltration Assessment 0 6/17/2020  9:49 PM  
Dressing Status Clean, dry, & intact 6/17/2020  9:49 PM  
  
 
Opportunity for questions and clarification was provided. Patient transported with: 
 [employer]

## 2020-06-18 NOTE — CONSULTS
Requesting Provider: Dar Al MD - Reason for Consultation: \"Left perinephric stranding, left hydronephrosis and left  hydroureter\"  Pre-existing [facility] Patient:   No                Patient: Tanesha Prieto MRN: [id number]  SSN: [id number]    YOB: 1975  Age: 54 y.o. Sex: female     Location: [facility]       Code Status: Full Code   PCP: Keyanna Machado MD  - [phone]   Emergency Contact:  Primary Emergency Contact: Tammy Lakhani, Home Phone: [phone]   Race/Islam/Language: [facility] / Lg Hoyt / Isaiah Price   Payor: Payor: Noemi Ojeda / Plan: Ming Germain [id number] PPO / Product Type: PPO /    Prior Admission Data: 5/27/20 [facility] 5E1 SURGICAL UNIT Darrell LOPEZ   Hospitalized:  Hospital Day: 2 - Admitted 6/17/2020  9:35 PM     CONSULTANTS  IP CONSULT TO [address] [place] [address]    ICD-10-CM ICD-9-CM   1. Pyelonephritis N12 590.80   2. Fever, unspecified fever cause R50.9 780.60   3. NORBERTO (acute kidney injury) ([facility] [address] N17.9 584.9         Assessment/Plan:       1. Pyelonephritis, UTI  2. left hydronephrosis and left hydroureter  3. S/p L CRULS on 6/11, stent pull on 6/15  4. Fever  5. Leukocytosis    CT: Left perinephric stranding, left hydronephrosis and left hydroureter. No obstructing ureteral calculus visualized. -Follow blood and urine cx, continue IV abx  -Treat clinical s/s  -US in a few days to assess for hydro     CC: Dizziness and Fever   HPI: She is a 54 y.o. female w/ hx of kidney/ureteral stone, s/p left lithotripsy on 6/11, stent pull on 6/15 that presented to the ER w/ cc of fever, left flank that began 4 days ago. Pain. CT showing Left perinephric stranding, left hydronephrosis and left hydroureter. No obstructing ureteral calculus. Visualized. Current temp 100.1, Tmax 102. WBC 17.3. Cr 1.46. UA suspicious for UTI, urine sent for cx. Blood cx also pending. Started on rocephin. Lactic acid wnl.   Pt states she is still having left flank pain. No voiding issues. Problem: Left perinephric stranding, left hydronephrosis and left hydroureter; Location: left kidney;  Severity: severe; Timin days, Context: as above in HPI; Better/Worse: abx, Associated s/s:left flank pain, fever     Temp (24hrs), Av.7 °F (38.2 °C), Min:98.5 °F (36.9 °C), Max:103.1 °F (39.5 °C)       Creatinine   Date/Time Value Ref Range Status   2020 09:47 AM 1.46 (H) 0.55 - 1.02 MG/DL Final   2020 09:45 PM 1.34 (H) 0.55 - 1.02 MG/DL Final   2020 04:36 AM 0.85 0.55 - 1.02 MG/DL Final   2020 09:31 AM 0.94 0.55 - 1.02 MG/DL Final   2019 09:10 AM 0.73 0.57 - 1.00 mg/dL Final     Current Antimicrobial Therapy (168h ago, onward)    Ordered     Start Stop    20 0150  cefTRIAXone (ROCEPHIN) 1 g in 0.9% sodium chloride (MBP/ADV) 50 mL  1 g,   IntraVENous,   EVERY 24 HOURS      20 0000 20 9869        Key Anti-Platelet Anticoagulant Meds     The patient is on no antiplatelet meds or anticoagulants.         Diet: DIET NPO Except Meds -       Labs     Lab Results   Component Value Date/Time    Lactic acid 1.6 2020 09:45 PM    WBC 17.3 (H) 2020 09:47 AM    HCT 34.0 (L) 2020 09:47 AM    PLATELET 273  09:47 AM    Sodium 135 (L) 2020 09:47 AM    Potassium 3.9 2020 09:47 AM    Chloride 101 2020 09:47 AM    CO2 24 2020 09:47 AM    BUN 23 (H) 2020 09:47 AM    Creatinine 1.46 (H) 2020 09:47 AM    Glucose 113 (H) 2020 09:47 AM    Calcium 8.1 (L) 2020 09:47 AM    Magnesium 2.2 2018 02:22 PM    INR 1.0 2013 03:44 PM     UA:   Lab Results   Component Value Date/Time    Color YELLOW/STRAW 2020 12:22 AM    Appearance CLEAR 2020 12:22 AM    Specific gravity 1.020 2020 12:22 AM    Specific gravity 1.020 2020 09:31 AM    pH (UA) 5.5 2020 12:22 AM    Protein 30 (A) 2020 12:22 AM    Glucose Negative 2020 12:22 AM    Ketone TRACE (A) 06/18/2020 12:22 AM    Bilirubin Negative 06/18/2020 12:22 AM    Urobilinogen 0.2 06/18/2020 12:22 AM    Nitrites Negative 06/18/2020 12:22 AM    Leukocyte Esterase SMALL (A) 06/18/2020 12:22 AM    Epithelial cells FEW 06/18/2020 12:22 AM    Bacteria 1+ (A) 06/18/2020 12:22 AM    WBC 5-10 06/18/2020 12:22 AM    RBC 0-5 06/18/2020 12:22 AM     Imaging     Results for orders placed during the hospital encounter of 06/17/20   CT ABD PELV WO CONT    Narrative INDICATION: Left lower quadrant pain, recent ureteral stent removed. Exam: Noncontrast CT of the abdomen and pelvis is performed with 5 mm  collimation. Sagittal and coronal reformatted images were also performed. CT dose reduction was achieved through the use of a standardized protocol  tailored for this examination and automatic exposure control for dose  modulation. Adaptive statistical iterative reconstruction (ASIR) was utilized. Direct comparison is made to prior CT dated August 2016. FINDINGS:    The visualized lung bases are clear. Abdomen:     Liver: The liver is normal on noncontrast images. Spleen: The spleen is normal on noncontrast images. Adrenals: The adrenals are normal on noncontrast images. Pancreas: The pancreas is normal on noncontrast images. Gallbladder: The gallbladder is normal on noncontrast images. Kidneys: There is left perinephric stranding, left hydronephrosis and left  hydroureter. No ureteral calculus is seen. There are several nonobstructing left  renal calculi, each measuring approximately 1 mm in size. There is no right  hydronephrosis. Bowel: No thickened or dilated loop of large or small bowel seen. Appendix: The appendix is normal.    Pelvis: Urinary bladder is partially filled and grossly normal.    Miscellaneous: There is no free intraperitoneal gas or fluid. There is no focal  fluid collection to suggest abscess.  There is a 4.1 cm x 3.4 cm left ovarian  cyst, measuring 3.4 cm x 4.4 cm in prior CT dated August 2016. Impression IMPRESSION: Left perinephric stranding, left hydronephrosis and left  hydroureter. No obstructing ureteral calculus visualized. US Results (most recent):  Results from [place] encounter on 05/24/20   US ABD LTD    Narrative INDICATION: Right upper quadrant abdominal pain, elevated lipase. Exam: Right upper quadrant ultrasound was performed. FINDINGS:     Gallbladder: There is sludge within the gallbladder. There is no pericholecystic  fluid, gallbladder wall thickening or gallbladder distention. The common bile  duct is within normal limits measuring 4 mm in diameter. Liver: The liver is diffusely echogenic. The portal vein is patent and  demonstrates appropriate directional hepatopedal flow. Main portal vein diameter  is 8 mm. Portal vein velocity is 28.9 cm/s. Right kidney: The right kidney measures 12.1 cm in sagittal length. There is no  hydronephrosis. Right kidney is echogenic. Pancreas: Visualized portions of the pancreas are normal.       Impression IMPRESSION:   1. Gallbladder sludge. No specific evidence of acute cholecystitis. 2. Diffuse hepatic steatosis. 3. Echogenic right kidney, suggesting intrinsic renal disease. No  hydronephrosis.            Cultures     All Micro Results     Procedure Component Value Units Date/Time    CULTURE, URINE [[id number]] Collected:  06/18/20 0853    Order Status:  Completed Specimen:  Urine from Clean catch Updated:  06/18/20 1051    CULTURE, BLOOD, PAIRED [[id number]] Collected:  06/18/20 0947    Order Status:  Completed Specimen:  Blood Updated:  06/18/20 1039    URINE CULTURE HOLD SAMPLE [[id number]]     Order Status:  Canceled Specimen:  Urine     CULTURE, BLOOD, PAIRED [[id number]]     Order Status:  Canceled Specimen:  Blood            Past History: (Complete 2+/3 areas)     Allergies   Allergen Reactions    Latex Rash    Adhesive Rash    Motrin [Ibuprofen] Nausea Only Current Facility-Administered Medications   Medication Dose Route Frequency    oxyCODONE-acetaminophen (PERCOCET) 5-325 mg per tablet 1 Tab  1 Tab Oral Q4H PRN    acetaminophen (TYLENOL) tablet 650 mg  650 mg Oral Q6H PRN    [START ON 6/19/2020] cefTRIAXone (ROCEPHIN) 1 g in 0.9% sodium chloride (MBP/ADV) 50 mL  1 g IntraVENous Q24H    buPROPion SR (WELLBUTRIN SR) tablet 150 mg  150 mg Oral BID    escitalopram oxalate (LEXAPRO) tablet 20 mg  20 mg Oral DAILY    gabapentin (NEURONTIN) capsule 600 mg  600 mg Oral BID    metoprolol succinate (TOPROL-XL) XL tablet 100 mg  100 mg Oral QPM    teriflunomide (AUBAGIO) tablet 14 mg (Patient Supplied)  14 mg Oral QPM    sucralfate (CARAFATE) tablet 0.5 g  0.5 g Oral AC&HS    lactated Ringers infusion  75 mL/hr IntraVENous CONTINUOUS    pantoprazole (PROTONIX) tablet 40 mg  40 mg Oral DAILY    sodium chloride (NS) flush 5-40 mL  5-40 mL IntraVENous Q8H    sodium chloride (NS) flush 5-40 mL  5-40 mL IntraVENous PRN    Prior to Admission medications    Medication Sig Start Date End Date Taking? Authorizing Provider   buPROPion XL (WELLBUTRIN XL) 300 mg XL tablet Take 1 Tab by mouth every morning. 6/16/20  Yes Franki Joel MD   teriflunomide (Aubagio) 14 mg tablet Take 14 mg by mouth every evening. Yes Provider, Historical   metoprolol succinate (TOPROL-XL) 100 mg tablet Take 100 mg by mouth every evening. Yes Provider, Historical   escitalopram oxalate (LEXAPRO) 20 mg tablet TAKE ONE TABLET BY MOUTH ONE TIME DAILY 4/27/20  Yes Franki Joel MD   pantoprazole (PROTONIX) 40 mg tablet Take 40 mg by mouth daily. Yes Provider, Historical   Bifidobacterium Infantis (ALIGN) 4 mg cap Take 1 Cap by mouth nightly. Yes Provider, Historical   fexofenadine (ALLEGRA) 180 mg tablet Take 180 mg by mouth every evening. Yes Provider, Historical   gabapentin (NEURONTIN) 300 mg capsule Take 600 mg by mouth two (2) times a day.  1caps twice a day 10/20/17  Yes Provider, Historical   soy gszsdvm-clkmrcf-Trwaai anthony 56- mg cap Take 1 Cap by mouth daily. 6/16/20   Brandee Roldan MD   sucralfate (CARAFATE) 100 mg/mL suspension Take 1 tsp by mouth four (4) times daily. Neelima Vasquez MD   omega 4-npr-mnx-fish oil 183.3 mg-75 mg -91.6 mg-306 mg cap Take 4 Caps by mouth daily. Patient taking differently: Take 2 Caps by mouth two (2) times a day. 10/25/18   Brandee Roldan MD   fluticasone (FLONASE) 50 mcg/actuation nasal spray 2 Sprays by Both Nostrils route daily. Patient taking differently: 2 Sprays by Both Nostrils route as needed. 10/22/18   Brandee Roldan MD   albuterol (PROVENTIL HFA, VENTOLIN HFA, PROAIR HFA) 90 mcg/actuation inhaler Take 2 Puffs by inhalation every four (4) hours as needed for Wheezing or Shortness of Breath. 9/27/17   Meg Mendez MD   cholecalciferol, vitamin D3, (VITAMIN D3) 2,000 unit tab Take 5,000 Units by mouth daily. Provider, Historical        PMHx:  has a past medical history of Ankle fracture, Asthma, Autoimmune disease ([address] Celiac disease, Chronic pain, Congenital sucrose isomaltose malabsorption, Depression, Diverticulosis of sigmoid colon, Dysplastic colon polyp, Essential hypertension, GERD (gastroesophageal reflux disease), Influenza B (03/07/2017), EDGARD virus antibody positive, Kidney stones, Miscarriage, MS (multiple sclerosis) ([address] Obstructive pyelonephritis (5/26/2020), Ovarian cyst, PUD (peptic ulcer disease), Routine gynecological examination, Sleep apnea, Tubular adenoma of colon (04/18/2016), Uterine fibroid, and Vitamin D deficiency (7/20/2011).  She also has no past medical history of Abnormal Pap smear, Anemia, Chlamydia, Complication of anesthesia, Genital herpes, unspecified, Gonorrhea, Heart abnormality, Herpes simplex without mention of complication, Human immunodeficiency virus (HIV) disease ([address] OTHER MEDICAL, Infertility, female, Pituitary disorder ([address] Polycystic disease, ovaries, Postpartum depression, Rhesus isoimmunization unspecified as to episode of care in pregnancy, Sickle-cell disease, unspecified, Syphilis, Trauma, Unspecified breast disorder, or Unspecified diseases of blood and blood-forming organs. PSurgHx:  has a past surgical history that includes hx gyn; hx  section; hx orthopaedic; hx ankle fracture tx; pr biopsy of breast, incisional; hx endoscopy; hx colonoscopy; and colonoscopy (N/A, 2019). PSocHx:  reports that she quit smoking about 19 years ago. She quit after 8.00 years of use. She has quit using smokeless tobacco. She reports current alcohol use. She reports that she does not use drugs.    ROS:  (Complete - 10 systems) - DENIES: Weightloss (Constitutional), Dry mouth (ENMT), Chest pain (CV), SOB (Respiratory), Constipation (GI), Weakness (MS), Pallor (Skin), TIA Sx (Neuro), Confusion (Psych), Easy bruising (Heme)    Physical Exam: (Comprehesive - 8+ 1995 Systems)     (1) Constitutional:  FIO2:   on SpO2: O2 Sat (%): 96 %  O2 Device: Room air    Patient Vitals for the past 24 hrs:   BP Temp Pulse Resp SpO2   20 0910 99/60 100.1 °F (37.8 °C) 96 18 96 %   20 0515  99.1 °F (37.3 °C)      20 0344  100.4 °F (38 °C)      20 0302 134/58 (!) 102 °F (38.9 °C) 100 18 97 %   20 0057 97/64 98.5 °F (36.9 °C) 79 16 96 %   20 2300 138/66    92 %   20 2245 134/69    92 %   20 2230 135/74    93 %   20 2215 122/62    92 %   20 2129 154/79 (!) 101.7 °F (38.7 °C) (!) 108 18 95 %          (2) ENMT:   moist mucous membranes, normal sinuses   (3) Respiratory:  breathing easily, no distress   (4) GI:  no abdominal masses, tenderness   (5) :   No funk, voiding independently, left flank pain   (6) Lymphatic:  no adenopathy, neck supple   (7) Muscloskeletal:  no gross deformity, normal ROM   (8) Skin:  no rash, warm & dry   (9) Neuro:  no focal deficits, normal speech      Signed By: Randall Chowdhury NP - June 18, 2020

## 2020-06-18 NOTE — ED PROVIDER NOTES
50-year-old female presents from home via private vehicle with a chief complaint of fever. Symptoms came on suddenly around 130 this afternoon when she presented to general surgery's office for evaluation of her gallbladder. Routine temperature check showed she had a temperature of 103. She was sent home and took Tylenol but continued to feel poorly so she came to the ER. Patient was recently discharged from [facility] after being treated for pancreatitis and the left kidney stone. She had a ureteral stent placed at that time and was discharged on antibiotics which she has since completed. 2 days ago she had the ureteral stent removed and has had left lower quadrant pain since then. She is also had nausea and vomiting that started today. She is had a mild cough but denies any shortness of breath. No diarrhea. Past medical history significant for asthma, multiple sclerosis, kidney stones, chronic pain, sleep apnea.            Past Medical History:   Diagnosis Date    Ankle fracture     Asthma     Autoimmune disease (HCC)     MS    Celiac disease     Chronic pain     MS    Congenital sucrose isomaltose malabsorption     Depression     Diverticulosis of sigmoid colon     Dysplastic colon polyp     Dr. Keller [place] - last colonoscopy 11/7/12 - single polyp    Essential hypertension     Gestasional    GERD (gastroesophageal reflux disease)     Influenza B 03/07/2017    EDGARD virus antibody positive     Kidney stones     Miscarriage     11/13    MS (multiple sclerosis) ([facility] [address]     Dr. Jose Stewart    Obstructive pyelonephritis 5/26/2020    Ovarian cyst     PUD (peptic ulcer disease)     Routine gynecological examination     Dr. Ho Stevenson Sleep apnea     pt states she no longer has the problem since wt loss - intentional wt loss    Tubular adenoma of colon 04/18/2016    Uterine fibroid     Vitamin D deficiency 7/20/2011       Past Surgical History:   Procedure Laterality Date    COLONOSCOPY N/A 2019    COLONOSCOPY/EGD ([facility]EX ALLERGY) performed by Juventino Balderas MD at [address]      double compound fx of right lower leg and dislocated heel - has a plate and 13 screws    HX  SECTION          HX COLONOSCOPY      HX ENDOSCOPY      HX GYN      fibroid tumor ovarian cyst     HX ORTHOPAEDIC      1992 Left shoulder surgery    GA BIOPSY OF BREAST, INCISIONAL           Family History:   Problem Relation Age of Onset   [place] Ezio Cancer Father         appendix/cancerous colon polyps    Heart Disease Father         cabg age early 66's   [place] Ezio Cancer Maternal Uncle         prostate/melanoma    Cancer Maternal Grandmother         cancerous colon polyps    Cancer Maternal Grandfather         prostate    Heart Disease Paternal Grandmother     Cancer Paternal Grandmother         cancerous colon polyps    No Known Problems Daughter     Colon Polyps Mother         precancerous    Atrial Fibrillation Mother     Heart Disease Paternal Aunt        Social History     Socioeconomic History    Marital status:      Spouse name: Not on file    Number of children: Not on file    Years of education: Not on file    Highest education level: Not on file   Occupational History    Not on file   Social Needs    Financial resource strain: Not on file    Food insecurity     Worry: Not on file     Inability: Not on file    Transportation needs     Medical: Not on file     Non-medical: Not on file   Tobacco Use    Smoking status: Former Smoker     Years: 8.00     Last attempt to quit: 2000     Years since quittin.9    Smokeless tobacco: Former User   Substance and Sexual Activity    Alcohol use: Yes     Comment: rare    Drug use: No    Sexual activity: Never   Lifestyle    Physical activity     Days per week: Not on file     Minutes per session: Not on file    Stress: Not on file   Relationships    Social connections     Talks on phone: Not on file     Gets together: Not on file     Attends Zoroastrian service: Not on file     Active member of club or organization: Not on file     Attends meetings of clubs or organizations: Not on file     Relationship status: Not on file    Intimate partner violence     Fear of current or ex partner: Not on file     Emotionally abused: Not on file     Physically abused: Not on file     Forced sexual activity: Not on file   Other Topics Concern    Not on file   Social History Narrative    Not on file         ALLERGIES: Latex; Adhesive; and Motrin [ibuprofen]    Review of Systems   Constitutional: Positive for fever. HENT: Negative for facial swelling. Eyes: Negative for visual disturbance. Respiratory: Negative for chest tightness. Cardiovascular: Negative for chest pain. Gastrointestinal: Positive for abdominal pain. Genitourinary: Negative for difficulty urinating and dysuria. Musculoskeletal: Negative for arthralgias. Skin: Negative for rash. Neurological: Negative for headaches. Hematological: Negative for adenopathy. Psychiatric/Behavioral: Negative for suicidal ideas. Vitals:    06/17/20 2129   BP: 154/79   Pulse: (!) 108   Resp: 18   Temp: (!) 101.7 °F (38.7 °C)   SpO2: 95%            Physical Exam  Vitals signs and nursing note reviewed. Constitutional:       General: She is not in acute distress. Appearance: She is well-developed. She is obese. She is not diaphoretic. HENT:      Head: Normocephalic and atraumatic. Eyes:      General: No scleral icterus. Pupils: Pupils are equal, round, and reactive to light. Neck:      Musculoskeletal: Normal range of motion and neck supple. Thyroid: No thyromegaly. Cardiovascular:      Rate and Rhythm: Regular rhythm. Tachycardia present. Heart sounds: Normal heart sounds. No murmur. Pulmonary:      Effort: Pulmonary effort is normal. No respiratory distress. Breath sounds: Normal breath sounds.    Abdominal:      General: Bowel sounds are normal. There is no distension. Palpations: Abdomen is soft. Tenderness: There is no abdominal tenderness. Musculoskeletal: Normal range of motion. Skin:     General: Skin is warm and dry. Findings: No rash. Neurological:      Mental Status: She is alert and oriented to person, place, and time. MDM  Number of Diagnoses or Management Options  NORBERTO (acute kidney injury) ([facility] [address]):   Fever, unspecified fever cause:   Pyelonephritis:      Amount and/or Complexity of Data Reviewed  Clinical lab tests: reviewed  Tests in the radiology section of CPT®: reviewed         MEDICAL DECISION MAKIN y.o. female with past medical history significant for gerd, htn, pancreatitis, kidney stone, MS presents with fever  Differential diagnosis includes but not limited to:  Sepsis, kidney infection, pneumonia       LABORATORY TESTS:  Labs Reviewed   CBC WITH AUTOMATED DIFF - Abnormal; Notable for the following components:       Result Value    WBC 14.0 (*)     NEUTROPHILS 80 (*)     LYMPHOCYTES 7 (*)     IMMATURE GRANULOCYTES 1 (*)     ABS. NEUTROPHILS 11.1 (*)     ABS. MONOCYTES 1.7 (*)     ABS. IMM. GRANS. 0.1 (*)     All other components within normal limits   METABOLIC PANEL, COMPREHENSIVE - Abnormal; Notable for the following components:    Sodium 130 (*)     Glucose 144 (*)     Creatinine 1.34 (*)     GFR est AA 52 (*)     GFR est non-AA 43 (*)     Bilirubin, total 1.1 (*)     Albumin 2.9 (*)     Globulin 4.2 (*)     A-G Ratio 0.7 (*)     All other components within normal limits   URINE CULTURE HOLD SAMPLE   CULTURE, URINE   SAMPLES BEING HELD   LACTIC ACID   URINALYSIS W/MICROSCOPIC       IMAGING RESULTS:  Ct Abd Pelv Wo Cont    Result Date: 2020  INDICATION: Left lower quadrant pain, recent ureteral stent removed. Exam: Noncontrast CT of the abdomen and pelvis is performed with 5 mm collimation. Sagittal and coronal reformatted images were also performed.  CT dose reduction was achieved through the use of a standardized protocol tailored for this examination and automatic exposure control for dose modulation. Adaptive statistical iterative reconstruction (ASIR) was utilized. Direct comparison is made to prior CT dated August 2016. FINDINGS: The visualized lung bases are clear. Abdomen: Liver: The liver is normal on noncontrast images. Spleen: The spleen is normal on noncontrast images. Adrenals: The adrenals are normal on noncontrast images. Pancreas: The pancreas is normal on noncontrast images. Gallbladder: The gallbladder is normal on noncontrast images. Kidneys: There is left perinephric stranding, left hydronephrosis and left hydroureter. No ureteral calculus is seen. There are several nonobstructing left renal calculi, each measuring approximately 1 mm in size. There is no right hydronephrosis. Bowel: No thickened or dilated loop of large or small bowel seen. Appendix: The appendix is normal. Pelvis: Urinary bladder is partially filled and grossly normal. Miscellaneous: There is no free intraperitoneal gas or fluid. There is no focal fluid collection to suggest abscess. There is a 4.1 cm x 3.4 cm left ovarian cyst, measuring 3.4 cm x 4.4 cm in prior CT dated August 2016. IMPRESSION: Left perinephric stranding, left hydronephrosis and left hydroureter. No obstructing ureteral calculus visualized. Xr Chest Port    Result Date: 6/17/2020  PORTABLE CHEST RADIOGRAPH/S: 6/17/2020 10:32 PM INDICATION: Dyspnea. COMPARISON: 12/23/2009. TECHNIQUE: Portable frontal upright radiograph/s of the chest. FINDINGS: The lungs are clear. The central airways are patent. No pneumothorax or pleural effusion. IMPRESSION: Clear lungs.       MEDICATIONS GIVEN:  Medications   cefTRIAXone (ROCEPHIN) 1 g in 0.9% sodium chloride (MBP/ADV) 50 mL (has no administration in time range)   sodium chloride 0.9 % bolus infusion 1,000 mL (1,000 mL IntraVENous New Bag 6/17/20 4967)   ketorolac (TORADOL) injection 30 mg (30 mg IntraVENous Given 6/17/20 3026)       ED COURSE:  Triage note reviewed  Medical Records Reviewed  Vital signs reviewed  ECG reviewed  Lab work reviewed  Radiology report reviewed  IVF given  Antibiotics given  Discussed results and reviewed plan with the patient  Admitted    IMPRESSION:  1. Pyelonephritis    2. Fever, unspecified fever cause    3. NORBERTO (acute kidney injury) ([facility] [address]        PLAN:  - Admit to hospitalist    CONDITION:  stable    Total critical care time spent exclusive of procedures:  45 minutes    Bereket Trevino MD          Hospitalist Perfect Serve for Admission  11:25 PM    ED Room Number: ER17/17  Patient Name and age:  Franci Guo 54 y.o.  female  Working Diagnosis:   1. Pyelonephritis    2. Fever, unspecified fever cause    3. NORBERTO (acute kidney injury) ([facility]        COVID-19 Suspicion:  no    Code Status:  Full Code  Readmission: yes  Isolation Requirements:  no  Recommended Level of Care:  telemetry  Department:[facility] Adult ED - 21   Other:  Recently at uretel stent removed. Now with fever and abd pain. CXR clear.       Procedures

## 2020-06-18 NOTE — PROGRESS NOTES
Pharmacist Note - Vancomycin Dosing    Consult provided for this 54 y.o. female for indication of sepsis/pyelonephritis. Antibiotic regimen(s): Vanc + CTX    Recent Labs     20  0947 20  2145   WBC 17.3* 14.0*   CREA 1.46* 1.34*   BUN 23* 18     Height: 168 cm  Weight: 108 kg  Est CrCl: 60 ml/min  Temp (24hrs), Av.6 °F (38.1 °C), Min:98.5 °F (36.9 °C), Max:102.9 °F (39.4 °C)    Cultures pending  Goal trough = 15 - 20 mcg/mL    Therapy will be initiated with a loading dose of 2000 mg IV x 1   Due to NORBERTO, next dose vs level to be determined after review of am labs  Pharmacy to follow patient daily and order levels / make dose adjustments as appropriate.

## 2020-06-18 NOTE — ED TRIAGE NOTES
Arrived from home, reporting sudden onset fever 104.9, that started around 1400, accompanied by dizziness. Patient was seen by gen surg, to be evaluated, however, pt's fever was too high. Self medicated tylenol around 2030.      Pt hx gallbladder issues

## 2020-06-18 NOTE — PROGRESS NOTES
COLLIN:  1. Urology consult pending. 2. Home with  when stable. Care Management Interventions  PCP Verified by CM: Yes  Palliative Care Criteria Met (RRAT>21 & CHF Dx)?: No  Mode of Transport at Discharge: Other (see comment)  MyChart Signup: Yes  Discharge Durable Medical Equipment: No  Health Maintenance Reviewed: Yes  Physical Therapy Consult: No  Occupational Therapy Consult: No  Speech Therapy Consult: No  Current Support Network: Lives with Spouse  Confirm Follow Up Transport: Family  The Plan for Transition of Care is Related to the Following Treatment Goals : Urology consult, home when stable  The Patient and/or Patient Representative was Provided with a Choice of Provider and Agrees with the Discharge Plan?: Yes  [place] Resource Information Provided?: No  Discharge Location  Discharge Placement: Home with family assistance    Reason for Readmission:     Pyelonephritis        RUR Score/Risk Level:     10%, Level 1    PCP: First and Last name:  Dr. Magdy Liang    Name of Practice:    Are you a current patient: Yes/No:  Yes    Approximate date of last visit: 6/16/20, virtual    Can you participate in a virtual visit with your PCP:  Yes     Is a Care Conference indicated:   No indication at this time. Did you attend your follow up appointment (s): If not, why not:       Yes  Resources/supports as identified by patient/family:   Her , Gabo Starks facing patient (as identified by patient/family and CM): Finances/Medication cost?    She has [employer] insurance    Transportation      Her    Support system or lack thereof? See above. Living arrangements? Lives with her  and her 11year old daughter. Self-care/ADLs/Cognition? Independent with ADLs and IADLs prior to admission. Current Advanced Directive/Advance Care Plan:   Not on file. Plan for utilizing home health:   No indication at this time.              Transition of Care Plan:    Based on readmission, the patient's previous Plan of Care   has been evaluated and/or modified. The current Transition of Care Plan is:       Reviewed chart for transitions of care,and discussed in rounds. CM met with patient at bedside to explain role and offer support. Patient is alert and oriented x4, and confirmed demographics. Patient is independent with ADLs and IADLS prior to admission, no DME use. She lives with her supportive  and child. She recieves SSDI and has [employer] She plans to return home on discharge. CM available if any needs arise.     Arsen Estrada [facility]

## 2020-06-18 NOTE — PROGRESS NOTES
Hospitalist Progress Note          Marko Garcia NP  Please call  and page for questions. Call physician on-call through the  7pm-7am    Daily Progress Note: 6/18/2020    Primary care Tay Da Silva MD    Date of admission: 6/17/2020  9:35 PM    Admission Summary and Hospital Course:      From H&P 6/18/2020:  \"Veronica Lozada is a 54 y.o. female with h/o HTN presents to the ED c/o fever and left flank pain. She was today at her surgeon office when the temp was taken and it was 103. She was sent home but despite taking tylenol she was not feeling well. For the last several days she has been experiencing worsening left flank pain. She recently had a left urter stent removal and was doing well until 4 days ago. She has been taking dilaudid prn for pain control. In the ed she was found to be febrile. CT of the abd/pelv showed hydronephrosis, and perinephric stranding of the left kidneysAnd small kidney stone. She was started on IV antibiotics with ceftriaxone IV. \"      Subjective:   Pt seen today in NAD. She reports a continued headache that she had present on admission that has not been helped with tylenol. She has a history of migraines but does not believe this is one. She reports having had a ureteral stent removed last Monday and that it caused quite a bit of pain during that procedure. She is having bilateral lower abd tenderness, bilateral CVA tenderness L>R and headache but otherwise denies visual changes, dizziness, CP, sob, cough, abd pain, n/v/d. She also states she has a hx of MS and has been a bit unsteady as of yesterday. Assessment/Plan:     Encounter Diagnoses     ICD-10-CM ICD-9-CM   1. Pyelonephritis N12 590.80   2. Fever, unspecified fever cause R50.9 780.60   3.  NORBERTO (acute kidney injury) ([facility] [address] N17.9 584.9      Sepsis: POA temp 101.7/WBCs 14/abnormal UA  -Received IVF in ED  -BC pending, urine culture pending  -Received rocephin in ED; continue  -Urology consulted d/t recent stent removal    NORBERTO: POA creat/GFR 1.34/43 (baseline normal kidney function)  -cont w/ IVF  -trend creat  -avoid nephrotoxic agents    Hyponatremia  -Mild, asymptomatic; likely 2/2 above  -Monitor labs    Headache  -No dizziness, visual changes or aura  -Continue PRN tylenol-so far not effective  -One time dose 2 tabs of fioricet    Hx HTN  -Continue metoprolol    Hx Peptic ulcer dx: recently elevated lipase  -continue carafate, protonix    Hx MS  -Monitor for flare 2/2 infection  -continue Aubagio, neurontin    Hx depression  -Continue wellbutrin, lexapro    See orders for other plans. Diet: NPO pending urology consult  VTE prophylaxis:  SCDs  Code status: FULL  Discussed plan of care with: Patient/Family and Nurse. Pre-admission: lived at home. Discharge planning: pending. Emergency Contact(s):  Extended Emergency Contact Information  Primary Emergency Contact: Ailin Valente Dr Phone: [phone]  Relation: Spouse  Secondary Emergency Contact: Jasmin Oconnor  Address: [address] [place] [address] Phone: [phone]  Mobile Phone: [phone]  Relation: Mother          Review of Systems:     Full ROS complete with pertinent positives and negatives as per HPI, otherwise negative  Objective:   Physical Exam:     Visit Vitals  BP 99/60   Pulse 96   Temp 100.1 °F (37.8 °C)   Resp 18   LMP 2020   SpO2 96%   Breastfeeding No      O2 Device: Room air    Temp (24hrs), Av.7 °F (38.2 °C), Min:98.5 °F (36.9 °C), Max:103.1 °F (39.5 °C)    No intake/output data recorded. No intake/output data recorded.       General:  Alert, cooperative, no distress, appears stated age, morbidly obese   Lungs:   Clear lung sounds with diminished bases   Heart:  Regular rate and rhythm, S1, S2 normal, no murmur, click, rub or gallop; distant heart sounds   Abdomen:   Soft, non-distended. Bowel sounds normal; BLQ tenderness to palp; Bilat CVA tenderness   Extremities: Extremities normal, atraumatic, no cyanosis or edema. Skin: Skin color, texture, turgor normal. No rashes or lesions   Neurologic: CNII-XII intact. Data Review:       Recent Days:  Recent Labs     06/17/20  2145   WBC 14.0*   HGB 12.5   HCT 38.5        Recent Labs     06/17/20  2145   *   K 3.6   CL 98   CO2 24   *   BUN 18   CREA 1.34*   CA 9.1   ALB 2.9*   ALT 26     No results for input(s): PH, PCO2, PO2, HCO3, FIO2 in the last 72 hours. 24 Hour Results:  Recent Results (from the past 24 hour(s))   SAMPLES BEING HELD    Collection Time: 06/17/20  9:45 PM   Result Value Ref Range    SAMPLES BEING HELD 1red,1blu,2bc(1lav,1blu)1BC(SILV)     COMMENT        Add-on orders for these samples will be processed based on acceptable specimen integrity and analyte stability, which may vary by analyte. CBC WITH AUTOMATED DIFF    Collection Time: 06/17/20  9:45 PM   Result Value Ref Range    WBC 14.0 (H) 3.6 - 11.0 K/uL    RBC 4.46 3.80 - 5.20 M/uL    HGB 12.5 11.5 - 16.0 g/dL    HCT 38.5 35.0 - 47.0 %    MCV 86.3 80.0 - 99.0 FL    MCH 28.0 26.0 - 34.0 PG    MCHC 32.5 30.0 - 36.5 g/dL    RDW 13.8 11.5 - 14.5 %    PLATELET 538 364 - 969 K/uL    MPV 10.8 8.9 - 12.9 FL    NRBC 0.0 0  WBC    ABSOLUTE NRBC 0.00 0.00 - 0.01 K/uL    NEUTROPHILS 80 (H) 32 - 75 %    LYMPHOCYTES 7 (L) 12 - 49 %    MONOCYTES 12 5 - 13 %    EOSINOPHILS 0 0 - 7 %    BASOPHILS 0 0 - 1 %    IMMATURE GRANULOCYTES 1 (H) 0.0 - 0.5 %    ABS. NEUTROPHILS 11.1 (H) 1.8 - 8.0 K/UL    ABS. LYMPHOCYTES 1.0 0.8 - 3.5 K/UL    ABS. MONOCYTES 1.7 (H) 0.0 - 1.0 K/UL    ABS. EOSINOPHILS 0.0 0.0 - 0.4 K/UL    ABS. BASOPHILS 0.1 0.0 - 0.1 K/UL    ABS. IMM.  GRANS. 0.1 (H) 0.00 - 0.04 K/UL    DF AUTOMATED     METABOLIC PANEL, COMPREHENSIVE    Collection Time: 06/17/20  9:45 PM   Result Value Ref Range    Sodium 130 (L) 136 - 145 mmol/L Potassium 3.6 3.5 - 5.1 mmol/L    Chloride 98 97 - 108 mmol/L    CO2 24 21 - 32 mmol/L    Anion gap 8 5 - 15 mmol/L    Glucose 144 (H) 65 - 100 mg/dL    BUN 18 6 - 20 MG/DL    Creatinine 1.34 (H) 0.55 - 1.02 MG/DL    BUN/Creatinine ratio 13 12 - 20      GFR est AA 52 (L) >60 ml/min/1.73m2    GFR est non-AA 43 (L) >60 ml/min/1.73m2    Calcium 9.1 8.5 - 10.1 MG/DL    Bilirubin, total 1.1 (H) 0.2 - 1.0 MG/DL    ALT (SGPT) 26 12 - 78 U/L    AST (SGOT) 15 15 - 37 U/L    Alk.  phosphatase 77 45 - 117 U/L    Protein, total 7.1 6.4 - 8.2 g/dL    Albumin 2.9 (L) 3.5 - 5.0 g/dL    Globulin 4.2 (H) 2.0 - 4.0 g/dL    A-G Ratio 0.7 (L) 1.1 - 2.2     LACTIC ACID    Collection Time: 06/17/20  9:45 PM   Result Value Ref Range    Lactic acid 1.6 0.4 - 2.0 MMOL/L   URINALYSIS W/MICROSCOPIC    Collection Time: 06/18/20 12:22 AM   Result Value Ref Range    Color YELLOW/STRAW      Appearance CLEAR CLEAR      Specific gravity 1.020 1.003 - 1.030      pH (UA) 5.5 5.0 - 8.0      Protein 30 (A) NEG mg/dL    Glucose Negative NEG mg/dL    Ketone TRACE (A) NEG mg/dL    Bilirubin Negative NEG      Blood MODERATE (A) NEG      Urobilinogen 0.2 0.2 - 1.0 EU/dL    Nitrites Negative NEG      Leukocyte Esterase SMALL (A) NEG      WBC 5-10 0 - 4 /hpf    RBC 0-5 0 - 5 /hpf    Epithelial cells FEW FEW /lpf    Bacteria 1+ (A) NEG /hpf       Problem List:  Problem List as of 6/18/2020 Date Reviewed: 6/16/2020          Codes Class Noted - Resolved    Hyponatremia ICD-10-CM: E87.1  ICD-9-CM: 276.1  6/18/2020 - Present        * (Principal) Pyelonephritis ICD-10-CM: N12  ICD-9-CM: 590.80  6/17/2020 - Present        Sepsis ([address] ICD-10-CM: A41.9  ICD-9-CM: 038.9, 995.91  6/17/2020 - Present        Obstructive pyelonephritis ICD-10-CM: N11.1  ICD-9-CM: 590.80  5/26/2020 - Present        UTI (urinary tract infection) ICD-10-CM: N39.0  ICD-9-CM: 599.0  5/24/2020 - Present        Elevated lipase ICD-10-CM: R74.8  ICD-9-CM: 790.5  5/24/2020 - Present 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 9) ICD-10-CM: E66.01  ICD-9-CM: 278.01  2018 - Present        Depression with anxiety ICD-10-CM: F41.8  ICD-9-CM: 300.4  2018 - Present        Kidney stones ICD-10-CM: N20.0  ICD-9-CM: 592.0  Unknown - Present        EDGARD virus antibody positive ICD-10-CM: R76.8  ICD-9-CM: 795.79  Unknown - Present        Tubular adenoma of colon ICD-10-CM: D12.6  ICD-9-CM: 211.3  Unknown - Present        Single delivery by  ICD-10-CM: O82  ICD-9-CM: 669.71  2015 - Present        Gestational hypertension ICD-10-CM: O13.9  ICD-9-CM: 642.30  2/10/2015 - Present        AMA (advanced maternal age) primigravida 35+ ICD-10-CM: O09.519  ICD-9-CM: 659.50  2/10/2015 - Present        Multiple sclerosis exacerbation ([facility] ICD-10-CM: G35  ICD-9-CM: 235  2013 - Present        Optic neuritis, right ICD-10-CM: H46.9  ICD-9-CM: 377.30  2013 - Present        Dehydration, moderate ICD-10-CM: E86.0  ICD-9-CM: 276.51  2013 - Present        Dysphagia ICD-10-CM: R13.10  ICD-9-CM: 787.20  2013 - Present        Meralgia paresthetica of right side ICD-10-CM: G57.11  ICD-9-CM: 355.1  2013 - Present        Migraine with aura, without mention of intractable migraine without mention of status migrainosus ICD-10-CM: G43.109  ICD-9-CM: 346.00  2013 - Present        Dysplastic colon polyp ICD-10-CM: K63.5  ICD-9-CM: 211.3  Unknown - Present        MS (multiple sclerosis) ([facility] ICD-10-CM: G35  ICD-9-CM: 340  Unknown - Present        Depression ICD-10-CM: F32.9  ICD-9-CM: 311  Unknown - Present        Asthma ICD-10-CM: J45.909  ICD-9-CM: 493.90  Unknown - Present        Elevated blood pressure ICD-10-CM: AWZ4878  ICD-9-CM: GTZ4951  7/20/2011 - Present        Vitamin D deficiency ICD-10-CM: E55.9  ICD-9-CM: 268.9  7/20/2011 - Present        Depression ICD-10-CM: F32.9  ICD-9-CM: 257  Unknown - Present        RESOLVED: Decreased fetal movement ICD-10-CM: O36.8190  ICD-9-CM: 655.70  2/10/2015 - 2/11/2015        RESOLVED: Decreased fetal movement in pregnancy ICD-10-CM: O36.8190  ICD-9-CM: 655.70  2/10/2015 - 2/11/2015              Medications reviewed  Current Facility-Administered Medications   Medication Dose Route Frequency    oxyCODONE-acetaminophen (PERCOCET) 5-325 mg per tablet 1 Tab  1 Tab Oral Q4H PRN    acetaminophen (TYLENOL) tablet 650 mg  650 mg Oral Q6H PRN    [START ON 6/19/2020] cefTRIAXone (ROCEPHIN) 1 g in 0.9% sodium chloride (MBP/ADV) 50 mL  1 g IntraVENous Q24H    buPROPion SR (WELLBUTRIN SR) tablet 150 mg  150 mg Oral BID    escitalopram oxalate (LEXAPRO) tablet 20 mg  20 mg Oral DAILY    gabapentin (NEURONTIN) capsule 600 mg  600 mg Oral BID    metoprolol succinate (TOPROL-XL) XL tablet 100 mg  100 mg Oral QPM    . PHARMACY TO SUBSTITUTE PER PROTOCOL (Reordered from: teriflunomide (Aubagio) 14 mg tablet)    Per Protocol    sucralfate (CARAFATE) tablet 0.5 g  0.5 g Oral AC&HS    lactated Ringers infusion  75 mL/hr IntraVENous CONTINUOUS    pantoprazole (PROTONIX) tablet 40 mg  40 mg Oral DAILY    sodium chloride (NS) flush 5-40 mL  5-40 mL IntraVENous Q8H    sodium chloride (NS) flush 5-40 mL  5-40 mL IntraVENous PRN       Care Plan discussed with: Patient/family, nurse      Rodolfo Goel NP  Hospitalist  Providers can reach me on Perfect[employer]

## 2020-06-18 NOTE — ED NOTES
Bedside and Verbal shift change report given to rBian Callejas RN (oncoming nurse) by Jillian Jonas RN (offgoing nurse). Report included the following information SBAR, ED Summary, MAR and Recent Results.

## 2020-06-18 NOTE — H&P
[address] [facility] Adult  Hospitalist Group  History and Physical    Primary Care Provider: Isabel Coleman MD  Date of Service:  6/18/2020    Subjective:     Fátima Cote is a 54 y.o. female with h/o HTN presents to the ED c/o fever and left flank pain. She was today at her surgeon office when the temp was taken and it was 103. She was sent home but despite taking tylenol she was not feeling well. For the last several days she has been experiencing worsening left flank pain. She recently had a left urter stent removal and was doing well until 4 days ago. She has been taking dilaudid prn for pain control. In the ed she was found to be febrile. CT of the abd/pelv showed hydronephrosis, and perinephric stranding of the left kidneysAnd small kidney stone. She was started on IV antibiotics with ceftriaxone IV. Review of Systems:    Review of Systems   Constitutional: Positive for fever and malaise/fatigue. Negative for chills and weight loss. HENT: Negative for congestion, ear discharge and hearing loss. Eyes: Negative for blurred vision and double vision. Respiratory: Negative for cough, sputum production, shortness of breath and wheezing. Cardiovascular: Negative for chest pain, palpitations, orthopnea, leg swelling and PND. Gastrointestinal: Positive for abdominal pain. Negative for constipation, diarrhea, melena, nausea and vomiting. Genitourinary: Positive for flank pain. Negative for dysuria, frequency, hematuria and urgency. Musculoskeletal: Negative for back pain, joint pain and myalgias. Skin: Negative for itching and rash. Neurological: Negative for dizziness, sensory change, speech change, focal weakness, weakness and headaches. Endo/Heme/Allergies: Negative for polydipsia. Does not bruise/bleed easily.        Past Medical History:   Diagnosis Date    Ankle fracture     Asthma     Autoimmune disease ([facility] [address]     MS    Celiac disease     Chronic pain     MS    Congenital sucrose isomaltose malabsorption     Depression     Diverticulosis of sigmoid colon     Dysplastic colon polyp     Dr. Amandeep Venegas - last colonoscopy 12 - single polyp    Essential hypertension     Gestasional    GERD (gastroesophageal reflux disease)     Influenza B 2017    EDGARD virus antibody positive     Kidney stones     Miscarriage         MS (multiple sclerosis) ([facility] [address]     Dr. Lamont Prakash    Obstructive pyelonephritis 2020    Ovarian cyst     PUD (peptic ulcer disease)     Routine gynecological examination     Dr. Jazmin Schwartz Sleep apnea     pt states she no longer has the problem since wt loss - intentional wt loss    Tubular adenoma of colon 2016    Uterine fibroid     Vitamin D deficiency 2011      Past Surgical History:   Procedure Laterality Date    COLONOSCOPY N/A 2019    COLONOSCOPY/EGD (LATEX ALLERGY) performed by Stefany Cortez MD at [address] [place] [address]      double compound fx of right lower leg and dislocated heel - has a plate and 13 screws    HX  SECTION          HX COLONOSCOPY      HX ENDOSCOPY      HX GYN      fibroid tumor ovarian cyst     HX ORTHOPAEDIC      1992 Left shoulder surgery    MN BIOPSY OF BREAST, INCISIONAL       Prior to Admission medications    Medication Sig Start Date End Date Taking? Authorizing Provider   buPROPion XL (WELLBUTRIN XL) 300 mg XL tablet Take 1 Tab by mouth every morning. 20  Yes Palak Kimball MD   teriflunomide (Aubagio) 14 mg tablet Take 14 mg by mouth every evening. Yes Provider, Historical   metoprolol succinate (TOPROL-XL) 100 mg tablet Take 100 mg by mouth every evening. Yes Provider, Historical   escitalopram oxalate (LEXAPRO) 20 mg tablet TAKE ONE TABLET BY MOUTH ONE TIME DAILY 20  Yes Palak Kimball MD   pantoprazole (PROTONIX) 40 mg tablet Take 40 mg by mouth daily.    Yes Provider, Historical   Bifidobacterium Infantis (ALIGN) 4 mg cap Take 1 Cap by mouth nightly. Yes Provider, Historical   fexofenadine (ALLEGRA) 180 mg tablet Take 180 mg by mouth every evening. Yes Provider, Historical   gabapentin (NEURONTIN) 300 mg capsule Take 600 mg by mouth two (2) times a day. 1caps twice a day 10/20/17  Yes Provider, Historical   HYDROmorphone (DILAUDID) 2 mg tablet  6/11/20   Provider, Historical   soy isxfujm-iavbmrl-Zsoycv anthony 56- mg cap Take 1 Cap by mouth daily. 6/16/20   Shivani Ferrer MD   sucralfate (CARAFATE) 100 mg/mL suspension Take 1 tsp by mouth four (4) times daily. Other, MD Neelima   omega 3-nyj-ahw-fish oil 183.3 mg-75 mg -91.6 mg-306 mg cap Take 4 Caps by mouth daily. Patient taking differently: Take 2 Caps by mouth two (2) times a day. 10/25/18   Shivani Ferrer MD   fluticasone (FLONASE) 50 mcg/actuation nasal spray 2 Sprays by Both Nostrils route daily. Patient taking differently: 2 Sprays by Both Nostrils route as needed. 10/22/18   Shivani Ferrer MD   albuterol (PROVENTIL HFA, VENTOLIN HFA, PROAIR HFA) 90 mcg/actuation inhaler Take 2 Puffs by inhalation every four (4) hours as needed for Wheezing or Shortness of Breath. 9/27/17   Marilyn Garcia MD   cholecalciferol, vitamin D3, (VITAMIN D3) 2,000 unit tab Take 5,000 Units by mouth daily.     Provider, Historical     Allergies   Allergen Reactions    Latex Rash    Adhesive Rash    Motrin [Ibuprofen] Nausea Only      Family History   Problem Relation Age of Onset    Cancer Father         appendix/cancerous colon polyps    Heart Disease Father         cabg age early 68's    Cancer Maternal Uncle         prostate/melanoma    Cancer Maternal Grandmother         cancerous colon polyps    Cancer Maternal Grandfather         prostate    Heart Disease Paternal Grandmother     Cancer Paternal Grandmother         cancerous colon polyps    No Known Problems Daughter     Colon Polyps Mother         precancerous    Atrial Fibrillation Mother     Heart Disease Paternal Aunt         SOCIAL HISTORY:  Patient resides at home  Patient ambulates independently   Smoking history: none  Alcohol history: none        Objective:       Physical Exam:   Patient Vitals for the past 12 hrs:   Temp Pulse Resp BP SpO2   06/17/20 2300    138/66 92 %   06/17/20 2245    134/69 92 %   06/17/20 2230    135/74 93 %   06/17/20 2215    122/62 92 %   06/17/20 2129 (!) 101.7 °F (38.7 °C) (!) 108 18 154/79 95 %     GEN APPEARANCE: Patient resting in bed in NAD  HEENT: Conjunctiva Clear  CVS: RRR, S1, S2; No M/G/R  LUNGS: CTAB; No Wheezes; No Rhonchi: No rales  ABD: Soft; No TTP; + Normoactive BS. +CVA tenderness on the left  EXT: WWP, no edema   Skin exam: No gross lesions noted on exposed skin surfaces  MENTAL STATUS: Answers questions appropriately, responds to commands. Neuro:  No gross motor or sensory deficits      Data Review:   Recent Results (from the past 24 hour(s))   SAMPLES BEING HELD    Collection Time: 06/17/20  9:45 PM   Result Value Ref Range    SAMPLES BEING HELD 1red,1blu,2bc(1lav,1blu)1BC(SILV)     COMMENT        Add-on orders for these samples will be processed based on acceptable specimen integrity and analyte stability, which may vary by analyte. CBC WITH AUTOMATED DIFF    Collection Time: 06/17/20  9:45 PM   Result Value Ref Range    WBC 14.0 (H) 3.6 - 11.0 K/uL    RBC 4.46 3.80 - 5.20 M/uL    HGB 12.5 11.5 - 16.0 g/dL    HCT 38.5 35.0 - 47.0 %    MCV 86.3 80.0 - 99.0 FL    MCH 28.0 26.0 - 34.0 PG    MCHC 32.5 30.0 - 36.5 g/dL    RDW 13.8 11.5 - 14.5 %    PLATELET 028 264 - 418 K/uL    MPV 10.8 8.9 - 12.9 FL    NRBC 0.0 0  WBC    ABSOLUTE NRBC 0.00 0.00 - 0.01 K/uL    NEUTROPHILS 80 (H) 32 - 75 %    LYMPHOCYTES 7 (L) 12 - 49 %    MONOCYTES 12 5 - 13 %    EOSINOPHILS 0 0 - 7 %    BASOPHILS 0 0 - 1 %    IMMATURE GRANULOCYTES 1 (H) 0.0 - 0.5 %    ABS. NEUTROPHILS 11.1 (H) 1.8 - 8.0 K/UL    ABS. LYMPHOCYTES 1.0 0.8 - 3.5 K/UL    ABS.  MONOCYTES 1.7 (H) 0.0 - 1.0 K/UL    ABS. EOSINOPHILS 0.0 0.0 - 0.4 K/UL    ABS. BASOPHILS 0.1 0.0 - 0.1 K/UL    ABS. IMM. GRANS. 0.1 (H) 0.00 - 0.04 K/UL    DF AUTOMATED     METABOLIC PANEL, COMPREHENSIVE    Collection Time: 06/17/20  9:45 PM   Result Value Ref Range    Sodium 130 (L) 136 - 145 mmol/L    Potassium 3.6 3.5 - 5.1 mmol/L    Chloride 98 97 - 108 mmol/L    CO2 24 21 - 32 mmol/L    Anion gap 8 5 - 15 mmol/L    Glucose 144 (H) 65 - 100 mg/dL    BUN 18 6 - 20 MG/DL    Creatinine 1.34 (H) 0.55 - 1.02 MG/DL    BUN/Creatinine ratio 13 12 - 20      GFR est AA 52 (L) >60 ml/min/1.73m2    GFR est non-AA 43 (L) >60 ml/min/1.73m2    Calcium 9.1 8.5 - 10.1 MG/DL    Bilirubin, total 1.1 (H) 0.2 - 1.0 MG/DL    ALT (SGPT) 26 12 - 78 U/L    AST (SGOT) 15 15 - 37 U/L    Alk. phosphatase 77 45 - 117 U/L    Protein, total 7.1 6.4 - 8.2 g/dL    Albumin 2.9 (L) 3.5 - 5.0 g/dL    Globulin 4.2 (H) 2.0 - 4.0 g/dL    A-G Ratio 0.7 (L) 1.1 - 2.2     LACTIC ACID    Collection Time: 06/17/20  9:45 PM   Result Value Ref Range    Lactic acid 1.6 0.4 - 2.0 MMOL/L   URINALYSIS W/MICROSCOPIC    Collection Time: 06/18/20 12:22 AM   Result Value Ref Range    Color YELLOW/STRAW      Appearance CLEAR CLEAR      Specific gravity 1.020 1.003 - 1.030      pH (UA) 5.5 5.0 - 8.0      Protein 30 (A) NEG mg/dL    Glucose Negative NEG mg/dL    Ketone TRACE (A) NEG mg/dL    Bilirubin Negative NEG      Blood MODERATE (A) NEG      Urobilinogen 0.2 0.2 - 1.0 EU/dL    Nitrites Negative NEG      Leukocyte Esterase SMALL (A) NEG      WBC 5-10 0 - 4 /hpf    RBC 0-5 0 - 5 /hpf    Epithelial cells FEW FEW /lpf    Bacteria 1+ (A) NEG /hpf       Assessment:     Active Problems:    Pyelonephritis (6/17/2020)      Sepsis ([address] (6/17/2020)        Plan:     1. Sepsis (POA): due to pyelonephritis  - Rocephin 1gm every 24 hours IV  - F/u blood culture and urine culture  - Urology consult given recent urological procedure  - sepsis reassessment completed.      2. NORBERTO; mild creatinine elevation. Likely due to acute infenction  - iv fluids NS 75ml/hr x1 L  - monitor renal function     3. HTN: stable. Restart home medication.      DVT prophy: scd  Code status: full code    FUNCTIONAL STATUS PRIOR TO HOSPITALIZATION Ambulates Independently     Signed By: Asif Arauz MD     June 18, 2020

## 2020-06-19 LAB
ANION GAP SERPL CALC-SCNC: 7 MMOL/L (ref 5–15)
BASOPHILS # BLD: 0 K/UL (ref 0–0.1)
BASOPHILS NFR BLD: 0 % (ref 0–1)
BUN SERPL-MCNC: 19 MG/DL (ref 6–20)
BUN/CREAT SERPL: 13 (ref 12–20)
CALCIUM SERPL-MCNC: 7.7 MG/DL (ref 8.5–10.1)
CHLORIDE SERPL-SCNC: 101 MMOL/L (ref 97–108)
CO2 SERPL-SCNC: 23 MMOL/L (ref 21–32)
CREAT SERPL-MCNC: 1.45 MG/DL (ref 0.55–1.02)
DIFFERENTIAL METHOD BLD: ABNORMAL
EOSINOPHIL # BLD: 0 K/UL (ref 0–0.4)
EOSINOPHIL NFR BLD: 0 % (ref 0–7)
ERYTHROCYTE [DISTWIDTH] IN BLOOD BY AUTOMATED COUNT: 13.7 % (ref 11.5–14.5)
GLUCOSE SERPL-MCNC: 137 MG/DL (ref 65–100)
HCT VFR BLD AUTO: 31.1 % (ref 35–47)
HGB BLD-MCNC: 10 G/DL (ref 11.5–16)
IMM GRANULOCYTES # BLD AUTO: 0.1 K/UL (ref 0–0.04)
IMM GRANULOCYTES NFR BLD AUTO: 1 % (ref 0–0.5)
LYMPHOCYTES # BLD: 1.3 K/UL (ref 0.8–3.5)
LYMPHOCYTES NFR BLD: 10 % (ref 12–49)
MCH RBC QN AUTO: 28.1 PG (ref 26–34)
MCHC RBC AUTO-ENTMCNC: 32.2 G/DL (ref 30–36.5)
MCV RBC AUTO: 87.4 FL (ref 80–99)
MONOCYTES # BLD: 1.2 K/UL (ref 0–1)
MONOCYTES NFR BLD: 9 % (ref 5–13)
NEUTS SEG # BLD: 10.4 K/UL (ref 1.8–8)
NEUTS SEG NFR BLD: 80 % (ref 32–75)
NRBC # BLD: 0 K/UL (ref 0–0.01)
NRBC BLD-RTO: 0 PER 100 WBC
PLATELET # BLD AUTO: 147 K/UL (ref 150–400)
PMV BLD AUTO: 10.7 FL (ref 8.9–12.9)
POTASSIUM SERPL-SCNC: 3.5 MMOL/L (ref 3.5–5.1)
RBC # BLD AUTO: 3.56 M/UL (ref 3.8–5.2)
SODIUM SERPL-SCNC: 131 MMOL/L (ref 136–145)
WBC # BLD AUTO: 13 K/UL (ref 3.6–11)

## 2020-06-19 PROCEDURE — 74011250637 HC RX REV CODE- 250/637: Performed by: NURSE PRACTITIONER

## 2020-06-19 PROCEDURE — 94760 N-INVAS EAR/PLS OXIMETRY 1: CPT

## 2020-06-19 PROCEDURE — 36415 COLL VENOUS BLD VENIPUNCTURE: CPT

## 2020-06-19 PROCEDURE — 65270000029 HC RM PRIVATE

## 2020-06-19 PROCEDURE — 74011250636 HC RX REV CODE- 250/636: Performed by: NURSE PRACTITIONER

## 2020-06-19 PROCEDURE — 74011250636 HC RX REV CODE- 250/636: Performed by: INTERNAL MEDICINE

## 2020-06-19 PROCEDURE — 74011000258 HC RX REV CODE- 258: Performed by: INTERNAL MEDICINE

## 2020-06-19 PROCEDURE — 85025 COMPLETE CBC W/AUTO DIFF WBC: CPT

## 2020-06-19 PROCEDURE — 80048 BASIC METABOLIC PNL TOTAL CA: CPT

## 2020-06-19 RX ORDER — ACETAMINOPHEN 325 MG/1
650 TABLET ORAL
Status: DISCONTINUED | OUTPATIENT
Start: 2020-06-19 | End: 2020-06-23 | Stop reason: HOSPADM

## 2020-06-19 RX ORDER — ONDANSETRON 4 MG/1
4 TABLET, ORALLY DISINTEGRATING ORAL
Status: DISCONTINUED | OUTPATIENT
Start: 2020-06-19 | End: 2020-06-23 | Stop reason: HOSPADM

## 2020-06-19 RX ORDER — VANCOMYCIN HYDROCHLORIDE
1250
Status: DISCONTINUED | OUTPATIENT
Start: 2020-06-19 | End: 2020-06-20

## 2020-06-19 RX ORDER — BACLOFEN 10 MG/1
10 TABLET ORAL
Status: DISCONTINUED | OUTPATIENT
Start: 2020-06-19 | End: 2020-06-23 | Stop reason: HOSPADM

## 2020-06-19 RX ORDER — IBUPROFEN 400 MG/1
400 TABLET ORAL
Status: DISCONTINUED | OUTPATIENT
Start: 2020-06-19 | End: 2020-06-23 | Stop reason: HOSPADM

## 2020-06-19 RX ORDER — BUTALBITAL, ACETAMINOPHEN AND CAFFEINE 50; 325; 40 MG/1; MG/1; MG/1
1 TABLET ORAL
Status: DISCONTINUED | OUTPATIENT
Start: 2020-06-19 | End: 2020-06-23 | Stop reason: HOSPADM

## 2020-06-19 RX ADMIN — SUCRALFATE 0.5 G: 1 TABLET ORAL at 21:03

## 2020-06-19 RX ADMIN — GABAPENTIN 600 MG: 300 CAPSULE ORAL at 21:04

## 2020-06-19 RX ADMIN — TAMSULOSIN HYDROCHLORIDE 0.4 MG: 0.4 CAPSULE ORAL at 21:03

## 2020-06-19 RX ADMIN — Medication 10 ML: at 21:04

## 2020-06-19 RX ADMIN — BUTALBITAL, ACETAMINOPHEN, AND CAFFEINE 1 TABLET: 50; 325; 40 TABLET ORAL at 14:31

## 2020-06-19 RX ADMIN — METOPROLOL SUCCINATE 100 MG: 50 TABLET, EXTENDED RELEASE ORAL at 17:31

## 2020-06-19 RX ADMIN — IBUPROFEN 400 MG: 400 TABLET, FILM COATED ORAL at 05:19

## 2020-06-19 RX ADMIN — BUPROPION HYDROCHLORIDE 150 MG: 150 TABLET, EXTENDED RELEASE ORAL at 08:42

## 2020-06-19 RX ADMIN — SUCRALFATE 0.5 G: 1 TABLET ORAL at 10:48

## 2020-06-19 RX ADMIN — SUCRALFATE 0.5 G: 1 TABLET ORAL at 16:22

## 2020-06-19 RX ADMIN — CEFTRIAXONE SODIUM 1 G: 1 INJECTION, POWDER, FOR SOLUTION INTRAMUSCULAR; INTRAVENOUS at 23:48

## 2020-06-19 RX ADMIN — ESCITALOPRAM OXALATE 20 MG: 10 TABLET ORAL at 08:42

## 2020-06-19 RX ADMIN — BUPROPION HYDROCHLORIDE 150 MG: 150 TABLET, EXTENDED RELEASE ORAL at 21:03

## 2020-06-19 RX ADMIN — SODIUM CHLORIDE, SODIUM LACTATE, POTASSIUM CHLORIDE, AND CALCIUM CHLORIDE 1000 ML: 600; 310; 30; 20 INJECTION, SOLUTION INTRAVENOUS at 22:02

## 2020-06-19 RX ADMIN — BUTALBITAL, ACETAMINOPHEN, AND CAFFEINE 1 TABLET: 50; 325; 40 TABLET ORAL at 22:01

## 2020-06-19 RX ADMIN — SODIUM CHLORIDE, SODIUM LACTATE, POTASSIUM CHLORIDE, AND CALCIUM CHLORIDE 75 ML/HR: 600; 310; 30; 20 INJECTION, SOLUTION INTRAVENOUS at 01:50

## 2020-06-19 RX ADMIN — ACETAMINOPHEN 650 MG: 325 TABLET, FILM COATED ORAL at 01:54

## 2020-06-19 RX ADMIN — ACETAMINOPHEN 650 MG: 325 TABLET, FILM COATED ORAL at 11:41

## 2020-06-19 RX ADMIN — GABAPENTIN 600 MG: 300 CAPSULE ORAL at 08:41

## 2020-06-19 RX ADMIN — ONDANSETRON 4 MG: 4 TABLET, ORALLY DISINTEGRATING ORAL at 05:19

## 2020-06-19 RX ADMIN — BACLOFEN 10 MG: 10 TABLET ORAL at 21:03

## 2020-06-19 RX ADMIN — IBUPROFEN 400 MG: 400 TABLET, FILM COATED ORAL at 23:48

## 2020-06-19 RX ADMIN — VANCOMYCIN HYDROCHLORIDE 1250 MG: 10 INJECTION, POWDER, LYOPHILIZED, FOR SOLUTION INTRAVENOUS at 11:42

## 2020-06-19 RX ADMIN — IBUPROFEN 400 MG: 400 TABLET, FILM COATED ORAL at 14:30

## 2020-06-19 RX ADMIN — SUCRALFATE 0.5 G: 1 TABLET ORAL at 06:40

## 2020-06-19 RX ADMIN — ACETAMINOPHEN 650 MG: 325 TABLET, FILM COATED ORAL at 21:04

## 2020-06-19 RX ADMIN — PANTOPRAZOLE SODIUM 40 MG: 40 TABLET, DELAYED RELEASE ORAL at 08:41

## 2020-06-19 NOTE — PROGRESS NOTES
Pharmacy Note - Re:  Vancomycin Dosing    Day #2 of Vancomycin  Indication: sepsis/pyelonephritis  Current regimen: received 2g IV x1 yesterday evening  Abx regimen: vanc, ceftriaxone  ID Following ?: NO  Concomitant nephrotoxic drugs (requires more frequent monitoring): NSAIDs (prn ibuprofen)  Frequency of BMP?: daily through     Recent Labs     20  0154 20  0947 20  2145   WBC 13.0* 17.3* 14.0*   CREA 1.45* 1.46* 1.34*   BUN 19 23* 18     Est CrCl: 60.2 ml/min; UOP: not yet documented today, RN reports pt voiding fine  Temp (24hrs), Av.3 °F (37.9 °C), Min:98.3 °F (36.8 °C), Max:102.9 °F (39.4 °C)    Cultures:    urine cx (clean catch): poss Enterococcus species predominating, mixed urogenital juanita 10K col/ml (prelim)   paired blood cx: ngsf (prelim)    Goal trough = 15 - 20 mcg/mL    Recent trough history (date/time/level/dose/action taken):  None    Plan:  Begin maintenance dose of 1.25g IV q16hr. Will check trough at steady state.     Conner MirelesD

## 2020-06-19 NOTE — PROGRESS NOTES
Progress Note    Patient: Blanche Juarez MRN: [id number]  SSN: [id number]    YOB: 1975  Age: 54 y.o. Sex: female        ADMITTED:  2020 to Tino Brandon MD  for Sepsis [facility]) [A41.9]  Pyelonephritis [N12]         1. Pyelonephritis, UTI  2. left hydronephrosis and left hydroureter  3. S/p L CRULS on , stent pull on 6/15  4. Fever  5. Leukocytosis    CT: Left perinephric stranding, left hydronephrosis and left hydroureter. No obstructing ureteral calculus visualized. UCX: ENTEROCOCCUS FAECALIS (PREDOMINATING) SENSITIVITY TO FOLLOW    Pt reports fever/chills overnight, medicated w/ Tylenol and Motrin alternatively. WBC improving, 17.3-->13.0  +Rocephin/Vanc  Cr 1.34-->1.46-->1.45  No voiding issues      Vitals:  Temp (24hrs), Av.3 °F (37.9 °C), Min:98.3 °F (36.8 °C), Max:102.9 °F (39.4 °C)     Blood pressure 101/67, pulse 83, temperature 98.4 °F (36.9 °C), resp. rate 17, last menstrual period 2020, SpO2 98 %, not currently breastfeeding. I&O's:   1901 -  0700  In: -   Out: 300 [Urine:300]   No intake/output data recorded.      Exam:    Appearance: Well-developed no acute distress  Conjunctiva: Conjunctiva and lids normal  External ears/nose: Normal no lesions or deformities  Hearing: Grossly intact  Respiratory effort: Breathing easily  Abdomen/flank: Soft, nontender, without masses, no CVA tenderness  Digits/nails: No clubbing cyanosis or petechiae  Skin inspection: Warm and dry  Sensation: No sensory deficits  Judgment/Insight: intact  Mood/Affect: normal     Labs:   Recent Labs     20  0154 20  0947 20  2145   WBC 13.0* 17.3* 14.0*   HGB 10.0* 10.9* 12.5   HCT 31.1* 34.0* 38.5   * 171 197     Recent Labs     20  0154 20  0947 20  2145   * 135* 130*   K 3.5 3.9 3.6    101 98   CO2 23 24 24   * 113* 144*   BUN 19 23* 18   CREA 1.45* 1.46* 1.34*   CA 7. 7* 8.1* 9.1        Cultures:   UCX POSSIBLE ENTEROCOCCUS SPECIES+  BCX NG2D   Imaging:  Reviewed     Assessment:       Principal Problem:    Pyelonephritis (6/17/2020)    Active Problems:    MS (multiple sclerosis) (HCC) ()      Kidney stones ()      Essential hypertension (10/28/2018)      Sepsis ([facility] [address] (6/17/2020)      Hyponatremia (6/18/2020)        Plan:     -Continue abx, follow cx, cx driven abx. WBC improving. Expect cyclical fever as is typical with pyelo. -Monitor creatinine. 1.34-->1.46-->1.45. No  surgical intervention @ this time  -Repeat upper tract imaging prior to DC once clinically improving to ensure resolution of hydronephrosis. -If she were not responding to antibiotic therapy in the next 48-72 hours could consider repeat CT A/P imaging with & without IV contrast to rule out renal abscess, although no sign of that based on CT.       Signed By: Clemente Frankel, NP - June 19, 2020

## 2020-06-19 NOTE — PROGRESS NOTES
Problem: Pain  Goal: *Control of Pain  Outcome: Progressing Towards Goal     Problem: Discharge Planning  Goal: *Discharge to safe environment  Description: See cm note.     Gale Kohli ANDREW [facility]     Outcome: Progressing Towards Goal

## 2020-06-19 NOTE — PROGRESS NOTES
Hospitalist Progress Note          Miguel A Lua NP  Please call  and page for questions. Call physician on-call through the  7pm-7am    Daily Progress Note: 6/19/2020    Primary care Bishop Martinez MD    Date of admission: 6/17/2020  9:35 PM    Admission Summary and Hospital Course:      From H&P 6/18/2020:  \"Veronica Pat is a 54 y.o. female with h/o HTN presents to the ED c/o fever and left flank pain. She was today at her surgeon office when the temp was taken and it was 103. She was sent home but despite taking tylenol she was not feeling well. For the last several days she has been experiencing worsening left flank pain. She recently had a left urter stent removal and was doing well until 4 days ago. She has been taking dilaudid prn for pain control. In the ed she was found to be febrile. CT of the abd/pelv showed hydronephrosis, and perinephric stranding of the left kidneysAnd small kidney stone. She was started on IV antibiotics with ceftriaxone IV. \"      Subjective:   Pt seen today in NAD. Improved HA and some abd pain/cramping; otherwise denies visual changes, dizziness, CP, sob, cough, abd pain, n/v/d.      Assessment/Plan:       Sepsis: POA temp 101.7/WBCs 14/abnormal UA  -Received IVF in ED  -BC NGTD, urine culture +ENTEROCOCCUS FAECALIS  -Received rocephin in ED; continue rocephin and vanc pending sensitivity  -Seen by urology; repeat upper tract imagine prior to DC; if not responding to abx 48-72h get CT A/P imaging W WO to r/o renal abscess     NORBERTO: POA creat/GFR 1.34/43 (baseline normal kidney function)  -cont w/ IVF  -trend creat  -avoid nephrotoxic agents    Hyponatremia  -Mild, asymptomatic; likely 2/2 above  -Monitor labs    Headache  -Improved  -No dizziness, visual changes or aura  -Continue PRN tylenol-so far not effective  -PRN fioricet    Hx HTN  -Continue metoprolol    Hx Peptic ulcer dx: recently elevated lipase  -continue carafate, protonix    Hx MS  -Monitor for flare 2/2 infection  -continue Aubagio, neurontin  -Prn baclofen    Hx depression  -Continue wellbutrin, lexapro    See orders for other plans. Diet: AHA  VTE prophylaxis:  SCDs, ambulatory  Code status: FULL  Discussed plan of care with: Patient/Family and Nurse. Pre-admission: lived at home. Discharge planning: pending. Emergency Contact(s):  Extended Emergency Contact Information  Primary Emergency Contact: Ailin Valente  Phone: [phone]  Relation: Spouse  Secondary Emergency Contact: Jasmin Oconnor  Address: [address] [place] [address], [facility] [address] [place] [address] Phone: [phone]  Mobile Phone: [phone]  Relation: Mother          Review of Systems:     Full ROS complete with pertinent positives and negatives as per HPI, otherwise negative  Objective:   Physical Exam:     Visit Vitals  /67 (BP 1 Location: Right arm, BP Patient Position: At rest)   Pulse 83   Temp (!) 100.7 °F (38.2 °C)   Resp 17   LMP 2020   SpO2 98%   Breastfeeding No      O2 Device: Room air    Temp (24hrs), Av.3 °F (37.9 °C), Min:98.3 °F (36.8 °C), Max:102.9 °F (39.4 °C)    No intake/output data recorded.  1901 -  0700  In: -   Out: 300 [Urine:300]      General:  Alert, cooperative, no distress, appears stated age, morbidly obese   Lungs:   Clear lung sounds with diminished bases   Heart:  Regular rate and rhythm, S1, S2 normal, no murmur, click, rub or gallop; distant heart sounds   Abdomen:   Soft, non-distended. Bowel sounds normal; BLQ tenderness to palp; Bilat CVA tenderness   Extremities: Extremities normal, atraumatic, no cyanosis or edema. Skin: Skin color, texture, turgor normal. No rashes or lesions   Neurologic: CNII-XII intact.       Data Review:       Recent Days:  Recent Labs     20  0154 20  0947 20  2145   WBC 13.0* 17.3* 14.0*   HGB 10.0* 10.9* 12.5   HCT 31.1* 34.0* 38.5   * 171 197     Recent Labs     06/19/20  0154 06/18/20  0947 06/17/20  2145   * 135* 130*   K 3.5 3.9 3.6    101 98   CO2 23 24 24   * 113* 144*   BUN 19 23* 18   CREA 1.45* 1.46* 1.34*   CA 7.7* 8.1* 9.1   ALB  --   --  2.9*   ALT  --   --  26     No results for input(s): PH, PCO2, PO2, HCO3, FIO2 in the last 72 hours. 24 Hour Results:  Recent Results (from the past 24 hour(s))   CBC WITH AUTOMATED DIFF    Collection Time: 06/19/20  1:54 AM   Result Value Ref Range    WBC 13.0 (H) 3.6 - 11.0 K/uL    RBC 3.56 (L) 3.80 - 5.20 M/uL    HGB 10.0 (L) 11.5 - 16.0 g/dL    HCT 31.1 (L) 35.0 - 47.0 %    MCV 87.4 80.0 - 99.0 FL    MCH 28.1 26.0 - 34.0 PG    MCHC 32.2 30.0 - 36.5 g/dL    RDW 13.7 11.5 - 14.5 %    PLATELET 469 (L) 578 - 400 K/uL    MPV 10.7 8.9 - 12.9 FL    NRBC 0.0 0  WBC    ABSOLUTE NRBC 0.00 0.00 - 0.01 K/uL    NEUTROPHILS 80 (H) 32 - 75 %    LYMPHOCYTES 10 (L) 12 - 49 %    MONOCYTES 9 5 - 13 %    EOSINOPHILS 0 0 - 7 %    BASOPHILS 0 0 - 1 %    IMMATURE GRANULOCYTES 1 (H) 0.0 - 0.5 %    ABS. NEUTROPHILS 10.4 (H) 1.8 - 8.0 K/UL    ABS. LYMPHOCYTES 1.3 0.8 - 3.5 K/UL    ABS. MONOCYTES 1.2 (H) 0.0 - 1.0 K/UL    ABS. EOSINOPHILS 0.0 0.0 - 0.4 K/UL    ABS. BASOPHILS 0.0 0.0 - 0.1 K/UL    ABS. IMM.  GRANS. 0.1 (H) 0.00 - 0.04 K/UL    DF AUTOMATED     METABOLIC PANEL, BASIC    Collection Time: 06/19/20  1:54 AM   Result Value Ref Range    Sodium 131 (L) 136 - 145 mmol/L    Potassium 3.5 3.5 - 5.1 mmol/L    Chloride 101 97 - 108 mmol/L    CO2 23 21 - 32 mmol/L    Anion gap 7 5 - 15 mmol/L    Glucose 137 (H) 65 - 100 mg/dL    BUN 19 6 - 20 MG/DL    Creatinine 1.45 (H) 0.55 - 1.02 MG/DL    BUN/Creatinine ratio 13 12 - 20      GFR est AA 47 (L) >60 ml/min/1.73m2    GFR est non-AA 39 (L) >60 ml/min/1.73m2    Calcium 7.7 (L) 8.5 - 10.1 MG/DL       Problem List:  Problem List as of 6/19/2020 Date Reviewed: 6/16/2020          Codes Class Noted - Resolved Hyponatremia ICD-10-CM: E87.1  ICD-9-CM: 276.1  2020 - Present        * (Principal) Pyelonephritis ICD-10-CM: N12  ICD-9-CM: 590.80  2020 - Present        Sepsis ([address] ICD-10-CM: A41.9  ICD-9-CM: 038.9, 995.91  2020 - Present        Obstructive pyelonephritis ICD-10-CM: N11.1  ICD-9-CM: 590.80  2020 - Present        UTI (urinary tract infection) ICD-10-CM: N39.0  ICD-9-CM: 599.0  2020 - Present        Elevated lipase ICD-10-CM: R74.8  ICD-9-CM: 790.5  2020 - Present        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 9) ICD-10-CM: E66.01  ICD-9-CM: 278.01  2018 - Present        Depression with anxiety ICD-10-CM: F41.8  ICD-9-CM: 300.4  2018 - Present        Kidney stones ICD-10-CM: N20.0  ICD-9-CM: 592.0  Unknown - Present        EDGARD virus antibody positive ICD-10-CM: R76.8  ICD-9-CM: 795.79  Unknown - Present        Tubular adenoma of colon ICD-10-CM: D12.6  ICD-9-CM: 211.3  Unknown - Present        Single delivery by  ICD-10-CM: O82  ICD-9-CM: 669.71  2015 - Present        Gestational hypertension ICD-10-CM: O13.9  ICD-9-CM: 642.30  2/10/2015 - Present        AMA (advanced maternal age) primigravida 35+ ICD-10-CM: O09.519  ICD-9-CM: 659.50  2/10/2015 - Present        [facility] ICD-10-CM: G35  ICD-9-CM: 702  2013 - Present        Optic neuritis, right ICD-10-CM: H46.9  ICD-9-CM: 377.30  2013 - Present        Dehydration, moderate ICD-10-CM: E86.0  ICD-9-CM: 276.51  2013 - Present        Dysphagia ICD-10-CM: R13.10  ICD-9-CM: 787.20  2013 - Present        Meralgia paresthetica of right side ICD-10-CM: G57.11  ICD-9-CM: 355.1  2013 - Present Migraine with aura, without mention of intractable migraine without mention of status migrainosus ICD-10-CM: G43.109  ICD-9-CM: 346.00  5/31/2013 - Present        Dysplastic colon polyp ICD-10-CM: K63.5  ICD-9-CM: 211.3  Unknown - Present        [facility] ICD-10-CM: G35  ICD-9-CM: 340  Unknown - Present        Depression ICD-10-CM: F32.9  ICD-9-CM: 311  Unknown - Present        Asthma ICD-10-CM: J45.909  ICD-9-CM: 493.90  Unknown - Present        Elevated blood pressure ICD-10-CM: DDR5055  ICD-9-CM: [place]  7/20/2011 - Present        Vitamin D deficiency ICD-10-CM: E55.9  ICD-9-CM: 268.9  7/20/2011 - Present        Depression ICD-10-CM: F32.9  ICD-9-CM: 563  Unknown - Present        RESOLVED: Decreased fetal movement ICD-10-CM: O36.8190  ICD-9-CM: 655.70  2/10/2015 - 2/11/2015        RESOLVED: Decreased fetal movement in pregnancy ICD-10-CM: O36.8190  ICD-9-CM: 655.70  2/10/2015 - 2/11/2015              Medications reviewed  Current Facility-Administered Medications   Medication Dose Route Frequency    acetaminophen (TYLENOL) tablet 650 mg  650 mg Oral Q4H PRN    ibuprofen (MOTRIN) tablet 400 mg  400 mg Oral Q6H PRN    ondansetron (ZOFRAN ODT) tablet 4 mg  4 mg Oral Q6H PRN    vancomycin (VANCOCIN) 1250 mg in  ml infusion  1,250 mg IntraVENous Q16H    oxyCODONE-acetaminophen (PERCOCET) 5-325 mg per tablet 1 Tab  1 Tab Oral Q4H PRN    cefTRIAXone (ROCEPHIN) 1 g in 0.9% sodium chloride (MBP/ADV) 50 mL  1 g IntraVENous Q24H    buPROPion SR (WELLBUTRIN SR) tablet 150 mg  150 mg Oral BID    escitalopram oxalate (LEXAPRO) tablet 20 mg  20 mg Oral DAILY    gabapentin (NEURONTIN) capsule 600 mg  600 mg Oral BID    metoprolol succinate (TOPROL-XL) XL tablet 100 mg  100 mg Oral QPM    teriflunomide (AUBAGIO) tablet 14 mg (Patient Supplied)  14 mg Oral QPM    sucralfate (CARAFATE) tablet 0.5 g  0.5 g Oral AC&HS    lactated Ringers infusion  75 mL/hr IntraVENous CONTINUOUS    pantoprazole (PROTONIX) tablet 40 mg  40 mg Oral DAILY    Vancomycin- pharmacy to dose   Other Rx Dosing/Monitoring    tamsulosin (FLOMAX) capsule 0.4 mg  0.4 mg Oral QHS    sodium chloride (NS) flush 5-40 mL  5-40 mL IntraVENous Q8H    sodium chloride (NS) flush 5-40 mL  5-40 mL IntraVENous PRN       Care Plan discussed with: Patient/family, nurse      Eloina Robison, NP  Hospitalist  Providers can reach me on [employer]

## 2020-06-19 NOTE — PROGRESS NOTES
Problem: Pain  Goal: *Control of Pain  Outcome: Progressing Towards Goal     Problem: Discharge Planning  Goal: *Discharge to safe environment  Description: See cm note.     David Lundberg ANDREW [facility]     Outcome: Progressing Towards Goal

## 2020-06-20 ENCOUNTER — APPOINTMENT (OUTPATIENT)
Dept: CT IMAGING | Age: 45
DRG: 872 | End: 2020-06-20
Attending: NURSE PRACTITIONER
Payer: COMMERCIAL

## 2020-06-20 LAB
ANION GAP SERPL CALC-SCNC: 7 MMOL/L (ref 5–15)
BACTERIA SPEC CULT: ABNORMAL
BACTERIA SPEC CULT: ABNORMAL
BASOPHILS # BLD: 0 K/UL (ref 0–0.1)
BASOPHILS NFR BLD: 1 % (ref 0–1)
BUN SERPL-MCNC: 16 MG/DL (ref 6–20)
BUN/CREAT SERPL: 14 (ref 12–20)
CALCIUM SERPL-MCNC: 8.3 MG/DL (ref 8.5–10.1)
CC UR VC: ABNORMAL
CHLORIDE SERPL-SCNC: 106 MMOL/L (ref 97–108)
CO2 SERPL-SCNC: 24 MMOL/L (ref 21–32)
CREAT SERPL-MCNC: 1.15 MG/DL (ref 0.55–1.02)
DIFFERENTIAL METHOD BLD: ABNORMAL
EOSINOPHIL # BLD: 0 K/UL (ref 0–0.4)
EOSINOPHIL NFR BLD: 0 % (ref 0–7)
ERYTHROCYTE [DISTWIDTH] IN BLOOD BY AUTOMATED COUNT: 13.9 % (ref 11.5–14.5)
GLUCOSE SERPL-MCNC: 103 MG/DL (ref 65–100)
HCT VFR BLD AUTO: 30.2 % (ref 35–47)
HGB BLD-MCNC: 9.4 G/DL (ref 11.5–16)
IMM GRANULOCYTES # BLD AUTO: 0.1 K/UL (ref 0–0.04)
IMM GRANULOCYTES NFR BLD AUTO: 1 % (ref 0–0.5)
LYMPHOCYTES # BLD: 1 K/UL (ref 0.8–3.5)
LYMPHOCYTES NFR BLD: 12 % (ref 12–49)
MCH RBC QN AUTO: 27.4 PG (ref 26–34)
MCHC RBC AUTO-ENTMCNC: 31.1 G/DL (ref 30–36.5)
MCV RBC AUTO: 88 FL (ref 80–99)
MONOCYTES # BLD: 1.1 K/UL (ref 0–1)
MONOCYTES NFR BLD: 13 % (ref 5–13)
NEUTS SEG # BLD: 6.1 K/UL (ref 1.8–8)
NEUTS SEG NFR BLD: 73 % (ref 32–75)
NRBC # BLD: 0 K/UL (ref 0–0.01)
NRBC BLD-RTO: 0 PER 100 WBC
PLATELET # BLD AUTO: 146 K/UL (ref 150–400)
PMV BLD AUTO: 11.1 FL (ref 8.9–12.9)
POTASSIUM SERPL-SCNC: 3.8 MMOL/L (ref 3.5–5.1)
RBC # BLD AUTO: 3.43 M/UL (ref 3.8–5.2)
SERVICE CMNT-IMP: ABNORMAL
SODIUM SERPL-SCNC: 137 MMOL/L (ref 136–145)
WBC # BLD AUTO: 8.3 K/UL (ref 3.6–11)

## 2020-06-20 PROCEDURE — 74011250636 HC RX REV CODE- 250/636: Performed by: INTERNAL MEDICINE

## 2020-06-20 PROCEDURE — 74011250637 HC RX REV CODE- 250/637: Performed by: NURSE PRACTITIONER

## 2020-06-20 PROCEDURE — 36415 COLL VENOUS BLD VENIPUNCTURE: CPT

## 2020-06-20 PROCEDURE — 80048 BASIC METABOLIC PNL TOTAL CA: CPT

## 2020-06-20 PROCEDURE — 74011000258 HC RX REV CODE- 258: Performed by: INTERNAL MEDICINE

## 2020-06-20 PROCEDURE — 85025 COMPLETE CBC W/AUTO DIFF WBC: CPT

## 2020-06-20 PROCEDURE — 74011250636 HC RX REV CODE- 250/636: Performed by: NURSE PRACTITIONER

## 2020-06-20 PROCEDURE — 65270000029 HC RM PRIVATE

## 2020-06-20 RX ORDER — HEPARIN SODIUM 5000 [USP'U]/ML
5000 INJECTION, SOLUTION INTRAVENOUS; SUBCUTANEOUS EVERY 8 HOURS
Status: DISCONTINUED | OUTPATIENT
Start: 2020-06-20 | End: 2020-06-23 | Stop reason: HOSPADM

## 2020-06-20 RX ORDER — SODIUM CHLORIDE 0.9 % (FLUSH) 0.9 %
10 SYRINGE (ML) INJECTION
Status: ACTIVE | OUTPATIENT
Start: 2020-06-20 | End: 2020-06-21

## 2020-06-20 RX ADMIN — Medication 10 ML: at 13:32

## 2020-06-20 RX ADMIN — SUCRALFATE 0.5 G: 1 TABLET ORAL at 18:15

## 2020-06-20 RX ADMIN — ACETAMINOPHEN 650 MG: 325 TABLET, FILM COATED ORAL at 09:45

## 2020-06-20 RX ADMIN — BUPROPION HYDROCHLORIDE 150 MG: 150 TABLET, EXTENDED RELEASE ORAL at 08:00

## 2020-06-20 RX ADMIN — SUCRALFATE 0.5 G: 1 TABLET ORAL at 10:59

## 2020-06-20 RX ADMIN — ACETAMINOPHEN 650 MG: 325 TABLET, FILM COATED ORAL at 18:14

## 2020-06-20 RX ADMIN — ESCITALOPRAM OXALATE 20 MG: 10 TABLET ORAL at 08:01

## 2020-06-20 RX ADMIN — BUPROPION HYDROCHLORIDE 150 MG: 150 TABLET, EXTENDED RELEASE ORAL at 21:17

## 2020-06-20 RX ADMIN — SUCRALFATE 0.5 G: 1 TABLET ORAL at 22:00

## 2020-06-20 RX ADMIN — GABAPENTIN 600 MG: 300 CAPSULE ORAL at 21:17

## 2020-06-20 RX ADMIN — PANTOPRAZOLE SODIUM 40 MG: 40 TABLET, DELAYED RELEASE ORAL at 08:00

## 2020-06-20 RX ADMIN — Medication 10 ML: at 05:00

## 2020-06-20 RX ADMIN — IBUPROFEN 400 MG: 400 TABLET, FILM COATED ORAL at 23:28

## 2020-06-20 RX ADMIN — BACLOFEN 10 MG: 10 TABLET ORAL at 06:59

## 2020-06-20 RX ADMIN — METOPROLOL SUCCINATE 100 MG: 50 TABLET, EXTENDED RELEASE ORAL at 18:14

## 2020-06-20 RX ADMIN — HEPARIN SODIUM 5000 UNITS: 5000 INJECTION INTRAVENOUS; SUBCUTANEOUS at 21:16

## 2020-06-20 RX ADMIN — VANCOMYCIN HYDROCHLORIDE 1250 MG: 10 INJECTION, POWDER, LYOPHILIZED, FOR SOLUTION INTRAVENOUS at 04:00

## 2020-06-20 RX ADMIN — TAMSULOSIN HYDROCHLORIDE 0.4 MG: 0.4 CAPSULE ORAL at 21:16

## 2020-06-20 RX ADMIN — PIPERACILLIN AND TAZOBACTAM 3.38 G: 3; .375 INJECTION, POWDER, LYOPHILIZED, FOR SOLUTION INTRAVENOUS at 15:34

## 2020-06-20 RX ADMIN — GABAPENTIN 600 MG: 300 CAPSULE ORAL at 08:01

## 2020-06-20 RX ADMIN — Medication 10 ML: at 21:20

## 2020-06-20 RX ADMIN — SUCRALFATE 0.5 G: 1 TABLET ORAL at 06:59

## 2020-06-20 RX ADMIN — PIPERACILLIN AND TAZOBACTAM 3.38 G: 3; .375 INJECTION, POWDER, LYOPHILIZED, FOR SOLUTION INTRAVENOUS at 23:11

## 2020-06-20 RX ADMIN — IBUPROFEN 400 MG: 400 TABLET, FILM COATED ORAL at 08:00

## 2020-06-20 RX ADMIN — SODIUM CHLORIDE, SODIUM LACTATE, POTASSIUM CHLORIDE, AND CALCIUM CHLORIDE 1000 ML: 600; 310; 30; 20 INJECTION, SOLUTION INTRAVENOUS at 13:34

## 2020-06-20 RX ADMIN — ONDANSETRON 4 MG: 4 TABLET, ORALLY DISINTEGRATING ORAL at 08:00

## 2020-06-20 RX ADMIN — HEPARIN SODIUM 5000 UNITS: 5000 INJECTION INTRAVENOUS; SUBCUTANEOUS at 13:29

## 2020-06-20 RX ADMIN — BACLOFEN 10 MG: 10 TABLET ORAL at 18:52

## 2020-06-20 NOTE — PROGRESS NOTES
Hospitalist Progress Note          Janeen Zepeda NP  Please call  and page for questions. Call physician on-call through the  7pm-7am    Daily Progress Note: 6/20/2020    Primary care Javid Gao MD    Date of admission: 6/17/2020  9:35 PM    Admission Summary and Hospital Course:      From H&P 6/18/2020:  \"Veronica Hogan is a 54 y.o. female with h/o HTN presents to the ED c/o fever and left flank pain. She was today at her surgeon office when the temp was taken and it was 103. She was sent home but despite taking tylenol she was not feeling well. For the last several days she has been experiencing worsening left flank pain. She recently had a left urter stent removal and was doing well until 4 days ago. She has been taking dilaudid prn for pain control. In the ed she was found to be febrile. CT of the abd/pelv showed hydronephrosis, and perinephric stranding of the left kidneysAnd small kidney stone. She was started on IV antibiotics with ceftriaxone IV. \"      Subjective:   Pt seen today in NAD. No headache today. Improved abd and flank pain. Reports some tremors and is concerned her MS is starting to flair. Requests neurology consult. Again spiking fevers up to 102. Otherwise denies visual changes, dizziness, CP, sob, cough, abd pain, n/v/d.      Assessment/Plan:       Sepsis: POA temp 101.7/WBCs 14/abnormal UA  -d/t pyelo  -BC NGTD, urine culture +ENTEROCOCCUS FAECALIS  -Received rocephin in ED; continue rocephin and vanc pending sensitivity  -Seen by urology; repeat upper tract imagine prior to DC; if not responding to abx 48-72h get CT A/P imaging W WO to r/o renal abscess  -plan to get CT A/P W WO today  -Consult ID for further recs     NORBERTO: POA creat/GFR 1.34/43 (baseline normal kidney function)  -improving  -IVF bolus today  -trend creat  -avoid nephrotoxic agents    Hyponatremia  -Resolved    Headache  -Resolved  -Continue PRN tylenol-so far not effective  -PRN fioricet    Hx HTN  -Stable  -Continue metoprolol    Hx Peptic ulcer dx: recently elevated lipase  -continue carafate, protonix    Hx MS  -Monitor for flare 2/2 infection  -continue Aubagio, neurontin  -Prn baclofen  -Neuro consult    Hx depression  -Continue wellbutrin, lexapro    See orders for other plans. Diet: AHA  VTE prophylaxis:  heparin  Code status: FULL  Discussed plan of care with: Patient/Family and Nurse. Pre-admission: lived at home. Discharge planning: pending. Emergency Contact(s):  Extended Emergency Contact Information  Primary Emergency Contact: Ailin Ambrosio Moreira Phone: [phone]  Relation: Spouse  Secondary Emergency Contact: Jasmin Oconnor  Address: [address] [place] [address] Phone: [phone]  Mobile Phone: [phone]  Relation: Mother          Review of Systems:     Full ROS complete with pertinent positives and negatives as per HPI, otherwise negative  Objective:   Physical Exam:     Visit Vitals  BP (!) 183/91 (BP 1 Location: Right arm, BP Patient Position: At rest)   Pulse 96   Temp 98.9 °F (37.2 °C)   Resp 19   LMP 2020   SpO2 97%   Breastfeeding No      O2 Device: Room air    Temp (24hrs), Av.1 °F (37.3 °C), Min:97.5 °F (36.4 °C), Max:102.4 °F (39.1 °C)    701 - 1900  In: 240 [P.O.:240]  Out: -    1901 -  0700  In: -   Out: 1200 [Urine:1200]      General:  Alert, cooperative, no distress, appears stated age, morbidly obese   Lungs:   Clear lung sounds with diminished bases   Heart:  Regular rate and rhythm, S1, S2 normal, no murmur, click, rub or gallop; distant heart sounds   Abdomen:   Soft, non-distended. Bowel sounds normal; BLQ tenderness to palp; Bilat CVA tenderness   Extremities: Extremities normal, atraumatic, no cyanosis or edema.    Skin: Skin color, texture, turgor normal. No rashes or lesions Neurologic: CNII-XII intact. Data Review:       Recent Days:  Recent Labs     06/20/20  0237 06/19/20  0154 06/18/20  0947   WBC 8.3 13.0* 17.3*   HGB 9.4* 10.0* 10.9*   HCT 30.2* 31.1* 34.0*   * 147* 171     Recent Labs     06/20/20  0237 06/19/20  0154 06/18/20  0947 06/17/20  2145    131* 135* 130*   K 3.8 3.5 3.9 3.6    101 101 98   CO2 24 23 24 24   * 137* 113* 144*   BUN 16 19 23* 18   CREA 1.15* 1.45* 1.46* 1.34*   CA 8.3* 7.7* 8.1* 9.1   ALB  --   --   --  2.9*   ALT  --   --   --  26     No results for input(s): PH, PCO2, PO2, HCO3, FIO2 in the last 72 hours. 24 Hour Results:  Recent Results (from the past 24 hour(s))   CBC WITH AUTOMATED DIFF    Collection Time: 06/20/20  2:37 AM   Result Value Ref Range    WBC 8.3 3.6 - 11.0 K/uL    RBC 3.43 (L) 3.80 - 5.20 M/uL    HGB 9.4 (L) 11.5 - 16.0 g/dL    HCT 30.2 (L) 35.0 - 47.0 %    MCV 88.0 80.0 - 99.0 FL    MCH 27.4 26.0 - 34.0 PG    MCHC 31.1 30.0 - 36.5 g/dL    RDW 13.9 11.5 - 14.5 %    PLATELET 305 (L) 511 - 400 K/uL    MPV 11.1 8.9 - 12.9 FL    NRBC 0.0 0  WBC    ABSOLUTE NRBC 0.00 0.00 - 0.01 K/uL    NEUTROPHILS 73 32 - 75 %    LYMPHOCYTES 12 12 - 49 %    MONOCYTES 13 5 - 13 %    EOSINOPHILS 0 0 - 7 %    BASOPHILS 1 0 - 1 %    IMMATURE GRANULOCYTES 1 (H) 0.0 - 0.5 %    ABS. NEUTROPHILS 6.1 1.8 - 8.0 K/UL    ABS. LYMPHOCYTES 1.0 0.8 - 3.5 K/UL    ABS. MONOCYTES 1.1 (H) 0.0 - 1.0 K/UL    ABS. EOSINOPHILS 0.0 0.0 - 0.4 K/UL    ABS. BASOPHILS 0.0 0.0 - 0.1 K/UL    ABS. IMM.  GRANS. 0.1 (H) 0.00 - 0.04 K/UL    DF AUTOMATED     METABOLIC PANEL, BASIC    Collection Time: 06/20/20  2:37 AM   Result Value Ref Range    Sodium 137 136 - 145 mmol/L    Potassium 3.8 3.5 - 5.1 mmol/L    Chloride 106 97 - 108 mmol/L    CO2 24 21 - 32 mmol/L    Anion gap 7 5 - 15 mmol/L    Glucose 103 (H) 65 - 100 mg/dL    BUN 16 6 - 20 MG/DL    Creatinine 1.15 (H) 0.55 - 1.02 MG/DL    BUN/Creatinine ratio 14 12 - 20      GFR est AA >60 >60 ml/min/1.73m2    GFR est non-AA 51 (L) >60 ml/min/1.73m2    Calcium 8.3 (L) 8.5 - 10.1 MG/DL       Problem List:  Problem List as of 2020 Date Reviewed: 2020          Codes Class Noted - Resolved    Hyponatremia ICD-10-CM: E87.1  ICD-9-CM: 276.1  2020 - Present        * (Principal) Pyelonephritis ICD-10-CM: N12  ICD-9-CM: 590.80  2020 - Present        Sepsis ([address] ICD-10-CM: A41.9  ICD-9-CM: 038.9, 995.91  2020 - Present        Obstructive pyelonephritis ICD-10-CM: N11.1  ICD-9-CM: 590.80  2020 - Present        UTI (urinary tract infection) ICD-10-CM: N39.0  ICD-9-CM: 599.0  2020 - Present        Elevated lipase ICD-10-CM: R74.8  ICD-9-CM: 790.5  2020 - Present        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 9) ICD-10-CM: E66.01  ICD-9-CM: 278.01  2018 - Present        Depression with anxiety ICD-10-CM: F41.8  ICD-9-CM: 300.4  2018 - Present        Kidney stones ICD-10-CM: N20.0  ICD-9-CM: 592.0  Unknown - Present        EDGARD virus antibody positive ICD-10-CM: R76.8  ICD-9-CM: 795.79  Unknown - Present        Tubular adenoma of colon ICD-10-CM: D12.6  ICD-9-CM: 211.3  Unknown - Present        Single delivery by  ICD-10-CM: O82  ICD-9-CM: 669.71  2015 - Present        Gestational hypertension ICD-10-CM: O13.9  ICD-9-CM: 642.30  2/10/2015 - Present        AMA (advanced maternal age) primigravida 35+ ICD-10-CM: O09.519  ICD-9-CM: 659.50  2/10/2015 - Present        Multiple sclerosis exacerbation ([facility] ICD-10-CM: G35  ICD-9-CM: 584  2013 - Present        Optic neuritis, right ICD-10-CM: H46.9  ICD-9-CM: 377.30  2013 - Present        Dehydration, moderate ICD-10-CM: E86.0  ICD-9-CM: 276.51 8/12/2013 - Present        Dysphagia ICD-10-CM: R13.10  ICD-9-CM: 787.20  5/31/2013 - Present        Meralgia paresthetica of right side ICD-10-CM: G57.11  ICD-9-CM: 355.1  5/31/2013 - Present        Migraine with aura, without mention of intractable migraine without mention of status migrainosus ICD-10-CM: G43.109  ICD-9-CM: 346.00  5/31/2013 - Present        Dysplastic colon polyp ICD-10-CM: K63.5  ICD-9-CM: 211.3  Unknown - Present        MS (multiple sclerosis) ([facility] ICD-10-CM: G35  ICD-9-CM: 340  Unknown - Present        Depression ICD-10-CM: F32.9  ICD-9-CM: 311  Unknown - Present        Asthma ICD-10-CM: J45.909  ICD-9-CM: 493.90  Unknown - Present        Elevated blood pressure ICD-10-CM: OWD9153  ICD-9-CM: Delora Linn  7/20/2011 - Present        Vitamin D deficiency ICD-10-CM: E55.9  ICD-9-CM: 268.9  7/20/2011 - Present        Depression ICD-10-CM: F32.9  ICD-9-CM: 991  Unknown - Present        RESOLVED: Decreased fetal movement ICD-10-CM: W53.2574  ICD-9-CM: 655.70  2/10/2015 - 2/11/2015        RESOLVED: Decreased fetal movement in pregnancy ICD-10-CM: O36.8190  ICD-9-CM: 655.70  2/10/2015 - 2/11/2015              Medications reviewed  Current Facility-Administered Medications   Medication Dose Route Frequency    acetaminophen (TYLENOL) tablet 650 mg  650 mg Oral Q4H PRN    ibuprofen (MOTRIN) tablet 400 mg  400 mg Oral Q6H PRN    ondansetron (ZOFRAN ODT) tablet 4 mg  4 mg Oral Q6H PRN    vancomycin (VANCOCIN) 1250 mg in  ml infusion  1,250 mg IntraVENous Q16H    baclofen (LIORESAL) tablet 10 mg  10 mg Oral TID PRN    butalbital-acetaminophen-caffeine (FIORICET, ESGIC) -40 mg per tablet 1 Tab  1 Tab Oral Q4H PRN    oxyCODONE-acetaminophen (PERCOCET) 5-325 mg per tablet 1 Tab  1 Tab Oral Q4H PRN    cefTRIAXone (ROCEPHIN) 1 g in 0.9% sodium chloride (MBP/ADV) 50 mL  1 g IntraVENous Q24H    buPROPion SR (WELLBUTRIN SR) tablet 150 mg  150 mg Oral BID    escitalopram oxalate (LEXAPRO) tablet 20 mg  20 mg Oral DAILY    gabapentin (NEURONTIN) capsule 600 mg  600 mg Oral BID    metoprolol succinate (TOPROL-XL) XL tablet 100 mg  100 mg Oral QPM    teriflunomide (AUBAGIO) tablet 14 mg (Patient Supplied)  14 mg Oral QPM    sucralfate (CARAFATE) tablet 0.5 g  0.5 g Oral AC&HS    pantoprazole (PROTONIX) tablet 40 mg  40 mg Oral DAILY    Vancomycin- pharmacy to dose   Other Rx Dosing/Monitoring    tamsulosin (FLOMAX) capsule 0.4 mg  0.4 mg Oral QHS    sodium chloride (NS) flush 5-40 mL  5-40 mL IntraVENous Q8H    sodium chloride (NS) flush 5-40 mL  5-40 mL IntraVENous PRN       Care Plan discussed with: Patient/family, nurse      Timmy Awan NP  Hospitalist  Providers can reach me on [employer]

## 2020-06-20 NOTE — PROGRESS NOTES
Feeling poorly this morning, had fever spike to 102. Flank pain on left improved, mild right flank pain now. Visit Vitals  BP (!) 183/91 (BP 1 Location: Right arm, BP Patient Position: At rest)   Pulse (!) 103   Temp (!) 102 °F (38.9 °C)   Resp 19   LMP 06/01/2020   SpO2 97%   Breastfeeding No     A&O  Sym chest rise  abd nondistended    Recent Results (from the past 12 hour(s))   CBC WITH AUTOMATED DIFF    Collection Time: 06/20/20  2:37 AM   Result Value Ref Range    WBC 8.3 3.6 - 11.0 K/uL    RBC 3.43 (L) 3.80 - 5.20 M/uL    HGB 9.4 (L) 11.5 - 16.0 g/dL    HCT 30.2 (L) 35.0 - 47.0 %    MCV 88.0 80.0 - 99.0 FL    MCH 27.4 26.0 - 34.0 PG    MCHC 31.1 30.0 - 36.5 g/dL    RDW 13.9 11.5 - 14.5 %    PLATELET 979 (L) 203 - 400 K/uL    MPV 11.1 8.9 - 12.9 FL    NRBC 0.0 0  WBC    ABSOLUTE NRBC 0.00 0.00 - 0.01 K/uL    NEUTROPHILS 73 32 - 75 %    LYMPHOCYTES 12 12 - 49 %    MONOCYTES 13 5 - 13 %    EOSINOPHILS 0 0 - 7 %    BASOPHILS 1 0 - 1 %    IMMATURE GRANULOCYTES 1 (H) 0.0 - 0.5 %    ABS. NEUTROPHILS 6.1 1.8 - 8.0 K/UL    ABS. LYMPHOCYTES 1.0 0.8 - 3.5 K/UL    ABS. MONOCYTES 1.1 (H) 0.0 - 1.0 K/UL    ABS. EOSINOPHILS 0.0 0.0 - 0.4 K/UL    ABS. BASOPHILS 0.0 0.0 - 0.1 K/UL    ABS. IMM. GRANS. 0.1 (H) 0.00 - 0.04 K/UL    DF AUTOMATED     METABOLIC PANEL, BASIC    Collection Time: 06/20/20  2:37 AM   Result Value Ref Range    Sodium 137 136 - 145 mmol/L    Potassium 3.8 3.5 - 5.1 mmol/L    Chloride 106 97 - 108 mmol/L    CO2 24 21 - 32 mmol/L    Anion gap 7 5 - 15 mmol/L    Glucose 103 (H) 65 - 100 mg/dL    BUN 16 6 - 20 MG/DL    Creatinine 1.15 (H) 0.55 - 1.02 MG/DL    BUN/Creatinine ratio 14 12 - 20      GFR est AA >60 >60 ml/min/1.73m2    GFR est non-AA 51 (L) >60 ml/min/1.73m2    Calcium 8.3 (L) 8.5 - 10.1 MG/DL       A/P: 55 yo F with left pyelonephritis after stent removal.  D/w patient cyclic fever typical with pyelo, would not replace stent. Being treated appropriately with abx, tailor to cultures. Hydrate, tylenol, may benefit from neuro consult for possible MS flair. No objection to steroids. Call with questions.

## 2020-06-20 NOTE — PROGRESS NOTES
Problem: Pain  Goal: *Control of Pain  Outcome: Progressing Towards Goal     Problem: Discharge Planning  Goal: *Discharge to safe environment  Description: See cm note. Leandra Reyes [facility]     Outcome: Progressing Towards Goal     Problem: Falls - Risk of  Goal: *Absence of Falls  Description: Document Tay Host Fall Risk and appropriate interventions in the flowsheet.   Outcome: Progressing Towards Goal  Note: Fall Risk Interventions:            Medication Interventions: Evaluate medications/consider consulting pharmacy, Patient to call before getting OOB         History of Falls Interventions: Door open when patient unattended         Problem: Patient Education: Go to Patient Education Activity  Goal: Patient/Family Education  Outcome: Progressing Towards Goal

## 2020-06-20 NOTE — PROGRESS NOTES
Day #3 of Vancomycin  Indication: sepsis/pyelonephritis  -s/p stent removal  Current regimen: vanc 1.25g q16h  Abx regimen: vanc, ceftriaxone  ID Following ?: NO  Concomitant nephrotoxic drugs (requires more frequent monitoring): NSAIDs (prn ibuprofen)  Frequency of BMP?: daily x1    Recent Labs     20  0237 20  0154 20  0947   WBC 8.3 13.0* 17.3*   CREA 1.15* 1.45* 1.46*   BUN 16 19 23*     Est CrCl: 75-80 ml/min  Temp (24hrs), Av.1 °F (37.3 °C), Min:97.5 °F (36.4 °C), Max:102.4 °F (39.1 °C)    Cultures:    urine cx (clean catch): > 100k cfu Enterococcus faecalis predominating, mixed urogenital juanita 10K col/ml (final)   paired blood cx: ng x2 days (prelim)    Goal trough = 10 - 15 mcg/mL    Recent trough history (date/time/level/dose/action taken):  None    Plan:  Continue current regimen. Continues to spike fevers but leukocytosis resolving. Continue to monitor. Will assess regimen once at steady state or sooner if clinically indicated. Noted improving renal function. Follow cultures. Rec to switch to amp 2g IV q4h for urine cx with amp (S) Enterococcus.      Caresse Dandy, [address] [facility]

## 2020-06-20 NOTE — PROGRESS NOTES
Per nursing, unable to get adequate IV access for contrast at this time d/t lack of good venous access and patient shivering from fever. Spoke to CT who will put the scan on hold and they ask that RN calls them once adequate IV access has been established. Ordered midline to be placed tomorrow. If able to get adequate IV access tonight, please go ahead and get CT, otherwise can wait until tomorrow.

## 2020-06-20 NOTE — PROGRESS NOTES
Problem: Falls - Risk of  Goal: *Absence of Falls  Description: Document Bradford Haddad Fall Risk and appropriate interventions in the flowsheet. Outcome: Progressing Towards Goal  Note: Fall Risk Interventions:     Call bell within patient reach. Side rails up x 2. Personal belongings within pt reach. Hourly rounding. Gripper sock when ambulating OOB.       Medication Interventions: Evaluate medications/consider consulting pharmacy

## 2020-06-20 NOTE — CONSULTS
Infectious Disease Consult Note    Reason for Consult: UTI, pyelonephritis, recent urological procedure  Date of Consultation: June 20, 2020  Date of Admission: 6/17/2020  Referring Provider: Stephanie Vazquez       HPI:      Ms Domingo Gramajo is a 54year old lady wt MS on Aubagio, optic neuritis, celiac disease,  Nephrolithiasis, S/P recent stent removal 6/15/20 and urological procedure/left lithotripsy on 6/11/2020. She reports after her stent was removed she experienced left-sided flank area pain. She also had some nausea. She has had a right ureteral stent as that she pain post removal.  Later on started spiking fevers as high as 103 and 104. She denied any cough or upper respiratory symptoms. She denied any travel history. She denies any exposure to known SARS-CoV-2 positive. She denied any diarrhea. She denies any other vaginal discharge. She denies any other hardware/instrumentation of the recent urological procedure. She denied having had a Mratini catheter recently. From chart review, she continues to have fevers. She presented with leukocytosis that is improved. She has mild thrombocytopenia that has developed. .  Renal function is mildly elevated but improving. LFTs were on admission. Lactate was 1.6. Urine analysis was +5 to. Negative for leukocyte esterase. A clean-catch urine culture is positive predominating Enterococcus faecalis that is ampicillin mixed urogenital juanita. Blood cultures negative so far. She had a CT scan of her abdomen pelvis and chest x-ray. Plans for repeat CT noted per primary team.    He was on IV ceftriaxone and vancomycin.       Past Medical History:  Past Medical History:   Diagnosis Date    Ankle fracture     Asthma     Autoimmune disease ([address]     MS    Celiac disease     Chronic pain     MS    Congenital sucrose isomaltose malabsorption     Depression     Diverticulosis of sigmoid colon     Dysplastic colon polyp     Dr. Juliet Martinez - last colonoscopy 12 - single polyp    Essential hypertension     Gestasional    GERD (gastroesophageal reflux disease)     Influenza B 2017    EDGARD virus antibody positive     Kidney stones     Miscarriage         [facility])     Dr. Miki Frank    Obstructive pyelonephritis 2020    Ovarian cyst     PUD (peptic ulcer disease)     Pyelonephritis     Routine gynecological examination     Dr. Rashmi Ayala Sleep apnea     pt states she no longer has the problem since wt loss - intentional wt loss    Tubular adenoma of colon 2016    Uterine fibroid     Vitamin D deficiency 2011         Surgical History:  Past Surgical History:   Procedure Laterality Date    COLONOSCOPY N/A 2019    COLONOSCOPY/EGD (LATEX ALLERGY) performed by Holly Cook MD at [address] [facility]      double compound fx of right lower leg and dislocated heel - has a plate and 13 screws    HX  SECTION          HX COLONOSCOPY      HX ENDOSCOPY      HX GYN      fibroid tumor ovarian cyst     HX ORTHOPAEDIC      1992 Left shoulder surgery    MS BIOPSY OF BREAST, INCISIONAL           Family History:   Family History   Problem Relation Age of Onset    Cancer Father         appendix/cancerous colon polyps    Heart Disease Father         cabg age early 66's   Debra Nancy Cancer Maternal Uncle         prostate/melanoma    Cancer Maternal Grandmother         cancerous colon polyps    Cancer Maternal Grandfather         prostate    Heart Disease Paternal Grandmother     Cancer Paternal Grandmother         cancerous colon polyps    No Known Problems Daughter     Colon Polyps Mother         precancerous    Atrial Fibrillation Mother     Heart Disease Paternal Aunt          Social History:     · Living Situation: At home with  and daughter, says she is able  · Tobacco: Denied  · Alcohol: Denied  · Illicit Drugs: Denied  · Sexual History: Says she has not been sexually active recently. · Travel History denied  · Exposures:   · Outdoor/Hiking: denied  · Animal/Pet: Denied  · Construction: denied  · Hot Tub: denied   · Brackish/stagnant exposure: denied      Allergies: Allergies   Allergen Reactions    Latex Rash    Adhesive Rash    Motrin [Ibuprofen] Nausea Only         Review of Systems:     Gen: Positive for fevers   HEENT: Negative for headache, vision changes, ear ache or discharge, tingling,  nasal discharge, swelling, lumps in neck, sores on tongue   CV:  Negative for chest pain, dyspnea on exertion, leg edema   Lungs: Negative for shortness of breath, cough, wheezing   Abdomen: Negative for nausea, vomiting, diarrhea, constipation, positive for  left-sided lower abdominal pain   Genitourinary: Positive left-sided flank pain   Neuro: Negative for headache, numbness, tingling, extremity weakness,  syncope, seizures    Skin: Negative for rash, sores/open wounds   Musculoskeletal: Negative for joint pain, joint swelling, joint erythema    Endocrine: Negative for high or low blood sugars, heat or cold intolerance    Psych: Negative for manic behavior     Medications:  No current facility-administered medications on file prior to encounter. Current Outpatient Medications on File Prior to Encounter   Medication Sig Dispense Refill    buPROPion XL (WELLBUTRIN XL) 300 mg XL tablet Take 1 Tab by mouth every morning. 30 Tab 5    teriflunomide (Aubagio) 14 mg tablet Take 14 mg by mouth every evening.  metoprolol succinate (TOPROL-XL) 100 mg tablet Take 100 mg by mouth every evening.  escitalopram oxalate (LEXAPRO) 20 mg tablet TAKE ONE TABLET BY MOUTH ONE TIME DAILY 30 Tab 5    pantoprazole (PROTONIX) 40 mg tablet Take 40 mg by mouth daily.  Bifidobacterium Infantis (ALIGN) 4 mg cap Take 1 Cap by mouth nightly.  fexofenadine (ALLEGRA) 180 mg tablet Take 180 mg by mouth every evening.       gabapentin (NEURONTIN) 300 mg capsule Take 600 mg by mouth two (2) times a day. 1caps twice a day      soy nuipszs-ntexuhh-Lhpczr anthony 56- mg cap Take 1 Cap by mouth daily. 30 Cap 5    sucralfate (CARAFATE) 100 mg/mL suspension Take 1 tsp by mouth four (4) times daily.  omega 3-dha-epa-fish oil 183.3 mg-75 mg -91.6 mg-306 mg cap Take 4 Caps by mouth daily. (Patient taking differently: Take 2 Caps by mouth two (2) times a day.) 120 Cap 5    fluticasone (FLONASE) 50 mcg/actuation nasal spray 2 Sprays by Both Nostrils route daily. (Patient taking differently: 2 Sprays by Both Nostrils route as needed.) 1 Bottle 5    albuterol (PROVENTIL HFA, VENTOLIN HFA, PROAIR HFA) 90 mcg/actuation inhaler Take 2 Puffs by inhalation every four (4) hours as needed for Wheezing or Shortness of Breath. 1 Inhaler 1    cholecalciferol, vitamin D3, (VITAMIN D3) 2,000 unit tab Take 5,000 Units by mouth daily.              Physical Exam:    Vitals:   Patient Vitals for the past 24 hrs:   Temp Pulse Resp BP SpO2   06/20/20 1100 98.9 °F (37.2 °C)       06/20/20 0946 100.2 °F (37.9 °C) 96      06/20/20 0919 (!) 102 °F (38.9 °C)       06/20/20 0823 (!) 102.4 °F (39.1 °C) (!) 103 19 (!) 183/91 97 %   06/20/20 0659 97.6 °F (36.4 °C)       06/20/20 0219 97.5 °F (36.4 °C) 73 16 112/68 97 %   06/19/20 2347 99.2 °F (37.3 °C)       06/19/20 2322 99.1 °F (37.3 °C)       06/19/20 2103 98 °F (36.7 °C)       06/19/20 2037 98.6 °F (37 °C) 85 17 137/84 100 %   06/19/20 1733 97.8 °F (36.6 °C)       06/19/20 1624 98.2 °F (36.8 °C)       ·   · GEN: NAD  · HEENT:no scleral icterus,  no cervical lymphadenopathy, no sinus tenderness, no thrush, dentition fair  · CV: S1, S2 heard regularly   · Lungs: Clear to auscultation bilaterally  · Abdomen: soft, non distended, non tender, + L sided flank tenderness, + L lower quadrant abd tenderness, no vesicles, rash, or erythema of area   · Genitourinary:  no funk  · Extremities: no edema  · Neuro: Alert, oriented to self, time, place and situation, moves all extremities to commands, verbal   · Skin: no rash  · Psych: good affect, good eye contact, non tearful   · MSK no edema erythema of the knees or ankles noted, no point tenderness to spine palpation      Labs:   Recent Results (from the past 24 hour(s))   CBC WITH AUTOMATED DIFF    Collection Time: 06/20/20  2:37 AM   Result Value Ref Range    WBC 8.3 3.6 - 11.0 K/uL    RBC 3.43 (L) 3.80 - 5.20 M/uL    HGB 9.4 (L) 11.5 - 16.0 g/dL    HCT 30.2 (L) 35.0 - 47.0 %    MCV 88.0 80.0 - 99.0 FL    MCH 27.4 26.0 - 34.0 PG    MCHC 31.1 30.0 - 36.5 g/dL    RDW 13.9 11.5 - 14.5 %    PLATELET 774 (L) 550 - 400 K/uL    MPV 11.1 8.9 - 12.9 FL    NRBC 0.0 0  WBC    ABSOLUTE NRBC 0.00 0.00 - 0.01 K/uL    NEUTROPHILS 73 32 - 75 %    LYMPHOCYTES 12 12 - 49 %    MONOCYTES 13 5 - 13 %    EOSINOPHILS 0 0 - 7 %    BASOPHILS 1 0 - 1 %    IMMATURE GRANULOCYTES 1 (H) 0.0 - 0.5 %    ABS. NEUTROPHILS 6.1 1.8 - 8.0 K/UL    ABS. LYMPHOCYTES 1.0 0.8 - 3.5 K/UL    ABS. MONOCYTES 1.1 (H) 0.0 - 1.0 K/UL    ABS. EOSINOPHILS 0.0 0.0 - 0.4 K/UL    ABS. BASOPHILS 0.0 0.0 - 0.1 K/UL    ABS. IMM.  GRANS. 0.1 (H) 0.00 - 0.04 K/UL    DF AUTOMATED     METABOLIC PANEL, BASIC    Collection Time: 06/20/20  2:37 AM   Result Value Ref Range    Sodium 137 136 - 145 mmol/L    Potassium 3.8 3.5 - 5.1 mmol/L    Chloride 106 97 - 108 mmol/L    CO2 24 21 - 32 mmol/L    Anion gap 7 5 - 15 mmol/L    Glucose 103 (H) 65 - 100 mg/dL    BUN 16 6 - 20 MG/DL    Creatinine 1.15 (H) 0.55 - 1.02 MG/DL    BUN/Creatinine ratio 14 12 - 20      GFR est AA >60 >60 ml/min/1.73m2    GFR est non-AA 51 (L) >60 ml/min/1.73m2    Calcium 8.3 (L) 8.5 - 10.1 MG/DL       Microbiology Data:       Blood: 6/18/20  Component Value Ref Range & Units Status   Special Requests: NO SPECIAL REQUESTS    Preliminary   Culture result: NO GROWTH 2 DAYS              Urine: 6/18/20  Clean catch; Urine         Component Value Ref Range & Units Status   Special Requests: NO SPECIAL REQUESTS    Final   [place] Count >100,000   COLONIES/mL    Final   Culture result: Abnormal     Final   ENTEROCOCCUS FAECALIS (PREDOMINATING)    Culture result:    Final   MIXED UROGENITAL MATT ISOLATED . Em Thurston .(10,000 COLONIES/mL)    Susceptibility      Enterococcus faecalis     KARENA    Ampicillin ($) <=2 ug/mL S    Ciprofloxacin ($) <=0.5 ug/mL S    Daptomycin ($$$$$) 2 ug/mL S    Levofloxacin ($) 1 ug/mL S    Linezolid ($$$$$) 2 ug/mL S    Nitrofurantoin <=16 ug/mL S    Tetracycline >=16 ug/mL R    Vancomycin ($) 1 ug/mL S            Pathology Results:  8/1/19     FINAL PATHOLOGIC DIAGNOSIS   1. Small bowel, duodenum, biopsy:   No histopathologic abnormality. 2. Stomach, biopsy:   Mild chronic gastritis. 3. Esophagus, biopsy:   Esophagitis with increased intraepithelial eosinophils (up to 4/hpf). 4. Large bowel, ascending, biopsy:   Sessile serrated polyp. 5. Large bowel, transverse, biopsy:   Sessile serrated polyp. 6. Large bowel, sigmoid, biopsy:   Sessile serrated polyp. Imaging:   CT A/P WO contrast 6/17/20  FINDINGS:     The visualized lung bases are clear.     Abdomen:      Liver: The liver is normal on noncontrast images.      Spleen: The spleen is normal on noncontrast images.      Adrenals: The adrenals are normal on noncontrast images.      Pancreas: The pancreas is normal on noncontrast images.      Gallbladder: The gallbladder is normal on noncontrast images.      Kidneys: There is left perinephric stranding, left hydronephrosis and left  hydroureter. No ureteral calculus is seen. There are several nonobstructing left  renal calculi, each measuring approximately 1 mm in size. There is no right  hydronephrosis.     Bowel: No thickened or dilated loop of large or small bowel seen.      Appendix: The appendix is normal.     Pelvis: Urinary bladder is partially filled and grossly normal.     Miscellaneous: There is no free intraperitoneal gas or fluid.  There is no focal  fluid collection to suggest abscess. There is a 4.1 cm x 3.4 cm left ovarian  cyst, measuring 3.4 cm x 4.4 cm in prior CT dated August 2016.     IMPRESSION  IMPRESSION: Left perinephric stranding, left hydronephrosis and left  hydroureter. No obstructing ureteral calculus visualized. CXR 6/17/20  FINDINGS:   The lungs are clear. The central airways are patent. No pneumothorax or pleural  effusion.      IMPRESSION  IMPRESSION:   Clear lungs      Procedures:   L lithrotripsy 6/11  Stent removal 6/15     Assessment / Plan:    Ms Maru Ordonez is a 54year old lady wt MS on Aubagio, optic neuritis, celiac disease,  Nephrolithiasis, S/P recent stent removal 6/15/20 and urological procedure/left lithotripsy on 6/11/2020. 1) L pyelonephritis, UTI, recent urological procedure/stent removal 6/15/20  Urine cx wt Ampicillin sensitive Enterococcus faecalis and mixed urogenital juanita  Stop ceftriaxone and stop IV vancomycin  Start renally dose Zosyn. Patient denies any penicillin allergy  Noted repeat CT ordered to assess for abscess/fluid collection. If any present would recommend drainage  Blood cultures so far negative  Pain control and antipyretics per primary team    2)MS   on Aubagio  Infections can trigger MS flares, continue to monitor  Noted seen by neurology    3) history of optic neuritis    4) chart reported history of celiac disease    5) nephrolithiasis history    6) mild thrombocytopenia  Could be secondary to antibiotics or infection  Continue to monitor    7) DVT per primary team    Thank for the opportunity to participate in the care of this patient. Please contact with questions or concerns.        Danial Neff,   3:35 PM

## 2020-06-20 NOTE — CONSULTS
Neuro consult completed. Dictated note to follow. Pt c/o cramping across her abdomen and diffuse intermittent shaking. Exam with APD on right, dec PP in left foot - chronic, no sensory level on trunk, o/w unremarkable. Low suspicion for MS as etiology of sxs, most likely related to sepsis. Pt's with MS and fever can have pseudoexacerbation, but no findings on exam to suggest this either, no h/o cord lesions that the pt can recall. Please call if pt has any new neurological deficits.

## 2020-06-20 NOTE — CONSULTS
[address]    Name:  Rafiq Wells  MR#:  [id number]  :  1975  ACCOUNT #:  [de-identified]  DATE OF SERVICE:  2020    NEUROLOGY CONSULTATION    HISTORY OF PRESENT ILLNESS:  This is a 70-year-old right-handed female, with history of MS, who was admitted 2020 with fever and left flank pain, with recent history of left ureter stent, found on CT of the abdomen and pelvis to have hydronephrosis and perinephric stranding, who was admitted with sepsis secondary to pyelonephritis. The patient is concerned she might be having an MS exacerbation due to having generalized shaking that was uncontrollable, despite being afebrile at the time. She also experienced cramping across her abdomen associated with this shaking. The patient has read about the [facility] hug,\" and was concerned maybe that was what was going on. The patient cannot remember the last exacerbation she has had, well-controlled on Aubagio, and followed by Dr. Brigitte Pandey at [facility]. She does not recall ever having been told she has spinal cord lesions. The patient denies any new numbness, has the chronic numbness in her left foot that Dr. Brigitte Pandey is aware of. PAST MEDICAL HISTORY:  1. Optic neuritis on the right, 2015. 2.  MS, diagnosed by Dr. Brigitte Pandey at [facility], with MRI and LP consistent with this. 3.  Depression. 4.  Kidney stones. 5.  Asthma. 6.  Vitamin D deficiency. 7.  History of right ankle fracture and surgery. 8.  Ovarian cyst/fibroid surgery. 9.  Gastroesophageal reflux disease. 10.  Diverticulosis. 11.  Miscarriage, 2013. 12.  Hypertension. 13.  EDGARD virus antibody positive. 14.  Obstructive sleep apnea, not on CPAP. 15.  Peptic ulcer disease. 16.  Celiac disease. 17.  Chronic pain. 18.  Status post left shoulder surgery. 19.  Status post . REVIEW OF SYSTEMS:  Per past medical history or HPI, otherwise reviewed and negative. MEDICATIONS AT HOME:  1.   Wellbutrin  mg a day. 2.  Aubagio. 3.  Metoprolol. 4.  Lexapro. 5.  Protonix. 6.  Flonase. 7.  Allegra. 8.  Gabapentin 600 mg b.i.d.  9.  Albuterol. 10.  Vitamin D. 11.  Carafate. 12.  Here, she has been started on Zosyn. ALLERGIES:  LATEX, MOTRIN. SOCIAL HISTORY:  Lives in [place] with her  and daughter. She is on disability for her MS. No tobacco.  Rare alcohol. No drug use. FAMILY HISTORY:  Father has ankylosing spondylitis, history of appendix, colon cancer, and heart disease, status post CABG. Mom with history of AFib. Daughter healthy, sister healthy. PHYSICAL EXAMINATION:  VITAL SIGNS:  Blood pressure this morning 183/91, pulse 103, respiratory rate 19, satting 97%, temperature was 102.4. She weighs 237 pounds, is 5 feet 6 inches. GENERAL:  She is A well-nourished, well-developed, obese female, lying in bed in no distress. HEART:  Regular rate and rhythm. No murmurs. EXTREMITIES:  Warm. 2+ radial pulses. No edema. NEUROLOGIC:  MENTAL STATUS:  Alert, oriented x4. Speech and language are intact. Attention, memory, and fund of knowledge appropriate. The patient is pleasant and appropriate. CRANIAL NERVES: No facial asymmetry or ptosis. Extraocular eye movements are intact without nystagmus or diplopia. Visual fields full. Pupils round and reactive with right APD. Sensation, hearing intact. MOTOR: 5/5 throughout. No pronator drift. No tremors. SENSORY: intact to light touch and pinprick throughout except left foot and no sensory level on her trunk. COORDINATION: intact finger-to-nose, heel-to-shin, rapid alternating movements. GAIT:not assessed at this time. REFLEXES: 2+ in the upper extremities, trace in the lower extremities, and her toes were downgoing.     STUDIES AND REPORTS:  In addition to that mentioned above, her hemoglobin is 9.4, platelet count 482, creatinine is 1.15.    ASSESSMENT AND PLAN:  This is a 58-year-old right-handed female with multiple sclerosis, admitted with sepsis secondary to pyelonephritis, with ongoing fevers despite Zosyn, with episodes of generalized shaking associated with abdominal muscle cramping. Exam reveals a right afferent pupillary defect and decreased sensation at the left foot, which is chronic, otherwise unremarkable. I have low suspicion for an multiple sclerosis exacerbation. Her symptoms are most likely related to her sepsis. Patients with multiple sclerosis can have pseudo-exacerbation associated with fever, but she has no findings on exam suggestive of this and no history of cord lesions that she can recall. The patient was reassured and instructed to make the nursing staff aware if she does develop any new neurological deficits such as sudden numbness or weakness of an extremity or vision change. Please call if needed.       MD ARTIS Dior/S_JOSE [id number]  D:  06/20/2020 16:33  T:  06/20/2020 19:34  JOB #:  [id number]

## 2020-06-20 NOTE — PROGRESS NOTES
Day #1 of Piperacillin/tazobactam  Indication:  UTI  Current regimen:  Piperacillin/tazobactam 3.375g IV every 8 hours  Abx regimen: Piperacillin/tazobactam  Recent Labs     20  0237 20  0154 20  0947   WBC 8.3 13.0* 17.3*   CREA 1.15* 1.45* 1.46*   BUN 16 19 23*     Est CrCl: 75.8 ml/min; UO: Unmeasured ml/kg/hr  Temp (24hrs), Av.2 °F (37.3 °C), Min:97.5 °F (36.4 °C), Max:102.4 °F (39.1 °C)    Cultures:    urine cx (clean catch): > 100k cfu Enterococcus faecalis predominating, mixed urogenital juanita 10K col/ml (final)   paired blood cx: ng x2 days (prelim)    Plan: Continue current regimen    **close i-vent after initial review. Remove this text prior to posting to note.

## 2020-06-21 LAB
ANION GAP SERPL CALC-SCNC: 8 MMOL/L (ref 5–15)
BASOPHILS # BLD: 0 K/UL (ref 0–0.1)
BASOPHILS NFR BLD: 1 % (ref 0–1)
BUN SERPL-MCNC: 13 MG/DL (ref 6–20)
BUN/CREAT SERPL: 14 (ref 12–20)
CALCIUM SERPL-MCNC: 8.7 MG/DL (ref 8.5–10.1)
CHLORIDE SERPL-SCNC: 109 MMOL/L (ref 97–108)
CO2 SERPL-SCNC: 23 MMOL/L (ref 21–32)
CREAT SERPL-MCNC: 0.9 MG/DL (ref 0.55–1.02)
DIFFERENTIAL METHOD BLD: ABNORMAL
EOSINOPHIL # BLD: 0.1 K/UL (ref 0–0.4)
EOSINOPHIL NFR BLD: 1 % (ref 0–7)
ERYTHROCYTE [DISTWIDTH] IN BLOOD BY AUTOMATED COUNT: 14.1 % (ref 11.5–14.5)
GLUCOSE SERPL-MCNC: 100 MG/DL (ref 65–100)
HCT VFR BLD AUTO: 28.7 % (ref 35–47)
HGB BLD-MCNC: 9.1 G/DL (ref 11.5–16)
IMM GRANULOCYTES # BLD AUTO: 0 K/UL (ref 0–0.04)
IMM GRANULOCYTES NFR BLD AUTO: 1 % (ref 0–0.5)
LYMPHOCYTES # BLD: 1.4 K/UL (ref 0.8–3.5)
LYMPHOCYTES NFR BLD: 21 % (ref 12–49)
MCH RBC QN AUTO: 28 PG (ref 26–34)
MCHC RBC AUTO-ENTMCNC: 31.7 G/DL (ref 30–36.5)
MCV RBC AUTO: 88.3 FL (ref 80–99)
MONOCYTES # BLD: 1 K/UL (ref 0–1)
MONOCYTES NFR BLD: 15 % (ref 5–13)
NEUTS SEG # BLD: 4 K/UL (ref 1.8–8)
NEUTS SEG NFR BLD: 61 % (ref 32–75)
NRBC # BLD: 0 K/UL (ref 0–0.01)
NRBC BLD-RTO: 0 PER 100 WBC
PLATELET # BLD AUTO: 164 K/UL (ref 150–400)
PMV BLD AUTO: 10.9 FL (ref 8.9–12.9)
POTASSIUM SERPL-SCNC: 3.5 MMOL/L (ref 3.5–5.1)
RBC # BLD AUTO: 3.25 M/UL (ref 3.8–5.2)
SODIUM SERPL-SCNC: 140 MMOL/L (ref 136–145)
WBC # BLD AUTO: 6.5 K/UL (ref 3.6–11)

## 2020-06-21 PROCEDURE — 74011000258 HC RX REV CODE- 258: Performed by: INTERNAL MEDICINE

## 2020-06-21 PROCEDURE — 65270000029 HC RM PRIVATE

## 2020-06-21 PROCEDURE — 36415 COLL VENOUS BLD VENIPUNCTURE: CPT

## 2020-06-21 PROCEDURE — 74011250637 HC RX REV CODE- 250/637: Performed by: NURSE PRACTITIONER

## 2020-06-21 PROCEDURE — 80048 BASIC METABOLIC PNL TOTAL CA: CPT

## 2020-06-21 PROCEDURE — 85025 COMPLETE CBC W/AUTO DIFF WBC: CPT

## 2020-06-21 PROCEDURE — 74011250636 HC RX REV CODE- 250/636: Performed by: NURSE PRACTITIONER

## 2020-06-21 PROCEDURE — 77030020365 HC SOL INJ SOD CL 0.9% 50ML

## 2020-06-21 PROCEDURE — 76937 US GUIDE VASCULAR ACCESS: CPT

## 2020-06-21 PROCEDURE — C1751 CATH, INF, PER/CENT/MIDLINE: HCPCS

## 2020-06-21 PROCEDURE — 74011250636 HC RX REV CODE- 250/636: Performed by: INTERNAL MEDICINE

## 2020-06-21 RX ADMIN — HEPARIN SODIUM 5000 UNITS: 5000 INJECTION INTRAVENOUS; SUBCUTANEOUS at 05:41

## 2020-06-21 RX ADMIN — BACLOFEN 10 MG: 10 TABLET ORAL at 07:11

## 2020-06-21 RX ADMIN — SUCRALFATE 0.5 G: 1 TABLET ORAL at 23:13

## 2020-06-21 RX ADMIN — BUTALBITAL, ACETAMINOPHEN, AND CAFFEINE 1 TABLET: 50; 325; 40 TABLET ORAL at 08:30

## 2020-06-21 RX ADMIN — IBUPROFEN 400 MG: 400 TABLET, FILM COATED ORAL at 17:23

## 2020-06-21 RX ADMIN — PANTOPRAZOLE SODIUM 40 MG: 40 TABLET, DELAYED RELEASE ORAL at 08:22

## 2020-06-21 RX ADMIN — TAMSULOSIN HYDROCHLORIDE 0.4 MG: 0.4 CAPSULE ORAL at 21:12

## 2020-06-21 RX ADMIN — PIPERACILLIN AND TAZOBACTAM 3.38 G: 3; .375 INJECTION, POWDER, LYOPHILIZED, FOR SOLUTION INTRAVENOUS at 15:21

## 2020-06-21 RX ADMIN — BUPROPION HYDROCHLORIDE 150 MG: 150 TABLET, EXTENDED RELEASE ORAL at 21:12

## 2020-06-21 RX ADMIN — GABAPENTIN 600 MG: 300 CAPSULE ORAL at 08:22

## 2020-06-21 RX ADMIN — HEPARIN SODIUM 5000 UNITS: 5000 INJECTION INTRAVENOUS; SUBCUTANEOUS at 13:35

## 2020-06-21 RX ADMIN — SUCRALFATE 0.5 G: 1 TABLET ORAL at 15:44

## 2020-06-21 RX ADMIN — Medication 10 ML: at 21:11

## 2020-06-21 RX ADMIN — SUCRALFATE 0.5 G: 1 TABLET ORAL at 07:10

## 2020-06-21 RX ADMIN — BUPROPION HYDROCHLORIDE 150 MG: 150 TABLET, EXTENDED RELEASE ORAL at 08:22

## 2020-06-21 RX ADMIN — ESCITALOPRAM OXALATE 20 MG: 10 TABLET ORAL at 08:22

## 2020-06-21 RX ADMIN — Medication 10 ML: at 05:41

## 2020-06-21 RX ADMIN — METOPROLOL SUCCINATE 100 MG: 50 TABLET, EXTENDED RELEASE ORAL at 17:23

## 2020-06-21 RX ADMIN — SUCRALFATE 0.5 G: 1 TABLET ORAL at 12:43

## 2020-06-21 RX ADMIN — PIPERACILLIN AND TAZOBACTAM 3.38 G: 3; .375 INJECTION, POWDER, LYOPHILIZED, FOR SOLUTION INTRAVENOUS at 23:14

## 2020-06-21 RX ADMIN — ACETAMINOPHEN 650 MG: 325 TABLET, FILM COATED ORAL at 07:11

## 2020-06-21 RX ADMIN — GABAPENTIN 600 MG: 300 CAPSULE ORAL at 21:12

## 2020-06-21 RX ADMIN — HEPARIN SODIUM 5000 UNITS: 5000 INJECTION INTRAVENOUS; SUBCUTANEOUS at 21:11

## 2020-06-21 NOTE — PROGRESS NOTES
Hospitalist Progress Note          Tabby Sheehan NP  Please call  and page for questions. Call physician on-call through the  7pm-7am    Daily Progress Note: 6/21/2020    Primary care Vero Valadez MD    Date of admission: 6/17/2020  9:35 PM    Admission Summary and Hospital Course:      From H&P 6/18/2020:  \"Veronica Snyder is a 54 y.o. female with h/o HTN presents to the ED c/o fever and left flank pain. She was today at her surgeon office when the temp was taken and it was 103. She was sent home but despite taking tylenol she was not feeling well. For the last several days she has been experiencing worsening left flank pain. She recently had a left urter stent removal and was doing well until 4 days ago. She has been taking dilaudid prn for pain control. In the ed she was found to be febrile. CT of the abd/pelv showed hydronephrosis, and perinephric stranding of the left kidneysAnd small kidney stone. She was started on IV antibiotics with ceftriaxone IV. \"      Subjective:   Pt seen today in NAD. No headache today. Improved abd and flank pain today. Otherwise denies visual changes, dizziness, CP, sob, cough, n/v/d. Patient now without IV access. Multiple attempts by several nurses including ICU. Needs IV access and CTA abd/pelvis today. Called nursing supervisor who states she will call vascular tech to come place midline.      Assessment/Plan:       Sepsis: POA temp 101.7/WBCs 14/abnormal UA  -improving  -d/t pyelo  -BC NGTD, urine culture +ENTEROCOCCUS FAECALIS  -ceftriaxone and vanc stopped and zosyn started per ID  -Seen by urology; repeat upper tract imagine prior to DC; if not responding to abx 48-72h get CT A/P imaging W WO to r/o renal abscess  -plan to get CT A/P W WO today once mid-line is placed  -appreciate ID input    NORBERTO: POA creat/GFR 1.34/43 (baseline normal kidney function)  -Resolved    Hyponatremia  -Resolved    Headache  -Resolved  -Continue PRN tylenol-so far not effective  -PRN fioricet    Hx HTN  -Stable  -Continue metoprolol    Hx Peptic ulcer dx: recently elevated lipase  -Stable  -continue carafate, protonix    Hx MS  -Stable  -Monitor for flare 2/2 infection  -continue Aubagio, neurontin  -Prn baclofen  -appreciate neuro input    Hx depression  -stable  -Continue wellbutrin, lexapro    See orders for other plans. Diet: AHA  VTE prophylaxis:  heparin  Code status: FULL  Discussed plan of care with: Patient/Family and Nurse. Pre-admission: lived at home. Discharge planning: pending. Emergency Contact(s):  Extended Emergency Contact Information  Primary Emergency Contact: Ailin Valente Dr Phone: [phone]  Relation: Spouse  Secondary Emergency Contact: ElvinJasmin  Address: [address] [place] [address] Phone: [phone]  Mobile Phone: [phone]  Relation: Mother          Review of Systems:     Full ROS complete with pertinent positives and negatives as per HPI, otherwise negative  Objective:   Physical Exam:     Visit Vitals  /78 (BP 1 Location: Left arm, BP Patient Position: At rest)   Pulse 87   Temp 99.2 °F (37.3 °C)   Resp 18   LMP 2020   SpO2 95%   Breastfeeding No      O2 Device: Room air    Temp (24hrs), Av °F (37.2 °C), Min:97.8 °F (36.6 °C), Max:100.5 °F (38.1 °C)    No intake/output data recorded.  1901 -  0700  In: 240 [P.O.:240]  Out: 400 [Urine:400]      General:  Alert, cooperative, no distress, appears stated age, morbidly obese   Lungs:   Clear lung sounds with diminished bases   Heart:  Regular rate and rhythm, S1, S2 normal, no murmur, click, rub or gallop; distant heart sounds   Abdomen:   Soft, non-distended.  Bowel sounds normal; BLQ tenderness to palp; Bilat CVA tenderness-improved   Extremities: Extremities normal, atraumatic, no cyanosis or edema. Skin: Skin color, texture, turgor normal. No rashes or lesions   Neurologic: CNII-XII intact. Data Review:       Recent Days:  Recent Labs     06/21/20  0340 06/20/20  0237 06/19/20  0154   WBC 6.5 8.3 13.0*   HGB 9.1* 9.4* 10.0*   HCT 28.7* 30.2* 31.1*    146* 147*     Recent Labs     06/21/20  0340 06/20/20  0237 06/19/20  0154    137 131*   K 3.5 3.8 3.5   * 106 101   CO2 23 24 23    103* 137*   BUN 13 16 19   CREA 0.90 1.15* 1.45*   CA 8.7 8.3* 7.7*     No results for input(s): PH, PCO2, PO2, HCO3, FIO2 in the last 72 hours. 24 Hour Results:  Recent Results (from the past 24 hour(s))   CBC WITH AUTOMATED DIFF    Collection Time: 06/21/20  3:40 AM   Result Value Ref Range    WBC 6.5 3.6 - 11.0 K/uL    RBC 3.25 (L) 3.80 - 5.20 M/uL    HGB 9.1 (L) 11.5 - 16.0 g/dL    HCT 28.7 (L) 35.0 - 47.0 %    MCV 88.3 80.0 - 99.0 FL    MCH 28.0 26.0 - 34.0 PG    MCHC 31.7 30.0 - 36.5 g/dL    RDW 14.1 11.5 - 14.5 %    PLATELET 797 483 - 679 K/uL    MPV 10.9 8.9 - 12.9 FL    NRBC 0.0 0  WBC    ABSOLUTE NRBC 0.00 0.00 - 0.01 K/uL    NEUTROPHILS 61 32 - 75 %    LYMPHOCYTES 21 12 - 49 %    MONOCYTES 15 (H) 5 - 13 %    EOSINOPHILS 1 0 - 7 %    BASOPHILS 1 0 - 1 %    IMMATURE GRANULOCYTES 1 (H) 0.0 - 0.5 %    ABS. NEUTROPHILS 4.0 1.8 - 8.0 K/UL    ABS. LYMPHOCYTES 1.4 0.8 - 3.5 K/UL    ABS. MONOCYTES 1.0 0.0 - 1.0 K/UL    ABS. EOSINOPHILS 0.1 0.0 - 0.4 K/UL    ABS. BASOPHILS 0.0 0.0 - 0.1 K/UL    ABS. IMM.  GRANS. 0.0 0.00 - 0.04 K/UL    DF AUTOMATED     METABOLIC PANEL, BASIC    Collection Time: 06/21/20  3:40 AM   Result Value Ref Range    Sodium 140 136 - 145 mmol/L    Potassium 3.5 3.5 - 5.1 mmol/L    Chloride 109 (H) 97 - 108 mmol/L    CO2 23 21 - 32 mmol/L    Anion gap 8 5 - 15 mmol/L    Glucose 100 65 - 100 mg/dL    BUN 13 6 - 20 MG/DL    Creatinine 0.90 0.55 - 1.02 MG/DL    BUN/Creatinine ratio 14 12 - 20      GFR est AA >60 >60 ml/min/1.73m2 GFR est non-AA >60 >60 ml/min/1.73m2    Calcium 8.7 8.5 - 10.1 MG/DL       Problem List:  Problem List as of 2020 Date Reviewed: 2020          Codes Class Noted - Resolved    Hyponatremia ICD-10-CM: E87.1  ICD-9-CM: 276.1  2020 - Present        * (Principal) Pyelonephritis ICD-10-CM: N12  ICD-9-CM: 590.80  2020 - Present        Sepsis ([address] ICD-10-CM: A41.9  ICD-9-CM: 038.9, 995.91  2020 - Present        Obstructive pyelonephritis ICD-10-CM: N11.1  ICD-9-CM: 590.80  2020 - Present        UTI (urinary tract infection) ICD-10-CM: N39.0  ICD-9-CM: 599.0  2020 - Present        Elevated lipase ICD-10-CM: R74.8  ICD-9-CM: 790.5  2020 - Present        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 9) ICD-10-CM: E66.01  ICD-9-CM: 278.01  2018 - Present        Depression with anxiety ICD-10-CM: F41.8  ICD-9-CM: 300.4  2018 - Present        Kidney stones ICD-10-CM: N20.0  ICD-9-CM: 592.0  Unknown - Present        EDGARD virus antibody positive ICD-10-CM: R76.8  ICD-9-CM: 795.79  Unknown - Present        Tubular adenoma of colon ICD-10-CM: D12.6  ICD-9-CM: 211.3  Unknown - Present        Single delivery by  ICD-10-CM: O82  ICD-9-CM: 669.71  2015 - Present        Gestational hypertension ICD-10-CM: O13.9  ICD-9-CM: 642.30  2/10/2015 - Present        AMA (advanced maternal age) primigravida 35+ ICD-10-CM: O09.519  ICD-9-CM: 659.50  2/10/2015 - Present        Multiple sclerosis exacerbation ([facility] ICD-10-CM: G35  ICD-9-CM: 964  2013 - Present        Optic neuritis, right ICD-10-CM: H46.9  ICD-9-CM: 377.30  2013 - Present        Dehydration, moderate ICD-10-CM: E86.0  ICD-9-CM: 276.51  2013 - Present Dysphagia ICD-10-CM: R13.10  ICD-9-CM: 787.20  5/31/2013 - Present        Meralgia paresthetica of right side ICD-10-CM: G57.11  ICD-9-CM: 355.1  5/31/2013 - Present        Migraine with aura, without mention of intractable migraine without mention of status migrainosus ICD-10-CM: G43.109  ICD-9-CM: 346.00  5/31/2013 - Present        Dysplastic colon polyp ICD-10-CM: K63.5  ICD-9-CM: 211.3  Unknown - Present        MS (multiple sclerosis) ([facility] ICD-10-CM: G35  ICD-9-CM: 340  Unknown - Present        Depression ICD-10-CM: F32.9  ICD-9-CM: 311  Unknown - Present        Asthma ICD-10-CM: J45.909  ICD-9-CM: 493.90  Unknown - Present        Elevated blood pressure ICD-10-CM: [id number]  ICD-9-CM: Dayne Crick  7/20/2011 - Present        Vitamin D deficiency ICD-10-CM: E55.9  ICD-9-CM: 268.9  7/20/2011 - Present        Depression ICD-10-CM: F32.9  ICD-9-CM: 036  Unknown - Present        RESOLVED: Decreased fetal movement ICD-10-CM: O36.8190  ICD-9-CM: 655.70  2/10/2015 - 2/11/2015        RESOLVED: Decreased fetal movement in pregnancy ICD-10-CM: O36.8190  ICD-9-CM: 655.70  2/10/2015 - 2/11/2015              Medications reviewed  Current Facility-Administered Medications   Medication Dose Route Frequency    heparin (porcine) injection 5,000 Units  5,000 Units SubCUTAneous Q8H    piperacillin-tazobactam (ZOSYN) 3.375 g in 0.9% sodium chloride (MBP/ADV) 100 mL  3.375 g IntraVENous Q8H    acetaminophen (TYLENOL) tablet 650 mg  650 mg Oral Q4H PRN    ibuprofen (MOTRIN) tablet 400 mg  400 mg Oral Q6H PRN    ondansetron (ZOFRAN ODT) tablet 4 mg  4 mg Oral Q6H PRN    baclofen (LIORESAL) tablet 10 mg  10 mg Oral TID PRN    butalbital-acetaminophen-caffeine (FIORICET, ESGIC) -40 mg per tablet 1 Tab  1 Tab Oral Q4H PRN    oxyCODONE-acetaminophen (PERCOCET) 5-325 mg per tablet 1 Tab  1 Tab Oral Q4H PRN    buPROPion SR (WELLBUTRIN SR) tablet 150 mg  150 mg Oral BID    escitalopram oxalate (LEXAPRO) tablet 20 mg  20 mg Oral DAILY    gabapentin (NEURONTIN) capsule 600 mg  600 mg Oral BID    metoprolol succinate (TOPROL-XL) XL tablet 100 mg  100 mg Oral QPM    teriflunomide (AUBAGIO) tablet 14 mg (Patient Supplied)  14 mg Oral QPM    sucralfate (CARAFATE) tablet 0.5 g  0.5 g Oral AC&HS    pantoprazole (PROTONIX) tablet 40 mg  40 mg Oral DAILY    tamsulosin (FLOMAX) capsule 0.4 mg  0.4 mg Oral QHS    sodium chloride (NS) flush 5-40 mL  5-40 mL IntraVENous Q8H    sodium chloride (NS) flush 5-40 mL  5-40 mL IntraVENous PRN       Care Plan discussed with: Patient/family, nurse      Moriah Paris NP  Hospitalist  Providers can reach me on Perfect[employer]

## 2020-06-21 NOTE — PROCEDURES
Midline Insertion and Progress Note    PRE-PROCEDURE VERIFICATION  Correct Procedure: yes  Correct Site:  yes  Temperature: Temp: 99.2 °F (37.3 °C), Temperature Source: Temp Source: Oral  Recent Labs     06/21/20  0340   BUN 13   CREA 0.90      WBC 6.5     Allergies: Latex; Adhesive; and Motrin [ibuprofen]    PROCEDURE DETAIL  A single lumen midline IV catheter was started for vascular access. The following documentation is in addition to the Midline properties in the lines/airways flowsheet :  Lot #: [id number]  Catheter Total Length: 13 (cm)  External Catheter Length: 0 (cm)  Circumference: 37 (cm)  Vein Selection for Midline :left brachial  Complication Related to Insertion: right cephalic and brachial vein attempted,unsuccessful. Line is okay to use.

## 2020-06-22 ENCOUNTER — TELEPHONE (OUTPATIENT)
Dept: SURGERY | Age: 45
End: 2020-06-22

## 2020-06-22 ENCOUNTER — APPOINTMENT (OUTPATIENT)
Dept: CT IMAGING | Age: 45
DRG: 872 | End: 2020-06-22
Attending: NURSE PRACTITIONER
Payer: COMMERCIAL

## 2020-06-22 LAB
ANION GAP SERPL CALC-SCNC: 9 MMOL/L (ref 5–15)
BASOPHILS # BLD: 0 K/UL (ref 0–0.1)
BASOPHILS NFR BLD: 0 % (ref 0–1)
BUN SERPL-MCNC: 9 MG/DL (ref 6–20)
BUN/CREAT SERPL: 10 (ref 12–20)
CALCIUM SERPL-MCNC: 8 MG/DL (ref 8.5–10.1)
CHLORIDE SERPL-SCNC: 106 MMOL/L (ref 97–108)
CO2 SERPL-SCNC: 27 MMOL/L (ref 21–32)
CREAT SERPL-MCNC: 0.87 MG/DL (ref 0.55–1.02)
DIFFERENTIAL METHOD BLD: ABNORMAL
EOSINOPHIL # BLD: 0.1 K/UL (ref 0–0.4)
EOSINOPHIL NFR BLD: 1 % (ref 0–7)
ERYTHROCYTE [DISTWIDTH] IN BLOOD BY AUTOMATED COUNT: 14.1 % (ref 11.5–14.5)
GLUCOSE SERPL-MCNC: 88 MG/DL (ref 65–100)
HCT VFR BLD AUTO: 27.7 % (ref 35–47)
HGB BLD-MCNC: 8.8 G/DL (ref 11.5–16)
IMM GRANULOCYTES # BLD AUTO: 0.1 K/UL (ref 0–0.04)
IMM GRANULOCYTES NFR BLD AUTO: 1 % (ref 0–0.5)
LYMPHOCYTES # BLD: 1.5 K/UL (ref 0.8–3.5)
LYMPHOCYTES NFR BLD: 25 % (ref 12–49)
MCH RBC QN AUTO: 27.8 PG (ref 26–34)
MCHC RBC AUTO-ENTMCNC: 31.8 G/DL (ref 30–36.5)
MCV RBC AUTO: 87.4 FL (ref 80–99)
MONOCYTES # BLD: 0.7 K/UL (ref 0–1)
MONOCYTES NFR BLD: 12 % (ref 5–13)
NEUTS SEG # BLD: 3.6 K/UL (ref 1.8–8)
NEUTS SEG NFR BLD: 61 % (ref 32–75)
NRBC # BLD: 0 K/UL (ref 0–0.01)
NRBC BLD-RTO: 0 PER 100 WBC
PLATELET # BLD AUTO: 195 K/UL (ref 150–400)
PMV BLD AUTO: 10.2 FL (ref 8.9–12.9)
POTASSIUM SERPL-SCNC: 3.5 MMOL/L (ref 3.5–5.1)
RBC # BLD AUTO: 3.17 M/UL (ref 3.8–5.2)
SODIUM SERPL-SCNC: 142 MMOL/L (ref 136–145)
WBC # BLD AUTO: 6 K/UL (ref 3.6–11)

## 2020-06-22 PROCEDURE — 74011250637 HC RX REV CODE- 250/637: Performed by: NURSE PRACTITIONER

## 2020-06-22 PROCEDURE — 74011000258 HC RX REV CODE- 258: Performed by: RADIOLOGY

## 2020-06-22 PROCEDURE — 74011250636 HC RX REV CODE- 250/636: Performed by: NURSE PRACTITIONER

## 2020-06-22 PROCEDURE — 85025 COMPLETE CBC W/AUTO DIFF WBC: CPT

## 2020-06-22 PROCEDURE — 74011636320 HC RX REV CODE- 636/320: Performed by: RADIOLOGY

## 2020-06-22 PROCEDURE — 74011000258 HC RX REV CODE- 258: Performed by: INTERNAL MEDICINE

## 2020-06-22 PROCEDURE — 74178 CT ABD&PLV WO CNTR FLWD CNTR: CPT

## 2020-06-22 PROCEDURE — 80048 BASIC METABOLIC PNL TOTAL CA: CPT

## 2020-06-22 PROCEDURE — 74011250636 HC RX REV CODE- 250/636: Performed by: INTERNAL MEDICINE

## 2020-06-22 PROCEDURE — 36415 COLL VENOUS BLD VENIPUNCTURE: CPT

## 2020-06-22 PROCEDURE — 65270000029 HC RM PRIVATE

## 2020-06-22 RX ORDER — SODIUM CHLORIDE 0.9 % (FLUSH) 0.9 %
10 SYRINGE (ML) INJECTION
Status: COMPLETED | OUTPATIENT
Start: 2020-06-22 | End: 2020-06-22

## 2020-06-22 RX ORDER — LOPERAMIDE HYDROCHLORIDE 2 MG/1
4 CAPSULE ORAL AS NEEDED
Status: DISCONTINUED | OUTPATIENT
Start: 2020-06-22 | End: 2020-06-23 | Stop reason: HOSPADM

## 2020-06-22 RX ADMIN — GABAPENTIN 600 MG: 300 CAPSULE ORAL at 21:10

## 2020-06-22 RX ADMIN — TAMSULOSIN HYDROCHLORIDE 0.4 MG: 0.4 CAPSULE ORAL at 21:10

## 2020-06-22 RX ADMIN — GABAPENTIN 600 MG: 300 CAPSULE ORAL at 09:46

## 2020-06-22 RX ADMIN — HEPARIN SODIUM 5000 UNITS: 5000 INJECTION INTRAVENOUS; SUBCUTANEOUS at 21:10

## 2020-06-22 RX ADMIN — PIPERACILLIN AND TAZOBACTAM 3.38 G: 3; .375 INJECTION, POWDER, LYOPHILIZED, FOR SOLUTION INTRAVENOUS at 22:52

## 2020-06-22 RX ADMIN — BUPROPION HYDROCHLORIDE 150 MG: 150 TABLET, EXTENDED RELEASE ORAL at 21:10

## 2020-06-22 RX ADMIN — Medication 10 ML: at 05:28

## 2020-06-22 RX ADMIN — Medication 1 CAPSULE: at 10:09

## 2020-06-22 RX ADMIN — Medication 10 ML: at 09:18

## 2020-06-22 RX ADMIN — SUCRALFATE 0.5 G: 1 TABLET ORAL at 06:52

## 2020-06-22 RX ADMIN — HEPARIN SODIUM 5000 UNITS: 5000 INJECTION INTRAVENOUS; SUBCUTANEOUS at 13:37

## 2020-06-22 RX ADMIN — SODIUM CHLORIDE 100 ML: 900 INJECTION, SOLUTION INTRAVENOUS at 09:18

## 2020-06-22 RX ADMIN — BUPROPION HYDROCHLORIDE 150 MG: 150 TABLET, EXTENDED RELEASE ORAL at 09:46

## 2020-06-22 RX ADMIN — METOPROLOL SUCCINATE 100 MG: 50 TABLET, EXTENDED RELEASE ORAL at 17:19

## 2020-06-22 RX ADMIN — HEPARIN SODIUM 5000 UNITS: 5000 INJECTION INTRAVENOUS; SUBCUTANEOUS at 05:28

## 2020-06-22 RX ADMIN — SUCRALFATE 0.5 G: 1 TABLET ORAL at 21:10

## 2020-06-22 RX ADMIN — Medication 10 ML: at 21:11

## 2020-06-22 RX ADMIN — SUCRALFATE 0.5 G: 1 TABLET ORAL at 16:06

## 2020-06-22 RX ADMIN — PIPERACILLIN AND TAZOBACTAM 3.38 G: 3; .375 INJECTION, POWDER, LYOPHILIZED, FOR SOLUTION INTRAVENOUS at 15:28

## 2020-06-22 RX ADMIN — IOPAMIDOL 100 ML: 612 INJECTION, SOLUTION INTRAVENOUS at 09:18

## 2020-06-22 RX ADMIN — ESCITALOPRAM OXALATE 20 MG: 10 TABLET ORAL at 09:46

## 2020-06-22 RX ADMIN — PANTOPRAZOLE SODIUM 40 MG: 40 TABLET, DELAYED RELEASE ORAL at 09:46

## 2020-06-22 RX ADMIN — SUCRALFATE 0.5 G: 1 TABLET ORAL at 12:20

## 2020-06-22 RX ADMIN — IBUPROFEN 400 MG: 400 TABLET, FILM COATED ORAL at 02:30

## 2020-06-22 RX ADMIN — PIPERACILLIN AND TAZOBACTAM 3.38 G: 3; .375 INJECTION, POWDER, LYOPHILIZED, FOR SOLUTION INTRAVENOUS at 06:52

## 2020-06-22 RX ADMIN — LOPERAMIDE HYDROCHLORIDE 4 MG: 2 CAPSULE ORAL at 13:37

## 2020-06-22 NOTE — PROGRESS NOTES
Problem: Pain  Goal: *Control of Pain  Outcome: Progressing Towards Goal     Problem: Discharge Planning  Goal: *Discharge to safe environment  Description: See cm note. [facility]     Outcome: Progressing Towards Goal     Problem: Falls - Risk of  Goal: *Absence of Falls  Description: Document Darrick Pan Fall Risk and appropriate interventions in the flowsheet.   Outcome: Progressing Towards Goal  Note: Fall Risk Interventions:            Medication Interventions: Evaluate medications/consider consulting pharmacy         History of Falls Interventions: Door open when patient unattended         Problem: Patient Education: Go to Patient Education Activity  Goal: Patient/Family Education  Outcome: Progressing Towards Goal

## 2020-06-22 NOTE — PROGRESS NOTES
Spoke with CT; patient to be NPO at midnight to go down first thing in AM for scan. Patient made aware.

## 2020-06-22 NOTE — PROGRESS NOTES
Paged urology in regards to patient's CT scan results. 1617: Spoke with Cindy from Urology. Read CT results to NP and she states that patient should be fine with taking zosyn and she will see patient tomorrow.

## 2020-06-22 NOTE — PROGRESS NOTES
[facility] Specialists at [facility] Surgery History and Physical    History of Present Illness:      Unknown Tomasz is a 54 y.o. female who has been having right upper quadrant abdominal pain. The pain is a occasional pain that seems to come with eating. The pain when she has it can last few hours. She does not appear to have any radiation to the back or any other area. She denies any nausea or vomiting. She has this pain 1 or 2 times. The pain when she has it can be a 4-5 out of 10. Of note she also has had issues with kidney stones recently and has a left ureteral stent but no active pain or issues that she describes currently. She was noted to have a fever of 103 in the office but otherwise did not look or appear septic.     Past Medical History:   Diagnosis Date    Ankle fracture     Asthma     Autoimmune disease (HCC)     MS    Celiac disease     Chronic pain     MS    Congenital sucrose isomaltose malabsorption     Depression     Diverticulosis of sigmoid colon     Dysplastic colon polyp     Dr. Abdirizak Dumont - last colonoscopy 11/7/12 - single polyp    Essential hypertension     Gestasional    GERD (gastroesophageal reflux disease)     Influenza B 03/07/2017    EDGARD virus antibody positive     Kidney stones     Miscarriage     11/13    [facility]) ([facility]     Dr. Christiano Gongora    Obstructive pyelonephritis 5/26/2020    Ovarian cyst     PUD (peptic ulcer disease)     Pyelonephritis     Routine gynecological examination     Dr. Luciano Thomas Sleep apnea     pt states she no longer has the problem since wt loss - intentional wt loss    Tubular adenoma of colon 04/18/2016    Uterine fibroid     Vitamin D deficiency 7/20/2011       Past Surgical History:   Procedure Laterality Date    COLONOSCOPY N/A 8/1/2019    COLONOSCOPY/EGD ([facility]) performed by Armond Lefort, MD at [address] [place] [address]      double compound fx of right lower leg and dislocated heel - has a plate and 13 screws    HX  SECTION          HX COLONOSCOPY      HX ENDOSCOPY      HX GYN      fibroid tumor ovarian cyst     HX ORTHOPAEDIC       Left shoulder surgery    CT BIOPSY OF BREAST, INCISIONAL         No current facility-administered medications for this visit. No current outpatient medications on file.     Facility-Administered Medications Ordered in Other Visits:     loperamide (IMODIUM) capsule 4 mg, 4 mg, Oral, PRN, Lauren Prasad, NP    lactobac ac& pc-s.therm-b.anim (MATT Q/RISAQUAD), 1 Cap, Oral, DAILY, Lauren Prasad NP, 1 Cap at 20 1009    heparin (porcine) injection 5,000 Units, 5,000 Units, SubCUTAneous, Q8H, Lauren Prasad NP, 5,000 Units at 20 0528    piperacillin-tazobactam (ZOSYN) 3.375 g in 0.9% sodium chloride (MBP/ADV) 100 mL, 3.375 g, IntraVENous, Q8H, Audra Vasquez, DO, Last Rate: 25 mL/hr at 20 0652, 3.375 g at 20 8245    acetaminophen (TYLENOL) tablet 650 mg, 650 mg, Oral, Q4H PRN, Pheba Nail, NP, 650 mg at 20 0711    ibuprofen (MOTRIN) tablet 400 mg, 400 mg, Oral, Q6H PRN, Sonu Nail, NP, 400 mg at 20 0230    ondansetron (ZOFRAN ODT) tablet 4 mg, 4 mg, Oral, Q6H PRN, Sonu Nail, NP, 4 mg at 20 0800    baclofen (LIORESAL) tablet 10 mg, 10 mg, Oral, TID PRN, Lauren Ruffinklduong, NP, 10 mg at 20 0711    butalbital-acetaminophen-caffeine (FIORICET, ESGIC) -40 mg per tablet 1 Tab, 1 Tab, Oral, Q4H PRN, Lauren Prasad, NP, 1 Tab at 20 0830    oxyCODONE-acetaminophen (PERCOCET) 5-325 mg per tablet 1 Tab, 1 Tab, Oral, Q4H PRN, Reyes Larsen MD, 1 Tab at 20 2339    buPROPion SR (WELLBUTRIN SR) tablet 150 mg, 150 mg, Oral, BID, Lauren Prasad NP, 150 mg at 20 0946    escitalopram oxalate (LEXAPRO) tablet 20 mg, 20 mg, Oral, DAILY, Lauren Prasad NP, 20 mg at 20 0946    gabapentin (NEURONTIN) capsule 600 mg, 600 mg, Oral, BID, Iván Likens, NP, 600 mg at 20 4593    metoprolol succinate (TOPROL-XL) XL tablet 100 mg, 100 mg, Oral, QPM, Iván Likens, NP, 100 mg at 20 1723    teriflunomide (AUBAGIO) tablet 14 mg (Patient Supplied), 14 mg, Oral, QPM, Iván Likens, NP, 14 mg at 20 1724    sucralfate (CARAFATE) tablet 0.5 g, 0.5 g, Oral, AC&HS, Iván Likens, NP, 0.5 g at 20 5042    pantoprazole (PROTONIX) tablet 40 mg, 40 mg, Oral, DAILY, Iván Likens, NP, 40 mg at 20 0946    tamsulosin (FLOMAX) capsule 0.4 mg, 0.4 mg, Oral, QHS, Sebastian, Nora E, NP, 0.4 mg at 20 2112    sodium chloride (NS) flush 5-40 mL, 5-40 mL, IntraVENous, Q8H, Lisa Mckeon MD, 10 mL at 20 0528    sodium chloride (NS) flush 5-40 mL, 5-40 mL, IntraVENous, PRN, Lisa Mckeon MD    Allergies   Allergen Reactions    Latex Rash    Adhesive Rash    Motrin [Ibuprofen] Nausea Only       Social History     Socioeconomic History    Marital status:      Spouse name: Not on file    Number of children: Not on file    Years of education: Not on file    Highest education level: Not on file   Occupational History    Not on file   Social Needs    Financial resource strain: Not on file    Food insecurity     Worry: Not on file     Inability: Not on file    Transportation needs     Medical: Not on file     Non-medical: Not on file   Tobacco Use    Smoking status: Former Smoker     Years: 8.00     Last attempt to quit: 2000     Years since quittin.9    Smokeless tobacco: Former User   Substance and Sexual Activity    Alcohol use: Yes     Comment: rare    Drug use: No    Sexual activity: Never   Lifestyle    Physical activity     Days per week: Not on file     Minutes per session: Not on file    Stress: Not on file   Relationships    Social connections     Talks on phone: Not on file     Gets together: Not on file     Attends Gnosticist service: Not on file Active member of club or organization: Not on file     Attends meetings of clubs or organizations: Not on file     Relationship status: Not on file    Intimate partner violence     Fear of current or ex partner: Not on file     Emotionally abused: Not on file     Physically abused: Not on file     Forced sexual activity: Not on file   Other Topics Concern    Not on file   Social History Narrative    Not on file       Family History   Problem Relation Age of Onset    Cancer Father         appendix/cancerous colon polyps    Heart Disease Father         cabg age early 66's   Gloriajean Seeds Cancer Maternal Uncle         prostate/melanoma    Cancer Maternal Grandmother         cancerous colon polyps    Cancer Maternal Grandfather         prostate    Heart Disease Paternal Grandmother     Cancer Paternal Grandmother         cancerous colon polyps    No Known Problems Daughter     Colon Polyps Mother         precancerous    Atrial Fibrillation Mother     Heart Disease Paternal Aunt        ROS   Constitutional: positive for fevers  Ears, Nose, Mouth, Throat, and Face: negative  Respiratory: negative  Cardiovascular: negative  Gastrointestinal: positive for abdominal pain  Genitourinary:Active kidney stone issues in the left side  Integument/Breast: negative  Hematologic/Lymphatic: negative  Behavioral/Psychiatric: negative  Allergic/Immunologic: negative      Physical Exam:     Visit Vitals  /89 (BP 1 Location: Left arm, BP Patient Position: Sitting)   Pulse 89   Temp (!) 103.1 °F (39.5 °C) (Oral)   Resp 18   Ht 5' 6\" (1.676 m)   Wt 237 lb (107.5 kg)   SpO2 95%   BMI 38.25 kg/m²       General - alert and oriented, no apparent distress  HEENT - no jaundice, no hearing imparement  Pulm - CTAB, no C/W/R  CV - RRR, no M/R/G  Abd -soft, nondistended, bowel sounds present, mild tenderness to palpation in the right upper quadrant no guarding no rebound no hernia, no flank tenderness to palpation  Ext - pulses intact in UE and LE bilaterally, no edema  Skin - supple, no rashes  Psychiatric - normal affect, good mood    Labs  Normal LFTs  Lipase 858    Imaging  RUQ US - Gallbladder: There is sludge within the gallbladder. There is no pericholecystic  fluid, gallbladder wall thickening or gallbladder distention. The common bile  duct is within normal limits measuring 4 mm in diameter.      Liver: The liver is diffusely echogenic. The portal vein is patent and  demonstrates appropriate directional hepatopedal flow. Main portal vein diameter  is 8 mm. Portal vein velocity is 28.9 cm/s.     Right kidney: The right kidney measures 12.1 cm in sagittal length. There is no  hydronephrosis. Right kidney is echogenic.     Pancreas: Visualized portions of the pancreas are normal.      IMPRESSION  IMPRESSION:   1. Gallbladder sludge. No specific evidence of acute cholecystitis. 2. Diffuse hepatic steatosis. 3. Echogenic right kidney, suggesting intrinsic renal disease. No  Hydronephrosis. I have reviewed and agree with all of the pertinent images    Assessment:     Lora Elizondo is a 54 y.o. female with symptomatic cholelithiasis    Recommendations:     1. She does appear to have symptomatic cholelithiasis and I recommend laparoscopic cholecystectomy with intraoperative cholangiogram.  She will need cholangiogram due to the elevated lipase. I doubt she has any common bile duct stones but would need to rule this out. She also has some active kidney issues with stones and with her current fever I am not sure which is the cause. This could be a viral illness or potentially something related to her kidney stones. Recommended her follow-up with her PCP as soon as possible. She does not appear to have any signs of sepsis here in the office and otherwise appears to feel okay. I will have her scheduled for the operating room here soon. I have discussed the above procedure with the patient in detail.   We reviewed the benefits and possible complications of the surgery which include bleeding, infection, damage to adjacent organs, venous thromboembolism, need for repeat surgery, death and other unforseen complications. The patient agreed to proceed with the surgery. Jay Burton MD    Ms. Geoffrey Smith has a reminder for a \"due or due soon\" health maintenance. I have asked that she contact her primary care provider for follow-up on this health maintenance.

## 2020-06-22 NOTE — PROGRESS NOTES
Hospitalist Progress Note          Gary Hansen NP  Please call  and page for questions. Call physician on-call through the  7pm-7am    Daily Progress Note: 6/22/2020    Primary care Tyrese Crowe MD    Date of admission: 6/17/2020  9:35 PM    Admission Summary and Hospital Course:      From H&P 6/18/2020:  \"Veronica Berger is a 54 y.o. female with h/o HTN presents to the ED c/o fever and left flank pain. She was today at her surgeon office when the temp was taken and it was 103. She was sent home but despite taking tylenol she was not feeling well. For the last several days she has been experiencing worsening left flank pain. She recently had a left urter stent removal and was doing well until 4 days ago. She has been taking dilaudid prn for pain control. In the ed she was found to be febrile. CT of the abd/pelv showed hydronephrosis, and perinephric stranding of the left kidneysAnd small kidney stone. She was started on IV antibiotics with ceftriaxone IV. \"      Subjective:   Pt seen today in NAD. No headache today. Improved abd and flank pain today. Has been having some diarrhea, not watery.  Otherwise denies visual changes, dizziness, CP, sob, cough, n/v.     Assessment/Plan:       Sepsis: POA temp 101.7/WBCs 14/abnormal UA  -improving  -d/t pyelo  -BC NGTD, urine culture +ENTEROCOCCUS FAECALIS  -Cont zosyn  -Seen by urology; repeat upper tract imagine prior to DC; if not responding to abx 48-72h get CT A/P imaging W WO to r/o renal abscess  -plan to get CT A/P W WO today now that IV access is available  -appreciate ID input    NORBERTO: POA creat/GFR 1.34/43 (baseline normal kidney function)  -Resolved    Hyponatremia  -Resolved    Headache  -Resolved  -Continue PRN tylenol, fioricet    Abd Pain  -now with diarrhea and hx GI issues  -Start probiotic  -PRN imodium  -Consult GI for this and per patient request    Hx HTN  -Stable  -Continue metoprolol    Hx Peptic ulcer dx: recently elevated lipase  -Stable  -continue carafate, protonix    Hx MS  -Stable  -Monitor for flare 2/2 infection  -continue Aubagio, neurontin  -Prn baclofen  -appreciate neuro input    Hx depression  -stable  -Continue wellbutrin, lexapro    Obesity: BMI 38.25  -Counseled and recommend OP f/u with PCP for weight loss    See orders for other plans. Diet: AHA  VTE prophylaxis:  heparin  Code status: FULL  Discussed plan of care with: Patient/Family and Nurse. Pre-admission: lived at home. Discharge planning: pending urology and GI recs, hopefully next 1-2 days      Emergency Contact(s):  Extended Emergency Contact Information  Primary Emergency Contact: Ailin Valente Dr Phone: [phone]  Relation: Spouse  Secondary Emergency Contact: Jasmin Oconnor  Address: [address] [place] [address] Phone: [phone]  Mobile Phone: [phone]  Relation: Mother          Review of Systems:     Full ROS complete with pertinent positives and negatives as per HPI, otherwise negative  Objective:   Physical Exam:     Visit Vitals  /84   Pulse 80   Temp 98.7 °F (37.1 °C)   Resp 18   LMP 2020   SpO2 97%   Breastfeeding No      O2 Device: Room air    Temp (24hrs), Av °F (37.2 °C), Min:98.4 °F (36.9 °C), Max:100.1 °F (37.8 °C)    No intake/output data recorded. No intake/output data recorded. General:  Alert, cooperative, no distress, appears stated age, morbidly obese   Lungs:   Clear lung sounds with diminished bases   Heart:  Regular rate and rhythm, S1, S2 normal, no murmur, click, rub or gallop; distant heart sounds   Abdomen:   Soft, non-distended. Bowel sounds normal; BLQ tenderness to palp; Bilat CVA tenderness-improved   Extremities: Extremities normal, atraumatic, no cyanosis or edema.    Skin: Skin color, texture, turgor normal. No rashes or lesions   Neurologic: CNII-XII intact. Data Review:       Recent Days:  Recent Labs     06/22/20  0208 06/21/20  0340 06/20/20  0237   WBC 6.0 6.5 8.3   HGB 8.8* 9.1* 9.4*   HCT 27.7* 28.7* 30.2*    164 146*     Recent Labs     06/22/20  0208 06/21/20  0340 06/20/20  0237    140 137   K 3.5 3.5 3.8    109* 106   CO2 27 23 24   GLU 88 100 103*   BUN 9 13 16   CREA 0.87 0.90 1.15*   CA 8.0* 8.7 8.3*     No results for input(s): PH, PCO2, PO2, HCO3, FIO2 in the last 72 hours. 24 Hour Results:  Recent Results (from the past 24 hour(s))   CBC WITH AUTOMATED DIFF    Collection Time: 06/22/20  2:08 AM   Result Value Ref Range    WBC 6.0 3.6 - 11.0 K/uL    RBC 3.17 (L) 3.80 - 5.20 M/uL    HGB 8.8 (L) 11.5 - 16.0 g/dL    HCT 27.7 (L) 35.0 - 47.0 %    MCV 87.4 80.0 - 99.0 FL    MCH 27.8 26.0 - 34.0 PG    MCHC 31.8 30.0 - 36.5 g/dL    RDW 14.1 11.5 - 14.5 %    PLATELET 821 476 - 710 K/uL    MPV 10.2 8.9 - 12.9 FL    NRBC 0.0 0  WBC    ABSOLUTE NRBC 0.00 0.00 - 0.01 K/uL    NEUTROPHILS 61 32 - 75 %    LYMPHOCYTES 25 12 - 49 %    MONOCYTES 12 5 - 13 %    EOSINOPHILS 1 0 - 7 %    BASOPHILS 0 0 - 1 %    IMMATURE GRANULOCYTES 1 (H) 0.0 - 0.5 %    ABS. NEUTROPHILS 3.6 1.8 - 8.0 K/UL    ABS. LYMPHOCYTES 1.5 0.8 - 3.5 K/UL    ABS. MONOCYTES 0.7 0.0 - 1.0 K/UL    ABS. EOSINOPHILS 0.1 0.0 - 0.4 K/UL    ABS. BASOPHILS 0.0 0.0 - 0.1 K/UL    ABS. IMM.  GRANS. 0.1 (H) 0.00 - 0.04 K/UL    DF AUTOMATED     METABOLIC PANEL, BASIC    Collection Time: 06/22/20  2:08 AM   Result Value Ref Range    Sodium 142 136 - 145 mmol/L    Potassium 3.5 3.5 - 5.1 mmol/L    Chloride 106 97 - 108 mmol/L    CO2 27 21 - 32 mmol/L    Anion gap 9 5 - 15 mmol/L    Glucose 88 65 - 100 mg/dL    BUN 9 6 - 20 MG/DL    Creatinine 0.87 0.55 - 1.02 MG/DL    BUN/Creatinine ratio 10 (L) 12 - 20      GFR est AA >60 >60 ml/min/1.73m2    GFR est non-AA >60 >60 ml/min/1.73m2    Calcium 8.0 (L) 8.5 - 10.1 MG/DL       Problem List:  Problem List as of 6/22/2020 Date Reviewed: 2020          Codes Class Noted - Resolved    Hyponatremia ICD-10-CM: E87.1  ICD-9-CM: 276.1  2020 - Present        * (Principal) Pyelonephritis ICD-10-CM: N12  ICD-9-CM: 590.80  2020 - Present        Sepsis ([address] ICD-10-CM: A41.9  ICD-9-CM: 038.9, 995.91  2020 - Present        Obstructive pyelonephritis ICD-10-CM: N11.1  ICD-9-CM: 590.80  2020 - Present        UTI (urinary tract infection) ICD-10-CM: N39.0  ICD-9-CM: 599.0  2020 - Present        Elevated lipase ICD-10-CM: R74.8  ICD-9-CM: 790.5  2020 - Present        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 9) ICD-10-CM: E66.01  ICD-9-CM: 278.01  2018 - Present        Depression with anxiety ICD-10-CM: F41.8  ICD-9-CM: 300.4  2018 - Present        Kidney stones ICD-10-CM: N20.0  ICD-9-CM: 592.0  Unknown - Present        EDGARD virus antibody positive ICD-10-CM: R76.8  ICD-9-CM: 795.79  Unknown - Present        Tubular adenoma of colon ICD-10-CM: D12.6  ICD-9-CM: 211.3  Unknown - Present        Single delivery by  ICD-10-CM: O82  ICD-9-CM: 669.71  2015 - Present        Gestational hypertension ICD-10-CM: O13.9  ICD-9-CM: 642.30  2/10/2015 - Present        AMA (advanced maternal age) primigravida 35+ ICD-10-CM: O09.519  ICD-9-CM: 659.50  2/10/2015 - Present        Multiple sclerosis exacerbation ([facility] [address] ICD-10-CM: G35  ICD-9-CM: 250  2013 - Present        Optic neuritis, right ICD-10-CM: H46.9  ICD-9-CM: 377.30  2013 - Present        Dehydration, moderate ICD-10-CM: E86.0  ICD-9-CM: 276.51  2013 - Present        Dysphagia ICD-10-CM: R13.10  ICD-9-CM: 787.20  2013 - Present        Meralgia paresthetica of right side ICD-10-CM: G57.11  ICD-9-CM: 355.1  5/31/2013 - Present        Migraine with aura, without mention of intractable migraine without mention of status migrainosus ICD-10-CM: G43.109  ICD-9-CM: 346.00  5/31/2013 - Present        Dysplastic colon polyp ICD-10-CM: K63.5  ICD-9-CM: 211.3  Unknown - Present        MS (multiple sclerosis) ([facility] ICD-10-CM: G35  ICD-9-CM: 340  Unknown - Present        Depression ICD-10-CM: F32.9  ICD-9-CM: 311  Unknown - Present        Asthma ICD-10-CM: J45.909  ICD-9-CM: 493.90  Unknown - Present        Elevated blood pressure ICD-10-CM: VHT5683  ICD-9-CM: Anni Dry  7/20/2011 - Present        Vitamin D deficiency ICD-10-CM: E55.9  ICD-9-CM: 268.9  7/20/2011 - Present        Depression ICD-10-CM: F32.9  ICD-9-CM: 405  Unknown - Present        RESOLVED: Decreased fetal movement ICD-10-CM: X88.0766  ICD-9-CM: 655.70  2/10/2015 - 2/11/2015        RESOLVED: Decreased fetal movement in pregnancy ICD-10-CM: O36.8190  ICD-9-CM: 655.70  2/10/2015 - 2/11/2015              Medications reviewed  Current Facility-Administered Medications   Medication Dose Route Frequency    sodium chloride 0.9 % bolus infusion 100 mL  100 mL IntraVENous RAD ONCE    iopamidoL (ISOVUE 300) 61 % contrast injection 100 mL  100 mL IntraVENous RAD ONCE    sodium chloride (NS) flush 10 mL  10 mL IntraVENous RAD ONCE    heparin (porcine) injection 5,000 Units  5,000 Units SubCUTAneous Q8H    piperacillin-tazobactam (ZOSYN) 3.375 g in 0.9% sodium chloride (MBP/ADV) 100 mL  3.375 g IntraVENous Q8H    acetaminophen (TYLENOL) tablet 650 mg  650 mg Oral Q4H PRN    ibuprofen (MOTRIN) tablet 400 mg  400 mg Oral Q6H PRN    ondansetron (ZOFRAN ODT) tablet 4 mg  4 mg Oral Q6H PRN    baclofen (LIORESAL) tablet 10 mg  10 mg Oral TID PRN    butalbital-acetaminophen-caffeine (FIORICET, ESGIC) -40 mg per tablet 1 Tab  1 Tab Oral Q4H PRN    oxyCODONE-acetaminophen (PERCOCET) 5-325 mg per tablet 1 Tab  1 Tab Oral Q4H PRN    bu[facility]ion SR [facility]) tablet 150 mg  150 mg Oral BID    escitalopram oxalate (LEXAPRO) tablet 20 mg  20 mg Oral DAILY    gabapentin (NEURONTIN) capsule 600 mg  600 mg Oral BID    metoprolol succinate (TOPROL-XL) XL tablet 100 mg  100 mg Oral QPM    teriflunomide (AUBAGIO) tablet 14 mg (Patient Supplied)  14 mg Oral QPM    sucralfate (CARAFATE) tablet 0.5 g  0.5 g Oral AC&HS    pantoprazole (PROTONIX) tablet 40 mg  40 mg Oral DAILY    tamsulosin (FLOMAX) capsule 0.4 mg  0.4 mg Oral QHS    sodium chloride (NS) flush 5-40 mL  5-40 mL IntraVENous Q8H    sodium chloride (NS) flush 5-40 mL  5-40 mL IntraVENous PRN       Care Plan discussed with: Patient/family, nurse      Eloina Robison, NP  Hospitalist  Providers can reach me on [employer]

## 2020-06-22 NOTE — MANAGEMENT PLAN
Reviewed CT today. Hydro resolving. No renal abscess. Doing better with stable vitals. WBC down to 6. Cr normal. Enterococcus in urine. On Zosyn. ID following. She is suitable for discharge from our perspective on culture directed antibiotic therapy per ID. No stent or other urologic intervention planned. Has outpatient follow up with Dr. Oh Cheney 7/8/20 at 2:50pm at [facility] location.

## 2020-06-22 NOTE — TELEPHONE ENCOUNTER
patient left voicemail to cancel surgery with Dr Yogi Carlton for 06/25/2020 as she is currently inpatient in [facility]. She states that she will call back to reschedule once she is discharged and given the go ahead for surgery.

## 2020-06-22 NOTE — CONSULTS
[facility] NOTE  Will Buzz Arroyo  [phone] office  [phone] NP/PA in-hospital cell phone M-F until 4:30PM  After 5PM or on weekends, please call  for physician on call        NAME:  Liang Jimenez   :   1975   MRN:   [id number]       Referring Physician: Rolanda Crum NP    Consult Date: 2020 2:09 PM    Chief Complaint: abdominal pain and diarrhea     History of Present Illness:  Patient is a 54 y.o. who is seen in consultation at the request of Rolanda Crum NP, for abdominal pain, diarrhea, patient request.  Patient has a past medical history of hypertension. She presented to the ED with complaints of fever and flank pain. Patient had a recent left ureteral stent. In the ED, she was febrile and CT scan showed hydronephrosis and perinephric standing of the left kidney and a small kidney stone. She was started on IV antibiotics and admitted to the hospital on 20 for sepsis secondary to pyelonephritis and acute kidney injury. Patient was seen in the office for RUQ abdominal pain and abdominal bloating by Dr. Michael Gómez on 6/15/20. At that time, patient was referred to surgery for evaluation for cholecystectomy. She has been on PPI and Carafate. Patient was seen by surgery, Dr. Vanda Caal, and cholecystectomy was discussed at that time. Given patient's fever following that time, she presented to the ED. Patient has had complaints of generalized abdominal pain, concentrated to the left side. She reports diarrhea that started last Friday. Patient reports loose stools that have become watery. She reports having 4 watery bowel movements today. No hematochezia or melena. Prior to admission, patient was having approximately 2 soft bowel movements daily. No nausea or vomiting. No regular NSAID use. No anticoagulation.   EGD (19) by Dr. Michael Gómez for epigastric abdominal pain/GERD/dysphagia/odynophagia showed a 3 cm hiatal hernia, Schatzki ring at the GE junction, dilated to 18 mm (random biopsies taken); gastritis with multiple non-bleeding ulcers in the antrum (largest around 1 cm, biopsied); normal duodenum (biopsied). Pathology of the duodenum was unremarkable; pathology of the stomach showed mild chronic gastritis; pathology of the esophagus showed esophagitis with increased intraepithelial eosinophils (up to 4/HPF). Colonoscopy (8/1/19) by Dr. Catalino Dimas for LLQ and RLQ abdominal pain showed a 9 mm polyp in the sigmoid colon; 9 mm polyp in the transverse colon; 9 mm polyp in the ascending colon. A repeat colonoscopy was recommended in 3 years. Pathology of the ascending colon, transverse colon, and sigmoid colon showed sessile serrated polyps. I have reviewed the emergency room note, hospital admission note, notes by all other clinicians who have seen the patient during this hospitalization to date. I have reviewed the problem list and the reason for this hospitalization. I have reviewed the allergies and the medications the patient was taking at home prior to this hospitalization.       PMH:  Past Medical History:   Diagnosis Date    Ankle fracture     Asthma     Autoimmune disease (HCC)     MS    Celiac disease     Chronic pain     MS    Congenital sucrose isomaltose malabsorption     Depression     Diverticulosis of sigmoid colon     Dysplastic colon polyp     Dr. Marisela Ashford - last colonoscopy 11/7/12 - single polyp    Essential hypertension     Gestasional    GERD (gastroesophageal reflux disease)     Influenza B 03/07/2017    EDGARD virus antibody positive     Kidney stones     Miscarriage     11/13    [facility]     Dr. Art Cotter    Obstructive pyelonephritis 5/26/2020    Ovarian cyst     PUD (peptic ulcer disease)     Pyelonephritis     Routine gynecological examination     Dr. Vini Jiang Sleep apnea     pt states she no longer has the problem since wt loss - intentional wt loss    Tubular adenoma of colon 2016    Uterine fibroid     Vitamin D deficiency 2011       PSH:  Past Surgical History:   Procedure Laterality Date    COLONOSCOPY N/A 2019    COLONOSCOPY/EGD (LATEX ALLERGY) performed by Renee Camacho MD at [address] [facility]      double compound fx of right lower leg and dislocated heel - has a plate and 13 screws    HX  SECTION          HX COLONOSCOPY      HX ENDOSCOPY      HX GYN      fibroid tumor ovarian cyst     HX ORTHOPAEDIC      1992 Left shoulder surgery    NE BIOPSY OF BREAST, INCISIONAL         Allergies: Allergies   Allergen Reactions    Latex Rash    Adhesive Rash    Motrin [Ibuprofen] Nausea Only       Home Medications:  Prior to Admission Medications   Prescriptions Last Dose Informant Patient Reported? Taking? Bifidobacterium Infantis (ALIGN) 4 mg cap 2020 at Unknown time  Yes Yes   Sig: Take 1 Cap by mouth nightly. albuterol (PROVENTIL HFA, VENTOLIN HFA, PROAIR HFA) 90 mcg/actuation inhaler   No No   Sig: Take 2 Puffs by inhalation every four (4) hours as needed for Wheezing or Shortness of Breath. buPROPion XL (WELLBUTRIN XL) 300 mg XL tablet 2020 at Unknown time  No Yes   Sig: Take 1 Tab by mouth every morning. cholecalciferol, vitamin D3, (VITAMIN D3) 2,000 unit tab   Yes No   Sig: Take 5,000 Units by mouth daily. escitalopram oxalate (LEXAPRO) 20 mg tablet 2020 at Unknown time  No Yes   Sig: TAKE ONE TABLET BY MOUTH ONE TIME DAILY   fexofenadine (ALLEGRA) 180 mg tablet 2020 at Unknown time  Yes Yes   Sig: Take 180 mg by mouth every evening. fluticasone (FLONASE) 50 mcg/actuation nasal spray   No No   Si Sprays by Both Nostrils route daily. Patient taking differently: 2 Sprays by Both Nostrils route as needed.    gabapentin (NEURONTIN) 300 mg capsule 2020 at Unknown time  Yes Yes   Sig: Take 600 mg by mouth two (2) times a day. 1caps twice a day   metoprolol succinate (TOPROL-XL) 100 mg tablet 6/17/2020 at Unknown time  Yes Yes   Sig: Take 100 mg by mouth every evening. omega 3-dha-epa-fish oil 183.3 mg-75 mg -91.6 mg-306 mg cap   No No   Sig: Take 4 Caps by mouth daily. Patient taking differently: Take 2 Caps by mouth two (2) times a day. pantoprazole (PROTONIX) 40 mg tablet 6/17/2020 at Unknown time  Yes Yes   Sig: Take 40 mg by mouth daily. soy chguvng-kihmcmv-Pfjrhk anthony 56- mg cap   No No   Sig: Take 1 Cap by mouth daily. sucralfate (CARAFATE) 100 mg/mL suspension   Yes No   Sig: Take 1 tsp by mouth four (4) times daily. teriflunomide (Aubagio) 14 mg tablet 6/17/2020 at Unknown time  Yes Yes   Sig: Take 14 mg by mouth every evening.       Facility-Administered Medications: None       Hospital Medications:  Current Facility-Administered Medications   Medication Dose Route Frequency    loperamide (IMODIUM) capsule 4 mg  4 mg Oral PRN    lactobac ac& pc-s.therm-b.anim (MATT Q/RISAQUAD)  1 Cap Oral DAILY    heparin (porcine) injection 5,000 Units  5,000 Units SubCUTAneous Q8H    piperacillin-tazobactam (ZOSYN) 3.375 g in 0.9% sodium chloride (MBP/ADV) 100 mL  3.375 g IntraVENous Q8H    acetaminophen (TYLENOL) tablet 650 mg  650 mg Oral Q4H PRN    ibuprofen (MOTRIN) tablet 400 mg  400 mg Oral Q6H PRN    ondansetron (ZOFRAN ODT) tablet 4 mg  4 mg Oral Q6H PRN    baclofen (LIORESAL) tablet 10 mg  10 mg Oral TID PRN    butalbital-acetaminophen-caffeine (FIORICET, ESGIC) -40 mg per tablet 1 Tab  1 Tab Oral Q4H PRN    oxyCODONE-acetaminophen (PERCOCET) 5-325 mg per tablet 1 Tab  1 Tab Oral Q4H PRN    buPROPion SR (WELLBUTRIN SR) tablet 150 mg  150 mg Oral BID    escitalopram oxalate (LEXAPRO) tablet 20 mg  20 mg Oral DAILY    gabapentin (NEURONTIN) capsule 600 mg  600 mg Oral BID    metoprolol succinate (TOPROL-XL) XL tablet 100 mg  100 mg Oral QPM    teriflunomide (AUBAGIO) tablet 14 mg (Patient Supplied)  14 mg Oral QPM    sucralfate (CARAFATE) tablet 0.5 g  0.5 g Oral AC&HS    pantoprazole (PROTONIX) tablet 40 mg  40 mg Oral DAILY    tamsulosin (FLOMAX) capsule 0.4 mg  0.4 mg Oral QHS    sodium chloride (NS) flush 5-40 mL  5-40 mL IntraVENous Q8H    sodium chloride (NS) flush 5-40 mL  5-40 mL IntraVENous PRN       Social History:  Social History     Tobacco Use    Smoking status: Former Smoker     Years: 8.00     Last attempt to quit: 2000     Years since quittin.9    Smokeless tobacco: Former User   Substance Use Topics    Alcohol use: Yes     Comment: rare       Family History:  Family History   Problem Relation Age of Onset    Cancer Father         appendix/cancerous colon polyps    Heart Disease Father         cabg age early 68's    Cancer Maternal Uncle         prostate/melanoma    Cancer Maternal Grandmother         cancerous colon polyps    Cancer Maternal Grandfather         prostate    Heart Disease Paternal Grandmother     Cancer Paternal Grandmother         cancerous colon polyps    No Known Problems Daughter     Colon Polyps Mother         precancerous    Atrial Fibrillation Mother     Heart Disease Paternal Aunt        Review of Systems:  Constitutional: negative fever, negative chills, negative weight loss  Eyes:   negative visual changes  ENT:   negative sore throat, tongue or lip swelling  Respiratory:  negative cough, negative dyspnea  Cards:  negative for chest pain, palpitations, lower extremity edema  GI:   See HPI  :  negative for frequency, dysuria  Integument:  negative for rash and pruritus  Heme:  negative for easy bruising and gum/nose bleeding  Musculoskeletal:negative for myalgias, back pain and muscle weakness  Neuro:    negative for headaches, dizziness  Psych: negative for feelings of anxiety, depression     Objective:     Patient Vitals for the past 8 hrs:   BP Temp Pulse Resp SpO2   20 0848 150/84 98.7 °F (37.1 °C) 80 18 97 %     No intake/output data recorded. No intake/output data recorded. EXAM:     CONST:  Pleasant female lying in bed, no acute distress   NEURO:  Alert and oriented x 3   HEENT: EOMI, no scleral icterus   LUNGS: No respiratory distress   CARD:  S1 S2   ABD:  Soft, mildly distended, left sided tenderness, no rebound, no guarding. + Bowel sounds. EXT:  Warm   PSYCH: Full, not anxious or agitated     Data Review     Recent Labs     06/22/20  0208 06/21/20  0340   WBC 6.0 6.5   HGB 8.8* 9.1*   HCT 27.7* 28.7*    164     Recent Labs     06/22/20  0208 06/21/20  0340    140   K 3.5 3.5    109*   CO2 27 23   BUN 9 13   CREA 0.87 0.90   GLU 88 100   CA 8.0* 8.7     No results for input(s): AP, TBIL, TP, ALB, GLOB, GGT, AML, LPSE in the last 72 hours. No lab exists for component: SGOT, GPT, AMYP, HLPSE  No results for input(s): INR, PTP, APTT, INREXT in the last 72 hours. Assessment:   · Diarrhea  · Abdominal pain, likely secondary to hydronephrosis/pyelonephritis: WBC 6.0 Hgb 8.8, LFTs unremarkable on 6/17/20 (total bilirubin 1.1). CT abdomen/pelvis with and without contrast (6/22/20): left-sided hydronephrosis has essentially resolved however there is delayed enhancement and somewhat heterogenous enhancement of the left kidney, this suggests pyelonephritis but no drainable renal abscess. RUQ ultrasound (5/24/20): gallbladder sludge, no specific evidence of acute cholecystitis; diffuse hepatic steatosis. · Pyelonephritis, recent stent removal on 6/15/20: ID following. On antibiotics.    · Acute kidney injury  · Hypertension  · Multiple sclerosis     Patient Active Problem List   Diagnosis Code    MS (multiple sclerosis) ([facility] G35    Depression F32.9    Asthma J45.909    Elevated blood pressure [id number]    Vitamin D deficiency E55.9    Depression F32.9    Dysplastic colon polyp K63.5    Dysphagia R13.10    Meralgia paresthetica of right side G57.11    Migraine with aura, without mention of intractable migraine without mention of status migrainosus G43. 109    Dehydration, moderate E86.0    Multiple sclerosis exacerbation (HCC) G35    Optic neuritis, right H46.9    Gestational hypertension O13.9    AMA (advanced maternal age) primigravida 35+ O46.5    Single delivery by  O80    Tubular adenoma of colon D12.6    EDGARD virus antibody positive R76.8    Kidney stones N20.0    Depression with anxiety F41.8    Severe obesity (BMI 35.0-39. 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    UTI (urinary tract infection) N39.0    Elevated lipase R74.8    Obstructive pyelonephritis N11.1    Pyelonephritis N12    Sepsis ([address] A41.9    Hyponatremia E87.1     Plan:     · On loperamide  · Check stool studies (enteric bacteria panel, C. difficile, and ova & parasites). · Supportive measures  · ID following - on antibiotics  · Patient was discussed with and will be seen by Dr. Errol Angel  · Thank you for allowing me to participate in care of Mirna Berrios     Signed By: Devi Jefferson, [id number]     2020  2:09 PM       Gastroenterology Attending Physician attestation statement and comments. This patient was seen and examined by me in a face-to-face visit today. I reviewed the medical record including lab work, imaging and other provider notes. I confirmed the history as described above. I spoke to the patient, reviewed the medical record including lab work, imaging and other provider notes. I discussed this case in detail with travon arshad. I formulated an  assessment of this patient and developed a treatment plan. I agree with the above consultation note. I agree with the history, exam and assessment and plan as outlined in the note.   I would like to add the following:   Abdomen soft  C/o diarrhea since last Friday, agree with stool testing  Suspect her abdominal pain is from pyelonephritis, follow urology recommendations  Will follow

## 2020-06-22 NOTE — PROGRESS NOTES
Infectious Disease Progress Note      HPI:      Ms Bunny Good seen  Says LLQ abdomen pain not resolved  Back not hurting as much   No nausea, or vomiting  Fevers resolved   Had 2 episodes loose stools over night         Medications:    Current Facility-Administered Medications:     loperamide (IMODIUM) capsule 4 mg, 4 mg, Oral, PRN, Arleen Malone, NP, 4 mg at 06/22/20 1337    lactobac ac& pc-s.therm-b.anim (MATT Q/RISAQUAD), 1 Cap, Oral, DAILY, Arleen Malone, NP, 1 Cap at 06/22/20 1009    heparin (porcine) injection 5,000 Units, 5,000 Units, SubCUTAneous, Q8H, Arleen Malone, NP, 5,000 Units at 06/22/20 1337    piperacillin-tazobactam (ZOSYN) 3.375 g in 0.9% sodium chloride (MBP/ADV) 100 mL, 3.375 g, IntraVENous, Q8H, Audra Vasquez, DO, Last Rate: 25 mL/hr at 06/22/20 0652, 3.375 g at 06/22/20 3398    acetaminophen (TYLENOL) tablet 650 mg, 650 mg, Oral, Q4H PRN, Griffin Lopez NP, 650 mg at 06/21/20 0711    ibuprofen (MOTRIN) tablet 400 mg, 400 mg, Oral, Q6H PRN, Griffin John, NP, 400 mg at 06/22/20 0230    ondansetron (ZOFRAN ODT) tablet 4 mg, 4 mg, Oral, Q6H PRN, Griffin Lopez NP, 4 mg at 06/20/20 0800    baclofen (LIORESAL) tablet 10 mg, 10 mg, Oral, TID PRN, Arleen Malone, NP, 10 mg at 06/21/20 0711    butalbital-acetaminophen-caffeine (FIORICET, ESGIC) -40 mg per tablet 1 Tab, 1 Tab, Oral, Q4H PRN, Arleen Maloen, NP, 1 Tab at 06/21/20 0830    oxyCODONE-acetaminophen (PERCOCET) 5-325 mg per tablet 1 Tab, 1 Tab, Oral, Q4H PRN, Karrie Maher MD, 1 Tab at 06/18/20 2339    buPROPion SR (WELLBUTRIN SR) tablet 150 mg, 150 mg, Oral, BID, Arleen Malone NP, 150 mg at 06/22/20 0946    escitalopram oxalate (LEXAPRO) tablet 20 mg, 20 mg, Oral, DAILY, Arleen Malone NP, 20 mg at 06/22/20 0946    gabapentin (NEURONTIN) capsule 600 mg, 600 mg, Oral, BID, Arleen Malone NP, 600 mg at 06/22/20 0946    metoprolol succinate (TOPROL-XL) XL tablet 100 mg, 100 mg, Oral, QPM, Sparkle Late, NP, 100 mg at 06/21/20 1723    teriflunomide (AUBAGIO) tablet 14 mg (Patient Supplied), 14 mg, Oral, QPM, Sparkle Late, NP, 14 mg at 06/21/20 1724    sucralfate (CARAFATE) tablet 0.5 g, 0.5 g, Oral, AC&HS, Sparkle Late, NP, 0.5 g at 06/22/20 1220    pantoprazole (PROTONIX) tablet 40 mg, 40 mg, Oral, DAILY, Sparkle Late, NP, 40 mg at 06/22/20 0946    tamsulosin (FLOMAX) capsule 0.4 mg, 0.4 mg, Oral, QHS, Sebastian, Noar E, NP, 0.4 mg at 06/21/20 2112    sodium chloride (NS) flush 5-40 mL, 5-40 mL, IntraVENous, Q8H, Kinsey Jarvis MD, Stopped at 06/22/20 1340    sodium chloride (NS) flush 5-40 mL, 5-40 mL, IntraVENous, PRN, Kinsey Jarvis MD          Physical Exam:    Vitals:   Patient Vitals for the past 24 hrs:   Temp Pulse Resp BP SpO2   06/22/20 0848 98.7 °F (37.1 °C) 80 18 150/84 97 %   06/22/20 0242 98.7 °F (37.1 °C) 81 18 152/86 96 %   06/22/20 0234 98.9 °F (37.2 °C)       06/21/20 2124 98.7 °F (37.1 °C)       06/21/20 2004 98.4 °F (36.9 °C) 82 18 141/77 96 %   06/21/20 1857 99.2 °F (37.3 °C)       06/21/20 1721 100.1 °F (37.8 °C)         · GEN: NAD  · HEENT:no scleral icterus, no thrush  · CV: S1, S2 heard regularly   · Lungs: Clear to auscultation bilaterally  · Abdomen: soft, non distended, non tender, + LLQ tenderness   · Extremities: no edema  · Neuro: Alert, oriented to self, time, place and situation, moves all extremities to commands, verbal   · Skin: no rash    Labs:   Recent Results (from the past 24 hour(s))   CBC WITH AUTOMATED DIFF    Collection Time: 06/22/20  2:08 AM   Result Value Ref Range    WBC 6.0 3.6 - 11.0 K/uL    RBC 3.17 (L) 3.80 - 5.20 M/uL    HGB 8.8 (L) 11.5 - 16.0 g/dL    HCT 27.7 (L) 35.0 - 47.0 %    MCV 87.4 80.0 - 99.0 FL    MCH 27.8 26.0 - 34.0 PG    MCHC 31.8 30.0 - 36.5 g/dL    RDW 14.1 11.5 - 14.5 %    PLATELET 781 068 - 101 K/uL    MPV 10.2 8.9 - 12.9 FL    NRBC 0.0 0  WBC    ABSOLUTE NRBC 0.00 0.00 - 0.01 K/uL    NEUTROPHILS 61 32 - 75 %    LYMPHOCYTES 25 12 - 49 %    MONOCYTES 12 5 - 13 %    EOSINOPHILS 1 0 - 7 %    BASOPHILS 0 0 - 1 %    IMMATURE GRANULOCYTES 1 (H) 0.0 - 0.5 %    ABS. NEUTROPHILS 3.6 1.8 - 8.0 K/UL    ABS. LYMPHOCYTES 1.5 0.8 - 3.5 K/UL    ABS. MONOCYTES 0.7 0.0 - 1.0 K/UL    ABS. EOSINOPHILS 0.1 0.0 - 0.4 K/UL    ABS. BASOPHILS 0.0 0.0 - 0.1 K/UL    ABS. IMM. GRANS. 0.1 (H) 0.00 - 0.04 K/UL    DF AUTOMATED     METABOLIC PANEL, BASIC    Collection Time: 06/22/20  2:08 AM   Result Value Ref Range    Sodium 142 136 - 145 mmol/L    Potassium 3.5 3.5 - 5.1 mmol/L    Chloride 106 97 - 108 mmol/L    CO2 27 21 - 32 mmol/L    Anion gap 9 5 - 15 mmol/L    Glucose 88 65 - 100 mg/dL    BUN 9 6 - 20 MG/DL    Creatinine 0.87 0.55 - 1.02 MG/DL    BUN/Creatinine ratio 10 (L) 12 - 20      GFR est AA >60 >60 ml/min/1.73m2    GFR est non-AA >60 >60 ml/min/1.73m2    Calcium 8.0 (L) 8.5 - 10.1 MG/DL       Microbiology Data:       Blood: 6/18/20  Component Value Ref Range & Units Status   Special Requests: NO SPECIAL REQUESTS    Preliminary   Culture result: NO GROWTH 2 DAYS              Urine: 6/18/20  Clean catch; Urine         Component Value Ref Range & Units Status   Special Requests: NO SPECIAL REQUESTS    Final   [place] Count >100,000   COLONIES/mL    Final   Culture result: Abnormal     Final   ENTEROCOCCUS FAECALIS (PREDOMINATING)    Culture result:    Final   MIXED UROGENITAL MATT ISOLATED . Tr Damico(10,000 COLONIES/mL)    Susceptibility      Enterococcus faecalis     KARENA    Ampicillin ($) <=2 ug/mL S    Ciprofloxacin ($) <=0.5 ug/mL S    Daptomycin ($$$$$) 2 ug/mL S    Levofloxacin ($) 1 ug/mL S    Linezolid ($$$$$) 2 ug/mL S    Nitrofurantoin <=16 ug/mL S    Tetracycline >=16 ug/mL R    Vancomycin ($) 1 ug/mL S            Pathology Results:  8/1/19     FINAL PATHOLOGIC DIAGNOSIS   1. Small bowel, duodenum, biopsy:   No histopathologic abnormality. 2. Stomach, biopsy:   Mild chronic gastritis.    3. Esophagus, biopsy:   Esophagitis with increased intraepithelial eosinophils (up to 4/hpf). 4. Large bowel, ascending, biopsy:   Sessile serrated polyp. 5. Large bowel, transverse, biopsy:   Sessile serrated polyp. 6. Large bowel, sigmoid, biopsy:   Sessile serrated polyp. Imaging:   CT A/P WO contrast 6/17/20  FINDINGS:     The visualized lung bases are clear.     Abdomen:      Liver: The liver is normal on noncontrast images.      Spleen: The spleen is normal on noncontrast images.      Adrenals: The adrenals are normal on noncontrast images.      Pancreas: The pancreas is normal on noncontrast images.      Gallbladder: The gallbladder is normal on noncontrast images.      Kidneys: There is left perinephric stranding, left hydronephrosis and left  hydroureter. No ureteral calculus is seen. There are several nonobstructing left  renal calculi, each measuring approximately 1 mm in size. There is no right  hydronephrosis.     Bowel: No thickened or dilated loop of large or small bowel seen.      Appendix: The appendix is normal.     Pelvis: Urinary bladder is partially filled and grossly normal.     Miscellaneous: There is no free intraperitoneal gas or fluid. There is no focal  fluid collection to suggest abscess. There is a 4.1 cm x 3.4 cm left ovarian  cyst, measuring 3.4 cm x 4.4 cm in prior CT dated August 2016.     IMPRESSION  IMPRESSION: Left perinephric stranding, left hydronephrosis and left  hydroureter. No obstructing ureteral calculus visualized. CXR 6/17/20  FINDINGS:   The lungs are clear. The central airways are patent. No pneumothorax or pleural  effusion.      IMPRESSION  IMPRESSION:   Clear lungs      CT A/P 6/22/20     FINDINGS:   LUNG BASES: Clear. INCIDENTALLY IMAGED HEART AND MEDIASTINUM: Unremarkable. LIVER: No mass or fatty infiltration. GALLBLADDER: Unremarkable. SPLEEN: Not enlarged. PANCREAS: No mass or inflammation  ADRENALS: Unremarkable.   KIDNEYS: No calcified renal stone. Mild left-sided hydronephrosis has resolved. There is however slight delay of left-sided nephrogram but the renal vein is  patent. Enhancement of the left kidney is somewhat heterogeneous without abscess  collection or discrete mass There are no radiopaque stones along the course of  the ureter. BOWEL: No dilatation or wall thickening. APPENDIX: Within normal limits  PERITONEUM: No ascites or pneumoperitoneum. RETROPERITONEUM: No lymphadenopathy or aortic aneurysm. BLADDER: Incompletely distended  REPRODUCTIVE ORGANS: Uterus is not enlarged. 4.3 cm hypodensity left adnexa  likely an ovarian cyst   BONES: No destructive bone lesion. ADDITIONAL COMMENTS: N/A     IMPRESSION  IMPRESSION:     1. Left-sided hydronephrosis has essentially resolved however there is delayed  enhancement and somewhat heterogeneous enhancement of the left kidney. This  suggest pyelonephritis but no drainable renal abscess    Procedures:   L lithrotripsy 6/11  Stent removal 6/15     Assessment / Plan:    Ms Shy Mendez is a 54year old lady wt MS on Aubagio, optic neuritis, celiac disease,  Nephrolithiasis, S/P recent stent removal 6/15/20 and urological procedure/left lithotripsy on 6/11/2020. 1) L pyelonephritis, UTI, recent urological procedure/stent removal 6/15/20  Urine cx wt Ampicillin sensitive Enterococcus faecalis and mixed urogenital juanita  On  renally dose Zosyn.     Repeat CT noted   Blood cultures so far negative  Pain control and antipyretics per primary team  If continues to improve, will plan on possible PO antibiotics to complete  A total 14 day course    2)MS   on Aubagio  Infections can trigger MS flares, continue to monitor  Noted seen by neurology    3) history of optic neuritis    4) chart reported history of celiac disease    5) nephrolithiasis history    6) mild thrombocytopenia  Could be secondary to antibiotics or infection  Continue to monitor    7) DVT per primary team    Thank for the opportunity to participate in the care of this patient. Please contact with questions or concerns.        Audra Vasquez,   1:45 PM

## 2020-06-23 VITALS
SYSTOLIC BLOOD PRESSURE: 148 MMHG | RESPIRATION RATE: 18 BRPM | HEART RATE: 82 BPM | TEMPERATURE: 98.3 F | OXYGEN SATURATION: 96 % | DIASTOLIC BLOOD PRESSURE: 95 MMHG

## 2020-06-23 LAB
ANION GAP SERPL CALC-SCNC: 7 MMOL/L (ref 5–15)
BACTERIA SPEC CULT: NORMAL
BASOPHILS # BLD: 0.1 K/UL (ref 0–0.1)
BASOPHILS NFR BLD: 1 % (ref 0–1)
BUN SERPL-MCNC: 8 MG/DL (ref 6–20)
BUN/CREAT SERPL: 9 (ref 12–20)
CALCIUM SERPL-MCNC: 8.5 MG/DL (ref 8.5–10.1)
CHLORIDE SERPL-SCNC: 104 MMOL/L (ref 97–108)
CO2 SERPL-SCNC: 26 MMOL/L (ref 21–32)
CREAT SERPL-MCNC: 0.86 MG/DL (ref 0.55–1.02)
DIFFERENTIAL METHOD BLD: ABNORMAL
EOSINOPHIL # BLD: 0.1 K/UL (ref 0–0.4)
EOSINOPHIL NFR BLD: 2 % (ref 0–7)
ERYTHROCYTE [DISTWIDTH] IN BLOOD BY AUTOMATED COUNT: 14 % (ref 11.5–14.5)
GLUCOSE SERPL-MCNC: 90 MG/DL (ref 65–100)
HCT VFR BLD AUTO: 28.1 % (ref 35–47)
HGB BLD-MCNC: 8.9 G/DL (ref 11.5–16)
IMM GRANULOCYTES # BLD AUTO: 0 K/UL
IMM GRANULOCYTES NFR BLD AUTO: 0 %
LYMPHOCYTES # BLD: 1.8 K/UL (ref 0.8–3.5)
LYMPHOCYTES NFR BLD: 31 % (ref 12–49)
MCH RBC QN AUTO: 27.8 PG (ref 26–34)
MCHC RBC AUTO-ENTMCNC: 31.7 G/DL (ref 30–36.5)
MCV RBC AUTO: 87.8 FL (ref 80–99)
MONOCYTES # BLD: 0.7 K/UL (ref 0–1)
MONOCYTES NFR BLD: 12 % (ref 5–13)
NEUTS BAND NFR BLD MANUAL: 2 % (ref 0–6)
NEUTS SEG # BLD: 3.1 K/UL (ref 1.8–8)
NEUTS SEG NFR BLD: 52 % (ref 32–75)
NRBC # BLD: 0 K/UL (ref 0–0.01)
NRBC BLD-RTO: 0 PER 100 WBC
PLATELET # BLD AUTO: 231 K/UL (ref 150–400)
PMV BLD AUTO: 9.7 FL (ref 8.9–12.9)
POTASSIUM SERPL-SCNC: 3.2 MMOL/L (ref 3.5–5.1)
RBC # BLD AUTO: 3.2 M/UL (ref 3.8–5.2)
RBC MORPH BLD: ABNORMAL
RBC MORPH BLD: ABNORMAL
SERVICE CMNT-IMP: NORMAL
SODIUM SERPL-SCNC: 137 MMOL/L (ref 136–145)
WBC # BLD AUTO: 5.8 K/UL (ref 3.6–11)

## 2020-06-23 PROCEDURE — 74011250636 HC RX REV CODE- 250/636: Performed by: NURSE PRACTITIONER

## 2020-06-23 PROCEDURE — 74011000258 HC RX REV CODE- 258: Performed by: INTERNAL MEDICINE

## 2020-06-23 PROCEDURE — 74011250636 HC RX REV CODE- 250/636: Performed by: INTERNAL MEDICINE

## 2020-06-23 PROCEDURE — 74011250637 HC RX REV CODE- 250/637: Performed by: NURSE PRACTITIONER

## 2020-06-23 PROCEDURE — 36415 COLL VENOUS BLD VENIPUNCTURE: CPT

## 2020-06-23 PROCEDURE — 80048 BASIC METABOLIC PNL TOTAL CA: CPT

## 2020-06-23 PROCEDURE — 85025 COMPLETE CBC W/AUTO DIFF WBC: CPT

## 2020-06-23 RX ORDER — POTASSIUM CHLORIDE 750 MG/1
40 TABLET, FILM COATED, EXTENDED RELEASE ORAL
Status: COMPLETED | OUTPATIENT
Start: 2020-06-23 | End: 2020-06-23

## 2020-06-23 RX ORDER — ACETAMINOPHEN 325 MG/1
650 TABLET ORAL
Qty: 30 TAB | Refills: 0 | Status: SHIPPED
Start: 2020-06-23 | End: 2021-12-13

## 2020-06-23 RX ORDER — AMOXICILLIN AND CLAVULANATE POTASSIUM 875; 125 MG/1; MG/1
1 TABLET, FILM COATED ORAL 2 TIMES DAILY WITH MEALS
Status: DISCONTINUED | OUTPATIENT
Start: 2020-06-23 | End: 2020-06-23 | Stop reason: HOSPADM

## 2020-06-23 RX ORDER — AMOXICILLIN AND CLAVULANATE POTASSIUM 875; 125 MG/1; MG/1
1 TABLET, FILM COATED ORAL 2 TIMES DAILY WITH MEALS
Qty: 28 TAB | Refills: 0 | Status: SHIPPED | OUTPATIENT
Start: 2020-06-23 | End: 2020-07-07

## 2020-06-23 RX ORDER — TAMSULOSIN HYDROCHLORIDE 0.4 MG/1
0.4 CAPSULE ORAL
Qty: 7 CAP | Refills: 0 | Status: SHIPPED | OUTPATIENT
Start: 2020-06-23 | End: 2020-06-30

## 2020-06-23 RX ORDER — LOPERAMIDE HYDROCHLORIDE 2 MG/1
4 CAPSULE ORAL AS NEEDED
Qty: 4 CAP | Refills: 0 | Status: SHIPPED
Start: 2020-06-23 | End: 2020-07-03

## 2020-06-23 RX ORDER — ONDANSETRON 4 MG/1
4 TABLET, ORALLY DISINTEGRATING ORAL
Qty: 20 TAB | Refills: 0 | Status: SHIPPED | OUTPATIENT
Start: 2020-06-23 | End: 2020-08-12

## 2020-06-23 RX ORDER — IBUPROFEN 400 MG/1
400 TABLET ORAL
Qty: 30 TAB | Refills: 0 | Status: SHIPPED
Start: 2020-06-23 | End: 2020-08-12

## 2020-06-23 RX ADMIN — BUTALBITAL, ACETAMINOPHEN, AND CAFFEINE 1 TABLET: 50; 325; 40 TABLET ORAL at 10:35

## 2020-06-23 RX ADMIN — SUCRALFATE 0.5 G: 1 TABLET ORAL at 10:35

## 2020-06-23 RX ADMIN — PIPERACILLIN AND TAZOBACTAM 3.38 G: 3; .375 INJECTION, POWDER, LYOPHILIZED, FOR SOLUTION INTRAVENOUS at 07:01

## 2020-06-23 RX ADMIN — HEPARIN SODIUM 5000 UNITS: 5000 INJECTION INTRAVENOUS; SUBCUTANEOUS at 07:00

## 2020-06-23 RX ADMIN — PANTOPRAZOLE SODIUM 40 MG: 40 TABLET, DELAYED RELEASE ORAL at 09:35

## 2020-06-23 RX ADMIN — Medication 10 ML: at 07:01

## 2020-06-23 RX ADMIN — Medication 1 CAPSULE: at 09:35

## 2020-06-23 RX ADMIN — SUCRALFATE 0.5 G: 1 TABLET ORAL at 07:00

## 2020-06-23 RX ADMIN — BUPROPION HYDROCHLORIDE 150 MG: 150 TABLET, EXTENDED RELEASE ORAL at 09:35

## 2020-06-23 RX ADMIN — POTASSIUM CHLORIDE 40 MEQ: 750 TABLET, FILM COATED, EXTENDED RELEASE ORAL at 12:32

## 2020-06-23 RX ADMIN — ESCITALOPRAM OXALATE 20 MG: 10 TABLET ORAL at 09:35

## 2020-06-23 RX ADMIN — GABAPENTIN 600 MG: 300 CAPSULE ORAL at 09:35

## 2020-06-23 NOTE — PROGRESS NOTES
Problem: Pain  Goal: *Control of Pain  Outcome: Progressing Towards Goal     Problem: Discharge Planning  Goal: *Discharge to safe environment  Description: See cm note. Jeferson Fenton [facility]     Outcome: Progressing Towards Goal     Problem: Falls - Risk of  Goal: *Absence of Falls  Description: Document Leonel Sheeba Fall Risk and appropriate interventions in the flowsheet. Outcome: Progressing Towards Goal  Note: Fall Risk Interventions:            Medication Interventions: Evaluate medications/consider consulting pharmacy         History of Falls Interventions: Door open when patient unattended         Problem: Patient Education: Go to Patient Education Activity  Goal: Patient/Family Education  Outcome: Progressing Towards Goal     Problem: Risk for Spread of Infection  Goal: Prevent transmission of infectious organism to others  Description: Prevent the transmission of infectious organisms to other patients, staff members, and visitors.   Outcome: Progressing Towards Goal     Problem: Patient Education:  Go to Education Activity  Goal: Patient/Family Education  Outcome: Progressing Towards Goal

## 2020-06-23 NOTE — DISCHARGE SUMMARY
Discharge Summary       PATIENT ID: Franci Guo  MRN: [id number]   YOB: 1975    DATE OF ADMISSION: 6/17/2020  9:35 PM    DATE OF DISCHARGE: 6/23/2020   PRIMARY CARE PROVIDER: Darlin Zhu MD     ATTENDING PHYSICIAN: Dr. Cinthia Rios  DISCHARGING PROVIDER: Toribio Arauz NP    To contact this individual call [phone] and ask the  to page. If unavailable ask to be transferred the Adult Hospitalist Department. CONSULTATIONS: IP CONSULT TO UROLOGY  IP CONSULT TO NEUROLOGY  IP CONSULT TO INFECTIOUS DISEASES  IP CONSULT TO GASTROENTEROLOGY    PROCEDURES/SURGERIES: * No surgery found *    [address] COURSE:     From H&P 6/17/2020:  \"Veronica Lemon is a 54 y.o. female with h/o HTN presents to the ED c/o fever and left flank pain. She was today at her surgeon office when the temp was taken and it was 103. She was sent home but despite taking tylenol she was not feeling well. For the last several days she has been experiencing worsening left flank pain. She recently had a left urter stent removal and was doing well until 4 days ago. She has been taking dilaudid prn for pain control. In the ed she was found to be febrile. CT of the abd/pelv showed hydronephrosis, and perinephric stranding of the left kidneysAnd small kidney stone. She was started on IV antibiotics with ceftriaxone IV. \"    Neurology Consult 6/20/2020:  \"ASSESSMENT AND PLAN:  This is a 30-year-old right-handed female with multiple sclerosis, admitted with sepsis secondary to pyelonephritis, with ongoing fevers despite Zosyn, with episodes of generalized shaking associated with abdominal muscle cramping. Exam reveals a right afferent pupillary defect and decreased sensation at the left foot, which is chronic, otherwise unremarkable. I have low suspicion for an multiple sclerosis exacerbation. Her symptoms are most likely related to her sepsis.   Patients with multiple sclerosis can have pseudo-exacerbation associated with fever, but she has no findings on exam suggestive of this and no history of cord lesions that she can recall. The patient was reassured and instructed to make the nursing staff aware if she does develop any new neurological deficits such as sudden numbness or weakness of an extremity or vision change. Please call if needed. \"     Urology Note 6/22/2020:  \"Reviewed CT today. [place] resolving. No renal abscess. Doing better with stable vitals. WBC down to 6. Cr normal. Enterococcus in urine. On Zosyn. ID following. She is suitable for discharge from our perspective on culture directed antibiotic therapy per ID. No stent or other urologic intervention planned. Has outpatient follow up with Dr. Liu Tam 7/8/20 at 2:50pm at [facility] location. \"    ID Note 6/23/2020:  \"IMPRESSION:  1. Left-sided hydronephrosis has essentially resolved however there is delayed  enhancement and somewhat heterogeneous enhancement of the left kidney. This  suggest pyelonephritis but no drainable renal abscess  Procedures:   L lithrotripsy 6/11  Stent removal 6/15   Assessment / Plan:   Ms Rustam Carlisle is a 54year old lady wt MS on Aubagio, optic neuritis, celiac disease,  Nephrolithiasis, S/P recent stent removal 6/15/20 and urological procedure/left lithotripsy on 6/11/2020. 1) L pyelonephritis, UTI, recent urological procedure/stent removal 6/15/20  Urine cx wt Ampicillin sensitive Enterococcus faecalis and mixed urogenital juanita  On  renally dose Zosyn IV  Repeat CT noted   Blood cultures so far negative  Pain control and antipyretics per primary team  Recommend Augmentin 875/125 po bid till 7/2/20  Check BMP in a week   Yogurt, probiotic encouraged\"    GI Note 6/23/2020:  \"Plan:   · On loperamide  · Stool studies have not been submitted  · Supportive measures  · ID following - on antibiotics  · Plan for discharge home today noted\"    Patient seen today no acute distress.   She reports significant provement in abdominal pain and diarrhea. She reports it is no longer watery. C. difficile was negative. Other stool studies are still pending. Patient is urinating without difficulty. According to urology and GI, okay for patient to discharge and follow-up as an outpatient. Discussed also with infectious disease who states that patient can go out on Augmentin p.o. antibiotics x14 days. Discussed discharge with patient including instructions, recommendations, follow-ups, medications and all questions were answered. At this time patient denies headache, dizziness, chest pain, cough, short of breath, nausea, vomiting, weakness, diarrhea. Discussed also with Dr. Geraldine Ashford / PLAN:      Sepsis due to pyelonephritis in a patient who has recent urological procedure with stent removal 6/15    POA temp 101.7/WBCs 14/abnormal UA  -Resolved  -d/t pyelo  -BC NGTD, urine culture +ENTEROCOCCUS FAECALIS  -Seen by urology; repeat CT w/ resolved hydronephrosis  -switch from zosyn IV to augmentin PO x14 days per ID  -f/u with urology     NORBERTO: POA creat/GFR 1.34/43 (baseline normal kidney function)  -Resolved     Hyponatremia  -Resolved     Headache  -Resolved     Abd Pain w/ diarrhea  -Improving  -cont probiotic, imodium  -diet low fiber and gluten free  -f/u with GI     Hx HTN  -Stable  -Continue home meds     Hx Peptic ulcer dx: recently elevated lipase  -Stable  -cont home meds     Hx MS  -Stable  -continue home meds  -f/u with PCP and/or neuro     Hx depression  -stable  -Continue home meds     Obesity: BMI 38.25  -Counseled and recommend OP f/u with PCP for weight loss        ADDITIONAL CARE RECOMMENDATIONS:   It is imperative that you follow up as directed to ensure resolution of your urinary tract/kidney infection and to further evaluate and manage your kidney and other chronic issues. You should take your antibiotics as directed and finish them.  See provided education handouts for important information and directions regarding your care. If your symptoms return you should seek help immediately. PENDING TEST RESULTS:   At the time of discharge the following test results are still pending: Stool studies    DIET: Regular diet, low fiber, gluten free    ACTIVITY: As tolerated    WOUND CARE: NA    EQUIPMENT needed: NA       My Medications      START taking these medications      Instructions Each Dose to Equal Morning Noon Evening Bedtime   acetaminophen 325 mg tablet  Commonly known as:  TYLENOL    Your last dose was: Your next dose is: Take 2 Tabs by mouth every four (4) hours as needed for Pain or Fever. Over the counter med   650 mg                 amoxicillin-clavulanate 875-125 mg per tablet  Commonly known as:  AUGMENTIN    Your last dose was: Your next dose is: Take 1 Tab by mouth two (2) times daily (with meals) for 14 days. 1 Tab                 ibuprofen 400 mg tablet  Commonly known as:  MOTRIN    Your last dose was: Your next dose is: Take 1 Tab by mouth every six (6) hours as needed for Pain. Over the counter med   400 mg                 loperamide 2 mg capsule  Commonly known as:  IMODIUM    Your last dose was: Your next dose is: Take 2 Caps by mouth as needed for Diarrhea (start with one tab; give additional one tab after each subsequent loose stool; max 4 tabs in 24h) for up to 10 days. Over the counter med   4 mg                 ondansetron 4 mg disintegrating tablet  Commonly known as:  ZOFRAN ODT    Your last dose was: Your next dose is: Take 1 Tab by mouth every six (6) hours as needed for Nausea or Vomiting (may give w ibuprofen if necessary). 4 mg                 tamsulosin 0.4 mg capsule  Commonly known as:  FLOMAX    Your last dose was: Your next dose is: Take 1 Cap by mouth nightly for 7 days.    0.4 mg                    CHANGE how you take these medications      Instructions Each Dose to Equal Morning Noon Evening Bedtime   fluticasone propionate 50 mcg/actuation nasal spray  Commonly known as:  FLONASE  What changed:    · when to take this  · reasons to take this    Your last dose was: Your next dose is: 2 Sprays by Both Nostrils route daily. 2 Spray                 omega 3-dha-epa-fish oil 183.3 mg-75 mg -91.6 mg-306 mg Cap  What changed:    · how much to take  · when to take this    Your last dose was: Your next dose is: Take 4 Caps by mouth daily. 4 Cap                    CONTINUE taking these medications      Instructions Each Dose to Equal Morning Noon Evening Bedtime   albuterol 90 mcg/actuation inhaler  Commonly known as:  PROVENTIL HFA, VENTOLIN HFA, PROAIR HFA    Your last dose was: Your next dose is: Take 2 Puffs by inhalation every four (4) hours as needed for Wheezing or Shortness of Breath. 2 Puff                 Align 4 mg Cap  Generic drug:  Bifidobacterium Infantis    Your last dose was: Your next dose is: Take 1 Cap by mouth nightly. 1 Cap                 Aubagio 14 mg tablet  Generic drug:  teriflunomide    Your last dose was: Your next dose is: Take 14 mg by mouth every evening. 14 mg                 buPROPion  mg XL tablet  Commonly known as:  WELLBUTRIN XL    Your last dose was: Your next dose is: Take 1 Tab by mouth every morning. 300 mg                 escitalopram oxalate 20 mg tablet  Commonly known as:  LEXAPRO    Your last dose was: Your next dose is:          TAKE ONE TABLET BY MOUTH ONE TIME DAILY                  fexofenadine 180 mg tablet  Commonly known as:  ALLEGRA    Your last dose was: Your next dose is: Take 180 mg by mouth every evening. 180 mg                 gabapentin 300 mg capsule  Commonly known as:  NEURONTIN    Your last dose was: Your next dose is: Take 600 mg by mouth two (2) times a day.  1caps twice a day 600 mg                 metoprolol succinate 100 mg tablet  Commonly known as:  TOPROL-XL    Your last dose was: Your next dose is: Take 100 mg by mouth every evening. 100 mg                 pantoprazole 40 mg tablet  Commonly known as:  PROTONIX    Your last dose was: Your next dose is: Take 40 mg by mouth daily. 40 mg                 soy hlrzmah-mopbrcr-Lilqut anthony 56- mg Cap    Your last dose was: Your next dose is: Take 1 Cap by mouth daily. 1 Cap                 sucralfate 100 mg/mL suspension  Commonly known as:  CARAFATE    Your last dose was: Your next dose is: Take 1 tsp by mouth four (4) times daily. 1 tsp                 Vitamin D3 50 mcg (2,000 unit) Tab  Generic drug:  cholecalciferol (vitamin D3)    Your last dose was: Your next dose is: Take 5,000 Units by mouth daily. 5,000 Units                       Where to Get Your Medications      [facility] [address] [place] [address]    Phone:  [phone]   · amoxicillin-clavulanate 875-125 mg per tablet  · ondansetron 4 mg disintegrating tablet  · tamsulosin 0.4 mg capsule     Information on where to get these meds will be given to you by the nurse or doctor. Ask your nurse or doctor about these medications  · acetaminophen 325 mg tablet  · ibuprofen 400 mg tablet  · loperamide 2 mg capsule         NOTIFY YOUR PHYSICIAN FOR ANY OF THE FOLLOWING:   Fever over 101 degrees for 24 hours. Chest pain, shortness of breath, fever, chills, nausea, vomiting, diarrhea, change in mentation, falling, weakness, bleeding. Severe pain or pain not relieved by medications. Or, any other signs or symptoms that you may have questions about.     DISPOSITION:   X Home With:   OT  PT  HH  RN       Long term SNF/Inpatient Rehab    Independent/assisted living    Hospice    Other:       PATIENT CONDITION AT DISCHARGE:     Functional status    Poor     Deconditioned    X Independent      Cognition    X Lucid     Forgetful     Dementia      Catheters/lines (plus indication)    Martini     PICC     PEG    X None      Code status    X Full code     DNR      PHYSICAL EXAMINATION AT DISCHARGE:  General:  Alert, cooperative, no distress, appears stated age, morbidly obese   Lungs:   Clear lung sounds with diminished bases   Heart:  Regular rate and rhythm, S1, S2 normal, no murmur, click, rub or gallop; distant heart sounds   Abdomen:   Soft, non-distended. Bowel sounds normal; BLQ tenderness to palp; No CVA tenderness   Extremities: Extremities normal, atraumatic, no cyanosis or edema. Skin: Skin color, texture, turgor normal. No rashes or lesions   Neurologic: CNII-XII intact.         BP (!) 148/95 Comment: notified nurse  Pulse 82   Temp 98.3 °F (36.8 °C)   Resp 18   LMP 06/01/2020   SpO2 96%   Breastfeeding No       CHRONIC MEDICAL DIAGNOSES:  Problem List as of 6/23/2020 Date Reviewed: 6/22/2020          Codes Class Noted - Resolved    Hyponatremia ICD-10-CM: E87.1  ICD-9-CM: 276.1  6/18/2020 - Present        * (Principal) Pyelonephritis ICD-10-CM: N12  ICD-9-CM: 590.80  6/17/2020 - Present        Sepsis ([address] ICD-10-CM: A41.9  ICD-9-CM: 038.9, 995.91  6/17/2020 - Present        Obstructive pyelonephritis ICD-10-CM: N11.1  ICD-9-CM: 590.80  5/26/2020 - Present        UTI (urinary tract infection) ICD-10-CM: N39.0  ICD-9-CM: 599.0  5/24/2020 - Present        Elevated lipase ICD-10-CM: R74.8  ICD-9-CM: 790.5  5/24/2020 - Present        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 9) ICD-10-CM: E66.01  ICD-9-CM: 278.01  6/30/2018 - Present Depression with anxiety ICD-10-CM: F41.8  ICD-9-CM: 300.4  2018 - Present        Kidney stones ICD-10-CM: N20.0  ICD-9-CM: 592.0  Unknown - Present        EDGARD virus antibody positive ICD-10-CM: R76.8  ICD-9-CM: 795.79  Unknown - Present        Tubular adenoma of colon ICD-10-CM: D12.6  ICD-9-CM: 211.3  Unknown - Present        Single delivery by  ICD-10-CM: O82  ICD-9-CM: 669.71  2015 - Present        Gestational hypertension ICD-10-CM: O13.9  ICD-9-CM: 642.30  2/10/2015 - Present        AMA (advanced maternal age) primigravida 35+ ICD-10-CM: O09.519  ICD-9-CM: 659.50  2/10/2015 - Present        Multiple sclerosis exacerbation ([facility] ICD-10-CM: G35  ICD-9-CM: 934  2013 - Present        Optic neuritis, right ICD-10-CM: H46.9  ICD-9-CM: 377.30  2013 - Present        Dehydration, moderate ICD-10-CM: E86.0  ICD-9-CM: 276.51  2013 - Present        Dysphagia ICD-10-CM: R13.10  ICD-9-CM: 787.20  2013 - Present        Meralgia paresthetica of right side ICD-10-CM: G57.11  ICD-9-CM: 355.1  2013 - Present        Migraine with aura, without mention of intractable migraine without mention of status migrainosus ICD-10-CM: G43.109  ICD-9-CM: 346.00  2013 - Present        Dysplastic colon polyp ICD-10-CM: K63.5  ICD-9-CM: 211.3  Unknown - Present        MS (multiple sclerosis) ([facility] ICD-10-CM: G35  ICD-9-CM: 340  Unknown - Present        Depression ICD-10-CM: F32.9  ICD-9-CM: 311  Unknown - Present        Asthma ICD-10-CM: J45.909  ICD-9-CM: 493.90  Unknown - Present        Elevated blood pressure ICD-10-CM: QOQ1607  ICD-9-CM: Zack Verito  2011 - Present        Vitamin D deficiency ICD-10-CM: E55.9  ICD-9-CM: 268.9  2011 - Present        Depression ICD-10-CM: F32.9  ICD-9-CM: 143  Unknown - Present        RESOLVED: Decreased fetal movement ICD-10-CM: T26.4784  ICD-9-CM: 655.70  2/10/2015 - 2015        RESOLVED: Decreased fetal movement in pregnancy ICD-10-CM: V44.0434  ICD-9-CM: 655.70  2/10/2015 - 2/11/2015              Greater than 30 minutes were spent with the patient on counseling and coordination of care    Signed:   Tiana Gerber NP  6/23/2020  12:24 PM

## 2020-06-23 NOTE — PROGRESS NOTES
Infectious Disease Progress Note      HPI:      Ms Dayton Amaya seen, improved overall   Says LLQ abdomen pain mildly but improved   No nausea, or vomiting  Fevers resolved   Loose stool is improved as well         Medications:    Current Facility-Administered Medications:     potassium chloride SR (KLOR-CON 10) tablet 40 mEq, 40 mEq, Oral, NOW, Ralf Herrera NP    loperamide (IMODIUM) capsule 4 mg, 4 mg, Oral, PRN, Ralf Herrera, NP, 4 mg at 06/22/20 1337    lactobac ac& pc-s.therm-b.anim (MATT Q/RISAQUAD), 1 Cap, Oral, DAILY, Ralf Herrera NP, 1 Cap at 06/23/20 0935    heparin (porcine) injection 5,000 Units, 5,000 Units, SubCUTAneous, Q8H, Ralf Herrera NP, 5,000 Units at 06/23/20 0700    piperacillin-tazobactam (ZOSYN) 3.375 g in 0.9% sodium chloride (MBP/ADV) 100 mL, 3.375 g, IntraVENous, Q8H, Audra Vasquez, , Last Rate: 25 mL/hr at 06/23/20 0701, 3.375 g at 06/23/20 0701    acetaminophen (TYLENOL) tablet 650 mg, 650 mg, Oral, Q4H PRN, Philippe Figueroa NP, 650 mg at 06/21/20 0711    ibuprofen (MOTRIN) tablet 400 mg, 400 mg, Oral, Q6H PRN, Philippe Figueroa NP, 400 mg at 06/22/20 0230    ondansetron (ZOFRAN ODT) tablet 4 mg, 4 mg, Oral, Q6H PRN, Philippe Figueroa NP, 4 mg at 06/20/20 0800    baclofen (LIORESAL) tablet 10 mg, 10 mg, Oral, TID PRN, Ralf Herrera NP, 10 mg at 06/21/20 0711    butalbital-acetaminophen-caffeine (FIORICET, ESGIC) -40 mg per tablet 1 Tab, 1 Tab, Oral, Q4H PRN, Ralf Herrera NP, 1 Tab at 06/23/20 1035    oxyCODONE-acetaminophen (PERCOCET) 5-325 mg per tablet 1 Tab, 1 Tab, Oral, Q4H PRN, Fernanda Hendrix MD, 1 Tab at 06/18/20 8909    buPROPion SR (WELLBUTRIN SR) tablet 150 mg, 150 mg, Oral, BID, Ralf Herrera NP, 150 mg at 06/23/20 0935    escitalopram oxalate (LEXAPRO) tablet 20 mg, 20 mg, Oral, DAILY, Ralf Herrera NP, 20 mg at 06/23/20 0935    gabapentin (NEURONTIN) capsule 600 mg, 600 mg, Oral, BID, Nany Hartley O, NP, 600 mg at 06/23/20 0935    metoprolol succinate (TOPROL-XL) XL tablet 100 mg, 100 mg, Oral, QPM, [facility], NP, 100 mg at 06/22/20 1719    teriflunomide (AUBAGIO) tablet 14 mg (Patient Supplied), 14 mg, Oral, QPM, [facility], NP, 14 mg at 06/22/20 1720    sucralfate (CARAFATE) tablet 0.5 g, 0.5 g, Oral, AC&HS, [facility], NP, 0.5 g at 06/23/20 1035    pantoprazole (PROTONIX) tablet 40 mg, 40 mg, Oral, DAILY, [facility], NP, 40 mg at 06/23/20 0935    tamsulosin (FLOMAX) capsule 0.4 mg, 0.4 mg, Oral, QHS, Sebastian, Perry Valle, NP, 0.4 mg at 06/22/20 2110    sodium chloride (NS) flush 5-40 mL, 5-40 mL, IntraVENous, Q8H, Rama Cortez MD, 10 mL at 06/23/20 0701    sodium chloride (NS) flush 5-40 mL, 5-40 mL, IntraVENous, PRN, Rama Cortez MD          Physical Exam:    Vitals:   Patient Vitals for the past 24 hrs:   Temp Pulse Resp BP SpO2   06/23/20 0903 98.3 °F (36.8 °C) 82 18 (!) 148/95 96 %   06/23/20 0216 98.6 °F (37 °C) 79 13 164/88 96 %   06/22/20 2007 98.6 °F (37 °C) 78 14 163/90 97 %   06/22/20 1449 98.4 °F (36.9 °C) 89 18 154/82 98 %     · GEN: NAD  · HEENT:no scleral icterus, no thrush  · CV: S1, S2 heard regularly   · Lungs: Clear to auscultation bilaterally  · Abdomen: soft, non distended, non tender, + LLQ tenderness mildly   · Extremities: no edema  · Skin: no rash    Labs:   Recent Results (from the past 24 hour(s))   CBC WITH AUTOMATED DIFF    Collection Time: 06/23/20  2:24 AM   Result Value Ref Range    WBC 5.8 3.6 - 11.0 K/uL    RBC 3.20 (L) 3.80 - 5.20 M/uL    HGB 8.9 (L) 11.5 - 16.0 g/dL    HCT 28.1 (L) 35.0 - 47.0 %    MCV 87.8 80.0 - 99.0 FL    MCH 27.8 26.0 - 34.0 PG    MCHC 31.7 30.0 - 36.5 g/dL    RDW 14.0 11.5 - 14.5 %    PLATELET 144 766 - 891 K/uL    MPV 9.7 8.9 - 12.9 FL    NRBC 0.0 0  WBC    ABSOLUTE NRBC 0.00 0.00 - 0.01 K/uL    NEUTROPHILS 52 32 - 75 %    BAND NEUTROPHILS 2 0 - 6 %    LYMPHOCYTES 31 12 - 49 %    MONOCYTES 12 5 - 13 % EOSINOPHILS 2 0 - 7 %    BASOPHILS 1 0 - 1 %    IMMATURE GRANULOCYTES 0 %    ABS. NEUTROPHILS 3.1 1.8 - 8.0 K/UL    ABS. LYMPHOCYTES 1.8 0.8 - 3.5 K/UL    ABS. MONOCYTES 0.7 0.0 - 1.0 K/UL    ABS. EOSINOPHILS 0.1 0.0 - 0.4 K/UL    ABS. BASOPHILS 0.1 0.0 - 0.1 K/UL    ABS. IMM. GRANS. 0.0 K/UL    DF MANUAL      RBC COMMENTS MICROCYTOSIS  1+        RBC COMMENTS NORMOCYTIC, NORMOCHROMIC     METABOLIC PANEL, BASIC    Collection Time: 06/23/20  2:24 AM   Result Value Ref Range    Sodium 137 136 - 145 mmol/L    Potassium 3.2 (L) 3.5 - 5.1 mmol/L    Chloride 104 97 - 108 mmol/L    CO2 26 21 - 32 mmol/L    Anion gap 7 5 - 15 mmol/L    Glucose 90 65 - 100 mg/dL    BUN 8 6 - 20 MG/DL    Creatinine 0.86 0.55 - 1.02 MG/DL    BUN/Creatinine ratio 9 (L) 12 - 20      GFR est AA >60 >60 ml/min/1.73m2    GFR est non-AA >60 >60 ml/min/1.73m2    Calcium 8.5 8.5 - 10.1 MG/DL       Microbiology Data:       Blood: 6/18/20  Component Value Ref Range & Units Status   Special Requests: NO SPECIAL REQUESTS    Preliminary   Culture result: NO GROWTH 2 DAYS              Urine: 6/18/20  Clean catch; Urine         Component Value Ref Range & Units Status   Special Requests: NO SPECIAL REQUESTS    Final   [place] Count >100,000   COLONIES/mL    Final   Culture result: Abnormal     Final   ENTEROCOCCUS FAECALIS (PREDOMINATING)    Culture result:    Final   MIXED UROGENITAL MATT ISOLATED . Chelseaia Shirley .(10,000 COLONIES/mL)    Susceptibility      Enterococcus faecalis     KARENA    Ampicillin ($) <=2 ug/mL S    Ciprofloxacin ($) <=0.5 ug/mL S    Daptomycin ($$$$$) 2 ug/mL S    Levofloxacin ($) 1 ug/mL S    Linezolid ($$$$$) 2 ug/mL S    Nitrofurantoin <=16 ug/mL S    Tetracycline >=16 ug/mL R    Vancomycin ($) 1 ug/mL S            Pathology Results:  8/1/19     FINAL PATHOLOGIC DIAGNOSIS   1. Small bowel, duodenum, biopsy:   No histopathologic abnormality. 2. Stomach, biopsy:   Mild chronic gastritis.    3. Esophagus, biopsy:   Esophagitis with increased intraepithelial eosinophils (up to 4/hpf). 4. Large bowel, ascending, biopsy:   Sessile serrated polyp. 5. Large bowel, transverse, biopsy:   Sessile serrated polyp. 6. Large bowel, sigmoid, biopsy:   Sessile serrated polyp. Imaging:   CT A/P WO contrast 6/17/20  FINDINGS:     The visualized lung bases are clear.     Abdomen:      Liver: The liver is normal on noncontrast images.      Spleen: The spleen is normal on noncontrast images.      Adrenals: The adrenals are normal on noncontrast images.      Pancreas: The pancreas is normal on noncontrast images.      Gallbladder: The gallbladder is normal on noncontrast images.      Kidneys: There is left perinephric stranding, left hydronephrosis and left  hydroureter. No ureteral calculus is seen. There are several nonobstructing left  renal calculi, each measuring approximately 1 mm in size. There is no right  hydronephrosis.     Bowel: No thickened or dilated loop of large or small bowel seen.      Appendix: The appendix is normal.     Pelvis: Urinary bladder is partially filled and grossly normal.     Miscellaneous: There is no free intraperitoneal gas or fluid. There is no focal  fluid collection to suggest abscess. There is a 4.1 cm x 3.4 cm left ovarian  cyst, measuring 3.4 cm x 4.4 cm in prior CT dated August 2016.     IMPRESSION  IMPRESSION: Left perinephric stranding, left hydronephrosis and left  hydroureter. No obstructing ureteral calculus visualized. CXR 6/17/20  FINDINGS:   The lungs are clear. The central airways are patent. No pneumothorax or pleural  effusion.      IMPRESSION  IMPRESSION:   Clear lungs      CT A/P 6/22/20     FINDINGS:   LUNG BASES: Clear. INCIDENTALLY IMAGED HEART AND MEDIASTINUM: Unremarkable. LIVER: No mass or fatty infiltration. GALLBLADDER: Unremarkable. SPLEEN: Not enlarged. PANCREAS: No mass or inflammation  ADRENALS: Unremarkable. KIDNEYS: No calcified renal stone.  Mild left-sided hydronephrosis has resolved. There is however slight delay of left-sided nephrogram but the renal vein is  patent. Enhancement of the left kidney is somewhat heterogeneous without abscess  collection or discrete mass There are no radiopaque stones along the course of  the ureter. BOWEL: No dilatation or wall thickening. APPENDIX: Within normal limits  PERITONEUM: No ascites or pneumoperitoneum. RETROPERITONEUM: No lymphadenopathy or aortic aneurysm. BLADDER: Incompletely distended  REPRODUCTIVE ORGANS: Uterus is not enlarged. 4.3 cm hypodensity left adnexa  likely an ovarian cyst   BONES: No destructive bone lesion. ADDITIONAL COMMENTS: N/A     IMPRESSION  IMPRESSION:     1. Left-sided hydronephrosis has essentially resolved however there is delayed  enhancement and somewhat heterogeneous enhancement of the left kidney. This  suggest pyelonephritis but no drainable renal abscess    Procedures:   L lithrotripsy 6/11  Stent removal 6/15     Assessment / Plan:    Ms Salvador Andres is a 54year old lady wt MS on Aubagio, optic neuritis, celiac disease,  Nephrolithiasis, S/P recent stent removal 6/15/20 and urological procedure/left lithotripsy on 6/11/2020.       1) L pyelonephritis, UTI, recent urological procedure/stent removal 6/15/20  Urine cx wt Ampicillin sensitive Enterococcus faecalis and mixed urogenital juanita  On  renally dose Zosyn IV  Repeat CT noted   Blood cultures so far negative  Pain control and antipyretics per primary team  Recommend Augmentin 875/125 po bid till 7/2/20  Check BMP in a week   Yogurt, probiotic encouraged         2)MS   on Aubagio  Infections can trigger MS flares, continue to monitor  Noted seen by neurology    3) history of optic neuritis    4) chart reported history of celiac disease    5) nephrolithiasis history    6) mild thrombocytopenia  Could be secondary to antibiotics or infection  Continue to monitor    7) DVT per primary team    8) Hypokalemia per primary team repletion       Thank for the opportunity to participate in the care of this patient. Please contact with questions or concerns.        Sienna Vasquez,   12:10 PM

## 2020-06-23 NOTE — PROGRESS NOTES
COLLIN: 1. GI following. 2. ID following; possible PO abx on discharge. Patient will return home when stable, cm to follow if any discharge needs arise.     Chicho Price [facility]

## 2020-06-23 NOTE — PROGRESS NOTES
[address]  [address] [facility], [address]       GI PROGRESS NOTE  Will Lori Parisiole  [phone] office  [phone] NP/PA in-hospital cell phone M-F until 4:30PM  After 5PM or on weekends, please call  for physician on call      NAME: Gucci Howard   :  1975   MRN:  [id number]       Subjective:   Patient reports overall improvement. Abdominal pain has improved. She reports intermittent LLQ abdominal pain. She reports having 2 loose bowel movements today (not watery, improved). Objective:     VITALS:   Last 24hrs VS reviewed since prior progress note. Most recent are:  Visit Vitals  BP (!) 148/95   Pulse 82   Temp 98.3 °F (36.8 °C)   Resp 18   SpO2 96%   Breastfeeding No       PHYSICAL EXAM:  General: Cooperative, no acute distress    Neurologic:  Alert and oriented  HEENT: EOMI, no scleral icterus   Lungs:  No respiratory distress  Abdomen: Soft, non-distended, no tenderness, no guarding, no rebound. Extremities: Warm  Psych:   Not anxious or agitated    Lab Data Reviewed:     Recent Results (from the past 24 hour(s))   CBC WITH AUTOMATED DIFF    Collection Time: 20  2:24 AM   Result Value Ref Range    WBC 5.8 3.6 - 11.0 K/uL    RBC 3.20 (L) 3.80 - 5.20 M/uL    HGB 8.9 (L) 11.5 - 16.0 g/dL    HCT 28.1 (L) 35.0 - 47.0 %    MCV 87.8 80.0 - 99.0 FL    MCH 27.8 26.0 - 34.0 PG    MCHC 31.7 30.0 - 36.5 g/dL    RDW 14.0 11.5 - 14.5 %    PLATELET 141 383 - 859 K/uL    MPV 9.7 8.9 - 12.9 FL    NRBC 0.0 0  WBC    ABSOLUTE NRBC 0.00 0.00 - 0.01 K/uL    NEUTROPHILS 52 32 - 75 %    BAND NEUTROPHILS 2 0 - 6 %    LYMPHOCYTES 31 12 - 49 %    MONOCYTES 12 5 - 13 %    EOSINOPHILS 2 0 - 7 %    BASOPHILS 1 0 - 1 %    IMMATURE GRANULOCYTES 0 %    ABS. NEUTROPHILS 3.1 1.8 - 8.0 K/UL    ABS. LYMPHOCYTES 1.8 0.8 - 3.5 K/UL    ABS. MONOCYTES 0.7 0.0 - 1.0 K/UL    ABS. EOSINOPHILS 0.1 0.0 - 0.4 K/UL    ABS. BASOPHILS 0.1 0.0 - 0.1 K/UL    ABS. IMM.  GRANS. 0.0 K/UL DF MANUAL      RBC COMMENTS MICROCYTOSIS  1+        RBC COMMENTS NORMOCYTIC, NORMOCHROMIC     METABOLIC PANEL, BASIC    Collection Time: 20  2:24 AM   Result Value Ref Range    Sodium 137 136 - 145 mmol/L    Potassium 3.2 (L) 3.5 - 5.1 mmol/L    Chloride 104 97 - 108 mmol/L    CO2 26 21 - 32 mmol/L    Anion gap 7 5 - 15 mmol/L    Glucose 90 65 - 100 mg/dL    BUN 8 6 - 20 MG/DL    Creatinine 0.86 0.55 - 1.02 MG/DL    BUN/Creatinine ratio 9 (L) 12 - 20      GFR est AA >60 >60 ml/min/1.73m2    GFR est non-AA >60 >60 ml/min/1.73m2    Calcium 8.5 8.5 - 10.1 MG/DL        Assessment:   · Diarrhea, improved  · Abdominal pain, likely secondary to hydronephrosis/pyelonephritis: CT abdomen/pelvis with and without contrast (20): left-sided hydronephrosis has essentially resolved however there is delayed enhancement and somewhat heterogenous enhancement of the left kidney, this suggests pyelonephritis but no drainable renal abscess. RUQ ultrasound (20): gallbladder sludge, no specific evidence of acute cholecystitis; diffuse hepatic steatosis. · Pyelonephritis, recent stent removal on 6/15/20: ID following. On antibiotics. · Acute kidney injury  · Hypertension  · Multiple sclerosis     Patient Active Problem List   Diagnosis Code    MS (multiple sclerosis) ([facility] [address] G35    Depression F32.9    Asthma J45.909    Elevated blood pressure [id number]    Vitamin D deficiency E55.9    Depression F32.9    Dysplastic colon polyp K63.5    Dysphagia R13.10    Meralgia paresthetica of right side G57.11    Migraine with aura, without mention of intractable migraine without mention of status migrainosus G43. 109    Dehydration, moderate E86.0    Multiple sclerosis exacerbation (HCC) G35    Optic neuritis, right H46.9    Gestational hypertension O13.9    AMA (advanced maternal age) primigravida 35+ O46.5    Single delivery by  O80    Tubular adenoma of colon D12.6    EDGARD virus antibody positive R76.8  Kidney stones N20.0    Depression with anxiety F41.8    Severe obesity (BMI 35.0-39. 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

## 2020-06-23 NOTE — DISCHARGE INSTRUCTIONS
Discharge Instructions       PATIENT ID: Virgil Alarcon  MRN: [id number]   YOB: 1975    DATE OF ADMISSION: 6/17/2020  9:35 PM    DATE OF DISCHARGE: 6/23/2020    PRIMARY CARE PROVIDER: Golden Kim MD     ATTENDING PHYSICIAN: Junito Cortes MD  DISCHARGING PROVIDER: Dee Dee James NP    To contact this individual call [phone] and ask the  to page. If unavailable ask to be transferred the Adult Hospitalist Department. DISCHARGE DIAGNOSES Pyelonephritis, UTI, hydronephrosis, hypertension, hyponatremia, kidney stones, sepsis, Hx of MS    CONSULTATIONS: IP CONSULT TO UROLOGY  IP CONSULT TO NEUROLOGY  IP CONSULT TO INFECTIOUS DISEASES  IP CONSULT TO GASTROENTEROLOGY    PROCEDURES/SURGERIES: * No surgery found *    PENDING TEST RESULTS:   At the time of discharge the following test results are still pending: Stool studies    FOLLOW UP APPOINTMENTS:   Follow-up Information     Follow up With Specialties Details Why Contact Info    Golden Kim MD Pediatrics, Internal Medicine Call in 1 day hospital follow up, urinary tract infection/kidney infection [id number] [facility]  [phone]      Cosimo Ahumada, MD Gastroenterology Call in 1 day hospital follow up, f/u diarrhea and stool studies [address]  [address] [facility] [address]      Bertha Sellers MD Urology  hospital follow up, hydronephrosis, kidney infection, started on tamsulosin [phone] Ma MD Neurology Call in 1 day hospital follow up, [id number] [facility]  [phone]             ADDITIONAL CARE RECOMMENDATIONS: It is imperative that you follow up as directed to ensure resolution of your urinary tract/kidney infection and to further evaluate and manage your kidney and other chronic issues. You should take your antibiotics as directed and finish them.  See provided education handouts for important information and directions regarding your care. If your symptoms return you should seek help immediately. DIET: Regular diet, low fiber, gluten free    ACTIVITY: As tolerated    WOUND CARE: NA    EQUIPMENT needed: NA      DISCHARGE MEDICATIONS:   See Medication Reconciliation Form    · It is important that you take the medication exactly as they are prescribed. · Keep your medication in the bottles provided by the pharmacist and keep a list of the medication names, dosages, and times to be taken in your wallet. · Do not take other medications without consulting your doctor. NOTIFY YOUR PHYSICIAN FOR ANY OF THE FOLLOWING:   Fever over 101 degrees for 24 hours. Chest pain, shortness of breath, fever, chills, nausea, vomiting, diarrhea, change in mentation, falling, weakness, bleeding. Severe pain or pain not relieved by medications. Or, any other signs or symptoms that you may have questions about. DISPOSITION:  X  Home With:   OT  PT  HH  RN       SNF/Inpatient Rehab/LTAC    Independent/assisted living    Hospice    Other:     CDMP Checked: Yes X     PROBLEM LIST Updated:   Yes X       Signed:   Kristin Echeverria NP  6/23/2020  12:19 PM

## 2020-06-23 NOTE — PROGRESS NOTES
Hospital follow-up telemedicine PCP transitional care appointment has been scheduled with Dr. Meghan Ramirez for Monday, 6/29/20 at 2:00 p.m. Pending patient discharge.   Aguilar Zhou, Care Management Specialist.

## 2020-06-24 ENCOUNTER — PATIENT OUTREACH (OUTPATIENT)
Dept: FAMILY MEDICINE CLINIC | Age: 45
End: 2020-06-24

## 2020-06-29 ENCOUNTER — VIRTUAL VISIT (OUTPATIENT)
Dept: INTERNAL MEDICINE CLINIC | Age: 45
End: 2020-06-29

## 2020-06-29 DIAGNOSIS — N17.9 ACUTE RENAL FAILURE, UNSPECIFIED ACUTE RENAL FAILURE TYPE (HCC): ICD-10-CM

## 2020-06-29 DIAGNOSIS — I10 ESSENTIAL HYPERTENSION: ICD-10-CM

## 2020-06-29 DIAGNOSIS — D64.9 ANEMIA, UNSPECIFIED TYPE: ICD-10-CM

## 2020-06-29 DIAGNOSIS — F41.8 DEPRESSION WITH ANXIETY: ICD-10-CM

## 2020-06-29 DIAGNOSIS — G35 MS (MULTIPLE SCLEROSIS) (HCC): ICD-10-CM

## 2020-06-29 DIAGNOSIS — N23 RENAL COLIC: ICD-10-CM

## 2020-06-29 DIAGNOSIS — E78.5 DYSLIPIDEMIA: ICD-10-CM

## 2020-06-29 DIAGNOSIS — N12 PYELONEPHRITIS: Primary | ICD-10-CM

## 2020-06-29 NOTE — PROGRESS NOTES
Jovita Dodge is a 54 y.o. female who was seen by synchronous (real-time) audio-video technology on 6/29/2020. Consent: Jovita Dodge, who was seen by synchronous (real-time) audio-video technology, and/or her healthcare decision maker, is aware that this patient-initiated, Telehealth encounter on 6/29/2020 is a billable service, with coverage as determined by her insurance carrier. She is aware that she may receive a bill and has provided verbal consent to proceed: Yes. I was in the office while conducting this encounter. Subjective:   Jovita Dodge was seen for Transitions Of Care [facility], 6/17 - 6/23, pyelonephritis )      Notes:  Admitted with sepsis and pyelo following ureteral stent removal  Complicated by ARF and anemia  Renal function returned to normal  Anemia was stable x 3 days PTD. Feeling better, but still some pain in kidney distribution on left    +blood loss with PICC line placement reported by pt. No other blood loss reported  No melena  Diarrhea - better with diet adjustments    Pt reports family hx appendiceal CA - father and sister  Candidate for appy at time of almas    Nursing screenings reviewed by provider at visit. Past medical, Social, and Family history reviewed  Medications reviewed and updated. Allergies   Allergen Reactions    Latex Rash    Adhesive Rash    Motrin [Ibuprofen] Nausea Only       Prior to Admission medications    Medication Sig Start Date End Date Taking? Authorizing Provider   acetaminophen (TYLENOL) 325 mg tablet Take 2 Tabs by mouth every four (4) hours as needed for Pain or Fever. Over the counter med 6/23/20  Yes Jong Lor, NP   amoxicillin-clavulanate (AUGMENTIN) 875-125 mg per tablet Take 1 Tab by mouth two (2) times daily (with meals) for 14 days.  6/23/20 7/7/20 Yes Jong Lor, NP   ondansetron (ZOFRAN ODT) 4 mg disintegrating tablet Take 1 Tab by mouth every six (6) hours as needed for Nausea or Vomiting (may give w ibuprofen if necessary). 6/23/20  Yes Bharat Tam NP   loperamide (IMODIUM) 2 mg capsule Take 2 Caps by mouth as needed for Diarrhea (start with one tab; give additional one tab after each subsequent loose stool; max 4 tabs in 24h) for up to 10 days. Over the counter med 6/23/20 7/3/20 Yes Bharat Tam NP   buPROPion XL (WELLBUTRIN XL) 300 mg XL tablet Take 1 Tab by mouth every morning. 6/16/20  Yes Modesto Cobb MD   sucralfate (CARAFATE) 100 mg/mL suspension Take 1 tsp by mouth four (4) times daily. Yes Other, MD Neelima   teriflunomide (Aubagio) 14 mg tablet Take 14 mg by mouth every evening. Yes Provider, Historical   metoprolol succinate (TOPROL-XL) 100 mg tablet Take 100 mg by mouth every evening. Yes Provider, Historical   escitalopram oxalate (LEXAPRO) 20 mg tablet TAKE ONE TABLET BY MOUTH ONE TIME DAILY 4/27/20  Yes Modesto Cobb MD   pantoprazole (PROTONIX) 40 mg tablet Take 40 mg by mouth daily. Yes Provider, Historical   Bifidobacterium Infantis (ALIGN) 4 mg cap Take 1 Cap by mouth nightly. Yes Provider, Historical   omega 3-dha-epa-fish oil 183.3 mg-75 mg -91.6 mg-306 mg cap Take 4 Caps by mouth daily. Patient taking differently: Take 2 Caps by mouth two (2) times a day. 10/25/18  Yes Modesto Cobb MD   fluticasone (FLONASE) 50 mcg/actuation nasal spray 2 Sprays by Both Nostrils route daily. Patient taking differently: 2 Sprays by Both Nostrils route as needed. 10/22/18  Yes Modesto Cobb MD   fexofenadine (ALLEGRA) 180 mg tablet Take 180 mg by mouth every evening. Yes Provider, Historical   gabapentin (NEURONTIN) 300 mg capsule Take 600 mg by mouth two (2) times a day. 1caps twice a day 10/20/17  Yes Provider, Historical   albuterol (PROVENTIL HFA, VENTOLIN HFA, PROAIR HFA) 90 mcg/actuation inhaler Take 2 Puffs by inhalation every four (4) hours as needed for Wheezing or Shortness of Breath.  9/27/17  Yes Rian Rosen MD cholecalciferol, vitamin D3, (VITAMIN D3) 2,000 unit tab Take 5,000 Units by mouth daily. Yes Provider, Historical   tamsulosin (FLOMAX) 0.4 mg capsule Take 1 Cap by mouth nightly for 7 days. 6/23/20 6/30/20  Lauren Prasad NP   ibuprofen (MOTRIN) 400 mg tablet Take 1 Tab by mouth every six (6) hours as needed for Pain. Over the counter med 6/23/20   Lauren Prasad NP   soy gwfumgz-udyhciz-Qcotce anthony 56- mg cap Take 1 Cap by mouth daily. 6/16/20   Lynsey Rosen MD         ROS   A complete ROS was performed and negative except as noted in HPI      PHYSICAL EXAMINATION:    Vital Signs:  Last menstrual period 06/01/2020. Constitutional: [x] Appears well-developed and well-nourished [x] No apparent distress      Mental status: [x] Alert and awake  [x] Oriented [x] Able to follow commands       Eyes:   EOM    [x]  Normal      Sclera  [x]  Normal              Discharge [x]  None visible       HENT: [x] Normocephalic, atraumatic    [x] Mouth/Throat: Mucous membranes are moist    External Ears [x] Normal      Neck: [x] No visualized mass     Pulmonary/Chest: [x] Respiratory effort normal   [x] No visualized signs of difficulty breathing or respiratory distress    Musculoskeletal:  [x] Normal range of motion of neck    Neurological:        [x] No Facial Asymmetry (Cranial nerve 7 motor function) (limited exam due to video visit)          [x] No gaze palsy     Skin:        [x] No significant exanthematous lesions or discoloration noted on facial skin             Psychiatric:       [x] Normal Affect       Other pertinent observable physical exam findings:  None. Prior labs reviewed. Reviewed prior imaging reports  Reviewed dc summary      We discussed the expected course, resolution and complications of the diagnosis(es) in detail. Medication risks, benefits, costs, interactions, and alternatives were discussed as indicated.   I advised her to contact the office if her condition worsens, changes or fails to improve as anticipated. She expressed understanding with the diagnosis(es) and plan. Brandon Fang is a 54 y.o. female who was evaluated by a video visit encounter for concerns as above. Patient identification was verified prior to start of the visit. A caregiver was present when appropriate. Due to this being a TeleHealth encounter (During [facility] public health emergency), evaluation of the following organ systems was limited: Vitals/Constitutional/EENT/Resp/CV/GI//MS/Neuro/Skin/Heme-Lymph-Imm. Pursuant to the emergency declaration under the [address] [place] [address] waiver authority and the [employer] Act, this Virtual  Visit was conducted, with patient's (and/or legal guardian's) consent, to reduce the patient's risk of exposure to COVID-19 and provide necessary medical care. Services were provided through a video synchronous discussion virtually to substitute for in-person clinic visit. Assessment & Plan:   Diagnoses and all orders for this visit:    Anemia - possible consumptive process assoc with sepsis + acute blood loss from PICC line placement complication. ARF - resolved PTD  Pyelo - improving  Gallbladder dz - deferred almas due to pyelo, sepsis      ICD-10-CM ICD-9-CM    1. Pyelonephritis N12 590.80    2. Acute renal failure, unspecified acute renal failure type ([facility] [place] [address] N17.9 584.9    3. MS (multiple sclerosis) ([facility] [place] [address] G35 340    4. Depression with anxiety F41.8 300.4    5. Essential hypertension I10 401.9    6. Dyslipidemia E78.5 272.4    7. Renal colic Q19 604.5    8.  Anemia, unspecified type D64.9 285.9      Follow-up and Dispositions    · Return in about 6 months (around 12/29/2020), or if symptoms worsen or fail to improve, for blood pressure - IO.        results and schedule of future studies reviewed with patient  reviewed diet, exercise and weight   cardiovascular risk and specific lipid/LDL goals reviewed  reviewed medications and side effects in detail    labs at lab - repeat Hgb, creat, etc  Consider additional anemia w/u pending repeat labs  See urology as scheduled  GI as scheduled  Defer almas procedure until this pyelo resolves. Pt to discuss with surgeon re: tom at the time of almas      AVS:  [x]  Sent to patient as Everywunt message after visit. []  Mailed to patient after visit. []  Not sent to patient after visit.       Future Appointments   Date Time Provider Cezar Chase   7/24/2020  8:40 AM Neal Halsted,  [address]

## 2020-06-29 NOTE — PROGRESS NOTES
[phone]    Stopped flomax 6-28-20     PAIN LEVEL 7- left lower flank/ abdomen     Chief Complaint   Patient presents with   [address]     [facility], 6/17 - 6/23, pyelonephritis      1. Have you been to the ER, urgent care clinic since your last visit? Hospitalized since your last visit? Yes [facility], 6/17 through 6/23, pyelonephritis     2. Have you seen or consulted any other health care providers outside of the [address] [place] [address] [place] since your last visit? Include any pap smears or colon screening. [facility]   There are no preventive care reminders to display for this patient. 3 most recent PHQ Screens 6/16/2020   Little interest or pleasure in doing things Not at all   Feeling down, depressed, irritable, or hopeless More than half the days   Total Score PHQ 2 2     Recent Travel Screening and Travel History documentation     Travel Screening       Question Response     In the last month, have you been in contact with someone who was confirmed or suspected to have Coronavirus / COVID-19? No / Unsure     Do you have any of the following symptoms? Abdominal pain     Have you traveled internationally in the last month?  No      Travel History   Travel since 05/29/20     No documented travel since 05/29/20

## 2020-07-01 LAB
25(OH)D3+25(OH)D2 SERPL-MCNC: 77 NG/ML (ref 30–100)
ALBUMIN SERPL-MCNC: 3.8 G/DL (ref 3.8–4.8)
ALBUMIN/GLOB SERPL: 1.5 {RATIO} (ref 1.2–2.2)
ALP SERPL-CCNC: 77 IU/L (ref 39–117)
ALT SERPL-CCNC: 24 IU/L (ref 0–32)
AST SERPL-CCNC: 16 IU/L (ref 0–40)
BASOPHILS # BLD AUTO: 0.1 X10E3/UL (ref 0–0.2)
BASOPHILS NFR BLD AUTO: 1 %
BILIRUB SERPL-MCNC: 0.3 MG/DL (ref 0–1.2)
BUN SERPL-MCNC: 10 MG/DL (ref 6–24)
BUN/CREAT SERPL: 11 (ref 9–23)
CALCIUM SERPL-MCNC: 9.4 MG/DL (ref 8.7–10.2)
CHLORIDE SERPL-SCNC: 103 MMOL/L (ref 96–106)
CHOLEST SERPL-MCNC: 117 MG/DL (ref 100–199)
CO2 SERPL-SCNC: 24 MMOL/L (ref 20–29)
CREAT SERPL-MCNC: 0.88 MG/DL (ref 0.57–1)
EOSINOPHIL # BLD AUTO: 0.1 X10E3/UL (ref 0–0.4)
EOSINOPHIL NFR BLD AUTO: 1 %
ERYTHROCYTE [DISTWIDTH] IN BLOOD BY AUTOMATED COUNT: 13.5 % (ref 11.7–15.4)
EST. AVERAGE GLUCOSE BLD GHB EST-MCNC: 105 MG/DL
GLOBULIN SER CALC-MCNC: 2.6 G/DL (ref 1.5–4.5)
GLUCOSE SERPL-MCNC: 90 MG/DL (ref 65–99)
HBA1C MFR BLD: 5.3 % (ref 4.8–5.6)
HCT VFR BLD AUTO: 37.4 % (ref 34–46.6)
HDLC SERPL-MCNC: 53 MG/DL
HGB BLD-MCNC: 12.1 G/DL (ref 11.1–15.9)
IMM GRANULOCYTES # BLD AUTO: 0.1 X10E3/UL (ref 0–0.1)
IMM GRANULOCYTES NFR BLD AUTO: 1 %
LDLC SERPL CALC-MCNC: 39 MG/DL (ref 0–99)
LYMPHOCYTES # BLD AUTO: 1.7 X10E3/UL (ref 0.7–3.1)
LYMPHOCYTES NFR BLD AUTO: 20 %
MCH RBC QN AUTO: 28.2 PG (ref 26.6–33)
MCHC RBC AUTO-ENTMCNC: 32.4 G/DL (ref 31.5–35.7)
MCV RBC AUTO: 87 FL (ref 79–97)
MONOCYTES # BLD AUTO: 0.7 X10E3/UL (ref 0.1–0.9)
MONOCYTES NFR BLD AUTO: 9 %
NEUTROPHILS # BLD AUTO: 5.8 X10E3/UL (ref 1.4–7)
NEUTROPHILS NFR BLD AUTO: 68 %
PLATELET # BLD AUTO: 309 X10E3/UL (ref 150–450)
POTASSIUM SERPL-SCNC: 4.6 MMOL/L (ref 3.5–5.2)
PROT SERPL-MCNC: 6.4 G/DL (ref 6–8.5)
RBC # BLD AUTO: 4.29 X10E6/UL (ref 3.77–5.28)
SODIUM SERPL-SCNC: 143 MMOL/L (ref 134–144)
TRIGL SERPL-MCNC: 126 MG/DL (ref 0–149)
VLDLC SERPL CALC-MCNC: 25 MG/DL (ref 5–40)
WBC # BLD AUTO: 8.4 X10E3/UL (ref 3.4–10.8)

## 2020-07-01 NOTE — PROGRESS NOTES
Hgb (red blood cell count) is back to normal.  Other labs are also normal.  Keep up the good work!    Continue your current medications

## 2020-07-10 ENCOUNTER — PATIENT OUTREACH (OUTPATIENT)
Dept: CASE MANAGEMENT | Age: 45
End: 2020-07-10

## 2020-07-10 NOTE — PROGRESS NOTES
Patient resolved from Transition of Care episode on 7/10/20  Discussed COVID-19 related testing which was not done at this time. Test results were not done. Patient informed of results, if available? no     Patient/family has been provided the following resources and education related to COVID-19:                         Signs, symptoms and red flags related to COVID-19            CDC exposure and quarantine guidelines            [employer] exposure contact - [phone]            Contact for their local Department of Health                 Patient currently reports no symptoms    No further outreach scheduled with this CTN/ACM/LPN/HC/ MA. Episode of Care resolved. Patient has this CTN/ACM/LPN/HC/MA contact information if future needs arise.

## 2020-07-11 RX ORDER — METOPROLOL SUCCINATE 100 MG/1
TABLET, EXTENDED RELEASE ORAL
Qty: 45 TAB | Refills: 5 | Status: SHIPPED | OUTPATIENT
Start: 2020-07-11 | End: 2021-05-04

## 2020-07-20 ENCOUNTER — OFFICE VISIT (OUTPATIENT)
Dept: SURGERY | Age: 45
End: 2020-07-20

## 2020-07-20 VITALS
SYSTOLIC BLOOD PRESSURE: 126 MMHG | TEMPERATURE: 98.8 F | RESPIRATION RATE: 16 BRPM | WEIGHT: 239 LBS | DIASTOLIC BLOOD PRESSURE: 82 MMHG | BODY MASS INDEX: 38.41 KG/M2 | HEIGHT: 66 IN | HEART RATE: 84 BPM | OXYGEN SATURATION: 98 %

## 2020-07-20 DIAGNOSIS — K80.20 CALCULUS OF GALLBLADDER WITHOUT CHOLECYSTITIS WITHOUT OBSTRUCTION: Primary | ICD-10-CM

## 2020-07-20 NOTE — PROGRESS NOTES
1. Have you been to the ER, urgent care clinic since your last visit? Hospitalized since your last visit? No    2. Have you seen or consulted any other health care providers outside of the [address] [place] [address] since your last visit? Include any pap smears or colon screening.  No

## 2020-07-20 NOTE — PROGRESS NOTES
Radha Weinstein is a 54 y.o. female who is referred by Dr. Duyen Paiz for further evaluation of symptomatic cholelithiasis. Ms. Clemente Mckeon tells me that she has been experiencing RUQ abdominal pain for some time now. The pain is intermittent and occurs after meals. Associated abdominal bloating. No nausea or vomitting. She reports occasional diarrhea. No clear ha/o hansel colored stool or tea colored urine. No chest pain or shortness of breath. She has otherwise been in her usual state of health. Abdominal ultrasound - 5/24/2020 - Gallbladder sludge. No specific evidence of acute cholecystitis. Diffuse hepatic steatosis. Echogenic right kidney, suggesting intrinsic renal disease. No hydronephrosis. Furthermore, Ms. Clemente Mckeon has a family h/o appendiceal carcinoma. Colonoscopy - 8/1/2019 - Rectum: normal. Sigmoid: 9 mm polyp removed by hot biopsy. Descending Colon: normal. Transverse Colon: 9 mm polyp removed by hot biopsy. Ascending Colon: 9 mm polyp removed by hot biopsy.  Cecum: normal. Terminal Ileum: normal.     Past Medical History:   Diagnosis Date    Ankle fracture     Asthma     Autoimmune disease ([facility] [address]     MS    Calculus of gallbladder without cholecystitis without obstruction 7/20/2020    Celiac disease     Chronic pain     MS    Congenital sucrose isomaltose malabsorption     Depression     Diverticulosis of sigmoid colon     Dysplastic colon polyp     Dr. Marisela Ashford - last colonoscopy 11/7/12 - single polyp    Essential hypertension     Gestasional    GERD (gastroesophageal reflux disease)     Influenza B 03/07/2017    EDGARD virus antibody positive     Kidney stones     Miscarriage     11/13    MS (multiple sclerosis) ([facility] [address]     Dr. Art Cotter    Obstructive pyelonephritis 5/26/2020    Ovarian cyst     PUD (peptic ulcer disease)     Pyelonephritis     Routine gynecological examination     Dr. Vini Jiang Sleep apnea     pt states she no longer has the problem since wt loss - intentional wt loss    Tubular adenoma of colon 2016    Uterine fibroid     Vitamin D deficiency 2011     Past Surgical History:   Procedure Laterality Date    COLONOSCOPY N/A 2019    COLONOSCOPY/EGD ([facility]) performed by Ashley Parnell MD at [address] [facility]      double compound fx of right lower leg and dislocated heel - has a plate and 13 screws    HX  SECTION          HX COLONOSCOPY      HX ENDOSCOPY      HX GYN      fibroid tumor ovarian cyst     HX ORTHOPAEDIC      1992 Left shoulder surgery    [place] BIOPSY OF BREAST, INCISIONAL       Family History   Problem Relation Age of Onset    Cancer Father         appendix/cancerous colon polyps    Heart Disease Father         cabg age early 66's   [facility] Cancer Maternal Uncle         prostate/melanoma    Cancer Maternal Grandmother         cancerous colon polyps    Cancer Maternal Grandfather         prostate    Heart Disease Paternal Grandmother     Cancer Paternal Grandmother         cancerous colon polyps    No Known Problems Daughter     Colon Polyps Mother         precancerous    Atrial Fibrillation Mother     Heart Disease Paternal Aunt      Social History     Socioeconomic History    Marital status:      Spouse name: Not on file    Number of children: Not on file    Years of education: Not on file    Highest education level: Not on file   Tobacco Use    Smoking status: Former Smoker     Years: 8.00     Last attempt to quit: 2000     Years since quittin.0    Smokeless tobacco: Former User   Substance and Sexual Activity    Alcohol use: Yes     Comment: rare    Drug use: No    Sexual activity: Never     Review of systems negative except as noted. Review of Systems   Constitutional: Negative for chills and fever. Cardiovascular: Negative for chest pain. Gastrointestinal: Positive for abdominal pain, nausea and vomiting. Abdominal bloating.  No h/o hansel colored stool. Genitourinary: Negative for dysuria and hematuria. No h/o tea colored urine. Physical Exam  Vitals signs reviewed. Constitutional:       General: She is not in acute distress. Appearance: Normal appearance. She is obese. HENT:      Head: Normocephalic and atraumatic. Eyes:      General: No scleral icterus. Neck:      Musculoskeletal: Neck supple. Cardiovascular:      Rate and Rhythm: Normal rate. Pulmonary:      Effort: Pulmonary effort is normal.   Abdominal:      General: There is no distension. Palpations: Abdomen is soft. Tenderness: There is no abdominal tenderness. There is no guarding. Musculoskeletal: Normal range of motion. Lymphadenopathy:      Cervical: No cervical adenopathy. Neurological:      General: No focal deficit present. Mental Status: She is alert. ASSESSMENT and PLAN  Reviewed imaging studies. In view of the findings on H and P and ultrasound, Ms. Kassandra Montes De Oca should benefit from cholecystectomy. Discussed laparoscopic cholecystectomy and intra-operative cholangiogram with today including risks of bleeding, infection, CBD injury, conversion to open procedure. Given her family h/o appendiceal cancer, will plan on laparoscopic appendectomy as well. Discussed procedure with her including risks of bleeding, infection, visceral injury, conversion to open procedure. She understands and wishes to proceed. I have tentatively scheduled Ms. Kassandra Montes De Oca for surgery on August 14, 2020 at [facility] and will see her back in the office postoperatively. She is agreeable to this plan of action and is most certainly free to contact the office should any questions or concerns arise.            CC: MD Denny Shearer MD

## 2020-07-20 NOTE — LETTER
7/20/2020 4:28 PM 
 
Ms. Stephenson [facility] [id number] Surgery is scheduled as follows: 
 
Surgery date: 08/14/2020      Arrival time: 9:15 AM     Start time: 11:15 AM 
 
Location: [place] [address]. Main Entrance [address] [facility], [facility] DO NOT EAT ANYTHING PAST MIDNIGHT THE NIGHT BEFORE SURGERY - YOU MAY HAVE CLEAR LIQUIDS UP TO ONE HOUR PRIOR TO YOUR ARRIVAL TIME. 
______________________________________________________________________ *YOU WILL BE CONTACTED BY THE [facility] PRE ADMISSION TESTING DEPARTMENT TO HAVE MANDATORY COVID 19 TESTING COMPLETED PRIOR TO YOUR SURGERY - THIS WILL BE COMPLETED 4 DAYS PRIOR TO YOUR SURGERY. PLEASE NOTE THAT IF YOU ARE UNABLE TO COMPLETE THIS TESTING PRIOR TO SURGERY, YOUR SURGERY WILL BE CANCELLED. **You will be instructed to quarantine yourself from the day of your COVID testing until the day of your surgery. You may also contact Pre Admission Testing to schedule your  testing at [phone]. 1st Post Operative appointment:  
 
08/26/2020 at 9:00 AM with Edith Garcia NP Office Phone: [phone] For questions regarding this information please contact Barry Jin at [phone]. Sincerely, Barry Jin Surgical Scheduler Pre-Operative Instructions **DO NOT EAT ANYTHING AFTER MIDNIGHT THE NIGHT BEFORE YOUR SURGERY** 
**YOU MAY HAVE CLEAR LIQUIDS UP TO ONE HOUR PRIOR TO YOUR ARRIVAL TIME -  This includes water, black coffee, Gatorade, Apple juice, and tea without cream or milk. YOU MAY DRINK UP TO 12 OUNCES OVER A PERIOD OF 4 HOURS. Please report to Patient Registration/Admitting at the time given to you by your Surgeons office  Located at the [address] [place] [address] S. 
If  your physical condition changes (fever, cold, flu), please contact your Surgeon as soon as possible. DO BRING your Insurance card and a photo ID, such as a Drivers License. Valuables are to be left at home. DO NOT wear make-up, lotion, powder, perfume/cologne/aftershave, or nail polish (unless clear). You may wear deodorant. DO NOT wear metal objects such as jewelry or hair pins. Remove all piercings/body jewelry. WEAR COMFORTABLE CLOTHING THAT WILL BE EASY TO CHANGE INTO AFTER SURGERY. If you are staying overnight, please pack a bag and leave it in your vehicle until after surgery. We recommend that you pack your toiletry items, a robe/pajamas, slippers or any other items that you need for your comfort. Have a family member deliver your bag to your room after your surgery  the Hospital does not have a secure location to store these personal items. Please bring a hard-sided case for glasses, contact lenses, or hearing aids. Denture cups are provided by the [address] [place] [address] OR AFTER YOUR SURGERY. YOU SHOULD NOT OPERATE A VEHICLE FOR 24 HOURS AFTER RECEIVING SEDATION OR ANESTHESIA. Please arrange for a family member or friend to drive you home after your surgery: You will not be allowed to take a cab or drive yourself home. If you are discharged the day of surgery, it is recommended that you have someone stay with you until the next morning. For questions regarding the above information, please contact the Satish Connor  office at [phone].

## 2020-07-21 RX ORDER — BUPIVACAINE HYDROCHLORIDE 2.5 MG/ML
30 INJECTION, SOLUTION EPIDURAL; INFILTRATION; INTRACAUDAL ONCE
Status: CANCELLED | OUTPATIENT
Start: 2020-07-21 | End: 2020-07-21

## 2020-07-21 RX ORDER — ACETAMINOPHEN 325 MG/1
1000 TABLET ORAL ONCE
Status: CANCELLED | OUTPATIENT
Start: 2020-07-21 | End: 2020-07-22

## 2020-07-29 ENCOUNTER — VIRTUAL VISIT (OUTPATIENT)
Dept: CARDIOLOGY CLINIC | Age: 45
End: 2020-07-29

## 2020-07-29 DIAGNOSIS — R00.2 PALPITATIONS: Primary | ICD-10-CM

## 2020-07-29 DIAGNOSIS — I10 ESSENTIAL HYPERTENSION: ICD-10-CM

## 2020-07-29 NOTE — PROGRESS NOTES
MD Ema Reyes, ANDRE               6mo in office       Message sent to schedule to schedule appointment.  -[facility]

## 2020-07-29 NOTE — PROGRESS NOTES
[facility] Cardiology Telemedicine Encounter                                                         Pursuant to the emergency declaration under the [facility], 1135 waiver authority and the [employer] Act, this Virtual  Visit was conducted, with patient's consent, to reduce the patient's risk of exposure to COVID-19 and provide continuity of care for an established patient. Services were provided through a video synchronous discussion virtually to substitute for in-person clinic visit. 3/18 48 hr holter, 1pvc, 10 pac's  3/18 normal echo     Current Outpatient Medications on File Prior to Visit   Medication Sig    metoprolol succinate (TOPROL-XL) 100 mg tablet TAKE ONE AND ONE-HALF TABLETS BY MOUTH ONE TIME DAILY    acetaminophen (TYLENOL) 325 mg tablet Take 2 Tabs by mouth every four (4) hours as needed for Pain or Fever. Over the counter med    ibuprofen (MOTRIN) 400 mg tablet Take 1 Tab by mouth every six (6) hours as needed for Pain. Over the counter med    buPROPion XL (WELLBUTRIN XL) 300 mg XL tablet Take 1 Tab by mouth every morning.  teriflunomide (Aubagio) 14 mg tablet Take 14 mg by mouth every evening.  escitalopram oxalate (LEXAPRO) 20 mg tablet TAKE ONE TABLET BY MOUTH ONE TIME DAILY    pantoprazole (PROTONIX) 40 mg tablet Take 40 mg by mouth daily.  Bifidobacterium Infantis (ALIGN) 4 mg cap Take 1 Cap by mouth nightly.  omega 3-dha-epa-fish oil 183.3 mg-75 mg -91.6 mg-306 mg cap Take 4 Caps by mouth daily. (Patient taking differently: Take 2 Caps by mouth two (2) times a day.)    fluticasone (FLONASE) 50 mcg/actuation nasal spray 2 Sprays by Both Nostrils route daily. (Patient taking differently: 2 Sprays by Both Nostrils route as needed.)    fexofenadine (ALLEGRA) 180 mg tablet Take 180 mg by mouth every evening.     gabapentin (NEURONTIN) 300 mg capsule Take 600 mg by mouth two (2) times a day. 1caps twice a day    albuterol (PROVENTIL HFA, VENTOLIN HFA, PROAIR HFA) 90 mcg/actuation inhaler Take 2 Puffs by inhalation every four (4) hours as needed for Wheezing or Shortness of Breath.  cholecalciferol, vitamin D3, (VITAMIN D3) 2,000 unit tab Take 5,000 Units by mouth daily.  ondansetron (ZOFRAN ODT) 4 mg disintegrating tablet Take 1 Tab by mouth every six (6) hours as needed for Nausea or Vomiting (may give w ibuprofen if necessary). No current facility-administered medications on file prior to visit. Subjective/HPI:   Rachid Brown is a 54 y.o. female who was seen by synchronous (real-time) audio-video technology on 7/29/2020. She was recently in the hospital with pyelonephritis was pretty sick but now is doing much much better. Her blood pressures under good control. She has not been exercising because of the heat. She had a few palpitations here and there but nothing sustained. She is scheduled for gallbladder and appendix removal in the next few weeks.     PCP Provider  Laila Dominguez MD  Past Medical History:   Diagnosis Date    Ankle fracture     Asthma     Autoimmune disease ([facility] [address]     MS    Calculus of gallbladder without cholecystitis without obstruction 7/20/2020    Celiac disease     Chronic pain     MS    Congenital sucrose isomaltose malabsorption     Depression     Diverticulosis of sigmoid colon     Dysplastic colon polyp     Dr. Derek Gonzales - last colonoscopy 11/7/12 - single polyp    Essential hypertension     Gestasional    GERD (gastroesophageal reflux disease)     Influenza B 03/07/2017    EDGARD virus antibody positive     Kidney stones     Miscarriage     11/13    MS (multiple sclerosis) ([facility] [address]     Dr. Graciela Cervantes    Obstructive pyelonephritis 5/26/2020    Ovarian cyst     PUD (peptic ulcer disease)     Pyelonephritis     Routine gynecological examination     Dr. Mayte Ruth Sleep apnea     pt states she no longer has the problem since wt loss - intentional wt loss    Tubular adenoma of colon 2016    Uterine fibroid     Vitamin D deficiency 2011      Past Surgical History:   Procedure Laterality Date    COLONOSCOPY N/A 2019    COLONOSCOPY/EGD (LATEX ALLERGY) performed by Pool Gtz MD at [facility] ENDOSCOPY    HX [facility]      double compound fx of right lower leg and dislocated heel - has a plate and 13 screws    HX  SECTION          HX COLONOSCOPY      HX ENDOSCOPY      HX GYN      fibroid tumor ovarian cyst     HX ORTHOPAEDIC      1992 Left shoulder surgery    MI BIOPSY OF BREAST, INCISIONAL       Allergies   Allergen Reactions    Latex Rash    Adhesive Rash    Motrin [Ibuprofen] Nausea Only      Family History   Problem Relation Age of Onset    Cancer Father         appendix/cancerous colon polyps    Heart Disease Father         cabg age early 66's   [facility] Cancer Maternal Uncle         prostate/melanoma    Cancer Maternal Grandmother         cancerous colon polyps    Cancer Maternal Grandfather         prostate    Heart Disease Paternal Grandmother     Cancer Paternal Grandmother         cancerous colon polyps    No Known Problems Daughter     Colon Polyps Mother         precancerous    Atrial Fibrillation Mother     Heart Disease Paternal Aunt       Current Outpatient Medications   Medication Sig    metoprolol succinate (TOPROL-XL) 100 mg tablet TAKE ONE AND ONE-HALF TABLETS BY MOUTH ONE TIME DAILY    acetaminophen (TYLENOL) 325 mg tablet Take 2 Tabs by mouth every four (4) hours as needed for Pain or Fever. Over the counter med    ibuprofen (MOTRIN) 400 mg tablet Take 1 Tab by mouth every six (6) hours as needed for Pain. Over the counter med    buPROPion XL (WELLBUTRIN XL) 300 mg XL tablet Take 1 Tab by mouth every morning.  teriflunomide (Aubagio) 14 mg tablet Take 14 mg by mouth every evening.     escitalopram oxalate (LEXAPRO) 20 mg tablet TAKE ONE TABLET BY MOUTH ONE TIME DAILY    pantoprazole (PROTONIX) 40 mg tablet Take 40 mg by mouth daily.  Bifidobacterium Infantis (ALIGN) 4 mg cap Take 1 Cap by mouth nightly.  omega 3-dha-epa-fish oil 183.3 mg-75 mg -91.6 mg-306 mg cap Take 4 Caps by mouth daily. (Patient taking differently: Take 2 Caps by mouth two (2) times a day.)    fluticasone (FLONASE) 50 mcg/actuation nasal spray 2 Sprays by Both Nostrils route daily. (Patient taking differently: 2 Sprays by Both Nostrils route as needed.)    fexofenadine (ALLEGRA) 180 mg tablet Take 180 mg by mouth every evening.  gabapentin (NEURONTIN) 300 mg capsule Take 600 mg by mouth two (2) times a day. 1caps twice a day    albuterol (PROVENTIL HFA, VENTOLIN HFA, PROAIR HFA) 90 mcg/actuation inhaler Take 2 Puffs by inhalation every four (4) hours as needed for Wheezing or Shortness of Breath.  cholecalciferol, vitamin D3, (VITAMIN D3) 2,000 unit tab Take 5,000 Units by mouth daily.  ondansetron (ZOFRAN ODT) 4 mg disintegrating tablet Take 1 Tab by mouth every six (6) hours as needed for Nausea or Vomiting (may give w ibuprofen if necessary). No current facility-administered medications for this visit. There were no vitals filed for this visit.   Social History     Socioeconomic History    Marital status:      Spouse name: Not on file    Number of children: Not on file    Years of education: Not on file    Highest education level: Not on file   Occupational History    Not on file   Social Needs    Financial resource strain: Not on file    Food insecurity     Worry: Not on file     Inability: Not on file    Transportation needs     Medical: Not on file     Non-medical: Not on file   Tobacco Use    Smoking status: Former Smoker     Years: 8.00     Last attempt to quit: 2000     Years since quittin.0    Smokeless tobacco: Former User   Substance and Sexual Activity    Alcohol use: Yes     Comment: rare    Drug use: No    Sexual activity: Never Lifestyle    Physical activity     Days per week: Not on file     Minutes per session: Not on file    Stress: Not on file   Relationships    Social connections     Talks on phone: Not on file     Gets together: Not on file     Attends Jewish service: Not on file     Active member of club or organization: Not on file     Attends meetings of clubs or organizations: Not on file     Relationship status: Not on file    Intimate partner violence     Fear of current or ex partner: Not on file     Emotionally abused: Not on file     Physically abused: Not on file     Forced sexual activity: Not on file   Other Topics Concern    Not on file   Social History Narrative    Not on file       Review of Symptoms  11 systems reviewed, negative other than as stated in the HPI    Physical Exam:    Due to this being a TeleHealth evaluation, many elements of the physical examination are unable to be assessed. General: Well developed, in no acute distress, cooperative and alert  HEENT: Pupils equal/round. No marked JVD visible on video. Respiratory: No audible wheezing, no signs of respiratory distress, lips non cyanotic  Extremities:  No edema  Neuro: A&Ox3, speech clear, no facial droop, answering questions appropriately  Skin: Skin color is normal. No rashes or lesions.  Non diaphoretic on visible skin during exam      Cardiology Labs:  Lab Results   Component Value Date/Time    Cholesterol, total 117 06/30/2020 09:02 AM    HDL Cholesterol 53 06/30/2020 09:02 AM    LDL, calculated 39 06/30/2020 09:02 AM    Triglyceride 126 06/30/2020 09:02 AM       Lab Results   Component Value Date/Time    Sodium 143 06/30/2020 09:02 AM    Potassium 4.6 06/30/2020 09:02 AM    Chloride 103 06/30/2020 09:02 AM    CO2 24 06/30/2020 09:02 AM    Anion gap 7 06/23/2020 02:24 AM    Glucose 90 06/30/2020 09:02 AM    BUN 10 06/30/2020 09:02 AM    Creatinine 0.88 06/30/2020 09:02 AM    BUN/Creatinine ratio 11 06/30/2020 09:02 AM    GFR est AA >60 06/23/2020 02:24 AM    GFR est non-AA >60 06/23/2020 02:24 AM    Calcium 9.4 06/30/2020 09:02 AM    Bilirubin, total 0.3 06/30/2020 09:02 AM    Alk. phosphatase 77 06/30/2020 09:02 AM    Protein, total 6.4 06/30/2020 09:02 AM    Albumin 3.8 06/30/2020 09:02 AM    Globulin 4.2 (H) 06/17/2020 09:45 PM    A-G Ratio 1.5 06/30/2020 09:02 AM    ALT (SGPT) 24 06/30/2020 09:02 AM         Assessment:     Diagnoses and all orders for this visit:    1. Palpitations    2. Essential hypertension        ICD-10-CM ICD-9-CM    1. Palpitations  R00.2 785.1    2. Essential hypertension  I10 401.9      No orders of the defined types were placed in this encounter. Plan:     She is stable and minimally symptomatic on a reasonable medical regimen and needs no cardiac testing at this time. She should be fine for any further surgery that she needs without special precautions     current treatment plan is effective, no change in therapy  lab results and schedule of future lab studies reviewed with patient  reviewed diet, exercise and weight control  We discussed the expected course, resolution and complications of the diagnosis(es) in detail. Medication risks, benefits, costs, interactions, and alternatives were discussed as indicated. I advised her to contact the office if her condition worsens, changes or fails to improve as anticipated. She expressed understanding with the diagnosis(es) and plan    [address] [facility] [address], [place]    Greater than 20 minutes was spent in direct video patient care, planning and chart review. This visit was conducted using [employer] services.

## 2020-08-06 ENCOUNTER — TELEPHONE (OUTPATIENT)
Dept: SURGERY | Age: 45
End: 2020-08-06

## 2020-08-10 ENCOUNTER — HOSPITAL ENCOUNTER (OUTPATIENT)
Dept: PREADMISSION TESTING | Age: 45
Discharge: HOME | End: 2020-08-10
Payer: COMMERCIAL

## 2020-08-10 DIAGNOSIS — U07.1 COVID-19: ICD-10-CM

## 2020-08-10 PROCEDURE — 87635 SARS-COV-2 COVID-19 AMP PRB: CPT

## 2020-08-11 LAB — SARS-COV-2, COV2NT: NOT DETECTED

## 2020-08-12 NOTE — PERIOP NOTES
PRE-OP INSTRUCTIONS GIVEN OVER TELEPHONE , WHICH INCLUDED INSTRUCTIONS ON MEDICATIONS TO TAKE DAY OF SURGERY AND MEDICATIONS TO STOP PRIOR TO SURGERY. VERBAL INSTRUCTIONS ON NEW DIET PROTOCOL TO FOLLOW, UNLESS OTHERWISE INSTRUCTED BY SURGEON'S OFFICE TO BE NPO AFTER MIDNIGHT. VERBAL  INSTRUCTIONS ON THE USE OF CHLORHEXIDINE SOLUTION THE EVENING BEFORE SURGERY AND THE MORNING OF SURGERY. CONFIRMED PATIENT'S UNDERSTANDING OF ARRIVAL PROCESS FOR THE DAY OF SURGERY. PATIENT VOICED UNDERSTANDING OF ABOVE.

## 2020-08-14 ENCOUNTER — APPOINTMENT (OUTPATIENT)
Dept: GENERAL RADIOLOGY | Age: 45
End: 2020-08-14
Attending: SURGERY
Payer: COMMERCIAL

## 2020-08-14 ENCOUNTER — ANESTHESIA (OUTPATIENT)
Dept: SURGERY | Age: 45
End: 2020-08-14
Payer: COMMERCIAL

## 2020-08-14 ENCOUNTER — HOSPITAL ENCOUNTER (OUTPATIENT)
Age: 45
Setting detail: OUTPATIENT SURGERY
Discharge: HOME | End: 2020-08-14
Attending: SURGERY | Admitting: SURGERY
Payer: COMMERCIAL

## 2020-08-14 ENCOUNTER — ANESTHESIA EVENT (OUTPATIENT)
Dept: SURGERY | Age: 45
End: 2020-08-14
Payer: COMMERCIAL

## 2020-08-14 VITALS
SYSTOLIC BLOOD PRESSURE: 104 MMHG | OXYGEN SATURATION: 97 % | WEIGHT: 235.8 LBS | BODY MASS INDEX: 37.9 KG/M2 | HEIGHT: 66 IN | TEMPERATURE: 98 F | HEART RATE: 64 BPM | RESPIRATION RATE: 18 BRPM | DIASTOLIC BLOOD PRESSURE: 65 MMHG

## 2020-08-14 DIAGNOSIS — K80.20 CALCULUS OF GALLBLADDER WITHOUT CHOLECYSTITIS WITHOUT OBSTRUCTION: Primary | ICD-10-CM

## 2020-08-14 LAB — HCG UR QL: NEGATIVE

## 2020-08-14 PROCEDURE — 77030005244 HC CATH INSRT PRT RANF -B: Performed by: SURGERY

## 2020-08-14 PROCEDURE — 74011000250 HC RX REV CODE- 250: Performed by: NURSE ANESTHETIST, CERTIFIED REGISTERED

## 2020-08-14 PROCEDURE — 77030027743 HC APPL F/HEMSTAT BARD -B: Performed by: SURGERY

## 2020-08-14 PROCEDURE — 77030032060 HC PWDR HEMSTAT ARISTA ASRB 3GM BARD -C: Performed by: SURGERY

## 2020-08-14 PROCEDURE — 77030010507 HC ADH SKN DERMBND J&J -B: Performed by: SURGERY

## 2020-08-14 PROCEDURE — 74011250636 HC RX REV CODE- 250/636: Performed by: NURSE ANESTHETIST, CERTIFIED REGISTERED

## 2020-08-14 PROCEDURE — 77030020263 HC SOL INJ SOD CL0.9% LFCR 1000ML: Performed by: SURGERY

## 2020-08-14 PROCEDURE — 77030009963 HC RELD STPLR ECR J&J -C: Performed by: SURGERY

## 2020-08-14 PROCEDURE — 76210000017 HC OR PH I REC 1.5 TO 2 HR: Performed by: SURGERY

## 2020-08-14 PROCEDURE — 76060000036 HC ANESTHESIA 2.5 TO 3 HR: Performed by: SURGERY

## 2020-08-14 PROCEDURE — 74011250637 HC RX REV CODE- 250/637: Performed by: ANESTHESIOLOGY

## 2020-08-14 PROCEDURE — 77030031139 HC SUT VCRL2 J&J -A: Performed by: SURGERY

## 2020-08-14 PROCEDURE — 77030012770 HC TRCR OPT FX AMR -B: Performed by: SURGERY

## 2020-08-14 PROCEDURE — 74300 X-RAY BILE DUCTS/PANCREAS: CPT

## 2020-08-14 PROCEDURE — 77030026438 HC STYL ET INTUB CARD -A: Performed by: ANESTHESIOLOGY

## 2020-08-14 PROCEDURE — 77030009851 HC PCH RTVR ENDOSC AMR -B: Performed by: SURGERY

## 2020-08-14 PROCEDURE — 77030020704 HC DISECT ENDOSC BLNT J&J -B: Performed by: SURGERY

## 2020-08-14 PROCEDURE — 76010000131 HC OR TIME 2 TO 2.5 HR: Performed by: SURGERY

## 2020-08-14 PROCEDURE — 74011000250 HC RX REV CODE- 250: Performed by: SURGERY

## 2020-08-14 PROCEDURE — 81025 URINE PREGNANCY TEST: CPT

## 2020-08-14 PROCEDURE — 77030040361 HC SLV COMPR DVT MDII -B: Performed by: SURGERY

## 2020-08-14 PROCEDURE — 77030034154 HC SHR COAG HARM ACE J&J -F: Performed by: SURGERY

## 2020-08-14 PROCEDURE — 77030020053 HC ELECTRD LAPSCP COVD -B: Performed by: SURGERY

## 2020-08-14 PROCEDURE — 77030018875 HC APPL CLP LIG4 J&J -B: Performed by: SURGERY

## 2020-08-14 PROCEDURE — 76210000021 HC REC RM PH II 0.5 TO 1 HR: Performed by: SURGERY

## 2020-08-14 PROCEDURE — 77030006984: Performed by: SURGERY

## 2020-08-14 PROCEDURE — 77030027876 HC STPLR ENDOSC FLX PWR J&J -G1: Performed by: SURGERY

## 2020-08-14 PROCEDURE — 74011000258 HC RX REV CODE- 258: Performed by: SURGERY

## 2020-08-14 PROCEDURE — 88304 TISSUE EXAM BY PATHOLOGIST: CPT

## 2020-08-14 PROCEDURE — 77030037032 HC INSRT SCIS CLICKLLINE DISP STOR -B: Performed by: SURGERY

## 2020-08-14 PROCEDURE — 77030008608 HC TRCR ENDOSC SMTH AMR -B: Performed by: SURGERY

## 2020-08-14 PROCEDURE — 77030036731 HC STPLR ENDOSC J&J -F: Performed by: SURGERY

## 2020-08-14 PROCEDURE — 77030038093 HC CATH CHOLGM OP PMI PRGV-C: Performed by: SURGERY

## 2020-08-14 PROCEDURE — 74011250637 HC RX REV CODE- 250/637: Performed by: SURGERY

## 2020-08-14 PROCEDURE — 77030002933 HC SUT MCRYL J&J -A: Performed by: SURGERY

## 2020-08-14 PROCEDURE — 77030008771 HC TU NG SALEM SUMP -A: Performed by: ANESTHESIOLOGY

## 2020-08-14 PROCEDURE — 74011250636 HC RX REV CODE- 250/636: Performed by: ANESTHESIOLOGY

## 2020-08-14 PROCEDURE — 77030011640 HC PAD GRND REM COVD -A: Performed by: SURGERY

## 2020-08-14 PROCEDURE — 77030040922 HC BLNKT HYPOTHRM STRY -A

## 2020-08-14 PROCEDURE — 77030008684 HC TU ET CUF COVD -B: Performed by: ANESTHESIOLOGY

## 2020-08-14 PROCEDURE — 77030008756 HC TU IRR SUC STRY -B: Performed by: SURGERY

## 2020-08-14 RX ORDER — FENTANYL CITRATE 50 UG/ML
INJECTION, SOLUTION INTRAMUSCULAR; INTRAVENOUS AS NEEDED
Status: DISCONTINUED | OUTPATIENT
Start: 2020-08-14 | End: 2020-08-14 | Stop reason: HOSPADM

## 2020-08-14 RX ORDER — ONDANSETRON 2 MG/ML
4 INJECTION INTRAMUSCULAR; INTRAVENOUS AS NEEDED
Status: DISCONTINUED | OUTPATIENT
Start: 2020-08-14 | End: 2020-08-14 | Stop reason: HOSPADM

## 2020-08-14 RX ORDER — SODIUM CHLORIDE, SODIUM LACTATE, POTASSIUM CHLORIDE, CALCIUM CHLORIDE 600; 310; 30; 20 MG/100ML; MG/100ML; MG/100ML; MG/100ML
125 INJECTION, SOLUTION INTRAVENOUS CONTINUOUS
Status: DISCONTINUED | OUTPATIENT
Start: 2020-08-14 | End: 2020-08-14 | Stop reason: HOSPADM

## 2020-08-14 RX ORDER — FENTANYL CITRATE 50 UG/ML
25 INJECTION, SOLUTION INTRAMUSCULAR; INTRAVENOUS
Status: DISCONTINUED | OUTPATIENT
Start: 2020-08-14 | End: 2020-08-14 | Stop reason: HOSPADM

## 2020-08-14 RX ORDER — SODIUM CHLORIDE, SODIUM LACTATE, POTASSIUM CHLORIDE, CALCIUM CHLORIDE 600; 310; 30; 20 MG/100ML; MG/100ML; MG/100ML; MG/100ML
INJECTION, SOLUTION INTRAVENOUS
Status: DISCONTINUED | OUTPATIENT
Start: 2020-08-14 | End: 2020-08-14 | Stop reason: HOSPADM

## 2020-08-14 RX ORDER — GLYCOPYRROLATE 0.2 MG/ML
INJECTION INTRAMUSCULAR; INTRAVENOUS AS NEEDED
Status: DISCONTINUED | OUTPATIENT
Start: 2020-08-14 | End: 2020-08-14 | Stop reason: HOSPADM

## 2020-08-14 RX ORDER — MIDAZOLAM HYDROCHLORIDE 1 MG/ML
1 INJECTION, SOLUTION INTRAMUSCULAR; INTRAVENOUS AS NEEDED
Status: DISCONTINUED | OUTPATIENT
Start: 2020-08-14 | End: 2020-08-14 | Stop reason: HOSPADM

## 2020-08-14 RX ORDER — KETOROLAC TROMETHAMINE 30 MG/ML
INJECTION, SOLUTION INTRAMUSCULAR; INTRAVENOUS AS NEEDED
Status: DISCONTINUED | OUTPATIENT
Start: 2020-08-14 | End: 2020-08-14 | Stop reason: HOSPADM

## 2020-08-14 RX ORDER — MIDAZOLAM HYDROCHLORIDE 1 MG/ML
INJECTION, SOLUTION INTRAMUSCULAR; INTRAVENOUS AS NEEDED
Status: DISCONTINUED | OUTPATIENT
Start: 2020-08-14 | End: 2020-08-14 | Stop reason: HOSPADM

## 2020-08-14 RX ORDER — BUPIVACAINE HYDROCHLORIDE 2.5 MG/ML
30 INJECTION, SOLUTION EPIDURAL; INFILTRATION; INTRACAUDAL ONCE
Status: DISCONTINUED | OUTPATIENT
Start: 2020-08-14 | End: 2020-08-14 | Stop reason: HOSPADM

## 2020-08-14 RX ORDER — SODIUM CHLORIDE 0.9 % (FLUSH) 0.9 %
5-40 SYRINGE (ML) INJECTION AS NEEDED
Status: DISCONTINUED | OUTPATIENT
Start: 2020-08-14 | End: 2020-08-14 | Stop reason: HOSPADM

## 2020-08-14 RX ORDER — SODIUM CHLORIDE 9 MG/ML
50 INJECTION, SOLUTION INTRAVENOUS CONTINUOUS
Status: DISCONTINUED | OUTPATIENT
Start: 2020-08-14 | End: 2020-08-14 | Stop reason: HOSPADM

## 2020-08-14 RX ORDER — HYDROMORPHONE HYDROCHLORIDE 1 MG/ML
0.2 INJECTION, SOLUTION INTRAMUSCULAR; INTRAVENOUS; SUBCUTANEOUS
Status: DISCONTINUED | OUTPATIENT
Start: 2020-08-14 | End: 2020-08-14 | Stop reason: HOSPADM

## 2020-08-14 RX ORDER — NEOSTIGMINE METHYLSULFATE 1 MG/ML
INJECTION INTRAVENOUS AS NEEDED
Status: DISCONTINUED | OUTPATIENT
Start: 2020-08-14 | End: 2020-08-14 | Stop reason: HOSPADM

## 2020-08-14 RX ORDER — OXYCODONE HYDROCHLORIDE 5 MG/1
5 TABLET ORAL
Qty: 10 TAB | Refills: 0 | Status: SHIPPED | OUTPATIENT
Start: 2020-08-14 | End: 2020-08-17

## 2020-08-14 RX ORDER — MORPHINE SULFATE 10 MG/ML
2 INJECTION, SOLUTION INTRAMUSCULAR; INTRAVENOUS
Status: DISCONTINUED | OUTPATIENT
Start: 2020-08-14 | End: 2020-08-14 | Stop reason: HOSPADM

## 2020-08-14 RX ORDER — ACETAMINOPHEN 500 MG
1000 TABLET ORAL ONCE
Status: COMPLETED | OUTPATIENT
Start: 2020-08-14 | End: 2020-08-14

## 2020-08-14 RX ORDER — LIDOCAINE HYDROCHLORIDE 20 MG/ML
INJECTION, SOLUTION EPIDURAL; INFILTRATION; INTRACAUDAL; PERINEURAL AS NEEDED
Status: DISCONTINUED | OUTPATIENT
Start: 2020-08-14 | End: 2020-08-14 | Stop reason: HOSPADM

## 2020-08-14 RX ORDER — ROCURONIUM BROMIDE 10 MG/ML
INJECTION, SOLUTION INTRAVENOUS AS NEEDED
Status: DISCONTINUED | OUTPATIENT
Start: 2020-08-14 | End: 2020-08-14 | Stop reason: HOSPADM

## 2020-08-14 RX ORDER — EPHEDRINE SULFATE/0.9% NACL/PF 50 MG/5 ML
5 SYRINGE (ML) INTRAVENOUS AS NEEDED
Status: DISCONTINUED | OUTPATIENT
Start: 2020-08-14 | End: 2020-08-14 | Stop reason: HOSPADM

## 2020-08-14 RX ORDER — FENTANYL CITRATE 50 UG/ML
50 INJECTION, SOLUTION INTRAMUSCULAR; INTRAVENOUS AS NEEDED
Status: DISCONTINUED | OUTPATIENT
Start: 2020-08-14 | End: 2020-08-14 | Stop reason: HOSPADM

## 2020-08-14 RX ORDER — SODIUM CHLORIDE 9 MG/ML
1000 INJECTION, SOLUTION INTRAVENOUS CONTINUOUS
Status: DISCONTINUED | OUTPATIENT
Start: 2020-08-14 | End: 2020-08-14 | Stop reason: HOSPADM

## 2020-08-14 RX ORDER — ACETAMINOPHEN 325 MG/1
650 TABLET ORAL ONCE
Status: DISCONTINUED | OUTPATIENT
Start: 2020-08-14 | End: 2020-08-14 | Stop reason: SDUPTHER

## 2020-08-14 RX ORDER — PROPOFOL 10 MG/ML
INJECTION, EMULSION INTRAVENOUS AS NEEDED
Status: DISCONTINUED | OUTPATIENT
Start: 2020-08-14 | End: 2020-08-14 | Stop reason: HOSPADM

## 2020-08-14 RX ORDER — SODIUM CHLORIDE 0.9 % (FLUSH) 0.9 %
5-40 SYRINGE (ML) INJECTION EVERY 8 HOURS
Status: DISCONTINUED | OUTPATIENT
Start: 2020-08-14 | End: 2020-08-14 | Stop reason: HOSPADM

## 2020-08-14 RX ORDER — DEXAMETHASONE SODIUM PHOSPHATE 4 MG/ML
INJECTION, SOLUTION INTRA-ARTICULAR; INTRALESIONAL; INTRAMUSCULAR; INTRAVENOUS; SOFT TISSUE AS NEEDED
Status: DISCONTINUED | OUTPATIENT
Start: 2020-08-14 | End: 2020-08-14 | Stop reason: HOSPADM

## 2020-08-14 RX ORDER — DIPHENHYDRAMINE HYDROCHLORIDE 50 MG/ML
12.5 INJECTION, SOLUTION INTRAMUSCULAR; INTRAVENOUS AS NEEDED
Status: DISCONTINUED | OUTPATIENT
Start: 2020-08-14 | End: 2020-08-14 | Stop reason: HOSPADM

## 2020-08-14 RX ORDER — MIDAZOLAM HYDROCHLORIDE 1 MG/ML
0.5 INJECTION, SOLUTION INTRAMUSCULAR; INTRAVENOUS
Status: DISCONTINUED | OUTPATIENT
Start: 2020-08-14 | End: 2020-08-14 | Stop reason: HOSPADM

## 2020-08-14 RX ORDER — OXYCODONE AND ACETAMINOPHEN 5; 325 MG/1; MG/1
1 TABLET ORAL AS NEEDED
Status: DISCONTINUED | OUTPATIENT
Start: 2020-08-14 | End: 2020-08-14 | Stop reason: HOSPADM

## 2020-08-14 RX ORDER — EPHEDRINE SULFATE/0.9% NACL/PF 50 MG/5 ML
SYRINGE (ML) INTRAVENOUS AS NEEDED
Status: DISCONTINUED | OUTPATIENT
Start: 2020-08-14 | End: 2020-08-14 | Stop reason: HOSPADM

## 2020-08-14 RX ORDER — ONDANSETRON 2 MG/ML
INJECTION INTRAMUSCULAR; INTRAVENOUS AS NEEDED
Status: DISCONTINUED | OUTPATIENT
Start: 2020-08-14 | End: 2020-08-14 | Stop reason: HOSPADM

## 2020-08-14 RX ORDER — LIDOCAINE HYDROCHLORIDE 10 MG/ML
0.1 INJECTION, SOLUTION EPIDURAL; INFILTRATION; INTRACAUDAL; PERINEURAL AS NEEDED
Status: DISCONTINUED | OUTPATIENT
Start: 2020-08-14 | End: 2020-08-14 | Stop reason: HOSPADM

## 2020-08-14 RX ORDER — KETAMINE HYDROCHLORIDE 10 MG/ML
INJECTION, SOLUTION INTRAMUSCULAR; INTRAVENOUS AS NEEDED
Status: DISCONTINUED | OUTPATIENT
Start: 2020-08-14 | End: 2020-08-14 | Stop reason: HOSPADM

## 2020-08-14 RX ADMIN — KETOROLAC TROMETHAMINE 30 MG: 30 INJECTION, SOLUTION INTRAMUSCULAR; INTRAVENOUS at 09:33

## 2020-08-14 RX ADMIN — LIDOCAINE HYDROCHLORIDE 100 MG: 20 INJECTION, SOLUTION EPIDURAL; INFILTRATION; INTRACAUDAL; PERINEURAL at 07:36

## 2020-08-14 RX ADMIN — Medication 5 MG: at 08:34

## 2020-08-14 RX ADMIN — KETAMINE HYDROCHLORIDE 50 MG: 10 INJECTION, SOLUTION INTRAMUSCULAR; INTRAVENOUS at 07:36

## 2020-08-14 RX ADMIN — DEXAMETHASONE SODIUM PHOSPHATE 8 MG: 4 INJECTION, SOLUTION INTRAMUSCULAR; INTRAVENOUS at 07:49

## 2020-08-14 RX ADMIN — ROCURONIUM BROMIDE 50 MG: 10 SOLUTION INTRAVENOUS at 07:36

## 2020-08-14 RX ADMIN — PROPOFOL 150 MG: 10 INJECTION, EMULSION INTRAVENOUS at 07:36

## 2020-08-14 RX ADMIN — SODIUM CHLORIDE, POTASSIUM CHLORIDE, SODIUM LACTATE AND CALCIUM CHLORIDE: 600; 310; 30; 20 INJECTION, SOLUTION INTRAVENOUS at 09:13

## 2020-08-14 RX ADMIN — OXYCODONE HYDROCHLORIDE AND ACETAMINOPHEN 1 TABLET: 5; 325 TABLET ORAL at 10:15

## 2020-08-14 RX ADMIN — FENTANYL CITRATE 50 MCG: 50 INJECTION, SOLUTION INTRAMUSCULAR; INTRAVENOUS at 07:27

## 2020-08-14 RX ADMIN — SODIUM CHLORIDE, POTASSIUM CHLORIDE, SODIUM LACTATE AND CALCIUM CHLORIDE: 600; 310; 30; 20 INJECTION, SOLUTION INTRAVENOUS at 07:27

## 2020-08-14 RX ADMIN — ONDANSETRON 4 MG: 2 INJECTION INTRAMUSCULAR; INTRAVENOUS at 10:13

## 2020-08-14 RX ADMIN — Medication 5 MG: at 08:31

## 2020-08-14 RX ADMIN — GLYCOPYRROLATE 0.4 MG: 0.2 INJECTION, SOLUTION INTRAMUSCULAR; INTRAVENOUS at 09:38

## 2020-08-14 RX ADMIN — FENTANYL CITRATE 50 MCG: 50 INJECTION, SOLUTION INTRAMUSCULAR; INTRAVENOUS at 09:28

## 2020-08-14 RX ADMIN — HYDROMORPHONE HYDROCHLORIDE 0.2 MG: 1 INJECTION, SOLUTION INTRAMUSCULAR; INTRAVENOUS; SUBCUTANEOUS at 10:28

## 2020-08-14 RX ADMIN — ONDANSETRON HYDROCHLORIDE 4 MG: 2 INJECTION, SOLUTION INTRAMUSCULAR; INTRAVENOUS at 09:19

## 2020-08-14 RX ADMIN — MIDAZOLAM 2 MG: 1 INJECTION INTRAMUSCULAR; INTRAVENOUS at 07:27

## 2020-08-14 RX ADMIN — CEFOTETAN DISODIUM 2 G: 2 INJECTION, POWDER, FOR SOLUTION INTRAMUSCULAR; INTRAVENOUS at 07:41

## 2020-08-14 RX ADMIN — ACETAMINOPHEN 1000 MG: 500 TABLET ORAL at 06:56

## 2020-08-14 RX ADMIN — FENTANYL CITRATE 50 MCG: 50 INJECTION, SOLUTION INTRAMUSCULAR; INTRAVENOUS at 08:03

## 2020-08-14 RX ADMIN — HYDROMORPHONE HYDROCHLORIDE 0.2 MG: 1 INJECTION, SOLUTION INTRAMUSCULAR; INTRAVENOUS; SUBCUTANEOUS at 10:13

## 2020-08-14 RX ADMIN — FENTANYL CITRATE 50 MCG: 50 INJECTION, SOLUTION INTRAMUSCULAR; INTRAVENOUS at 08:17

## 2020-08-14 RX ADMIN — NEOSTIGMINE METHYLSULFATE 3 MG: 1 INJECTION, SOLUTION INTRAVENOUS at 09:38

## 2020-08-14 RX ADMIN — DIPHENHYDRAMINE HYDROCHLORIDE 12.5 MG: 50 INJECTION, SOLUTION INTRAMUSCULAR; INTRAVENOUS at 10:16

## 2020-08-14 NOTE — ANESTHESIA POSTPROCEDURE EVALUATION
Procedure(s):  LAPAROSCOPIC CHOLECYSTECTOMY, INTRA OPERATIVE CHOALNGIOGRAM, LAPAROSCOPIC APPENDECTOMY. general    Anesthesia Post Evaluation        Patient location during evaluation: PACU  Note status: Adequate. Level of consciousness: responsive to verbal stimuli and sleepy but conscious  Pain management: satisfactory to patient  Airway patency: patent  Anesthetic complications: no  Cardiovascular status: acceptable  Respiratory status: acceptable  Hydration status: acceptable  Comments: +Post-Anesthesia Evaluation and Assessment    Patient: Laura Desir MRN: [id number]  SSN: [id number]   YOB: 1975  Age: 54 y.o. Sex: female          Cardiovascular Function/Vital Signs    /65 (BP 1 Location: Right arm, BP Patient Position: Sitting)   Pulse 64   Temp 36.7 °C (98 °F)   Resp 18   Ht 5' 6\" (1.676 m)   Wt 107 kg (235 lb 12.8 oz)   SpO2 (S) 97%   BMI 38.06 kg/m²     Patient is status post Procedure(s):  LAPAROSCOPIC CHOLECYSTECTOMY, INTRA OPERATIVE CHOALNGIOGRAM, LAPAROSCOPIC APPENDECTOMY. Nausea/Vomiting: Controlled. Postoperative hydration reviewed and adequate. Pain:  Pain Scale 1: FLACC (08/14/20 1231)  Pain Intensity 1: 0 (08/14/20 1045)   Managed. Neurological Status:   Neuro (WDL): Exceptions to WDL (08/14/20 1045)   At baseline. Mental Status and Level of Consciousness: Arousable. Pulmonary Status:   O2 Device: Room air (08/14/20 1231)   Adequate oxygenation and airway patent. Complications related to anesthesia: None    Post-anesthesia assessment completed. No concerns. I have evaluated the patient and the patient is stable and ready to be discharged from PACU .     Signed By: Mariam Rowe MD    8/14/2020        INITIAL Post-op Vital signs:   Vitals Value Taken Time   /71 8/14/2020 11:15 AM   Temp 36.9 °C (98.4 °F) 8/14/2020 10:45 AM   Pulse 59 8/14/2020 11:27 AM   Resp 11 8/14/2020 11:27 AM   SpO2 95 % 8/14/2020 11:28 AM   Vitals shown include unvalidated device data.

## 2020-08-14 NOTE — DISCHARGE INSTRUCTIONS
****  You took a pain pill (Oxycodone and Acetaminophen) at 10:15am  ****  You had Toradol (NSAID) at 9:30am          Patient Discharge Instructions    Abena Reynolds / [id number] : 1975    Admitted 2020 Discharged: 2020       · It is important that you take the medication exactly as they are prescribed. · Keep your medication in the bottles provided by the pharmacist and keep a list of the medication names, dosages, and times to be taken in your wallet. · Do not take other medications without consulting your doctor. What to do at Home    Recommended diet: Regular. Recommended activity: No Restrictions. No Driving While Taking Oxycodone. Tylenol 1000mg every 6 hours as needed for pain. Ice pack to abdomen as needed. Oxycodone as needed for severe pain. May Take Shower or [place] Roxo. If you experience any of the following symptoms Fevers, Chills, Nausea, Vomitting, Redness or Drainage at Surgical Site(s) or Any Other Questions or Concerns Please Call -  [phone]. Follow-up with Dr. Misti Merrill in 10-14 days. Patient Education        Cholecystectomy: What to Expect at Home  Your Recovery  After your surgery, it is normal to feel weak and tired for several days after you return home. Your belly may be swollen. If you had laparoscopic surgery, you may also have pain in your shoulder for about 24 hours. You may have gas or need to burp a lot at first, and a few people get diarrhea. The diarrhea usually goes away in 2 to 4 weeks, but it may last longer. How quickly you recover depends on whether you had a laparoscopic or open surgery. · For a laparoscopic surgery, most people can go back to work or their normal routine in 1 to 2 weeks, but it may take longer, depending on the type of work you do. · For an open surgery, it will probably take 4 to 6 weeks before you get back to your normal routine.   This care sheet gives you a general idea about how long it will take for you to recover. However, each person recovers at a different pace. Follow the steps below to get better as quickly as possible. How can you care for yourself at home? Activity  · Rest when you feel tired. Getting enough sleep will help you recover. · Try to walk each day. Start out by walking a little more than you did the day before. Gradually increase the amount you walk. Walking boosts blood flow and helps prevent pneumonia and constipation. · For about 2 to 4 weeks, avoid lifting anything that would make you strain. This may include a child, heavy grocery bags and milk containers, a heavy briefcase or backpack, cat litter or dog food bags, or a vacuum . · Avoid strenuous activities, such as biking, jogging, weightlifting, and aerobic exercise, until your doctor says it is okay. · You may shower 24 to 48 hours after surgery, if your doctor okays it. Pat the cut (incision) dry. Do not take a bath for the first 2 weeks, or until your doctor tells you it is okay. · You may drive when you are no longer taking pain medicine and can quickly move your foot from the gas pedal to the brake. You must also be able to sit comfortably for a long period of time, even if you do not plan to go far. You might get caught in traffic. · For a laparoscopic surgery, most people can go back to work or their normal routine in 1 to 2 weeks, but it may take longer. For an open surgery, it will probably take 4 to 6 weeks before you get back to your normal routine. · Your doctor will tell you when you can have sex again. Diet  · Eat smaller meals more often instead of fewer larger meals. You can eat a normal diet, but avoid eating fatty foods for about 1 month. Fatty foods include hamburger, whole milk, cheese, and many snack foods. If your stomach is upset, try bland, low-fat foods like plain rice, broiled chicken, toast, and yogurt. · Drink plenty of fluids (unless your doctor tells you not to).   · If you have diarrhea, try avoiding spicy foods, dairy products, fatty foods, and alcohol. You can also watch to see if specific foods cause it, and stop eating them. If the diarrhea continues for more than 2 weeks, talk to your doctor. · You may notice that your bowel movements are not regular right after your surgery. This is common. Try to avoid constipation and straining with bowel movements. You may want to take a fiber supplement every day. If you have not had a bowel movement after a couple of days, ask your doctor about taking a mild laxative. Medicines  · Your doctor will tell you if and when you can restart your medicines. He or she will also give you instructions about taking any new medicines. · If you take aspirin or some other blood thinner, ask your doctor if and when to start taking it again. Make sure that you understand exactly what your doctor wants you to do. · Take pain medicines exactly as directed. ? If the doctor gave you a prescription medicine for pain, take it as prescribed. ? If you are not taking a prescription pain medicine, take an over-the-counter medicine such as acetaminophen (Tylenol), ibuprofen (Advil, Motrin), or naproxen (Aleve). Read and follow all instructions on the label. ? Do not take two or more pain medicines at the same time unless the doctor told you to. Many pain medicines contain acetaminophen, which is Tylenol. Too much Tylenol can be harmful. · If you think your pain medicine is making you sick to your stomach:  ? Take your medicine after meals (unless your doctor tells you not to). ? Ask your doctor for a different pain medicine. · If your doctor prescribed antibiotics, take them as directed. Do not stop taking them just because you feel better. You need to take the full course of antibiotics. Incision care  · After 24 to 48 hours, wash the area daily with warm, soapy water, and pat it dry.   Ice  · To reduce swelling and pain, put ice or a cold pack on your belly for 10 to 20 minutes at a time. Do this every 1 to 2 hours. Put a thin cloth between the ice and your skin. Follow-up care is a key part of your treatment and safety. Be sure to make and go to all appointments, and call your doctor if you are having problems. It's also a good idea to know your test results and keep a list of the medicines you take. When should you call for help? [id number] anytime you think you may need emergency care. For example, call if:  · You passed out (lost consciousness). · You are short of breath. .  Call your doctor now or seek immediate medical care if:  · You are sick to your stomach and cannot drink fluids. · You have pain that does not get better when you take your pain medicine. · You cannot pass stools or gas. · You have signs of infection, such as:  ? Increased pain, swelling, warmth, or redness. ? Red streaks leading from the incision. ? Pus draining from the incision. ? A fever. · Bright red blood has soaked through the bandage over your incision. · You have loose stitches, or your incision comes open. · You have signs of a blood clot in your leg (called a deep vein thrombosis), such as:  ? Pain in your calf, back of knee, thigh, or groin. ? Redness and swelling in your leg or groin. Watch closely for any changes in your health, and be sure to contact your doctor if you have any problems. Where can you learn more? Go to http://www.gray.com/  Enter F357 in the search box to learn more about \"Cholecystectomy: What to Expect at Home. \"  Current as of: August 12, 2019               Content Version: 12.5  © 4064-2100 [employer], Incorporated. Care instructions adapted under license by [employer] (which disclaims liability or warranty for this information).  If you have questions about a medical condition or this instruction, always ask your healthcare professional. [address] any warranty or liability for your use of this information. ______________________________________________________________________    Anesthesia Discharge Instructions    After general anesthesia or intervenous sedation, for 24 hours or while taking prescription Narcotics:  · Limit your activities  · Do not drive or operate hazardous machinery  · If you have not urinated within 8 hours after discharge, please contact your surgeon on call. · Do not make important personal or business decisions  · Do not drink alcoholic beverages    Report the following to your surgeon:  · Excessive pain, swelling, redness or odor of or around the surgical area  · Temperature over 100.5 degrees  · Nausea and vomiting lasting longer than 4 hours or if unable to take medication  · Any signs of decreased circulation or nerve impairment to extremity:  Change in color, persistent numbness, tingling, coldness or increased pain.   · Any questions

## 2020-08-14 NOTE — BRIEF OP NOTE
Brief Postoperative Note    Patient: Rachel Bravo  YOB: 1975  MRN: [id number]    Date of Procedure: 8/14/2020     Pre-Op Diagnosis:  Symptomatic Cholelithiasis. Family History of Appendiceal Carcinoma. Post-Op Diagnosis:  Same. Procedure(s):   Laparoscopic Cholecystectomy. Intra Operative Cholangiogram.   Laparoscopic Appendectomy. Surgeon(s):  Iesha Milner MD    Surgical Assistant: None    Anesthesia: General     Estimated Blood Loss (mL): Approx. 10 ml. Complications: None    Specimens:   ID Type Source Tests Collected by Time Destination   1 : GALLBLADDER Fresh Gallbladder  Iesha Milner MD 8/14/2020 8806 Pathology   2 : APPENDIX Fresh Appendix  Iesha Milner MD 8/14/2020 2733 Pathology        Implants: * No implants in log *    Drains: * No LDAs found *    Findings: Hepatic steatosis. Chronic cholecystitis. No apparent retained CBD stone on cholangiogram. Grossly normal appendix.     Electronically Signed by Cheryl Rm MD on 8/14/2020 at 9:50 AM

## 2020-08-14 NOTE — PERIOP NOTES
Patient and spouse verbalized understanding of all instructions. All belongings returned. No s/s of nausea, SOB, pain or bleeding.

## 2020-08-14 NOTE — PROGRESS NOTES
Physical Therapy Screening:  Services are not indicated at this time. [facility] therapy based on results obtained during the nursing admission assessment. The patients chart was reviewed and the patient is not appropriate for a skilled therapy evaluation at this time. Please consult physical therapy if any therapy needs arise. Thank you.     Kahlil Galeana, PT

## 2020-08-14 NOTE — OP NOTES
[address]   OPERATIVE REPORT    Name:  Monster De Jesus  MR#:  [id number]  :  1975  ACCOUNT #:  [de-identified]  DATE OF SERVICE:  2020      PREOPERATIVE DIAGNOSES:  1. Symptomatic cholelithiasis. 2.  Family history of appendiceal carcinoma. POSTOPERATIVE DIAGNOSES:  1. Symptomatic cholelithiasis. 2.  Family history of appendiceal carcinoma. PROCEDURES PERFORMED:  1. Laparoscopic cholecystectomy. 2.  Intraoperative cholangiogram.  3.  Laparoscopic appendectomy. SURGEON:  Sheryl Medellin MD    ANESTHESIA:  General endotracheal.    COMPLICATIONS:  None. SPECIMENS REMOVED:  1.  Gallbladder to Pathology. 2.  Appendix to Pathology. IMPLANTS:  None. ESTIMATED BLOOD LOSS:  Approximately 10 mL. IV FLUIDS:  Crystalloid 1200 mL. URINE OUTPUT:  100 mL. DRAINS:  None. INDICATIONS FOR SURGERY:  The patient is a 76-year-old female with symptomatic cholelithiasis. Furthermore, Ms. Tomy Bhatti has a family history of appendiceal carcinoma. She is brought to the operating room at this time for laparoscopic cholecystectomy, intraoperative cholangiogram and laparoscopic appendectomy. The risks of the procedure, including but not limited to, infection, bleeding, injury to common bile duct, visceral injury and conversion to an open procedure were discussed in detail with the patient. Ms. Morales understood and wished to proceed. PROCEDURE:  After consent was obtained, the patient was brought to the operating room where she was placed in the supine position on the operating room table. Following the induction of an adequate level of general anesthesia via the endotracheal tube, compression devices were placed on both lower extremities. A Martini catheter was inserted using aseptic technique and the abdomen was prepped with ChloraPrep and draped as a sterile field.   Local anesthetic was infiltrated and the patient's infraumbilical incision was reopened sharply. Using the 5-mm nonbladed dilating trocar, access to the peritoneal cavity was achieved under direct vision. After an adequate level of carbon dioxide pneumoperitoneum had been achieved, the laparoscope was inserted. Inspection of the peritoneal contents revealed no focal abnormalities. Local anesthetic was infiltrated again and a 12-mm trocar and two 5-mm trocars were placed in the usual locations under direct vision. The operating room table was placed in reverse Trendelenburg position and attention directed towards the gallbladder. The liver appeared steatotic. Adhesions between the omentum and gallbladder were encountered and these were taken down with a combination of blunt and sharp dissection. The gallbladder was thick-walled consistent with chronic cholecystitis. The gallbladder was then grasped and attention directed towards the cystic duct. This structure was identified, dissected free circumferentially and followed into the gallbladder. The cystic artery was subsequently identified, dissected free circumferentially and followed into the gallbladder as well. The gallbladder was mobilized and the critical view was obtained. It was then decided to perform an intraoperative cholangiogram.  The Barahona clamp was brought on the field and placed across the neck of the gallbladder. The catheter was inserted and the cholangiogram was performed. Contrast was noted in the cystic duct, the common bile duct and the duodenum. There were no filling defects consistent with a retained common bile duct stone identified on the study. Furthermore, contrast was noted in the common hepatic duct, the right and left hepatic ducts and the intrahepatic biliary radicles. Contrast was also noted in the proximal pancreatic duct and this appeared grossly normal.  The catheter was removed and the Barahona clamp was removed as well. The cystic duct was divided between 3 clips proximally and 1 distally.   The cystic artery was then divided between 3 clips proximally and 1 distally as well. Using the hook electrocautery, the gallbladder was taken off of the liver and placed in an Endo Catch bag. The specimen was removed from the peritoneal cavity, passed off the field and submitted for histopathologic evaluation. The gallbladder fossa was inspected and the clips on the cystic duct stump and the clips on the cystic artery stump were identified and found to be in place. Several bleeders were then cauterized. Attention was then directed towards the appendectomy. Local anesthetic was infiltrated and a 5-mm trocar was placed in the suprapubic location under direct vision. The laparoscope was moved to this port and the infraumbilical 5-mm trocar was upsized to a 12-mm trocar. Local anesthetic was infiltrated again and a 5-mm trocar was placed in the left lower quadrant under direct vision. Attention was then directed towards the cecum. The cecum was readily identified and appeared grossly normal.  There was no fat wrapping or other evidence of Crohn's disease. The appendix was then identified and appeared grossly normal as well. An opening in the mesoappendix at the base was created. The mesoappendix was then divided with the Harmonic scalpel. The appendix was divided at the base with 2 firings of the Endo INDRA stapler with a blue cartridge. The staple lines were inspected and found to be intact and hemostatic. The specimen was placed in an Endo Catch bag, removed from the peritoneal cavity and passed off the field. The specimen was submitted for histopathologic evaluation. The mesoappendix was inspected and found to be intact and hemostatic. The staple lines on the cecum were inspected and found to be intact and hemostatic as well. The right paracolic gutter was irrigated copiously with saline. Attention was directed back to the gallbladder fossa.   The clips on the cystic duct stump and the clips on the cystic artery stump were identified and found to be in place. The gallbladder fossa was irrigated copiously with saline and again several bleeders cauterized. 3 g of Lamberto were placed in the gallbladder fossa. The peritoneal cavity was inspected again and no other abnormalities were noted. The trocars were all removed under direct vision and the pneumoperitoneum was evacuated. The wounds were irrigated copiously with saline and the fascial defects at the 12-mm trocar sites were closed with 0 Vicryl figure-of-eight sutures. These incisions were closed with interrupted 0 Vicryl suture followed by 4-0 Monocryl subcuticular suture to the skin. The 5-mm trocar sites were closed with 4-0 Monocryl subcuticular suture to the skin. Additional local anesthetic was infiltrated and the incisions were dressed with Dermabond. The patient was awakened from her general anesthetic and the Martini catheter was removed. She was extubated in the operating room and transferred to the stretcher. The patient was brought to the recovery room in stable condition, having tolerated the procedure well. At the conclusion of the procedure, all sponge counts, instrument counts and needle counts were reported as correct x2.         Solomon Casey MD [id number]  D:  08/14/2020 10:03  T:  08/14/2020 15:40  JOB #:  [id number]  CC:  MD Yarely Vásquez MD

## 2020-08-14 NOTE — ANESTHESIA PREPROCEDURE EVALUATION
Relevant Problems   No relevant active problems       Anesthetic History   No history of anesthetic complications            Review of Systems / Medical History  Patient summary reviewed, nursing notes reviewed and pertinent labs reviewed    Pulmonary  Within defined limits      Sleep apnea    Asthma        Neuro/Psych   Within defined limits           Cardiovascular  Within defined limits  Hypertension        Dysrhythmias            GI/Hepatic/Renal  Within defined limits   GERD      PUD     Endo/Other  Within defined limits      Morbid obesity     Other Findings              Physical Exam    Airway  Mallampati: II  TM Distance: > 6 cm  Neck ROM: normal range of motion   Mouth opening: Normal     Cardiovascular  Regular rate and rhythm,  S1 and S2 normal,  no murmur, click, rub, or gallop             Dental  No notable dental hx       Pulmonary  Breath sounds clear to auscultation               Abdominal  GI exam deferred       Other Findings            Anesthetic Plan    ASA: 3  Anesthesia type: general          Induction: Intravenous  Anesthetic plan and risks discussed with: Patient

## 2020-08-26 ENCOUNTER — VIRTUAL VISIT (OUTPATIENT)
Dept: SURGERY | Age: 45
End: 2020-08-26
Payer: COMMERCIAL

## 2020-08-26 DIAGNOSIS — Z90.49 S/P LAPAROSCOPIC APPENDECTOMY: ICD-10-CM

## 2020-08-26 DIAGNOSIS — Z90.49 S/P LAPAROSCOPIC CHOLECYSTECTOMY: ICD-10-CM

## 2020-08-26 DIAGNOSIS — Z09 POSTOPERATIVE EXAMINATION: Primary | ICD-10-CM

## 2020-08-26 PROCEDURE — 99024 POSTOP FOLLOW-UP VISIT: CPT | Performed by: NURSE PRACTITIONER

## 2020-08-26 NOTE — PROGRESS NOTES
1. Have you been to the ER, urgent care clinic since your last visit? Hospitalized since your last visit? No    2. Have you seen or consulted any other health care providers outside of the [address] [place] [address] since your last visit? Include any pap smears or colon screening.  No

## 2020-08-26 NOTE — PROGRESS NOTES
I was at home while conducting this encounter. Consent:  She and/or her healthcare decision maker is aware that this patient-initiated Telehealth encounter is a billable service, with coverage as determined by her insurance carrier. She is aware that she may receive a bill and has provided verbal consent to proceed: No - Not billable    This virtual visit was conducted via [employer]. Pursuant to the emergency declaration under the [facility], [facility] waiver authority and the [employer] Act, this Virtual  Visit was conducted to reduce the patient's risk of exposure to COVID-19 and provide continuity of care for an established patient. Services were provided through a video synchronous discussion virtually to substitute for in-person clinic visit. Due to this being a TeleHealth evaluation, many elements of the physical examination are unable to be assessed. Total Time: minutes: 11-20 minutes. Subjective:      Laura Desir is a 54 y.o. female presents for postop care following laparoscopic cholecystectomy, IOC and lap appe by Dr. Mykel Mcdaniels. Appetite is good. Eating a regular diet. without difficulty. Bowel movements are regular. The patient is not having any pain. .Denies fever, nausea, redness at incision site, vomiting and diarrhea      Objective:       General:  alert, no distress   Abdomen: soft   Incision:   healing well, no drainage, no erythema, no seroma, no swelling, no dehiscence, incisions well approximated       Lungs: unlabored        FINAL PATHOLOGIC DIAGNOSIS   1. Gallbladder, cholecystectomy:   Chronic cholecystitis and cholesterolosis   2. Appendix, appendectomy:   No diagnostic pathologic abnormality     Assessment:      Doing well postoperatively. Plan:     1. Pt is to increase activities as tolerated. No heavy lifting x 2 weeks. Avoid fatty foods for 2 weeks.   2. Follow-up: prn    Ms. Maru Ordonez has a reminder for a \"due or due soon\" health maintenance. I have asked that she contact her primary care provider for follow-up on this health maintenance. Patient verbalized understanding and agreement.

## 2020-08-26 NOTE — PATIENT INSTRUCTIONS
Cholecystectomy: What to Expect at [facility] Your Recovery After your surgery, it is normal to feel weak and tired for several days after you return home. Your belly may be swollen. If you had laparoscopic surgery, you may also have pain in your shoulder for about 24 hours. You may have gas or need to burp a lot at first, and a few people get diarrhea. The diarrhea usually goes away in 2 to 4 weeks, but it may last longer. How quickly you recover depends on whether you had a laparoscopic or open surgery. · For a laparoscopic surgery, most people can go back to work or their normal routine in 1 to 2 weeks, but it may take longer, depending on the type of work you do. · For an open surgery, it will probably take 4 to 6 weeks before you get back to your normal routine. This care sheet gives you a general idea about how long it will take for you to recover. However, each person recovers at a different pace. Follow the steps below to get better as quickly as possible. How can you care for yourself at home? Activity · Rest when you feel tired. Getting enough sleep will help you recover. · Try to walk each day. Start out by walking a little more than you did the day before. Gradually increase the amount you walk. Walking boosts blood flow and helps prevent pneumonia and constipation. · For about 2 to 4 weeks, avoid lifting anything that would make you strain. This may include a child, heavy grocery bags and milk containers, a heavy briefcase or backpack, cat litter or dog food bags, or a vacuum . · Avoid strenuous activities, such as biking, jogging, weightlifting, and aerobic exercise, until your doctor says it is okay. · You may shower 24 to 48 hours after surgery, if your doctor okays it. Pat the cut (incision) dry. Do not take a bath for the first 2 weeks, or until your doctor tells you it is okay.  
· You may drive when you are no longer taking pain medicine and can quickly move your foot from the gas pedal to the brake. You must also be able to sit comfortably for a long period of time, even if you do not plan to go far. You might get caught in traffic. · For a laparoscopic surgery, most people can go back to work or their normal routine in 1 to 2 weeks, but it may take longer. For an open surgery, it will probably take 4 to 6 weeks before you get back to your normal routine. · Your doctor will tell you when you can have sex again. Diet · Eat smaller meals more often instead of fewer larger meals. You can eat a normal diet, but avoid eating fatty foods for about 1 month. Fatty foods include hamburger, whole milk, cheese, and many snack foods. If your stomach is upset, try bland, low-fat foods like plain rice, broiled chicken, toast, and yogurt. · Drink plenty of fluids (unless your doctor tells you not to). · If you have diarrhea, try avoiding spicy foods, dairy products, fatty foods, and alcohol. You can also watch to see if specific foods cause it, and stop eating them. If the diarrhea continues for more than 2 weeks, talk to your doctor. · You may notice that your bowel movements are not regular right after your surgery. This is common. Try to avoid constipation and straining with bowel movements. You may want to take a fiber supplement every day. If you have not had a bowel movement after a couple of days, ask your doctor about taking a mild laxative. Medicines · Your doctor will tell you if and when you can restart your medicines. He or she will also give you instructions about taking any new medicines. · If you take aspirin or some other blood thinner, ask your doctor if and when to start taking it again. Make sure that you understand exactly what your doctor wants you to do. · Take pain medicines exactly as directed. ? If the doctor gave you a prescription medicine for pain, take it as prescribed. ? If you are not taking a prescription pain medicine, take an over-the-counter medicine such as acetaminophen (Tylenol), ibuprofen (Advil, Motrin), or naproxen (Aleve). Read and follow all instructions on the label. ? Do not take two or more pain medicines at the same time unless the doctor told you to. Many pain medicines contain acetaminophen, which is Tylenol. Too much Tylenol can be harmful. · If you think your pain medicine is making you sick to your stomach: 
? Take your medicine after meals (unless your doctor tells you not to). ? Ask your doctor for a different pain medicine. · If your doctor prescribed antibiotics, take them as directed. Do not stop taking them just because you feel better. You need to take the full course of antibiotics. Incision care · If you have strips of tape on the incision, or cut, leave the tape on for a week or until it falls off. · After 24 to 48 hours, wash the area daily with warm, soapy water, and pat it dry. · You may have staples to hold the cut together. Keep them dry until your doctor takes them out. This is usually in 7 to 10 days. · Keep the area clean and dry. You may cover it with a gauze bandage if it weeps or rubs against clothing. Change the bandage every day. Ice · To reduce swelling and pain, put ice or a cold pack on your belly for 10 to 20 minutes at a time. Do this every 1 to 2 hours. Put a thin cloth between the ice and your skin. Follow-up care is a key part of your treatment and safety. Be sure to make and go to all appointments, and call your doctor if you are having problems. It's also a good idea to know your test results and keep a list of the medicines you take. When should you call for help? [id number] anytime you think you may need emergency care. For example, call if: 
· You passed out (lost consciousness). · You are short of breath. Denis Degroot Call your doctor now or seek immediate medical care if: · You are sick to your stomach and cannot drink fluids. · You have pain that does not get better when you take your pain medicine. · You cannot pass stools or gas. · You have signs of infection, such as: 
? Increased pain, swelling, warmth, or redness. ? Red streaks leading from the incision. ? Pus draining from the incision. ? A fever. · Bright red blood has soaked through the bandage over your incision. · You have loose stitches, or your incision comes open. · You have signs of a blood clot in your leg (called a deep vein thrombosis), such as: 
? Pain in your calf, back of knee, thigh, or groin. ? Redness and swelling in your leg or groin. Watch closely for any changes in your health, and be sure to contact your doctor if you have any problems. Where can you learn more? Go to http://temi-james.info/ Enter [phone] in the search box to learn more about \"Cholecystectomy: What to Expect at Home. \" Current as of: August 12, 2019               Content Version: 12.5 © 2970-3685 [employer]wise[employer]. Care instructions adapted under license by [employer] (which disclaims liability or warranty for this information). If you have questions about a medical condition or this instruction, always ask your healthcare professional. [address] any warranty or liability for your use of this information.

## 2020-11-09 DIAGNOSIS — F41.8 DEPRESSION WITH ANXIETY: ICD-10-CM

## 2020-11-09 RX ORDER — ESCITALOPRAM OXALATE 20 MG/1
TABLET ORAL
Qty: 30 TAB | Refills: 5 | Status: SHIPPED | OUTPATIENT
Start: 2020-11-09 | End: 2021-05-14

## 2020-11-30 ENCOUNTER — OFFICE VISIT (OUTPATIENT)
Dept: INTERNAL MEDICINE CLINIC | Age: 45
End: 2020-11-30
Payer: COMMERCIAL

## 2020-11-30 VITALS
TEMPERATURE: 98.6 F | HEIGHT: 66 IN | SYSTOLIC BLOOD PRESSURE: 105 MMHG | HEART RATE: 75 BPM | DIASTOLIC BLOOD PRESSURE: 70 MMHG | WEIGHT: 235.8 LBS | OXYGEN SATURATION: 95 % | BODY MASS INDEX: 37.9 KG/M2 | RESPIRATION RATE: 18 BRPM

## 2020-11-30 DIAGNOSIS — R73.9 HYPERGLYCEMIA: ICD-10-CM

## 2020-11-30 DIAGNOSIS — H91.91 CHANGE IN HEARING OF RIGHT EAR: ICD-10-CM

## 2020-11-30 DIAGNOSIS — M25.552 BILATERAL HIP PAIN: Primary | ICD-10-CM

## 2020-11-30 DIAGNOSIS — F41.8 DEPRESSION WITH ANXIETY: ICD-10-CM

## 2020-11-30 DIAGNOSIS — G35 MS (MULTIPLE SCLEROSIS) (HCC): ICD-10-CM

## 2020-11-30 DIAGNOSIS — E78.5 DYSLIPIDEMIA: ICD-10-CM

## 2020-11-30 DIAGNOSIS — J30.2 SEASONAL ALLERGIC RHINITIS, UNSPECIFIED TRIGGER: ICD-10-CM

## 2020-11-30 DIAGNOSIS — L21.9 SEBORRHEIC DERMATITIS: ICD-10-CM

## 2020-11-30 DIAGNOSIS — H92.02 EAR PAIN, LEFT: ICD-10-CM

## 2020-11-30 DIAGNOSIS — M25.551 BILATERAL HIP PAIN: Primary | ICD-10-CM

## 2020-11-30 DIAGNOSIS — E55.9 VITAMIN D DEFICIENCY: ICD-10-CM

## 2020-11-30 DIAGNOSIS — I10 ESSENTIAL HYPERTENSION: ICD-10-CM

## 2020-11-30 PROCEDURE — 99214 OFFICE O/P EST MOD 30 MIN: CPT | Performed by: INTERNAL MEDICINE

## 2020-11-30 RX ORDER — KETOCONAZOLE 20 MG/G
CREAM TOPICAL 2 TIMES DAILY
Qty: 15 G | Refills: 0 | Status: SHIPPED | OUTPATIENT
Start: 2020-11-30 | End: 2021-12-13

## 2020-11-30 NOTE — PROGRESS NOTES
HPI:  Presents for f/u HTN, hip pain    BP doing well  Some increases with social stressors, but overall stable and at goal    Pt feeling better s/p pyelo and almas    Bilateral L>R hip pain  Known left hip from prior imaging of L spine  Pt raises concern re: autoimmune dz - father with ankylosing spondylitis    Left ear pain  Right ear hearing change    Past medical, Social, and Family history reviewed    Prior to Admission medications    Medication Sig Start Date End Date Taking? Authorizing Provider   influenza vaccine 2020-21, 4 yrs+,,PF, (FLUCELVAX QUAD) syrg injection Flucelvax Quad [id number] (PF) 60 mcg (15 mcg x 4)/0.5 mL IM syringe   INJECT 0.5ML INTRAMUSCULARLY ONCE   Yes Provider, Historical   metoprolol succinate 100 mg CSpX metoprolol succinate  mg capsule sprinkle, ext. release 24 hr   Take 1 capsule every day by oral route. Yes Provider, Historical   escitalopram oxalate (LEXAPRO) 20 mg tablet TAKE ONE TABLET BY MOUTH ONE TIME DAILY 11/9/20  Yes Devon Barros MD   metoprolol succinate (TOPROL-XL) 100 mg tablet TAKE ONE AND ONE-HALF TABLETS BY MOUTH ONE TIME DAILY  Patient taking differently: 100 mg nightly. 7/11/20  Yes Tameka Martell NP   buPROPion XL (WELLBUTRIN XL) 300 mg XL tablet Take 1 Tab by mouth every morning. 6/16/20  Yes Devon Barros MD   teriflunomide (Aubagio) 14 mg tablet Take 14 mg by mouth every evening. Yes Provider, Historical   Bifidobacterium Infantis (ALIGN) 4 mg cap Take 1 Cap by mouth nightly. Yes Provider, Historical   omega 3-dha-epa-fish oil 183.3 mg-75 mg -91.6 mg-306 mg cap Take 4 Caps by mouth daily. Patient taking differently: Take 2 Caps by mouth two (2) times a day. 10/25/18  Yes Devon Barros MD   fluticasone (FLONASE) 50 mcg/actuation nasal spray 2 Sprays by Both Nostrils route daily. Patient taking differently: 2 Sprays by Both Nostrils route as needed.  10/22/18  Yes Devon Barros MD   fexofenadine (ALLEGRA) 180 mg tablet Take 180 mg by mouth every evening. Yes Provider, Historical   gabapentin (NEURONTIN) 300 mg capsule Take 600 mg by mouth two (2) times a day. 1caps twice a day 10/20/17  Yes Provider, Historical   albuterol (PROVENTIL HFA, VENTOLIN HFA, PROAIR HFA) 90 mcg/actuation inhaler Take 2 Puffs by inhalation every four (4) hours as needed for Wheezing or Shortness of Breath. 9/27/17  Yes Ezequiel Covarrubias MD   cholecalciferol, vitamin D3, (VITAMIN D3) 2,000 unit tab Take 5,000 Units by mouth daily. Yes Provider, Historical   acetaminophen (TYLENOL) 325 mg tablet Take 2 Tabs by mouth every four (4) hours as needed for Pain or Fever. Over the counter med 6/23/20   Guillermo Merida NP   pantoprazole (PROTONIX) 40 mg tablet Take 40 mg by mouth daily. Provider, Historical          ROS  Complete ROS reviewed and negative or stable except as noted in HPI. Physical Exam  Vitals signs and nursing note reviewed. Constitutional:       General: She is not in acute distress. HENT:      Head: Normocephalic and atraumatic. Right Ear: Tympanic membrane and external ear normal.      Left Ear: Tympanic membrane and external ear normal.   Eyes:      General: No scleral icterus. Pupils: Pupils are equal, round, and reactive to light. Neck:      Musculoskeletal: Normal range of motion and neck supple. Vascular: No JVD. Cardiovascular:      Rate and Rhythm: Normal rate and regular rhythm. Heart sounds: Normal heart sounds. No murmur. No friction rub. No gallop. Pulmonary:      Effort: Pulmonary effort is normal. No respiratory distress. Breath sounds: Normal breath sounds. No wheezing or rales. Abdominal:      General: Bowel sounds are normal. There is no distension. Palpations: Abdomen is soft. Tenderness: There is no abdominal tenderness. Musculoskeletal: Normal range of motion. Lymphadenopathy:      Cervical: No cervical adenopathy. Skin:     General: Skin is warm.       Findings: Rash (c/w seb derm at nasal creases, lower face creases) present. Neurological:      Mental Status: She is alert and oriented to person, place, and time. Motor: No abnormal muscle tone. Prior labs reviewed. Serology screening 1 year ago      Assessment/Plan:    Possible seb derm exac by mask wearing      ICD-10-CM ICD-9-CM    1. Bilateral hip pain  M25.551 719.45 REFERRAL TO ORTHOPEDICS    M25.552     2. Seborrheic dermatitis  L21.9 690.10 ketoconazole (NIZORAL) 2 % topical cream   3. Depression with anxiety  F41.8 300.4    4. MS (multiple sclerosis) ([facility])  G35 340 TSH 3RD GENERATION      T4, FREE   5. Essential hypertension  I10 401.9 CBC WITH AUTOMATED DIFF   6. Dyslipidemia  E78.5 272.4 CBC WITH AUTOMATED DIFF      METABOLIC PANEL, COMPREHENSIVE      LIPID PANEL   7. Vitamin D deficiency  E55.9 268.9 VITAMIN D, 25 HYDROXY   8. Seasonal allergic rhinitis, unspecified trigger  J30.2 477.9    9. Hyperglycemia  R73.9 790.29 HEMOGLOBIN A1C WITH EAG   10. Ear pain, left  H92.02 388.70 REFERRAL TO ENT-OTOLARYNGOLOGY   11. Change in hearing of right ear  H91.91 389.9 REFERRAL TO ENT-OTOLARYNGOLOGY     Follow-up and Dispositions    · Return in about 6 months (around 5/30/2021), or if symptoms worsen or fail to improve, for blood pressure.         results and schedule of future studies reviewed with patient  reviewed diet, exercise and weight   cardiovascular risk and specific lipid/LDL goals reviewed  reviewed medications and side effects in detail     Ref to ortho   Ketoconazole cream  Return for fasting labs  Ref ENT  Consider pneumovax 23 at 5 year interval in 6/2021

## 2020-11-30 NOTE — PROGRESS NOTES
RM # [id number]    Chief Complaint   Patient presents with    Follow-up     Blood pressure and septic post hospital 8/2020    Rash     Face masks are causing a rash and itching    Hip Pain     Bilateral pain left is the worst also right thumb is swollen and painful       1. Have you been to the ER, urgent care clinic since your last visit? Hospitalized since your last visit? No    2. Have you seen or consulted any other health care providers outside of the [address] [place] [address] since your last visit? Include any pap smears or colon screening. No    There are no preventive care reminders to display for this patient. Learning Assessment 6/17/2020   PRIMARY LEARNER Patient   HIGHEST LEVEL OF EDUCATION - PRIMARY LEARNER  2 YEARS OF COLLEGE   BARRIERS PRIMARY LEARNER NONE   PRIMARY LANGUAGE ENGLISH   LEARNER PREFERENCE PRIMARY VIDEOS     -   ANSWERED BY Patie   RELATIONSHIP SELF           AVS, education, follow up and recommendations provided and addressed with patient.   services used to advise patient no

## 2020-12-03 RX ORDER — BUTALBITAL, ACETAMINOPHEN AND CAFFEINE 50; 325; 40 MG/1; MG/1; MG/1
1 TABLET ORAL
Qty: 20 TAB | Refills: 1 | Status: SHIPPED | OUTPATIENT
Start: 2020-12-03 | End: 2021-12-13

## 2020-12-03 RX ORDER — SUMATRIPTAN 50 MG/1
50 TABLET, FILM COATED ORAL
Qty: 12 TAB | Refills: 3 | Status: SHIPPED | OUTPATIENT
Start: 2020-12-03 | End: 2021-12-13

## 2021-01-08 DIAGNOSIS — F41.8 DEPRESSION WITH ANXIETY: ICD-10-CM

## 2021-01-08 RX ORDER — BUPROPION HYDROCHLORIDE 300 MG/1
TABLET ORAL
Qty: 30 TAB | Refills: 5 | Status: SHIPPED | OUTPATIENT
Start: 2021-01-08 | End: 2021-07-14

## 2021-02-10 ENCOUNTER — OFFICE VISIT (OUTPATIENT)
Dept: CARDIOLOGY CLINIC | Age: 46
End: 2021-02-10
Payer: COMMERCIAL

## 2021-02-10 VITALS
WEIGHT: 239 LBS | RESPIRATION RATE: 18 BRPM | HEIGHT: 66 IN | DIASTOLIC BLOOD PRESSURE: 86 MMHG | OXYGEN SATURATION: 97 % | HEART RATE: 87 BPM | BODY MASS INDEX: 38.41 KG/M2 | SYSTOLIC BLOOD PRESSURE: 124 MMHG

## 2021-02-10 DIAGNOSIS — R00.2 PALPITATIONS: ICD-10-CM

## 2021-02-10 DIAGNOSIS — I10 ESSENTIAL HYPERTENSION: Primary | ICD-10-CM

## 2021-02-10 PROCEDURE — 99213 OFFICE O/P EST LOW 20 MIN: CPT | Performed by: SPECIALIST

## 2021-02-10 RX ORDER — FAMOTIDINE 20 MG/1
TABLET, FILM COATED ORAL
COMMUNITY
Start: 2021-02-03 | End: 2021-12-13

## 2021-02-10 NOTE — PROGRESS NOTES
HISTORY OF PRESENT ILLNESS  Serina Triplett is a 54 y.o. female     SUMMARY:   Problem List  Date Reviewed: 2/10/2021          Codes Class Noted    S/P almas ICD-10-CM: Z90.49  ICD-9-CM: V45.79  Unknown        S/P appy ICD-10-CM: Z90.49  ICD-9-CM: V45.89  Unknown        Calculus of gallbladder without cholecystitis without obstruction ICD-10-CM: K80.20  ICD-9-CM: 574.20  2020        Hyponatremia ICD-10-CM: E87.1  ICD-9-CM: 276.1  2020        Pyelonephritis ICD-10-CM: N12  ICD-9-CM: 590.80  2020        Sepsis ([address] ICD-10-CM: A41.9  ICD-9-CM: 038.9, 995.91  2020        Obstructive pyelonephritis ICD-10-CM: N11.1  ICD-9-CM: 590.80  2020        UTI (urinary tract infection) ICD-10-CM: N39.0  ICD-9-CM: 599.0  2020        Elevated lipase ICD-10-CM: R74.8  ICD-9-CM: 790.5  2020        Congenital sucrose isomaltose malabsorption ICD-10-CM: E74.31  ICD-9-CM: 271.3  Unknown        PUD (peptic ulcer disease) ICD-10-CM: K27.9  ICD-9-CM: 533.90  Unknown        Essential hypertension ICD-10-CM: I10  ICD-9-CM: 401.9  10/28/2018        Severe obesity (BMI 35.0-39. 9) ICD-10-CM: E66.01  ICD-9-CM: 278.01  2018        Depression with anxiety ICD-10-CM: F41.8  ICD-9-CM: 300.4  2018        Kidney stones ICD-10-CM: N20.0  ICD-9-CM: 592.0  Unknown        EDGARD virus antibody positive ICD-10-CM: R76.8  ICD-9-CM: 795.79  Unknown        Tubular adenoma of colon ICD-10-CM: D12.6  ICD-9-CM: 211.3  Unknown        Single delivery by  ICD-10-CM: O82  ICD-9-CM: 669.71  2015        Gestational hypertension ICD-10-CM: O13.9  ICD-9-CM: 642.30  2/10/2015        AMA (advanced maternal age) primigravida 35+ ICD-10-CM: O09.519  ICD-9-CM: 659.50  2/10/2015        Multiple sclerosis exacerbation ([facility] ICD-10-CM: G35  ICD-9-CM: 340  2013        Optic neuritis, right ICD-10-CM: H46.9  ICD-9-CM: 377.30  2013        Dehydration, moderate ICD-10-CM: E86.0  ICD-9-CM: 276.51 8/12/2013        Dysphagia ICD-10-CM: R13.10  ICD-9-CM: 787.20  5/31/2013        Meralgia paresthetica of right side ICD-10-CM: G57.11  ICD-9-CM: 355.1  5/31/2013        Migraine with aura, without mention of intractable migraine without mention of status migrainosus ICD-10-CM: G43.109  ICD-9-CM: 346.00  5/31/2013        Dysplastic colon polyp ICD-10-CM: K63.5  ICD-9-CM: 211.3  Unknown        MS (multiple sclerosis) ([facility] ICD-10-CM: G35  ICD-9-CM: 340  Unknown        Depression ICD-10-CM: F32.9  ICD-9-CM: 311  Unknown        Asthma ICD-10-CM: J45.909  ICD-9-CM: 493.90  Unknown        Elevated blood pressure ICD-10-CM: [id number]  ICD-9-CM: Ed Wren  7/20/2011        Vitamin D deficiency ICD-10-CM: E55.9  ICD-9-CM: 268.9  7/20/2011        Depression ICD-10-CM: F32.9  ICD-9-CM: 119  Unknown              Current Outpatient Medications on File Prior to Visit   Medication Sig    famotidine (PEPCID) 20 mg tablet     buPROPion XL (WELLBUTRIN XL) 300 mg XL tablet TAKE ONE TABLET BY MOUTH EVERY MORNING    SUMAtriptan (IMITREX) 50 mg tablet Take 1 Tab by mouth daily as needed for Migraine.  butalbital-acetaminophen-caffeine (FIORICET, ESGIC) -40 mg per tablet Take 1 Tab by mouth every six (6) hours as needed for Headache.  ketoconazole (NIZORAL) 2 % topical cream Apply  to affected area two (2) times a day.  escitalopram oxalate (LEXAPRO) 20 mg tablet TAKE ONE TABLET BY MOUTH ONE TIME DAILY    metoprolol succinate (TOPROL-XL) 100 mg tablet TAKE ONE AND ONE-HALF TABLETS BY MOUTH ONE TIME DAILY (Patient taking differently: 100 mg nightly.)    acetaminophen (TYLENOL) 325 mg tablet Take 2 Tabs by mouth every four (4) hours as needed for Pain or Fever. Over the counter med    teriflunomide (Aubagio) 14 mg tablet Take 14 mg by mouth every evening.  pantoprazole (PROTONIX) 40 mg tablet Take 40 mg by mouth daily.  Bifidobacterium Infantis (ALIGN) 4 mg cap Take 1 Cap by mouth nightly.     omega 3-dha-epa-fish oil 183.3 mg-75 mg -91.6 mg-306 mg cap Take 4 Caps by mouth daily. (Patient taking differently: Take 2 Caps by mouth two (2) times a day.)    fluticasone (FLONASE) 50 mcg/actuation nasal spray 2 Sprays by Both Nostrils route daily. (Patient taking differently: 2 Sprays by Both Nostrils route as needed.)    fexofenadine (ALLEGRA) 180 mg tablet Take 180 mg by mouth every evening.  gabapentin (NEURONTIN) 300 mg capsule Take 600 mg by mouth two (2) times a day. 1caps twice a day    albuterol (PROVENTIL HFA, VENTOLIN HFA, PROAIR HFA) 90 mcg/actuation inhaler Take 2 Puffs by inhalation every four (4) hours as needed for Wheezing or Shortness of Breath.  cholecalciferol, vitamin D3, (VITAMIN D3) 2,000 unit tab Take 5,000 Units by mouth daily.  influenza vaccine 2020-21, 4 yrs+,,PF, (FLUCELVAX QUAD) syrg injection Flucelvax Quad 5299-1314 (PF) 60 mcg (15 mcg x 4)/0.5 mL IM syringe   INJECT 0.5ML INTRAMUSCULARLY ONCE     No current facility-administered medications on file prior to visit. CARDIOLOGY STUDIES TO DATE:  3/18 48 hr holter, 1pvc, 10 pac's  3/18 normal echo    Chief Complaint   Patient presents with    Follow-up     HPI :  She is doing quite well from a cardiac standpoint. Her blood pressures under good control and she has not gained much weight since we last saw her in the office. No palpitations or other worrisome cardiac symptoms. her exercise is limited by chronic low back pain.   CARDIAC ROS:   negative for chest pain, dyspnea, syncope, orthopnea, paroxysmal nocturnal dyspnea, exertional chest pressure/discomfort, claudication, lower extremity edema    Family History   Problem Relation Age of Onset    Cancer Father         appendix/cancerous colon polyps    Heart Disease Father         cabg age early 66's    Cancer Maternal Uncle         prostate/melanoma    Cancer Maternal Grandmother         cancerous colon polyps    Cancer Maternal Grandfather         prostate    Heart Disease Paternal Grandmother     Cancer Paternal Grandmother         cancerous colon polyps    No Known Problems Daughter     Colon Polyps Mother         precancerous    Atrial Fibrillation Mother     Heart Disease Paternal Aunt     Anesth Problems Neg Hx        Past Medical History:   Diagnosis Date    Acute renal failure (ARF) ([address]     6/2020-STONES AND PYELONEPHRITIS    Ankle fracture     Arrhythmia     PALPITATIONS    Asthma     Autoimmune disease ([address]     MS    Calculus of gallbladder without cholecystitis without obstruction 7/20/2020    Celiac disease     Chronic pain     MS    Congenital sucrose isomaltose malabsorption     Depression     Diverticulosis of sigmoid colon     Dysplastic colon polyp     Dr. Lupe Johnson - last colonoscopy 11/7/12 - single polyp    Essential hypertension     Gestasional    GERD (gastroesophageal reflux disease)     Influenza B 03/07/2017    EDGARD virus antibody positive     Kidney stones     Miscarriage     11/13    MS (multiple sclerosis) ([address]     Dr. Julia Martin    Obstructive pyelonephritis 5/26/2020    Ovarian cyst     PUD (peptic ulcer disease)     Pyelonephritis     Routine gynecological examination     Dr. Nikki Price S/P appy     S/P almas     Sleep apnea     pt states she no longer has the problem since wt loss - intentional wt loss    Tubular adenoma of colon 04/18/2016    Uterine fibroid     Vitamin D deficiency 7/20/2011       GENERAL ROS:  A comprehensive review of systems was negative except for that written in the HPI.     Visit Vitals  /86 (BP 1 Location: Left upper arm, BP Patient Position: Sitting)   Pulse 87   Resp 18   Ht 5' 6\" (1.676 m)   Wt 239 lb (108.4 kg)   SpO2 97%   BMI 38.58 kg/m²       Wt Readings from Last 3 Encounters:   02/10/21 239 lb (108.4 kg)   11/30/20 235 lb 12.8 oz (107 kg)   08/14/20 235 lb 12.8 oz (107 kg)            BP Readings from Last 3 Encounters:   02/10/21 124/86   11/30/20 105/70   08/14/20 104/65 PHYSICAL EXAM  General appearance: alert, cooperative, no distress, appears stated age  Neurologic: Alert and oriented X 3  Neck: supple, symmetrical, trachea midline, no adenopathy, no carotid bruit and no JVD  Lungs: clear to auscultation bilaterally  Heart: regular rate and rhythm, S1, S2 normal, no murmur, click, rub or gallop  Extremities: extremities normal, atraumatic, no cyanosis or edema    Lab Results   Component Value Date/Time    Cholesterol, total 117 06/30/2020 09:02 AM    Cholesterol, total 200 (H) 08/02/2019 09:10 AM    Cholesterol, total 209 (H) 04/22/2019 09:40 AM    Cholesterol, total 213 (H) 10/22/2018 10:59 AM    Cholesterol, total 188 04/24/2018 11:30 AM    HDL Cholesterol 53 06/30/2020 09:02 AM    HDL Cholesterol 51 08/02/2019 09:10 AM    HDL Cholesterol 54 04/22/2019 09:40 AM    HDL Cholesterol 63 10/22/2018 10:59 AM    HDL Cholesterol 70 04/24/2018 11:30 AM    LDL, calculated 39 06/30/2020 09:02 AM    LDL, calculated 92 08/02/2019 09:10 AM    LDL, calculated 110 (H) 04/22/2019 09:40 AM    LDL, calculated 85 10/22/2018 10:59 AM    LDL, calculated 65 04/24/2018 11:30 AM    Triglyceride 126 06/30/2020 09:02 AM    Triglyceride 287 (H) 08/02/2019 09:10 AM    Triglyceride 223 (H) 04/22/2019 09:40 AM    Triglyceride 327 (H) 10/22/2018 10:59 AM    Triglyceride 267 (H) 04/24/2018 11:30 AM     ASSESSMENT :      She is stable and asymptomatic, well compensated on a good medical regimen and needs no cardiac testing at at this time. current treatment plan is effective, no change in therapy  lab results and schedule of future lab studies reviewed with patient  reviewed diet, exercise and weight control    Encounter Diagnoses   Name Primary?  Essential hypertension Yes    Palpitations      Orders Placed This Encounter    famotidine (PEPCID) 20 mg tablet       Follow-up and Dispositions    · Return in about 6 months (around 8/10/2021).          Jon Valle MD  2/10/2021  Please note that this dictation was completed with [employer], the computer voice recognition software. Quite often unanticipated grammatical, syntax, homophones, and other interpretive errors are inadvertently transcribed by the computer software. Please disregard these errors. Please excuse any errors that have escaped final proofreading. Thank you.

## 2021-02-15 NOTE — H&P
Date of Surgery Update:  Shauna Aldridge was seen and examined. History and physical has been reviewed. The patient has been examined.  There have been no significant clinical changes since the completion of the originally dated History and Physical.    Signed By: Naa Rome MD     2020 7:18 AM         Please note from the office and include the additional information below:    Past Medical History  Past Medical History:   Diagnosis Date    Acute renal failure ([facility]F) ([facility]     2020-STONES AND PYELONEPHRITIS    Ankle fracture     Arrhythmia     PALPITATIONS    Asthma     Autoimmune disease ([facility]     MS    Calculus of gallbladder without cholecystitis without obstruction 2020    Celiac disease     Chronic pain     MS    Congenital sucrose isomaltose malabsorption     Depression     Diverticulosis of sigmoid colon     Dysplastic colon polyp     Dr. Paulo Breaux - last colonoscopy 12 - single polyp    Essential hypertension     Gestasional    GERD (gastroesophageal reflux disease)     Influenza B 2017    EDGARD virus antibody positive     Kidney stones     Miscarriage         MS (multiple sclerosis) ([facility]     Dr. Romie Patton    Obstructive pyelonephritis 2020    Ovarian cyst     PUD (peptic ulcer disease)     Pyelonephritis     Routine gynecological examination     Dr. Sarabia Basket Sleep apnea     pt states she no longer has the problem since wt loss - intentional wt loss    Tubular adenoma of colon 2016    Uterine fibroid     Vitamin D deficiency 2011        Past Surgical History  Past Surgical History:   Procedure Laterality Date    COLONOSCOPY N/A 2019    COLONOSCOPY/EGD ([facility]) performed by Earl Betancourt MD at [address] [place] [address]      double compound fx of right lower leg and dislocated heel - has a plate and 13 screws    HX  SECTION          HX COLONOSCOPY      HX ENDOSCOPY      HX GYN fibroid tumor ovarian cyst     HX ORTHOPAEDIC      1992 Left shoulder surgery    AL BIOPSY OF BREAST, INCISIONAL          Social History  The patient Mirna Berrios  reports that she quit smoking about 20 years ago. She quit after 8.00 years of use. She has quit using smokeless tobacco. She reports current alcohol use. She reports that she does not use drugs.      Family History  Family History   Problem Relation Age of Onset   [place] Cancer Father         appendix/cancerous colon polyps    Heart Disease Father         cabg age early 68's    Cancer Maternal Uncle         prostate/melanoma    Cancer Maternal Grandmother         cancerous colon polyps    Cancer Maternal Grandfather         prostate    Heart Disease Paternal Grandmother     Cancer Paternal Grandmother         cancerous colon polyps    No Known Problems Daughter     Colon Polyps Mother         precancerous    Atrial Fibrillation Mother     Heart Disease Paternal Aunt     Anesth Problems Neg Hx - Rt foot 3rd distal phalanx  open wound  probe to bone  c/w with OM , Abnormal   - afebrile, no leukocytosis   -XR R Foot: cortical erosions of right 3rd distal phalanx, however, no discrete om   -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 Araiza,   - Tissue cx growing Staphylococcus lugdunensis  -continue Rocephin 2g IV daily as per ID     -f/u BCx, NGTD   -WBAT with surgical shoe  -PT eval; no skilled PT needed

## 2021-05-04 RX ORDER — METOPROLOL SUCCINATE 100 MG/1
TABLET, EXTENDED RELEASE ORAL
Qty: 45 TAB | Refills: 5 | Status: SHIPPED | OUTPATIENT
Start: 2021-05-04 | End: 2021-09-30

## 2021-05-14 DIAGNOSIS — F41.8 DEPRESSION WITH ANXIETY: ICD-10-CM

## 2021-05-14 RX ORDER — ESCITALOPRAM OXALATE 20 MG/1
TABLET ORAL
Qty: 30 TAB | Refills: 5 | Status: SHIPPED | OUTPATIENT
Start: 2021-05-14 | End: 2021-12-03 | Stop reason: SDUPTHER

## 2021-07-14 DIAGNOSIS — F41.8 DEPRESSION WITH ANXIETY: ICD-10-CM

## 2021-07-14 RX ORDER — BUPROPION HYDROCHLORIDE 300 MG/1
TABLET ORAL
Qty: 30 TABLET | Refills: 5 | Status: SHIPPED | OUTPATIENT
Start: 2021-07-14 | End: 2022-01-31

## 2021-08-03 PROBLEM — F32.A DEPRESSION: Status: RESOLVED | Noted: 2021-08-03 | Resolved: 2021-08-03

## 2021-09-16 ENCOUNTER — APPOINTMENT (OUTPATIENT)
Dept: GENERAL RADIOLOGY | Age: 46
End: 2021-09-16
Attending: STUDENT IN AN ORGANIZED HEALTH CARE EDUCATION/TRAINING PROGRAM
Payer: COMMERCIAL

## 2021-09-16 ENCOUNTER — HOSPITAL ENCOUNTER (EMERGENCY)
Age: 46
Discharge: HOME | End: 2021-09-16
Attending: EMERGENCY MEDICINE
Payer: COMMERCIAL

## 2021-09-16 VITALS
HEART RATE: 72 BPM | SYSTOLIC BLOOD PRESSURE: 153 MMHG | OXYGEN SATURATION: 97 % | TEMPERATURE: 97.9 F | DIASTOLIC BLOOD PRESSURE: 89 MMHG | RESPIRATION RATE: 14 BRPM

## 2021-09-16 DIAGNOSIS — S91.311A LACERATION OF RIGHT FOOT, INITIAL ENCOUNTER: Primary | ICD-10-CM

## 2021-09-16 PROCEDURE — 99281 EMR DPT VST MAYX REQ PHY/QHP: CPT

## 2021-09-16 PROCEDURE — 74011000250 HC RX REV CODE- 250: Performed by: STUDENT IN AN ORGANIZED HEALTH CARE EDUCATION/TRAINING PROGRAM

## 2021-09-16 PROCEDURE — 75810000293 HC SIMP/SUPERF WND  RPR

## 2021-09-16 PROCEDURE — 73630 X-RAY EXAM OF FOOT: CPT

## 2021-09-16 RX ORDER — LIDOCAINE HYDROCHLORIDE AND EPINEPHRINE 20; 10 MG/ML; UG/ML
1.5 INJECTION, SOLUTION INFILTRATION; PERINEURAL
Status: COMPLETED | OUTPATIENT
Start: 2021-09-16 | End: 2021-09-16

## 2021-09-16 RX ADMIN — LIDOCAINE HYDROCHLORIDE AND EPINEPHRINE 30 MG: 20; 10 INJECTION, SOLUTION INFILTRATION; PERINEURAL at 10:32

## 2021-09-16 NOTE — ED TRIAGE NOTES
Patient dropped an ice  onto her right foot and cut her foot. Patient reported that her foot was gushing blood. The wound is currently covered in a bandage.

## 2021-09-16 NOTE — ED PROVIDER NOTES
Patient is a 79-year-old female who presents to ED complaining of right foot pain and laceration which occurred PTA. Reports an  caused a \"gash\" to right foot near big toe. States she is also having pain with ambulating. Reports tetanus is up-to-date. Denies any additional injury, numbness or weakness.            Past Medical History:   Diagnosis Date    Acute renal failure (ARF) ([facility]     2020-STONES AND PYELONEPHRITIS    Ankle fracture     Arrhythmia     PALPITATIONS    Asthma     Autoimmune disease ([facility]     MS    Calculus of gallbladder without cholecystitis without obstruction 2020    Celiac disease     Chronic pain     MS    Congenital sucrose isomaltose malabsorption     Depression     Diverticulosis of sigmoid colon     Dysplastic colon polyp     Dr. Denise Canales - last colonoscopy 12 - single polyp    Essential hypertension     Gestasional    GERD (gastroesophageal reflux disease)     Influenza B 2017    EDGARD virus antibody positive     Kidney stones     Miscarriage         [facility]     Dr. Julian Webb    Obstructive pyelonephritis 2020    Ovarian cyst     PUD (peptic ulcer disease)     Pyelonephritis     Routine gynecological examination     Dr. Megha Araiza S/P appy     S/P almas     Sleep apnea     pt states she no longer has the problem since wt loss - intentional wt loss    Tubular adenoma of colon 2016    Uterine fibroid     Vitamin D deficiency 2011       Past Surgical History:   Procedure Laterality Date    COLONOSCOPY N/A 2019    COLONOSCOPY/EGD (LATEX ALLERGY) performed by Radha Cheung MD at [address] [facility]      double compound fx of right lower leg and dislocated heel - has a plate and 13 screws    HX  SECTION          HX COLONOSCOPY      HX ENDOSCOPY      HX GYN      fibroid tumor ovarian cyst     HX LAP CHOLECYSTECTOMY  2020    HX ORTHOPAEDIC  Left shoulder surgery    MA BIOPSY OF BREAST, INCISIONAL           Family History:   Problem Relation Age of Onset   [facility] Cancer Father         appendix/cancerous colon polyps    Heart Disease Father         cabg age early 68's    Cancer Maternal Uncle         prostate/melanoma    Cancer Maternal Grandmother         cancerous colon polyps    Cancer Maternal Grandfather         prostate    Heart Disease Paternal Grandmother     Cancer Paternal Grandmother         cancerous colon polyps    No Known Problems Daughter     Colon Polyps Mother         precancerous    Atrial Fibrillation Mother     Heart Disease Paternal Aunt     Anesth Problems Neg Hx        Social History     Socioeconomic History    Marital status:      Spouse name: Not on file    Number of children: Not on file    Years of education: Not on file    Highest education level: Not on file   Occupational History    Not on file   Tobacco Use    Smoking status: Former Smoker     Years: 8.00     Quit date: 2000     Years since quittin.1    Smokeless tobacco: Former User   Substance and Sexual Activity    Alcohol use: Yes     Comment: rare    Drug use: No    Sexual activity: Never   Other Topics Concern    Not on file   Social History Narrative    Not on file     Social Determinants of Health     Financial Resource Strain:     Difficulty of Paying Living Expenses:    Food Insecurity:     Worried About Running Out of Food in the Last Year:     [address] in the Last Year:    Transportation Needs:     Lack of Transportation (Medical):      Lack of Transportation (Non-Medical):    Physical Activity:     Days of Exercise per Week:     Minutes of Exercise per Session:    Stress:     Feeling of Stress :    Social Connections:     Frequency of Communication with Friends and Family:     Frequency of Social Gatherings with Friends and Family:     Attends [employer] Services:     Active Member of Clubs or Organizations:     Attends Club or Organization Meetings:     Marital Status:    Intimate Partner Violence:     Fear of Current or Ex-Partner:     Emotionally Abused:     Physically Abused:     Sexually Abused: ALLERGIES: Latex, Adhesive, and Motrin [ibuprofen]    Review of Systems   Musculoskeletal: Positive for arthralgias. Negative for back pain, gait problem, neck pain and neck stiffness. Skin: Positive for wound. Negative for color change. Allergic/Immunologic: Negative for immunocompromised state. Neurological: Negative for syncope, weakness, numbness and headaches. All other systems reviewed and are negative. Vitals:    09/16/21 1027   BP: (!) 153/89   Pulse: 72   Resp: 14   Temp: 97.9 °F (36.6 °C)   SpO2: 97%            Physical Exam  Vitals and nursing note reviewed. Constitutional:       Appearance: She is normal weight. HENT:      Head: Normocephalic and atraumatic. Nose: Nose normal.      Mouth/Throat:      Mouth: Mucous membranes are moist.   Eyes:      Conjunctiva/sclera: Conjunctivae normal.   Cardiovascular:      Rate and Rhythm: Normal rate. Pulmonary:      Effort: Pulmonary effort is normal.   Musculoskeletal:         General: Normal range of motion. Cervical back: Normal range of motion and neck supple. Skin:     General: Skin is warm. Capillary Refill: Capillary refill takes less than 2 seconds. Comments: 1.5cm laceration near big toe on right foot. Full ROM of right ankle and toes. Sensation intact. Distal pulses 2+. Good cap refill noted. Neurological:      General: No focal deficit present. Mental Status: She is alert. Mental status is at baseline.    Psychiatric:         Mood and Affect: Mood normal.          MDM       Wound Repair    Date/Time: 9/16/2021 11:39 AM  Performed by: PAPreparation: skin prepped with Shur-Clens  Location details: right foot  Wound length:2.5 cm or less  Anesthesia: local infiltration    Anesthesia:  Local Anesthetic: lidocaine 2% with epinephrine  Anesthetic total: 4 mL  Foreign bodies: no foreign bodies  Irrigation solution: saline  Irrigation method: syringe  Debridement: none  Skin closure: 4-0 nylon  Number of sutures: 4  Technique: simple  Approximation: close  Dressing: 4x4  Patient tolerance: patient tolerated the procedure well with no immediate complications  My total time at bedside, performing this procedure was 1-15 minutes.

## 2021-09-16 NOTE — DISCHARGE INSTRUCTIONS
Keep the wound dry for the first 24-48 hours. After that it is okay for the wound to get wet, but do not soak it for extended periods of time. Wash the wound 1-2 times a day with warm water and gentle soap. Apply an antibiotic ointment such as Neosporin or Bacitracin, or plain petroleum jelly (Vaseline). Keep covered until healed if able to. Watch for any signs of infection, including fever/chills, redness, pain, swelling, or drainage from the wound, and seek medical attention if necessary. See your primary care or return to the ED in 7 days to have the sutures removed.

## 2021-09-23 ENCOUNTER — HOSPITAL ENCOUNTER (EMERGENCY)
Age: 46
Discharge: HOME | End: 2021-09-23
Attending: EMERGENCY MEDICINE
Payer: COMMERCIAL

## 2021-09-23 VITALS
BODY MASS INDEX: 38.3 KG/M2 | DIASTOLIC BLOOD PRESSURE: 99 MMHG | HEIGHT: 66 IN | TEMPERATURE: 99.3 F | SYSTOLIC BLOOD PRESSURE: 151 MMHG | OXYGEN SATURATION: 96 % | RESPIRATION RATE: 16 BRPM | WEIGHT: 238.32 LBS | HEART RATE: 76 BPM

## 2021-09-23 DIAGNOSIS — T14.8XXA POST-TRAUMATIC WOUND INFECTION: Primary | ICD-10-CM

## 2021-09-23 DIAGNOSIS — L08.9 POST-TRAUMATIC WOUND INFECTION: Primary | ICD-10-CM

## 2021-09-23 PROCEDURE — 74011250637 HC RX REV CODE- 250/637: Performed by: EMERGENCY MEDICINE

## 2021-09-23 PROCEDURE — 75810000275 HC EMERGENCY DEPT VISIT NO LEVEL OF CARE

## 2021-09-23 RX ORDER — AMOXICILLIN AND CLAVULANATE POTASSIUM 875; 125 MG/1; MG/1
1 TABLET, FILM COATED ORAL
Status: COMPLETED | OUTPATIENT
Start: 2021-09-23 | End: 2021-09-23

## 2021-09-23 RX ORDER — AMOXICILLIN AND CLAVULANATE POTASSIUM 875; 125 MG/1; MG/1
1 TABLET, FILM COATED ORAL 2 TIMES DAILY
Qty: 14 TABLET | Refills: 0 | Status: SHIPPED | OUTPATIENT
Start: 2021-09-23 | End: 2021-09-30

## 2021-09-23 RX ADMIN — AMOXICILLIN AND CLAVULANATE POTASSIUM 1 TABLET: 875; 125 TABLET, FILM COATED ORAL at 12:13

## 2021-09-23 NOTE — ED TRIAGE NOTES
Patient arrives ambulatory to ed for suture removal to right foot. Sutures were placed at [facility] 7 days ago. Patient concerned d/t wound \"oozing\" and \"puffiness\". Mild erythema noted.

## 2021-09-23 NOTE — ED PROVIDER NOTES
Please note that this dictation was completed with [email or web address], the computer voice recognition software.  Quite often unanticipated grammatical, syntax, homophones, and other interpretive errors are inadvertently transcribed by the computer software.  Please disregard these errors.  Please excuse any errors that have escaped final proofreading. 39-year-old female past medical history listed seen at [facility] ER 1 week ago for \"was doing some painting dropped a razor knife and it sliced the edge of my right big toe, then about my foot and it really opened up and I could see bone. \"  I did stitches placed at [facility] 7 days ago was told to get the stitches out in 5 days. I spoke with my sisters surgical RN she told me that seemed a little early so I waited till today but if noticed the previous 2 days it is starting to get red and having a little bit of discharge from the wound. It is also beginning slightly tender to palpation. \"  Patient states she has been keeping it dry other than showering has been icing it and elevating it as directed. We discussed the benefit of leaving sutures in 10 days to 2 weeks discussed course of antibiotics as it does seem red warm to the touch slight concern for infection.   She agrees with these plans like to start antibiotics leave the sutures in for 10 to 14 days;     pt denies HA, vison changes, diff swallowing, CP, SOB, Abd pain, F/Ch, N/V, D/Cons or other current systemic complaints    Social/ PSH reviewed in EMR    EMR Chart Reviewed           Past Medical History:   Diagnosis Date    Acute renal failure (ARF) ([address]     6/2020-STONES AND PYELONEPHRITIS    Ankle fracture     Arrhythmia     PALPITATIONS    Asthma     Autoimmune disease ([address]     MS    Calculus of gallbladder without cholecystitis without obstruction 7/20/2020    Celiac disease     Chronic pain     MS    Congenital sucrose isomaltose malabsorption     Depression     Diverticulosis of sigmoid colon     Dysplastic colon polyp     Dr. Osmany Horowitz - last colonoscopy 12 - single polyp    Essential hypertension     Gestasional    GERD (gastroesophageal reflux disease)     Influenza B 2017    EDGARD virus antibody positive     Kidney stones     Miscarriage         MS (multiple sclerosis) ([facility]     Dr. Montana Rivers Obstructive pyelonephritis 2020    Ovarian cyst     PUD (peptic ulcer disease)     Pyelonephritis     Routine gynecological examination     Dr. Paty Locke S/P appcuong     S/P almas     Sleep apnea     pt states she no longer has the problem since wt loss - intentional wt loss    Tubular adenoma of colon 2016    Uterine fibroid     Vitamin D deficiency 2011       Past Surgical History:   Procedure Laterality Date    COLONOSCOPY N/A 2019    COLONOSCOPY/EGD (LATEX ALLERGY) performed by Edmar Cuellar MD at [address] [facility]      double compound fx of right lower leg and dislocated heel - has a plate and 13 screws    HX  SECTION          HX COLONOSCOPY      HX ENDOSCOPY      HX GYN      fibroid tumor ovarian cyst     HX LAP CHOLECYSTECTOMY  2020    HX ORTHOPAEDIC      1992 Left shoulder surgery    CA BIOPSY OF BREAST, INCISIONAL           Family History:   Problem Relation Age of Onset   [place] Pleas Cancer Father         appendix/cancerous colon polyps    Heart Disease Father         cabg age early 66's   Fl[place]ia Pleas Cancer Maternal Uncle         prostate/melanoma    Cancer Maternal Grandmother         cancerous colon polyps    Cancer Maternal Grandfather         prostate    Heart Disease Paternal Grandmother     Cancer Paternal Grandmother         cancerous colon polyps    No Known Problems Daughter     Colon Polyps Mother         precancerous    Atrial Fibrillation Mother     Heart Disease Paternal Aunt     Anesth Problems Neg Hx        Social History     Socioeconomic History    Marital status:      Spouse name: Not on file  Number of children: Not on file    Years of education: Not on file    Highest education level: Not on file   Occupational History    Not on file   Tobacco Use    Smoking status: Former Smoker     Years: 8.00     Quit date: 2000     Years since quittin.1    Smokeless tobacco: Former User   Substance and Sexual Activity    Alcohol use: Yes     Comment: rare    Drug use: No    Sexual activity: Never   Other Topics Concern    Not on file   Social History Narrative    Not on file     Social Determinants of Health     Financial Resource Strain:     Difficulty of Paying Living Expenses:    Food Insecurity:     Worried About [employer] in the Last Year:     [address] [employer] in the Last Year:    Transportation Needs:     Lack of Transportation (Medical):  Lack of Transportation (Non-Medical):    Physical Activity:     Days of Exercise per Week:     Minutes of Exercise per Session:    Stress:     Feeling of Stress :    Social Connections:     Frequency of Communication with Friends and Family:     Frequency of Social Gatherings with Friends and Family:     Attends Yarsanism Services:     Active Member of Clubs or Organizations:     Attends Club or Organization Meetings:     Marital Status:    Intimate Partner Violence:     Fear of Current or Ex-Partner:     Emotionally Abused:     Physically Abused:     Sexually Abused: ALLERGIES: Latex, Adhesive, and Motrin [ibuprofen]    Review of Systems   Skin: Positive for color change, rash and wound. All other systems reviewed and are negative. There were no vitals filed for this visit. Physical Exam  Vitals and nursing note reviewed. Constitutional:       General: She is not in acute distress. Appearance: Normal appearance. She is well-developed. She is not ill-appearing, toxic-appearing or diaphoretic.       Comments: NAD, AxOx4, speaking in complete sentences       HENT:      Head: Normocephalic and atraumatic. Right Ear: External ear normal.      Left Ear: External ear normal.   Eyes:      General: No scleral icterus. Right eye: No discharge. Left eye: No discharge. Extraocular Movements: Extraocular movements intact. Conjunctiva/sclera: Conjunctivae normal.      Pupils: Pupils are equal, round, and reactive to light. Neck:      Vascular: No JVD. Trachea: No tracheal deviation. Cardiovascular:      Rate and Rhythm: Normal rate and regular rhythm. Heart sounds: Normal heart sounds. No murmur heard. No friction rub. No gallop. Pulmonary:      Effort: Pulmonary effort is normal. No respiratory distress. Genitourinary:     Vagina: No vaginal discharge. Musculoskeletal:         General: Swelling, tenderness and signs of injury present. Normal range of motion. Cervical back: Normal range of motion and neck supple. Comments: R medial foot (pictured) - noted sutures/ redness/ min purulent d/c on bandage; min ttp; no lymphangitis; pt has distal motor/ CV/ Sensation grossly intact toe tip;    Skin:     General: Skin is warm and dry. Capillary Refill: Capillary refill takes less than 2 seconds. Coloration: Skin is not pale. Findings: Erythema and rash present. Neurological:      General: No focal deficit present. Mental Status: She is alert and oriented to person, place, and time. Cranial Nerves: No cranial nerve deficit. Sensory: No sensory deficit. Motor: No weakness or abnormal muscle tone. Coordination: Coordination normal.      Gait: Gait normal.      Deep Tendon Reflexes: Reflexes normal.   Psychiatric:         Behavior: Behavior normal.         Thought Content: Thought content normal.          MDM       Procedures          12:06 PM  Bladimir Morales's  results have been reviewed with her. She has been counseled regarding her diagnosis.   She verbally conveys understanding and agreement of the signs, symptoms, diagnosis, treatment and prognosis and additionally agrees to Call/ Arrange follow up as recommended with Dr. Narinder Nunez MD in 24 - 48 hours. She also agrees with the care-plan and conveys that all of her questions have been answered. I have also put together some discharge instructions for her that include: 1) educational information regarding their diagnosis, 2) how to care for their diagnosis at home, as well a 3) list of reasons why they would want to return to the ED prior to their follow-up appointment, should their condition change or for concerns.

## 2021-09-23 NOTE — DISCHARGE INSTRUCTIONS
Thank you for allowing us to provide you with medical care today. We realize that you have many choices for your emergency care needs. We thank you for choosing [facility]. Please choose us in the future for any continued health care needs. We hope we addressed all of your medical concerns. We strive to provide excellent quality care in the Emergency Department. Anything less than excellent does not meet our expectations. The exam and treatment you received in the Emergency Department were for an emergent problem and are not intended as complete care. It is important that you follow up with a doctor, nurse practitioner, or physician's assistant for ongoing care. If your symptoms worsen or you do not improve as expected and you are unable to reach your usual health care provider, you should return to the Emergency Department. We are available 24 hours a day. Take this sheet with you when you go to your follow-up visit. If you have any problem arranging the follow-up visit, contact the Emergency Department immediately. Make an appointment your family doctor for follow up of this visit. Return to the ER if you are unable to be seen in a timely manner.

## 2021-09-30 ENCOUNTER — HOSPITAL ENCOUNTER (EMERGENCY)
Age: 46
Discharge: LEFT BEFORE DISCHARGE | End: 2021-09-30
Attending: EMERGENCY MEDICINE
Payer: COMMERCIAL

## 2021-09-30 VITALS
SYSTOLIC BLOOD PRESSURE: 137 MMHG | WEIGHT: 237 LBS | TEMPERATURE: 98.1 F | HEART RATE: 65 BPM | BODY MASS INDEX: 38.25 KG/M2 | RESPIRATION RATE: 18 BRPM | DIASTOLIC BLOOD PRESSURE: 95 MMHG | OXYGEN SATURATION: 98 %

## 2021-09-30 PROCEDURE — 75810000275 HC EMERGENCY DEPT VISIT NO LEVEL OF CARE

## 2021-09-30 RX ORDER — METOPROLOL SUCCINATE 100 MG/1
TABLET, EXTENDED RELEASE ORAL
Qty: 45 TABLET | Refills: 3 | Status: SHIPPED | OUTPATIENT
Start: 2021-09-30 | End: 2022-03-15 | Stop reason: SDUPTHER

## 2021-09-30 NOTE — ED TRIAGE NOTES
Triage: pt here for suture removal to right medial foot. Pt took last dose of antibiotic this AM but increased warmth and redness to area. Denies fever. Pt stated they did not give her a tetanus in the ER when she had sutures placed. Last tetanus 6 years ago.

## 2021-09-30 NOTE — ED NOTES
Pt left without being seen by provider after triage. Pt reports \"not wanting to wait any longer\". Pt ambulatory out of triage despite nurse apologizing and asking pt to stay.

## 2021-09-30 NOTE — ED PROVIDER NOTES
HPI     Patient left without being seen. I went to the room and patient was not there.     Past Medical History:   Diagnosis Date    [facility] ([facility]) [facility]     2020-STONES AND PYELONEPHRITIS    Ankle fracture     Arrhythmia     PALPITATIONS    Asthma     Autoimmune disease ([facility]     MS    Calculus of gallbladder without cholecystitis without obstruction 2020    Celiac disease     Chronic pain     MS    Congenital sucrose isomaltose malabsorption     Depression     Diverticulosis of sigmoid colon     Dysplastic colon polyp     Dr. Joaquin Haney - last colonoscopy 12 - single polyp    Essential hypertension     Gestasional    GERD (gastroesophageal reflux disease)     Influenza B 2017    EDGARD virus antibody positive     Kidney stones     Miscarriage         MS (multiple sclerosis) ([facility]     Dr. Merrill Osullivan    Obstructive pyelonephritis 2020    Ovarian cyst     PUD (peptic ulcer disease)     Pyelonephritis     Routine gynecological examination     Dr. Ahn Knife S/P appy     S/P almas     Sleep apnea     pt states she no longer has the problem since wt loss - intentional wt loss    Tubular adenoma of colon 2016    Uterine fibroid     Vitamin D deficiency 2011       Past Surgical History:   Procedure Laterality Date    COLONOSCOPY N/A 2019    COLONOSCOPY/EGD (LATEX ALLERGY) performed by Leida Newman MD at [facility] ENDOSCOPY    [facility]      double compound fx of right lower leg and dislocated heel - has a plate and 13 screws    HX  SECTION          HX COLONOSCOPY      HX ENDOSCOPY      HX GYN      fibroid tumor ovarian cyst     HX LAP CHOLECYSTECTOMY  2020    HX ORTHOPAEDIC      1992 Left shoulder surgery    MS BIOPSY OF BREAST, INCISIONAL           Family History:   Problem Relation Age of Onset    Cancer Father         appendix/cancerous colon polyps    Heart Disease Father         cabg age early 68's    Cancer Maternal Uncle         prostate/melanoma    Cancer Maternal Grandmother         cancerous colon polyps    Cancer Maternal Grandfather         prostate    Heart Disease Paternal Grandmother     Cancer Paternal Grandmother         cancerous colon polyps    No Known Problems Daughter     Colon Polyps Mother         precancerous    Atrial Fibrillation Mother     Heart Disease Paternal Aunt     Anesth Problems Neg Hx        Social History     Socioeconomic History    Marital status:      Spouse name: Not on file    Number of children: Not on file    Years of education: Not on file    Highest education level: Not on file   Occupational History    Not on file   Tobacco Use    Smoking status: Former Smoker     Years: 8.00     Quit date: 2000     Years since quittin.2    Smokeless tobacco: Former User   Substance and Sexual Activity    Alcohol use: Yes     Comment: rare    Drug use: No    Sexual activity: Never   Other Topics Concern    Not on file   Social History Narrative    Not on file     Social Determinants of Health     Financial Resource Strain:     Difficulty of Paying Living Expenses:    Food Insecurity:     Worried About Running Out of Food in the Last Year:     [address] in the Last Year:    Transportation Needs:     Lack of Transportation (Medical):  Lack of Transportation (Non-Medical):    Physical Activity:     Days of Exercise per Week:     Minutes of Exercise per Session:    Stress:     Feeling of Stress :    Social Connections:     Frequency of Communication with Friends and Family:     Frequency of Social Gatherings with Friends and Family:     Attends Evangelical Services:     Active Member of Clubs or Organizations:     Attends Club or Organization Meetings:     Marital Status:    Intimate Partner Violence:     Fear of Current or Ex-Partner:     Emotionally Abused:     Physically Abused:     Sexually Abused:           ALLERGIES: Latex, Adhesive, and Motrin [ibuprofen]    Review of Systems    Vitals:    09/30/21 1146   BP: (!) 137/95   Pulse: 65   Resp: 18   Temp: 98.1 °F (36.7 °C)   SpO2: 98%   Weight: 107.5 kg (237 lb)            Physical Exam     MDM       Procedures

## 2021-11-21 ENCOUNTER — OFFICE VISIT (OUTPATIENT)
Dept: URGENT CARE | Age: 46
End: 2021-11-21
Payer: COMMERCIAL

## 2021-11-21 VITALS — TEMPERATURE: 98 F | OXYGEN SATURATION: 94 % | HEART RATE: 77 BPM | RESPIRATION RATE: 18 BRPM

## 2021-11-21 DIAGNOSIS — Z20.822 SUSPECTED COVID-19 VIRUS INFECTION: Primary | ICD-10-CM

## 2021-11-21 LAB — SARS-COV-2 POC: NEGATIVE

## 2021-11-21 PROCEDURE — 87426 SARSCOV CORONAVIRUS AG IA: CPT | Performed by: FAMILY MEDICINE

## 2021-11-21 PROCEDURE — 99202 OFFICE O/P NEW SF 15 MIN: CPT | Performed by: FAMILY MEDICINE

## 2021-11-21 NOTE — PROGRESS NOTES
This patient was seen at [address] [place] [address] Urgent Care while in their vehicle due to COVID-19 pandemic with PPE and focused examination in order to decrease community viral transmission. The patient/guardian gave verbal consent to treat. Cold Symptoms  The history is provided by the patient. This is a new problem. The current episode started 2 days ago. The problem occurs constantly. The problem has not changed since onset. The cough is non-productive. There has been no fever. Associated symptoms include ear congestion, headaches, rhinorrhea and sore throat. Pertinent negatives include no chills, no myalgias, no shortness of breath, no wheezing and no nausea. She has tried nothing for the symptoms. Risk factors: no exposure to covid. She is not a smoker. Her past medical history does not include asthma.         Past Medical History:   Diagnosis Date    Acute [facility] failure ([facility]F) ([facility]     6/2020-STONES AND PYELONEPHRITIS    Ankle fracture     Arrhythmia     PALPITATIONS    Asthma     Autoimmune disease ([facility]     MS    Calculus of gallbladder without cholecystitis without obstruction 7/20/2020    Celiac disease     Chronic pain     MS    Congenital sucrose isomaltose malabsorption     Depression     Diverticulosis of sigmoid colon     Dysplastic colon polyp     Dr. Liz Silva - last colonoscopy 11/7/12 - single polyp    Essential hypertension     Gestasional    GERD (gastroesophageal reflux disease)     Influenza B 03/07/2017    EDGARD virus antibody positive     Kidney stones     Miscarriage     11/13    MS (multiple sclerosis) ([facility]     Dr. Dorina Ugarte    Obstructive pyelonephritis 5/26/2020    Ovarian cyst     PUD (peptic ulcer disease)     Pyelonephritis     Routine gynecological examination     Dr. Chai Mtz S/P appcuong     S/P almas     Sleep apnea     pt states she no longer has the problem since wt loss - intentional wt loss    Tubular adenoma of colon 04/18/2016    Uterine fibroid     Vitamin D deficiency 2011        Past Surgical History:   Procedure Laterality Date    COLONOSCOPY N/A 2019    COLONOSCOPY/EGD (LATEX ALLERGY) performed by Rosan Boast, MD at [facility] ENDOSCOPY    [facility]      double compound fx of right lower leg and dislocated heel - has a plate and 13 screws    HX  SECTION          HX COLONOSCOPY      HX ENDOSCOPY      HX GYN      fibroid tumor ovarian cyst     HX LAP CHOLECYSTECTOMY  2020    HX ORTHOPAEDIC      1992 Left shoulder surgery    MD BIOPSY OF BREAST, INCISIONAL           Family History   Problem Relation Age of Onset    Cancer Father         appendix/cancerous colon polyps    Heart Disease Father         cabg age early 66's   [facility] Cancer Maternal Uncle         prostate/melanoma    Cancer Maternal Grandmother         cancerous colon polyps    Cancer Maternal Grandfather         prostate    Heart Disease Paternal Grandmother     Cancer Paternal Grandmother         cancerous colon polyps    No Known Problems Daughter     Colon Polyps Mother         precancerous    Atrial Fibrillation Mother     Heart Disease Paternal Aunt     Anesth Problems Neg Hx         Social History     Socioeconomic History    Marital status:      Spouse name: Not on file    Number of children: Not on file    Years of education: Not on file    Highest education level: Not on file   Occupational History    Not on file   Tobacco Use    Smoking status: Former Smoker     Years: 8.00     Quit date: 2000     Years since quittin.3    Smokeless tobacco: Former User   Substance and Sexual Activity    Alcohol use: Yes     Comment: rare    Drug use: No    Sexual activity: Never   Other Topics Concern    Not on file   Social History Narrative    Not on file     Social Determinants of Health     Financial Resource Strain:     Difficulty of Paying Living Expenses: Not on file   Food Insecurity:     Worried About Running Out of Food in the Last Year: Not on file    Ran Out of Food in the Last Year: Not on file   Transportation Needs:     Lack of Transportation (Medical): Not on file    Lack of Transportation (Non-Medical): Not on file   Physical Activity:     Days of Exercise per Week: Not on file    Minutes of Exercise per Session: Not on file   Stress:     Feeling of Stress : Not on file   Social Connections:     Frequency of Communication with Friends and Family: Not on file    Frequency of Social Gatherings with Friends and Family: Not on file    Attends [employer] Services: Not on file    Active Member of [address] [place] [address] or Organizations: Not on file    Attends Club or Organization Meetings: Not on file    Marital Status: Not on file   Intimate Partner Violence:     Fear of Current or Ex-Partner: Not on file    Emotionally Abused: Not on file    Physically Abused: Not on file    Sexually Abused: Not on file   Housing Stability:     Unable to Pay for Housing in the Last Year: Not on file    Number of [place] in the Last Year: Not on file    Unstable Housing in the Last Year: Not on file                ALLERGIES: Latex, Adhesive, and Motrin [ibuprofen]    Review of Systems   Constitutional: Negative for chills. HENT: Positive for congestion, rhinorrhea and sore throat. Respiratory: Positive for cough. Negative for shortness of breath and wheezing. Gastrointestinal: Negative for nausea. Musculoskeletal: Negative for myalgias. Neurological: Positive for headaches. All other systems reviewed and are negative. Vitals:    11/21/21 0952   Pulse: 77   Resp: 18   Temp: 98 °F (36.7 °C)   SpO2: 94%       Physical Exam  Vitals and nursing note reviewed. Constitutional:       General: She is not in acute distress. Appearance: She is not ill-appearing. Pulmonary:      Effort: Pulmonary effort is normal. No respiratory distress. Breath sounds: No wheezing.          MDM    Procedures             ICD-10-CM ICD-9-CM    1. Suspected COVID-19 virus infection  Z20.822 V01.79 AMB POC SARS-COV-2     No orders of the defined types were placed in this encounter. No results found for any visits on 11/21/21. The patients condition was discussed with the patient and they understand. The patient is to follow up with primary care doctor. If signs and symptoms become worse the pt is to go to the ER. The patient is to take medications as prescribed.

## 2021-12-03 DIAGNOSIS — F41.8 DEPRESSION WITH ANXIETY: ICD-10-CM

## 2021-12-03 RX ORDER — ESCITALOPRAM OXALATE 20 MG/1
20 TABLET ORAL DAILY
Qty: 30 TABLET | Refills: 0 | Status: SHIPPED | OUTPATIENT
Start: 2021-12-03 | End: 2022-02-24

## 2021-12-03 NOTE — TELEPHONE ENCOUNTER
Pt has an appointment with DO ALYSSA Hodges on 12/13/2021. Unsure if pt is switching PCP's. Can pt have a short supply until seen? Last visit 11/30/2020 MD Evans Geiger   Next appointment 12/13/2021 DO Elena Hodges   Previous refill encounter(s)   05/14/2021 Lexapro #30 with 5 refills     Requested Prescriptions     Pending Prescriptions Disp Refills    escitalopram oxalate (LEXAPRO) 20 mg tablet 30 Tablet 0     Sig: Take 1 Tablet by mouth daily.

## 2021-12-13 ENCOUNTER — OFFICE VISIT (OUTPATIENT)
Dept: PRIMARY CARE CLINIC | Age: 46
End: 2021-12-13
Payer: COMMERCIAL

## 2021-12-13 VITALS
OXYGEN SATURATION: 95 % | SYSTOLIC BLOOD PRESSURE: 113 MMHG | TEMPERATURE: 97.5 F | RESPIRATION RATE: 16 BRPM | HEART RATE: 69 BPM | BODY MASS INDEX: 37.77 KG/M2 | WEIGHT: 235 LBS | HEIGHT: 66 IN | DIASTOLIC BLOOD PRESSURE: 78 MMHG

## 2021-12-13 DIAGNOSIS — E66.9 OBESITY, CLASS II, BMI 35-39.9: Primary | ICD-10-CM

## 2021-12-13 DIAGNOSIS — Z11.59 NEED FOR HEPATITIS C SCREENING TEST: ICD-10-CM

## 2021-12-13 DIAGNOSIS — N39.3 STRESS INCONTINENCE: ICD-10-CM

## 2021-12-13 DIAGNOSIS — G35 MULTIPLE SCLEROSIS (HCC): ICD-10-CM

## 2021-12-13 LAB
25(OH)D3 SERPL-MCNC: 73 NG/ML (ref 30–100)
APPEARANCE UR: CLEAR
BILIRUB UR QL: NEGATIVE
CHOLEST SERPL-MCNC: 188 MG/DL
COLOR UR: NORMAL
EST. AVERAGE GLUCOSE BLD GHB EST-MCNC: 94 MG/DL
GLUCOSE UR STRIP.AUTO-MCNC: NEGATIVE MG/DL
HBA1C MFR BLD: 4.9 % (ref 4–5.6)
HCV AB SERPL QL IA: NONREACTIVE
HDLC SERPL-MCNC: 65 MG/DL
HDLC SERPL: 2.9 {RATIO} (ref 0–5)
HGB UR QL STRIP: NEGATIVE
KETONES UR QL STRIP.AUTO: NEGATIVE MG/DL
LDLC SERPL CALC-MCNC: 63 MG/DL (ref 0–100)
LEUKOCYTE ESTERASE UR QL STRIP.AUTO: NEGATIVE
NITRITE UR QL STRIP.AUTO: NEGATIVE
PH UR STRIP: 5 [PH] (ref 5–8)
PROT UR STRIP-MCNC: NEGATIVE MG/DL
SP GR UR REFRACTOMETRY: 1.02 (ref 1–1.03)
TRIGL SERPL-MCNC: 300 MG/DL (ref ?–150)
UROBILINOGEN UR QL STRIP.AUTO: 0.2 EU/DL (ref 0.2–1)
VLDLC SERPL CALC-MCNC: 60 MG/DL

## 2021-12-13 PROCEDURE — 99204 OFFICE O/P NEW MOD 45 MIN: CPT | Performed by: FAMILY MEDICINE

## 2021-12-13 NOTE — PROGRESS NOTES
Chief Complaint   Patient presents with    Medication Refill       Health Maintenance Due   Topic    Hepatitis C Screening     COVID-19 Vaccine (1)        1. Have you been to the ER, urgent care clinic since your last visit? Hospitalized since your last visit? No    2. Have you seen or consulted any other health care providers outside of the [address] [place] [address] since your last visit? Include any pap smears or colon screening.  No    Visit Vitals  /78 (BP 1 Location: Left upper arm, BP Patient Position: Sitting, BP Cuff Size: Adult)   Pulse 69   Temp 97.5 °F (36.4 °C) (Temporal)   Resp 16   Ht 5' 6\" (1.676 m)   Wt 235 lb (106.6 kg)   SpO2 95%   BMI 37.93 kg/m²

## 2021-12-13 NOTE — PROGRESS NOTES
HPI     Chief Complaint   Patient presents with    Medication Refill     She is a 51 y.o. female who presents for establishing care. Currently on Lexapro for depression/ anxiety. Has been on it a couple of years. Has been on Wellbutrin and Celexa in the past and states this worked a little better. Also still taking Wellbutrin. No thoughts of SI/ HI. She is on Toprol for blood pressure. Now managed by Cardiology Dr. Pati Curry. They are thinking of changing it for better BP control. Also on Flonase and Allegra for seasonal allergies. Currently on Gabapentin and Aubagio with [facility] Dr. Rickey Givens for her MS. Has an Albuterol inhaler PRN. Typically needs this with transition of seasons. Has hx of allergy induced asthma but seems to be much better since getting on allergy shots. States she had NORBERTO after sepsis 1 year ago. States since then she has leakage with coughing, sneezing, increased abdominal pressure. Does have a kidney stones. No dysuria, hematuria, no flank pain. She is EDGARD Virus Ab positive. 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 Has not had COVID 19 vaccine and does not plan on getting it. Review of Systems   Constitutional: Negative for chills and fever. HENT: Negative for sore throat. Respiratory: Negative for cough. Reviewed PmHx, RxHx, FmHx, SocHx, AllgHx and updated and dated in the chart. Physical Exam:  Visit Vitals  /78 (BP 1 Location: Left upper arm, BP Patient Position: Sitting, BP Cuff Size: Adult)   Pulse 69   Temp 97.5 °F (36.4 °C) (Temporal)   Resp 16   Ht 5' 6\" (1.676 m)   Wt 235 lb (106.6 kg)   SpO2 95%   BMI 37.93 kg/m²     Physical Exam  Vitals and nursing note reviewed. Constitutional:       General: She is not in acute distress. Appearance: Normal appearance. She is not ill-appearing. Cardiovascular:      Rate and Rhythm: Normal rate and regular rhythm. Heart sounds: No murmur heard.       Pulmonary: Effort: Pulmonary effort is normal. No respiratory distress. Breath sounds: Normal breath sounds. Neurological:      General: No focal deficit present. Mental Status: She is alert. Psychiatric:         Mood and Affect: Mood normal.         Behavior: Behavior normal.       No results found for this or any previous visit (from the past 12 hour(s)). Assessment / Plan     Diagnoses and all orders for this visit:    1. Obesity, Class II, BMI 35-39.9  -     LIPID PANEL; Future  -     HEMOGLOBIN A1C WITH EAG; Future    2. Need for hepatitis C screening test  -     HEPATITIS C AB; Future    3. Multiple sclerosis (HCC)  -     VITAMIN D, 25 HYDROXY; Future    4. Stress incontinence  -     URINALYSIS W/ RFLX MICROSCOPIC; Future  -     CULTURE, URINE; Future  -     REFERRAL TO PHYSICAL THERAPY         I have discussed the diagnosis with the patient and the intended plan as seen in the above orders. The patient has received an after-visit summary and questions were answered concerning future plans. I have discussed medication side effects and warnings with the patient as well.     Carlos Henderson, DO

## 2021-12-13 NOTE — PATIENT INSTRUCTIONS
Kegel Exercises: Care Instructions  Overview     Kegel exercises strengthen muscles around the bladder. These muscles control the flow of urine. Kegel exercises are sometime called \"pelvic floor\" exercises. They can help prevent urine leakage and keep the pelvic organs in place. Kegel exercises can strengthen pelvic muscles that have been weakened by age, pregnancy, childbirth, and surgery. They may help prevent or treat urine leakage. You do Kegel exercises by tightening the muscles you use when you urinate. You will likely need to do these exercises for several weeks to get better. Follow-up care is a key part of your treatment and safety. Be sure to make and go to all appointments, and call your doctor if you are having problems. It's also a good idea to know your test results and keep a list of the medicines you take. How can you care for yourself at home? · Do Kegel exercises. ? Find the muscles you need to strengthen. To do this, tighten the muscles that stop your urine while you are going to the bathroom. These are the same muscles you squeeze during Kegel exercises. ? Squeeze the muscles as hard as you can. Your belly and thighs should not move. ? Hold the squeeze for 3 seconds. Then relax for 3 seconds. ? Start with 3 seconds, and then add 1 second each week until you are able to squeeze for 10 seconds. ? Repeat the exercise 10 to 15 times for each session. Do three or more sessions each day. · You can check to see if you are using the right muscles. ? Tighten the muscles that help you stop passing gas or keep you from urinating. Make sure you aren't using your stomach, leg, or buttock muscles. ? Place a finger in your vagina and squeeze around it. You are doing them right when you feel pressure around your finger. Your doctor may also suggest that you put special weights in your vagina while you do the exercises.   · Check with your doctor if you don't notice a difference after trying these exercises for several weeks. Your doctor may suggest getting help from a physical therapist or recommend other treatment. Where can you learn more? Go to http://www.gray.com/  Enter E078 in the search box to learn more about \"Kegel Exercises: Care Instructions. \"  Current as of: February 10, 2021               Content Version: 13.0  © 9846-2206 [employer]. Care instructions adapted under license by [employer] (which disclaims liability or warranty for this information).  If you have questions about a medical condition or this instruction, always ask your healthcare professional. [address] any warranty or liability for your use of this information.

## 2021-12-14 LAB
BACTERIA SPEC CULT: NORMAL
SERVICE CMNT-IMP: NORMAL

## 2022-01-11 ENCOUNTER — OFFICE VISIT (OUTPATIENT)
Dept: URGENT CARE | Age: 47
End: 2022-01-11
Payer: COMMERCIAL

## 2022-01-11 VITALS — OXYGEN SATURATION: 96 % | TEMPERATURE: 98.5 F | HEART RATE: 85 BPM | RESPIRATION RATE: 16 BRPM

## 2022-01-11 DIAGNOSIS — Z20.822 EXPOSURE TO COVID-19 VIRUS: ICD-10-CM

## 2022-01-11 DIAGNOSIS — R51.9 ACUTE NONINTRACTABLE HEADACHE, UNSPECIFIED HEADACHE TYPE: Primary | ICD-10-CM

## 2022-01-11 PROCEDURE — 99212 OFFICE O/P EST SF 10 MIN: CPT | Performed by: FAMILY MEDICINE

## 2022-01-11 NOTE — PROGRESS NOTES
This patient was seen at [address] [place] [address] Urgent Care while in their vehicle due to COVID-19 pandemic with PPE and focused examination in order to decrease community viral transmission. The patient/guardian gave verbal consent to treat. Tracey Miller is a 51 y.o. female who presents with headache x 3 days. Was exposed to COVID-19 on 22. Denies cough, fever, SOB, N/V/D. The history is provided by the patient.         Past Medical History:   Diagnosis Date    Acute renal failure (ARF) [facility]     2020-STONES AND PYELONEPHRITIS    Ankle fracture     Arrhythmia     PALPITATIONS    Asthma     Autoimmune disease ([facility] [facility]     MS    Calculus of gallbladder without cholecystitis without obstruction 2020    Celiac disease     Chronic pain     MS    Congenital sucrose isomaltose malabsorption     Depression     Diverticulosis of sigmoid colon     Dysplastic colon polyp     Dr. Rudy Prince - last colonoscopy 12 - single polyp    Essential hypertension     Gestasional    GERD (gastroesophageal reflux disease)     Influenza B 2017    EDGARD virus antibody positive     Kidney stones     Miscarriage         MS (multiple sclerosis) ([facility] [address] [facility]     Dr. Narinder Martin    Obstructive pyelonephritis 2020    Ovarian cyst     PUD (peptic ulcer disease)     Pyelonephritis     Routine gynecological examination     Dr. Jose Montiel S/P appy     S/P almas     Sleep apnea     pt states she no longer has the problem since wt loss - intentional wt loss    Tubular adenoma of colon 2016    Uterine fibroid     Vitamin D deficiency 2011        Past Surgical History:   Procedure Laterality Date    COLONOSCOPY N/A 2019    COLONOSCOPY/EGD ([facility]ER[facility]) performed by Quyen Whitten MD at [address] [place] [address]      double compound fx of right lower leg and dislocated heel - has a plate and 13 screws    HX  SECTION          HX COLONOSCOPY      HX ENDOSCOPY      HX GYN      fibroid tumor ovarian cyst     HX LAP CHOLECYSTECTOMY  2020    HX ORTHOPAEDIC      1992 Left shoulder surgery    [place] BIOPSY OF BREAST, INCISIONAL           Family History   Problem Relation Age of Onset    Cancer Father         appendix/cancerous colon polyps    Heart Disease Father         cabg age early 68's    Cancer Maternal Uncle         prostate/melanoma    Cancer Maternal Grandmother         cancerous colon polyps    Cancer Maternal Grandfather         prostate    Heart Disease Paternal Grandmother     Cancer Paternal Grandmother         cancerous colon polyps    No Known Problems Daughter     Colon Polyps Mother         precancerous    Atrial Fibrillation Mother     Heart Disease Paternal Aunt     Anesth Problems Neg Hx         Social History     Socioeconomic History    Marital status:      Spouse name: Not on file    Number of children: Not on file    Years of education: Not on file    Highest education level: Not on file   Occupational History    Not on file   Tobacco Use    Smoking status: Former Smoker     Years: 8.00     Quit date: 2000     Years since quittin.4    Smokeless tobacco: Former User   Vaping Use    Vaping Use: Every day    Substances: CBD    Devices: Refillable tank   Substance and Sexual Activity    Alcohol use: Yes     Comment: rare    Drug use: No    Sexual activity: Never   Other Topics Concern    Not on file   Social History Narrative    Not on file     Social Determinants of Health     Financial Resource Strain:     Difficulty of Paying Living Expenses: Not on file   Food Insecurity:     Worried About [address] in the Last Year: Not on file    [facility] in the Last Year: Not on file   Transportation Needs:     Lack of Transportation (Medical): Not on file    Lack of Transportation (Non-Medical):  Not on file   Physical Activity:     Days of Exercise per Week: Not on file    Minutes of Exercise per Session: Not on file   Stress:     Feeling of Stress : Not on file   Social Connections:     Frequency of Communication with Friends and Family: Not on file    Frequency of Social Gatherings with Friends and Family: Not on file    Attends Jewish Services: Not on file    Active Member of Clubs or Organizations: Not on file    Attends Club or Organization Meetings: Not on file    Marital Status: Not on file   Intimate Partner Violence:     Fear of Current or Ex-Partner: Not on file    Emotionally Abused: Not on file    Physically Abused: Not on file    Sexually Abused: Not on file   Housing Stability:     Unable to Pay for Housing in the Last Year: Not on file    Number of [place] in the Last Year: Not on file    Unstable Housing in the Last Year: Not on file                ALLERGIES: Latex, Adhesive, and Motrin [ibuprofen]    Review of Systems   Constitutional: Negative for activity change, appetite change and fever. Respiratory: Negative for cough and shortness of breath. Gastrointestinal: Negative for diarrhea, nausea and vomiting. Neurological: Positive for headaches. Vitals:    01/11/22 1028   Pulse: 85   Resp: 16   Temp: 98.5 °F (36.9 °C)   SpO2: 96%       Physical Exam  Vitals and nursing note reviewed. Constitutional:       General: She is not in acute distress. Appearance: She is well-developed. She is not diaphoretic. Pulmonary:      Effort: Pulmonary effort is normal. No respiratory distress. Breath sounds: Normal breath sounds. No stridor. No wheezing, rhonchi or rales. Neurological:      Mental Status: She is alert. Psychiatric:         Behavior: Behavior normal.         Thought Content: Thought content normal.         Judgment: Judgment normal.         MDM    ICD-10-CM ICD-9-CM   1. Acute nonintractable headache, unspecified headache type  R51.9 784.0   2.  Exposure to COVID-19 virus  Z20.822 V01.79       Orders Placed This Encounter    NOVEL CORONAVIRUS (COVID-19)     Scheduling Instructions:      1) Due to current limited availability of the COVID-19 PCR test, tests will be prioritized and may not be completed.              2) Order only if the test result will change clinical management or necessary for a return to mission-critical employment decision.              3) Print and instruct patient to adhere to CDC home isolation program. (Link Above)              4) Set up or refer patient for a monitoring program.              5) Have patient sign up for and leverage MyChart (if not previously done). Order Specific Question:   Is this test for diagnosis or screening? Answer:   Diagnosis of ill patient     Order Specific Question:   Symptomatic for COVID-19 as defined by CDC? Answer:   Yes     Order Specific Question:   Date of Symptom Onset     Answer:   1/9/2022     Order Specific Question:   Hospitalized for COVID-19? Answer:   No     Order Specific Question:   Admitted to ICU for COVID-19? Answer:   No     Order Specific Question:   Employed in healthcare setting? Answer:   Unknown     Order Specific Question:   Resident in a congregate (group) care setting? Answer:   Unknown     Order Specific Question:   Pregnant? Answer:   Unknown     Order Specific Question:   Previously tested for COVID-19? Answer:   Yes        Quarantine, await COVID results  Deep breathing exercises, ambulation  Tylenol prn  Increase fluids with electrolytes if decreased PO intake      If signs and symptoms become worse the pt is to go to the ER.          Procedures

## 2022-01-13 LAB
SARS-COV-2, NAA 2 DAY TAT: NORMAL
SARS-COV-2, NAA: NOT DETECTED

## 2022-01-31 DIAGNOSIS — F41.8 DEPRESSION WITH ANXIETY: ICD-10-CM

## 2022-01-31 RX ORDER — BUPROPION HYDROCHLORIDE 300 MG/1
TABLET ORAL
Qty: 90 TABLET | Refills: 0 | Status: SHIPPED | OUTPATIENT
Start: 2022-01-31 | End: 2022-03-29 | Stop reason: SDUPTHER

## 2022-01-31 NOTE — TELEPHONE ENCOUNTER
Writer left detailed message that her medication was approved,and to callback to make a follow-up appt with  please schedule accordingly.

## 2022-02-24 DIAGNOSIS — F41.8 DEPRESSION WITH ANXIETY: ICD-10-CM

## 2022-02-24 RX ORDER — ESCITALOPRAM OXALATE 20 MG/1
TABLET ORAL
Qty: 30 TABLET | Refills: 0 | Status: SHIPPED | OUTPATIENT
Start: 2022-02-24 | End: 2022-03-21

## 2022-03-14 DIAGNOSIS — F41.8 DEPRESSION WITH ANXIETY: ICD-10-CM

## 2022-03-15 DIAGNOSIS — I10 ESSENTIAL HYPERTENSION: Primary | ICD-10-CM

## 2022-03-15 RX ORDER — METOPROLOL SUCCINATE 100 MG/1
100 TABLET, EXTENDED RELEASE ORAL DAILY
Qty: 30 TABLET | Refills: 0 | Status: SHIPPED | OUTPATIENT
Start: 2022-03-15 | End: 2022-03-16 | Stop reason: ALTCHOICE

## 2022-03-15 NOTE — TELEPHONE ENCOUNTER
Requested Prescriptions     Signed Prescriptions Disp Refills    Toprol  mg tablet 30 Tablet 0     Sig: Take 1 Tablet by mouth daily.      Authorizing Provider: Lizbeth Rodríguez     Ordering User: Felicia Masterson    Per Dr. Darrell Merlos verbal orders

## 2022-03-16 ENCOUNTER — TELEPHONE (OUTPATIENT)
Dept: PRIMARY CARE CLINIC | Age: 47
End: 2022-03-16

## 2022-03-16 ENCOUNTER — OFFICE VISIT (OUTPATIENT)
Dept: CARDIOLOGY CLINIC | Age: 47
End: 2022-03-16
Payer: COMMERCIAL

## 2022-03-16 VITALS
SYSTOLIC BLOOD PRESSURE: 132 MMHG | RESPIRATION RATE: 18 BRPM | BODY MASS INDEX: 37.28 KG/M2 | WEIGHT: 232 LBS | HEIGHT: 66 IN | HEART RATE: 94 BPM | DIASTOLIC BLOOD PRESSURE: 82 MMHG | OXYGEN SATURATION: 96 %

## 2022-03-16 DIAGNOSIS — R00.2 PALPITATIONS: ICD-10-CM

## 2022-03-16 DIAGNOSIS — I10 ESSENTIAL HYPERTENSION: Primary | ICD-10-CM

## 2022-03-16 PROCEDURE — 99213 OFFICE O/P EST LOW 20 MIN: CPT | Performed by: SPECIALIST

## 2022-03-16 RX ORDER — BUPROPION HYDROCHLORIDE 300 MG/1
300 TABLET ORAL DAILY
Qty: 90 TABLET | Refills: 0 | OUTPATIENT
Start: 2022-03-16

## 2022-03-16 RX ORDER — METOPROLOL SUCCINATE 100 MG/1
100 TABLET, EXTENDED RELEASE ORAL DAILY
Qty: 90 TABLET | Refills: 3 | Status: SHIPPED | OUTPATIENT
Start: 2022-03-16 | End: 2022-03-17 | Stop reason: SDUPTHER

## 2022-03-16 RX ORDER — LANOLIN ALCOHOL/MO/W.PET/CERES
3 CREAM (GRAM) TOPICAL DAILY PRN
COMMUNITY

## 2022-03-16 RX ORDER — DICLOFENAC SODIUM 10 MG/G
GEL TOPICAL
COMMUNITY
Start: 2022-02-22

## 2022-03-16 NOTE — PROGRESS NOTES
HISTORY OF PRESENT ILLNESS  Mariaa Perdue is a 49 y.o. female     SUMMARY:   Problem List  Date Reviewed: 3/16/2022          Codes Class Noted    S/P almas ICD-10-CM: Z90.49  ICD-9-CM: V45.79  Unknown        S/P appy ICD-10-CM: Z90.49  ICD-9-CM: V45.89  Unknown        Calculus of gallbladder without cholecystitis without obstruction ICD-10-CM: K80.20  ICD-9-CM: 574.20  2020        Hyponatremia ICD-10-CM: E87.1  ICD-9-CM: 276.1  2020        Pyelonephritis ICD-10-CM: N12  ICD-9-CM: 590.80  2020        Sepsis ([address] ICD-10-CM: A41.9  ICD-9-CM: 038.9, 995.91  2020        Obstructive pyelonephritis ICD-10-CM: N11.1  ICD-9-CM: 590.80  2020        UTI (urinary tract infection) ICD-10-CM: N39.0  ICD-9-CM: 599.0  2020        Elevated lipase ICD-10-CM: R74.8  ICD-9-CM: 790.5  2020        Congenital sucrose isomaltose malabsorption ICD-10-CM: E74.31  ICD-9-CM: 271.3  Unknown        PUD (peptic ulcer disease) ICD-10-CM: K27.9  ICD-9-CM: 533.90  Unknown        Essential hypertension ICD-10-CM: I10  ICD-9-CM: 401.9  10/28/2018        Severe obesity (BMI 35.0-39. 9) ICD-10-CM: E66.01  ICD-9-CM: 278.01  2018        Depression with anxiety ICD-10-CM: F41.8  ICD-9-CM: 300.4  2018        Kidney stones ICD-10-CM: N20.0  ICD-9-CM: 592.0  Unknown        EDGARD virus antibody positive ICD-10-CM: R76.8  ICD-9-CM: 795.79  Unknown        Tubular adenoma of colon ICD-10-CM: D12.6  ICD-9-CM: 211.3  Unknown        Single delivery by  ICD-10-CM: O82  ICD-9-CM: 669.71  2015        Gestational hypertension ICD-10-CM: O13.9  ICD-9-CM: 642.30  2/10/2015        AMA (advanced maternal age) primigravida 35+ ICD-10-CM: O09.519  ICD-9-CM: 659.50  2/10/2015        Multiple sclerosis exacerbation ([facility] ICD-10-CM: G35  ICD-9-CM: 340  2013        Optic neuritis, right ICD-10-CM: H46.9  ICD-9-CM: 377.30  2013        Dehydration, moderate ICD-10-CM: E86.0  ICD-9-CM: 276.51 8/12/2013        Dysphagia ICD-10-CM: R13.10  ICD-9-CM: 787.20  5/31/2013        Meralgia paresthetica of right side ICD-10-CM: G57.11  ICD-9-CM: 355.1  5/31/2013        Migraine with aura, without mention of intractable migraine without mention of status migrainosus ICD-10-CM: G43.109  ICD-9-CM: 346.00  5/31/2013        Dysplastic colon polyp ICD-10-CM: K63.5  ICD-9-CM: 211.3  Unknown        MS (multiple sclerosis) ([facility] ICD-10-CM: G35  ICD-9-CM: 340  Unknown        Asthma ICD-10-CM: J45.909  ICD-9-CM: 493.90  Unknown        Elevated blood pressure ICD-10-CM: [id number]  ICD-9-CM: Jewell Zamorano  7/20/2011        Vitamin D deficiency ICD-10-CM: E55.9  ICD-9-CM: 268.9  7/20/2011        Depression ICD-10-CM: F32. A  ICD-9-CM: 311  Unknown              Current Outpatient Medications on File Prior to Visit   Medication Sig    diclofenac (VOLTAREN) 1 % gel APPLY TOPICALLY 3 TIMES A DAY AS NEEDED FOR PAIN    melatonin 3 mg tablet Take 3 mg by mouth daily as needed.  escitalopram oxalate (LEXAPRO) 20 mg tablet TAKE 1 TABLET BY MOUTH EVERY DAY    buPROPion XL (WELLBUTRIN XL) 300 mg XL tablet TAKE 1 TABLET BY MOUTH EVERY MORNING    teriflunomide (Aubagio) 14 mg tablet Take 14 mg by mouth every evening.  omega 3-dha-epa-fish oil 183.3 mg-75 mg -91.6 mg-306 mg cap Take 4 Caps by mouth daily. (Patient taking differently: Take 2 Caps by mouth two (2) times a day.)    fluticasone (FLONASE) 50 mcg/actuation nasal spray 2 Sprays by Both Nostrils route daily. (Patient taking differently: 2 Sprays by Both Nostrils route as needed.)    fexofenadine (ALLEGRA) 180 mg tablet Take 180 mg by mouth every evening.  gabapentin (NEURONTIN) 300 mg capsule Take 600 mg by mouth two (2) times a day. 1caps twice a day    albuterol (PROVENTIL HFA, VENTOLIN HFA, PROAIR HFA) 90 mcg/actuation inhaler Take 2 Puffs by inhalation every four (4) hours as needed for Wheezing or Shortness of Breath.     cholecalciferol, vitamin D3, (VITAMIN D3) 2,000 unit tab Take 5,000 Units by mouth daily. No current facility-administered medications on file prior to visit. CARDIOLOGY STUDIES TO DATE:  3/18 48 hr holter, 1pvc, 10 pac's  3/18 normal echo    Chief Complaint   Patient presents with    Follow-up     HPI :  Overall she is doing quite well from a cardiac perspective. She is walking some in spite of her back issues and actually is lost a few pounds since we met last year. She has been out of her metoprolol for a couple weeks could not get her primary to refill it and as a consequence her blood pressure is up a little bit and she has noticed some palpitations.   CARDIAC ROS:   negative for chest pain, dyspnea, syncope, orthopnea, paroxysmal nocturnal dyspnea, exertional chest pressure/discomfort, claudication, lower extremity edema    Family History   Problem Relation Age of Onset    Cancer Father         appendix/cancerous colon polyps    Heart Disease Father         cabg age early 66's    Cancer Maternal Uncle         prostate/melanoma    Cancer Maternal Grandmother         cancerous colon polyps    Cancer Maternal Grandfather         prostate    Heart Disease Paternal Grandmother     Cancer Paternal Grandmother         cancerous colon polyps    No Known Problems Daughter     Colon Polyps Mother         precancerous    Atrial Fibrillation Mother     Heart Disease Paternal Aunt     Anesth Problems Neg Hx        Past Medical History:   Diagnosis Date    Acute renal failure (ARF) ([address]     6/2020-STONES AND PYELONEPHRITIS    Ankle fracture     Arrhythmia     PALPITATIONS    Asthma     Autoimmune disease ([address]     MS    Calculus of gallbladder without cholecystitis without obstruction 7/20/2020    Celiac disease     Chronic pain     MS    Congenital sucrose isomaltose malabsorption     Depression     Diverticulosis of sigmoid colon     Dysplastic colon polyp     Dr. Adrianna Sifuentes - last colonoscopy 11/7/12 - single polyp    Essential hypertension     Gestasional    GERD (gastroesophageal reflux disease)     Influenza B 03/07/2017    EDGARD virus antibody positive     Kidney stones     Miscarriage     11/13    [facility]     Dr. Jesus Eubanks    Obstructive pyelonephritis 5/26/2020    Ovarian cyst     PUD (peptic ulcer disease)     Pyelonephritis     Routine gynecological examination     Dr. Fajardo Oar S/P appy     S/P almas     Sleep apnea     pt states she no longer has the problem since wt loss - intentional wt loss    Tubular adenoma of colon 04/18/2016    Uterine fibroid     Vitamin D deficiency 7/20/2011       GENERAL ROS:  A comprehensive review of systems was negative except for that written in the HPI.     Visit Vitals  /82 (BP 1 Location: Left upper arm, BP Patient Position: Sitting, BP Cuff Size: Adult)   Pulse 94   Resp 18   Ht 5' 6\" (1.676 m)   Wt 232 lb (105.2 kg)   SpO2 96%   BMI 37.45 kg/m²       Wt Readings from Last 3 Encounters:   03/16/22 232 lb (105.2 kg)   12/13/21 235 lb (106.6 kg)   09/30/21 237 lb (107.5 kg)            BP Readings from Last 3 Encounters:   03/16/22 132/82   12/13/21 113/78   09/30/21 (!) 137/95       PHYSICAL EXAM  General appearance: alert, cooperative, no distress, appears stated age  Neurologic: Alert and oriented X 3  Neck: supple, symmetrical, trachea midline, no adenopathy, no carotid bruit and no JVD  Lungs: clear to auscultation bilaterally  Heart: regular rate and rhythm, S1, S2 normal, no murmur, click, rub or gallop  Extremities: extremities normal, atraumatic, no cyanosis or edema    Lab Results   Component Value Date/Time    Cholesterol, total 188 12/13/2021 10:43 AM    Cholesterol, total 117 06/30/2020 09:02 AM    Cholesterol, total 200 (H) 08/02/2019 09:10 AM    Cholesterol, total 209 (H) 04/22/2019 09:40 AM    Cholesterol, total 213 (H) 10/22/2018 10:59 AM    HDL Cholesterol 65 12/13/2021 10:43 AM    HDL Cholesterol 53 06/30/2020 09:02 AM    HDL Cholesterol 51 08/02/2019 09:10 AM    HDL Cholesterol 54 04/22/2019 09:40 AM    HDL Cholesterol 63 10/22/2018 10:59 AM    LDL, calculated 63 12/13/2021 10:43 AM    LDL, calculated 39 06/30/2020 09:02 AM    LDL, calculated 92 08/02/2019 09:10 AM    LDL, calculated 110 (H) 04/22/2019 09:40 AM    LDL, calculated 85 10/22/2018 10:59 AM    Triglyceride 300 (H) 12/13/2021 10:43 AM    Triglyceride 126 06/30/2020 09:02 AM    Triglyceride 287 (H) 08/02/2019 09:10 AM    Triglyceride 223 (H) 04/22/2019 09:40 AM    Triglyceride 327 (H) 10/22/2018 10:59 AM    CHOL/HDL Ratio 2.9 12/13/2021 10:43 AM     ASSESSMENT :      She is stable and asymptomatic, well compensated on a good medical regimen and needs no cardiac testing at this time. We will resume her metoprolol. current treatment plan is effective, no change in therapy  lab results and schedule of future lab studies reviewed with patient  reviewed diet, exercise and weight control    Encounter Diagnoses   Name Primary?  Essential hypertension Yes    Palpitations      Orders Placed This Encounter    diclofenac (VOLTAREN) 1 % gel    melatonin 3 mg tablet    metoprolol succinate (TOPROL-XL) 100 mg tablet       Follow-up and Dispositions    · Return in about 1 year (around 3/16/2023). Rajat Tay MD  3/16/2022  Please note that this dictation was completed with [employer], the computer voice recognition software. Quite often unanticipated grammatical, syntax, homophones, and other interpretive errors are inadvertently transcribed by the computer software. Please disregard these errors. Please excuse any errors that have escaped final proofreading. Thank you.

## 2022-03-16 NOTE — TELEPHONE ENCOUNTER
Requested Prescriptions     Pending Prescriptions Disp Refills    buPROPion XL (WELLBUTRIN XL) 300 mg XL tablet 90 Tablet 0     Sig: Take 1 Tablet by mouth daily. Last Visit 12/13/21  Last Refill 01/31/22    Spoke to pt says that Dr. Remigio Ventura is not longer her pcp and that he did give her a 30 day supply.

## 2022-03-16 NOTE — TELEPHONE ENCOUNTER
Spoke to pt would like to know because she seen Dr. Melissa Daniels on December can she have a refill out until June. Pt says she just had shots in her back and Dr. Melissa Daniels told her that when she needed a refill she could just call.

## 2022-03-17 RX ORDER — METOPROLOL SUCCINATE 100 MG/1
100 TABLET, EXTENDED RELEASE ORAL DAILY
Qty: 90 TABLET | Refills: 3 | Status: SHIPPED | OUTPATIENT
Start: 2022-03-17 | End: 2022-03-17 | Stop reason: SDUPTHER

## 2022-03-17 RX ORDER — METOPROLOL SUCCINATE 100 MG/1
100 TABLET, EXTENDED RELEASE ORAL DAILY
Qty: 90 TABLET | Refills: 3 | Status: SHIPPED | OUTPATIENT
Start: 2022-03-17

## 2022-03-18 PROBLEM — E87.1 HYPONATREMIA: Status: ACTIVE | Noted: 2020-06-18

## 2022-03-19 DIAGNOSIS — F41.8 DEPRESSION WITH ANXIETY: ICD-10-CM

## 2022-03-19 PROBLEM — I10 ESSENTIAL HYPERTENSION: Status: ACTIVE | Noted: 2018-10-28

## 2022-03-19 PROBLEM — K80.20 CALCULUS OF GALLBLADDER WITHOUT CHOLECYSTITIS WITHOUT OBSTRUCTION: Status: ACTIVE | Noted: 2020-07-20

## 2022-03-19 PROBLEM — N12 PYELONEPHRITIS: Status: ACTIVE | Noted: 2020-06-17

## 2022-03-19 PROBLEM — A41.9 SEPSIS (HCC): Status: ACTIVE | Noted: 2020-06-17

## 2022-03-20 PROBLEM — R74.8 ELEVATED LIPASE: Status: ACTIVE | Noted: 2020-05-24

## 2022-03-20 PROBLEM — N11.1 OBSTRUCTIVE PYELONEPHRITIS: Status: ACTIVE | Noted: 2020-05-26

## 2022-03-20 PROBLEM — N39.0 UTI (URINARY TRACT INFECTION): Status: ACTIVE | Noted: 2020-05-24

## 2022-03-21 RX ORDER — ESCITALOPRAM OXALATE 20 MG/1
TABLET ORAL
Qty: 30 TABLET | Refills: 0 | Status: SHIPPED | OUTPATIENT
Start: 2022-03-21 | End: 2022-03-29 | Stop reason: SDUPTHER

## 2022-03-27 DIAGNOSIS — Z79.899 MEDICATION MANAGEMENT: Primary | ICD-10-CM

## 2022-03-29 ENCOUNTER — OFFICE VISIT (OUTPATIENT)
Dept: PRIMARY CARE CLINIC | Age: 47
End: 2022-03-29
Payer: COMMERCIAL

## 2022-03-29 VITALS
DIASTOLIC BLOOD PRESSURE: 72 MMHG | HEART RATE: 74 BPM | OXYGEN SATURATION: 96 % | HEIGHT: 66 IN | RESPIRATION RATE: 16 BRPM | SYSTOLIC BLOOD PRESSURE: 109 MMHG | TEMPERATURE: 97.5 F | WEIGHT: 232 LBS | BODY MASS INDEX: 37.28 KG/M2

## 2022-03-29 DIAGNOSIS — F41.1 GAD (GENERALIZED ANXIETY DISORDER): ICD-10-CM

## 2022-03-29 DIAGNOSIS — G89.29 CHRONIC GENERALIZED PAIN: ICD-10-CM

## 2022-03-29 DIAGNOSIS — E66.01 CLASS 2 SEVERE OBESITY DUE TO EXCESS CALORIES WITH SERIOUS COMORBIDITY AND BODY MASS INDEX (BMI) OF 37.0 TO 37.9 IN ADULT (HCC): ICD-10-CM

## 2022-03-29 DIAGNOSIS — R52 CHRONIC GENERALIZED PAIN: ICD-10-CM

## 2022-03-29 DIAGNOSIS — F33.42 RECURRENT MAJOR DEPRESSIVE DISORDER, IN FULL REMISSION (HCC): Primary | ICD-10-CM

## 2022-03-29 DIAGNOSIS — G35 MS (MULTIPLE SCLEROSIS) (HCC): ICD-10-CM

## 2022-03-29 DIAGNOSIS — M47.27 OSTEOARTHRITIS OF SPINE WITH RADICULOPATHY, LUMBOSACRAL REGION: ICD-10-CM

## 2022-03-29 DIAGNOSIS — I10 ESSENTIAL HYPERTENSION: ICD-10-CM

## 2022-03-29 PROCEDURE — 99204 OFFICE O/P NEW MOD 45 MIN: CPT | Performed by: NURSE PRACTITIONER

## 2022-03-29 RX ORDER — BUPROPION HYDROCHLORIDE 300 MG/1
300 TABLET ORAL DAILY
Qty: 90 TABLET | Refills: 0 | Status: SHIPPED | OUTPATIENT
Start: 2022-03-29 | End: 2022-06-13 | Stop reason: SDUPTHER

## 2022-03-29 RX ORDER — ESCITALOPRAM OXALATE 20 MG/1
20 TABLET ORAL DAILY
Qty: 90 TABLET | Refills: 0 | Status: SHIPPED | OUTPATIENT
Start: 2022-03-29 | End: 2022-06-13 | Stop reason: SDUPTHER

## 2022-03-29 NOTE — PROGRESS NOTES
[facility]   [address], [address] [facility]  P: [phone]  F: [phone]    SUBJECTIVE     HPI:     Patience Mcknight is a 49 y.o. female who is seen in the clinic for   Chief Complaint   Patient presents with    Other     Would like to be tested for multiple autoimmunal disorders    Medication Refill      Patient presents today with concerns that her Lumbosacral OA with radiculopathy may be related to Ankylosing Spondylitis. Her father had tested positive for HLA-B27. Would like to get tested for RA/SLE as well. Complains that of chronic pain in bilateral hips and wrists along with lumbar spine. She has a PMH of MS, whom she sees a Neurologist for management. She is requesting a refill of her Lexapro and Wellbutrin for management of Anxiety/Depression. She feels like it \"works for the most part\". Has not experienced any s/e of meds. Routine labs were ordered a few days prior. Patient verbalized that she will get them done today. Patient Active Problem List    Diagnosis    S/P almas    S/P appy    Calculus of gallbladder without cholecystitis without obstruction    Hyponatremia    Pyelonephritis    Sepsis ([address]    Obstructive pyelonephritis    UTI (urinary tract infection)    Elevated lipase    Congenital sucrose isomaltose malabsorption    PUD (peptic ulcer disease)    Essential hypertension    Severe obesity (BMI 35.0-39. 9)    Depression with anxiety    Kidney stones    EDGARD virus antibody positive    Tubular adenoma of colon    Single delivery by     Gestational hypertension    AMA (advanced maternal age) primigravida 33+    Multiple sclerosis exacerbation (HCC)    Optic neuritis, right    Dehydration, moderate    Dysphagia    Meralgia paresthetica of right side    Migraine with aura, without mention of intractable migraine without mention of status migrainosus    Dysplastic colon polyp    Elevated blood pressure    Vitamin D deficiency    [facility]) [facility]    Asthma    Depression        Past Medical History:   Diagnosis Date    Acute renal failure (ARF) ([facility]     2020-STONES AND PYELONEPHRITIS    Ankle fracture     Arrhythmia     PALPITATIONS    Asthma     Autoimmune disease ([facility]     MS    Calculus of gallbladder without cholecystitis without obstruction 2020    Celiac disease     Chronic pain     MS    Congenital sucrose isomaltose malabsorption     Depression     Diverticulosis of sigmoid colon     Dysplastic colon polyp     Dr. Jessica Jaramillo - last colonoscopy 12 - single polyp    Essential hypertension     Gestasional    GERD (gastroesophageal reflux disease)     Influenza B 2017    EDGARD virus antibody positive     Kidney stones     Miscarriage         MS (multiple sclerosis) ([facility]     Dr. Katy Chi    Obstructive pyelonephritis 2020    Ovarian cyst     PUD (peptic ulcer disease)     Pyelonephritis     Routine gynecological examination     Dr. Laura Castle S/P tom     S/P almas     Sleep apnea     pt states she no longer has the problem since wt loss - intentional wt loss    Tubular adenoma of colon 2016    Uterine fibroid     Vitamin D deficiency 2011     Past Surgical History:   Procedure Laterality Date    COLONOSCOPY N/A 2019    COLONOSCOPY/EGD (LATEX ALLERGY) performed by Pako Schmitt MD at [address] [facility]      double compound fx of right lower leg and dislocated heel - has a plate and 13 screws    HX  SECTION          HX COLONOSCOPY      HX ENDOSCOPY      HX GYN      fibroid tumor ovarian cyst     HX LAP CHOLECYSTECTOMY  2020    HX ORTHOPAEDIC      1992 Left shoulder surgery    [place] BIOPSY OF BREAST, INCISIONAL       Social History     Socioeconomic History    Marital status:      Spouse name: Not on file    Number of children: Not on file    Years of education: Not on file    Highest education level: Not on file   Occupational History    Not on file   Tobacco Use    Smoking status: Former Smoker     Years: 8.00     Types: Pipe     Quit date: 2000     Years since quittin.7    Smokeless tobacco: Former User    Tobacco comment: CBD vape   Vaping Use    Vaping Use: Every day    Substances: CBD    Devices: Refillable tank   Substance and Sexual Activity    Alcohol use: Yes     Comment: 2 a month    Drug use: No    Sexual activity: Never   Other Topics Concern    Not on file   Social History Narrative    Not on file     Social Determinants of Health     Financial Resource Strain:     Difficulty of Paying Living Expenses: Not on file   Food Insecurity:     Worried About Running Out of Food in the Last Year: Not on file    Vivian of Food in the Last Year: Not on file   Transportation Needs:     Lack of Transportation (Medical): Not on file    Lack of Transportation (Non-Medical):  Not on file   Physical Activity:     Days of Exercise per Week: Not on file    Minutes of Exercise per Session: Not on file   Stress:     Feeling of Stress : Not on file   Social Connections:     Frequency of Communication with Friends and Family: Not on file    Frequency of Social Gatherings with Friends and Family: Not on file    Attends Alevism Services: Not on file    Active Member of [address] [place] [address] [employer] or Organizations: Not on file    Attends Club or Organization Meetings: Not on file    Marital Status: Not on file   Intimate Partner Violence:     Fear of Current or Ex-Partner: Not on file    Emotionally Abused: Not on file    Physically Abused: Not on file    Sexually Abused: Not on file   Housing Stability:     Unable to Pay for Housing in the Last Year: Not on file    Number of [place] in the Last Year: Not on file    Unstable Housing in the Last Year: Not on file     Family History   Problem Relation Age of Onset    Cancer Father         appendix/cancerous colon polyps    Heart Disease Father         cabg age early 68's    Cancer Maternal Uncle         prostate/melanoma    Cancer Maternal Grandmother         cancerous colon polyps    Cancer Maternal Grandfather         prostate    Heart Disease Paternal Grandmother     Cancer Paternal Grandmother         cancerous colon polyps    No Known Problems Daughter     Colon Polyps Mother         precancerous    Atrial Fibrillation Mother     Heart Disease Paternal Aunt     Anesth Problems Neg Hx      Immunization History   Administered Date(s) Administered    Hep B Vaccine 08/05/2014    Influenza Vaccine 11/18/2017, 10/21/2018, 10/28/2019, 09/11/2020    Influenza Vaccine (>6 mo Afluria QUAD Vial 89868 (0.25 mL) / 43297 (0.5 mL)) 09/11/2020    Influenza Vaccine (Quad) Mdck Pf (>2 Yrs Flucelvax QUAD 19339) 10/21/2018    Influenza Vaccine PF 12/29/2013, 12/01/2015    Influenza Vaccine Split 10/19/2011    Pneumococcal Polysaccharide (PPSV-23) 06/24/2016    TDAP Vaccine 07/20/2011    Tdap 12/03/2014      Allergies   Allergen Reactions    Latex Rash    Adhesive Rash    Motrin [Ibuprofen] Nausea Only       Office Visit on 01/11/2022   Component Date Value Ref Range Status    SARS-CoV-2, ALEX 01/11/2022 Not Detected  Not Detected Final    Comment: This nucleic acid amplification test was developed and its performance  characteristics determined by [facility]. Nucleic acid  amplification tests include RT-PCR and TMA. This test has not been  FDA cleared or approved. This test has been authorized by FDA under  an Emergency Use Authorization (EUA). This test is only authorized  for the duration of time the declaration that circumstances exist  justifying the authorization of the emergency use of in vitro  diagnostic tests for detection of SARS-CoV-2 virus and/or diagnosis  of COVID-19 infection under section 564(b)(1) of the Act, 21 U. S.C.  738XTB-8(A) (1), unless the authorization is terminated or revoked  sooner.   When diagnostic testing is negative, the possibility of a false  negative result should be considered in the context of a patient's  recent exposures and the presence of clinical signs and symptoms  consistent with COVID-19. An individual without symptoms of COVID-19  and who is not shedding SARS-CoV-2 virus wo                           uld expect to have a  negative (not detected) result in this assay.  SARS-CoV-2, ALEX 2 DAY TAT 01/11/2022 Performed   Final      XR FOOT RT MIN 3 V  Narrative: EXAM: XR FOOT RT MIN 3 V    INDICATION: foot laceration near right big toe, foot pain with weight bearing. COMPARISON: None. FINDINGS: Three views of the right foot demonstrate no fracture or other acute  osseous or articular abnormality. The soft tissues are within normal limits. Impression: No acute abnormality. Current Outpatient Medications   Medication Sig Dispense Refill    buPROPion XL (WELLBUTRIN XL) 300 mg XL tablet Take 1 Tablet by mouth daily. 90 Tablet 0    escitalopram oxalate (LEXAPRO) 20 mg tablet Take 1 Tablet by mouth daily. 90 Tablet 0    metoprolol succinate (TOPROL-XL) 100 mg tablet Take 1 Tablet by mouth daily. 90 Tablet 3    diclofenac (VOLTAREN) 1 % gel APPLY TOPICALLY 3 TIMES A DAY AS NEEDED FOR PAIN      melatonin 3 mg tablet Take 3 mg by mouth daily as needed.  teriflunomide (Aubagio) 14 mg tablet Take 14 mg by mouth every evening.  omega 3-dha-epa-fish oil 183.3 mg-75 mg -91.6 mg-306 mg cap Take 4 Caps by mouth daily. (Patient taking differently: Take 2 Caps by mouth two (2) times a day.) 120 Cap 5    fluticasone (FLONASE) 50 mcg/actuation nasal spray 2 Sprays by Both Nostrils route daily. (Patient taking differently: 2 Sprays by Both Nostrils route as needed.) 1 Bottle 5    fexofenadine (ALLEGRA) 180 mg tablet Take 180 mg by mouth every evening.  gabapentin (NEURONTIN) 300 mg capsule Take 600 mg by mouth two (2) times a day.  1caps twice a day      albuterol (PROVENTIL HFA, VENTOLIN HFA, PROAIR HFA) 90 mcg/actuation inhaler Take 2 Puffs by inhalation every four (4) hours as needed for Wheezing or Shortness of Breath. 1 Inhaler 1    cholecalciferol, vitamin D3, (VITAMIN D3) 2,000 unit tab Take 5,000 Units by mouth daily. The past medical history, past surgical history, and family history were reviewed and updated in the medical record. Lab values/Imaging were reviewed. The medications were reviewed and updated in the medical record. Immunizations were reviewed and updated in the medical record. All relevant preventative screenings reviewed and updated in the medical record. REVIEW OF SYSTEMS   Review of Systems   Constitutional: Negative for chills, diaphoresis, fever and malaise/fatigue. Eyes: Negative for blurred vision and pain. Respiratory: Negative for cough, sputum production, shortness of breath and wheezing. Cardiovascular: Negative for chest pain and palpitations. Musculoskeletal: Positive for back pain and joint pain. Negative for falls. Neurological: Positive for weakness. Negative for dizziness, tingling, tremors, sensory change, speech change, focal weakness and headaches. Psychiatric/Behavioral: Negative for depression, hallucinations, memory loss, substance abuse and suicidal ideas. The patient is not nervous/anxious and does not have insomnia. PHYSICAL EXAM   /72 (BP 1 Location: Right arm, BP Patient Position: Sitting, BP Cuff Size: Adult long)   Pulse 74   Temp 97.5 °F (36.4 °C) (Temporal)   Resp 16   Ht 5' 6\" (1.676 m)   Wt 232 lb (105.2 kg)   SpO2 96%   BMI 37.45 kg/m²      Physical Exam  Constitutional:       General: She is not in acute distress. Appearance: She is obese. She is not ill-appearing. Cardiovascular:      Rate and Rhythm: Normal rate and regular rhythm. Pulses: Normal pulses. Heart sounds: Normal heart sounds. No murmur heard.       Pulmonary:      Effort: Pulmonary effort is normal. No respiratory distress. Breath sounds: No wheezing. Musculoskeletal:         General: No swelling, tenderness, deformity or signs of injury. Comments: Limited ROM of lumbar spine. Neurological:      General: No focal deficit present. Mental Status: She is alert and oriented to person, place, and time. Cranial Nerves: No cranial nerve deficit. Sensory: No sensory deficit. Motor: No weakness. Psychiatric:         Mood and Affect: Mood normal.         Behavior: Behavior normal.            ASSESSMENT/ PLAN   Below is the assessment and plan developed based on review of pertinent history, physical exam, labs, studies, and medications. 1. Recurrent major depressive disorder, in full remission ([facility]  -     buPROPion XL (WELLBUTRIN XL) 300 mg XL tablet; Take 1 Tablet by mouth daily. , Normal, Disp-90 Tablet, R-0  -     escitalopram oxalate (LEXAPRO) 20 mg tablet; Take 1 Tablet by mouth daily. , Normal, Disp-90 Tablet, R-0  2. Osteoarthritis of spine with radiculopathy, lumbosacral region  3. MS (multiple sclerosis) ([facility]  4. Chronic generalized pain  -     HLA-B27; Future  -     KRISTIE, DIRECT, W/REFLEX; Future  -     RHEUMATOID FACTOR, QL  -     SED RATE (ESR); Future  -     REFERRAL TO RHEUMATOLOGY  5. CHELSIE (generalized anxiety disorder)  -     buPROPion XL (WELLBUTRIN XL) 300 mg XL tablet; Take 1 Tablet by mouth daily. , Normal, Disp-90 Tablet, R-0  -     escitalopram oxalate (LEXAPRO) 20 mg tablet; Take 1 Tablet by mouth daily. , Normal, Disp-90 Tablet, R-0  6. Essential hypertension  7. Class 2 severe obesity due to excess calories with serious comorbidity and body mass index (BMI) of 37.0 to 37.9 in adult [facility])     Discussed with patient that an appointment with Rheumatology can be made if any of her labs come back abnormal. She verbalized agreement at this time.        Follow-up and Dispositions    · Return in about 3 months (around 6/29/2022) for Management of multiple co-morbidities . Disclaimer:  Advised patient to call back or return to office if symptoms worsen/change/persist.  Discussed expected course/resolution/complications of diagnosis in detail with patient. Medication risks/benefits/alternatives discussed with patient. Patient was given an after visit summary which includes diagnoses, current medications, & vitals. Discussed patient instructions and advised to read to all patient instructions regarding care. Patient expressed understanding with the diagnosis and plan.        Kandace Alarcon NP  3/29/2022

## 2022-03-29 NOTE — PROGRESS NOTES
Chief Complaint   Patient presents with    Ear Pain     left ear discomfort- started about three days ago-     Medication Refill       Health Maintenance Due   Topic    COVID-19 Vaccine (1)        1. Have you been to the ER, urgent care clinic since your last visit? Hospitalized since your last visit? No    2. Have you seen or consulted any other health care providers outside of the [address] [place] [address] since your last visit? Include any pap smears or colon screening.  No    Visit Vitals  Wt 232 lb (105.2 kg)   BMI 37.45 kg/m²

## 2022-03-30 ENCOUNTER — TELEPHONE (OUTPATIENT)
Dept: PRIMARY CARE CLINIC | Age: 47
End: 2022-03-30

## 2022-03-30 LAB
ERYTHROCYTE [SEDIMENTATION RATE] IN BLOOD: 9 MM/HR (ref 0–20)
RHEUMATOID FACT SERPL-ACNC: <10 IU/ML (ref 0–15)

## 2022-03-30 NOTE — TELEPHONE ENCOUNTER
----- Message from Amaury Monroe NP sent at 3/30/2022  8:13 AM EDT -----  Low likelihood of Rheumatoid Arthritis.

## 2022-03-31 LAB — ANA SER QL: NEGATIVE

## 2022-04-04 LAB — HLA-B27 QL NAA+PROBE: NEGATIVE

## 2022-04-05 ENCOUNTER — TELEPHONE (OUTPATIENT)
Dept: PRIMARY CARE CLINIC | Age: 47
End: 2022-04-05

## 2022-04-05 NOTE — TELEPHONE ENCOUNTER
----- Message from Raphael Potter NP sent at 4/4/2022  5:31 PM EDT -----  Negative for ankylosing spondylitis.

## 2022-07-13 ENCOUNTER — TELEPHONE (OUTPATIENT)
Dept: RHEUMATOLOGY | Age: 47
End: 2022-07-13

## 2022-08-23 ENCOUNTER — OFFICE VISIT (OUTPATIENT)
Dept: PRIMARY CARE CLINIC | Age: 47
End: 2022-08-23
Payer: COMMERCIAL

## 2022-08-23 ENCOUNTER — NURSE TRIAGE (OUTPATIENT)
Dept: OTHER | Facility: CLINIC | Age: 47
End: 2022-08-23

## 2022-08-23 ENCOUNTER — HOSPITAL ENCOUNTER (OUTPATIENT)
Dept: GENERAL RADIOLOGY | Age: 47
Discharge: HOME | End: 2022-08-23
Attending: FAMILY MEDICINE
Payer: COMMERCIAL

## 2022-08-23 VITALS
BODY MASS INDEX: 37.86 KG/M2 | SYSTOLIC BLOOD PRESSURE: 119 MMHG | HEART RATE: 78 BPM | RESPIRATION RATE: 18 BRPM | TEMPERATURE: 97.5 F | HEIGHT: 66 IN | DIASTOLIC BLOOD PRESSURE: 78 MMHG | OXYGEN SATURATION: 96 % | WEIGHT: 235.6 LBS

## 2022-08-23 DIAGNOSIS — M25.522 LEFT ELBOW PAIN: ICD-10-CM

## 2022-08-23 DIAGNOSIS — M25.552 LEFT HIP PAIN: ICD-10-CM

## 2022-08-23 DIAGNOSIS — M54.50 LUMBOSACRAL PAIN: ICD-10-CM
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
## 2022-08-23 DIAGNOSIS — M25.562 ACUTE PAIN OF LEFT KNEE: ICD-10-CM

## 2022-08-23 DIAGNOSIS — M54.50 LUMBOSACRAL PAIN: Primary | ICD-10-CM

## 2022-08-23 PROCEDURE — 73080 X-RAY EXAM OF ELBOW: CPT

## 2022-08-23 PROCEDURE — 73562 X-RAY EXAM OF KNEE 3: CPT

## 2022-08-23 PROCEDURE — 73502 X-RAY EXAM HIP UNI 2-3 VIEWS: CPT

## 2022-08-23 PROCEDURE — 72100 X-RAY EXAM L-S SPINE 2/3 VWS: CPT

## 2022-08-23 PROCEDURE — 99213 OFFICE O/P EST LOW 20 MIN: CPT | Performed by: FAMILY MEDICINE

## 2022-08-23 RX ORDER — CYCLOBENZAPRINE HCL 10 MG
10 TABLET ORAL
Qty: 7 TABLET | Refills: 0 | Status: SHIPPED | OUTPATIENT
Start: 2022-08-23

## 2022-08-23 NOTE — PROGRESS NOTES
HPI     Chief Complaint   Patient presents with    Donald Buckner out a truck on friday - denies hitting head . L side pain did not go to ED. She is a 49 y.o. female who presents for fall. [place] out of a truck on Friday. States she was getting out of the side door and missed the step rail. States she tried to catch herself as she was falling. States she hit her entire L side. States she primarily caught herself with her L hand but hit her L hip and knee. She is not in \"unbearable pain\" but has herniated discs in her back and is having more pain in her lower back/ sacrum area. States her L elbow is also hurting more over the past few days. Denies hitting head or LOC. Denies saddle anesthesia or gross urinary incontinence but does not she urinated on herself during the accident. Review of Systems   Genitourinary:  Negative for difficulty urinating. Musculoskeletal:  Positive for arthralgias and back pain. Neurological:  Negative for weakness. Reviewed PmHx, RxHx, FmHx, SocHx, AllgHx and updated and dated in the chart. Physical Exam:  Visit Vitals  /78 (BP 1 Location: Right arm, BP Patient Position: Sitting, BP Cuff Size: Adult)   Pulse 78   Temp 97.5 °F (36.4 °C) (Temporal)   Resp 18   Ht 5' 6\" (1.676 m)   Wt 235 lb 9.6 oz (106.9 kg)   LMP  (LMP Unknown)   SpO2 96%   BMI 38.03 kg/m²     Physical Exam  Vitals and nursing note reviewed. Constitutional:       General: She is not in acute distress. Appearance: Normal appearance. She is not ill-appearing. Cardiovascular:      Rate and Rhythm: Normal rate and regular rhythm. Heart sounds: No murmur heard. Pulmonary:      Effort: Pulmonary effort is normal. No respiratory distress. Breath sounds: Normal breath sounds. Musculoskeletal:      Comments: Tenderness along lower paraspinal muscles BL. No bony tenderness to palpation of lumbar spine. Full ROM in L hip, knee, L elbow without pain.  She is tender to palpation along L medial epicondyl and medial joint line of L knee to palpation. No gross edema of knee. Gross strength intact in upper and lower ext BL. She is able to get on and off exam table without difficulty. No ecchymosis or gross deformity is present. Neurological:      General: No focal deficit present. Mental Status: She is alert. Mental status is at baseline. Cranial Nerves: No cranial nerve deficit. Sensory: No sensory deficit. Motor: No weakness. Coordination: Coordination normal.      Gait: Gait normal.      Deep Tendon Reflexes: Reflexes normal.     No results found for this or any previous visit (from the past 12 hour(s)). Assessment / Plan     Diagnoses and all orders for this visit:    1. Lumbosacral pain  -     XR SPINE LUMB 2 OR 3 V; Future  -     cyclobenzaprine (FLEXERIL) 10 mg tablet; Take 1 Tablet by mouth nightly. 2. Left elbow pain  -     XR ELBOW LT MIN 3 V; Future    3. Fall, initial encounter  -     XR ELBOW LT MIN 3 V; Future  -     XR HIP LT W OR WO PELV 2-3 VWS; Future  -     XR KNEE LT 3 V; Future  -     XR SPINE LUMB 2 OR 3 V; Future    4. Left hip pain  -     XR HIP LT W OR WO PELV 2-3 VWS; Future    5. Acute pain of left knee  -     XR KNEE LT 3 V; Future     Will get Xrays. She was non tender to palpation of L wrist and declines Xray of L wrist. PT referral pending Xray results. Recommend she follow up with Ortho. I have discussed the diagnosis with the patient and the intended plan as seen in the above orders. The patient has received an after-visit summary and questions were answered concerning future plans. I have discussed medication side effects and warnings with the patient as well.     Shen Lema,

## 2022-08-23 NOTE — PROGRESS NOTES
Identified pt with two pt identifiers(name and ). Chief Complaint   Patient presents with    Huy Ped out a truck on friday - denies hitting head . L side pain did not go to ED. 3 most recent PHQ Screens 2022   Little interest or pleasure in doing things Not at all   Feeling down, depressed, irritable, or hopeless Not at all   Total Score PHQ 2 0   Trouble falling or staying asleep, or sleeping too much Several days   Feeling tired or having little energy Several days   Poor appetite, weight loss, or overeating Not at all   Feeling bad about yourself - or that you are a failure or have let yourself or your family down Not at all   Trouble concentrating on things such as school, work, reading, or watching TV Not at all   Moving or speaking so slowly that other people could have noticed; or the opposite being so fidgety that others notice Not at all   Thoughts of being better off dead, or hurting yourself in some way Not at all   PHQ 9 Score 2        Vitals:    22 0921   BP: 119/78   Pulse: 78   Resp: 18   Temp: 97.5 °F (36.4 °C)   TempSrc: Temporal   SpO2: 96%   Weight: 235 lb 9.6 oz (106.9 kg)   Height: 5' 6\" (1.676 m)       Health Maintenance Due   Topic Date Due    COVID-19 Vaccine (1) Never done    Pneumococcal 0-64 years (2 - PCV) 2017        1. Have you been to the ER, urgent care clinic since your last visit? Hospitalized since your last visit? No    2. Have you seen or consulted any other health care providers outside of the [address] [place] [address] since your last visit? Include any pap smears or colon screening. No    3. For patients aged 39-70: Has the patient had a colonoscopy / FIT/ Cologuard? Yes - no Care Gap present      If the patient is female:    4. For patients aged 41-77: Has the patient had a mammogram within the past 2 years? Yes - no Care Gap present      5. For patients aged 21-65: Has the patient had a pap smear?  Yes - no Care Gap present

## 2022-08-23 NOTE — TELEPHONE ENCOUNTER
Received call from Mingo Brewer at [facility] with [employer]. Subjective: Caller states \"I fell Friday out of a truck. I missed the step and fell. I already have disk issues in my back. I did lose control of my bladder when I fell. I am having pain in my whole left side basically\"     Current Symptoms: left sided pain; back, elbow, hand, wrist, knee, ankle    Onset: Friday    Pain Severity: 8/10; aching; constant    Temperature: denies     What has been tried: Tylenol     Recommended disposition: Go to Office Now    Care advice provided, patient verbalizes understanding; denies any other questions or concerns; instructed to call back for any new or worsening symptoms. Patient/Caller agrees with recommended disposition; writer provided warm transfer to [facility] at [facility] for appointment scheduling    Attention Provider: Thank you for allowing me to participate in the care of your patient. The patient was connected to triage in response to information provided to the [facility]. Please do not respond through this encounter as the response is not directed to a shared pool.       Reason for Disposition   SEVERE back pain (e.g., excruciating, unable to do any normal activities) and not improved after pain medicine and CARE ADVICE    Protocols used: Back Injury-ADULT-[place]

## 2022-11-02 ENCOUNTER — ANESTHESIA (OUTPATIENT)
Dept: ENDOSCOPY | Age: 47
End: 2022-11-02
Payer: COMMERCIAL

## 2022-11-02 ENCOUNTER — ANESTHESIA EVENT (OUTPATIENT)
Dept: ENDOSCOPY | Age: 47
End: 2022-11-02
Payer: COMMERCIAL

## 2022-11-02 ENCOUNTER — HOSPITAL ENCOUNTER (OUTPATIENT)
Age: 47
Setting detail: OUTPATIENT SURGERY
Discharge: HOME | End: 2022-11-02
Attending: INTERNAL MEDICINE | Admitting: INTERNAL MEDICINE
Payer: COMMERCIAL

## 2022-11-02 VITALS
OXYGEN SATURATION: 100 % | TEMPERATURE: 96.8 F | BODY MASS INDEX: 36.16 KG/M2 | DIASTOLIC BLOOD PRESSURE: 68 MMHG | RESPIRATION RATE: 16 BRPM | HEIGHT: 66 IN | SYSTOLIC BLOOD PRESSURE: 108 MMHG | HEART RATE: 63 BPM | WEIGHT: 225 LBS

## 2022-11-02 LAB — HCG UR QL: NEGATIVE

## 2022-11-02 PROCEDURE — 77030009426 HC FCPS BIOP ENDOSC BSC -B: Performed by: INTERNAL MEDICINE

## 2022-11-02 PROCEDURE — 2709999900 HC NON-CHARGEABLE SUPPLY: Performed by: INTERNAL MEDICINE

## 2022-11-02 PROCEDURE — 81025 URINE PREGNANCY TEST: CPT

## 2022-11-02 PROCEDURE — 74011250637 HC RX REV CODE- 250/637: Performed by: INTERNAL MEDICINE

## 2022-11-02 PROCEDURE — 76060000031 HC ANESTHESIA FIRST 0.5 HR: Performed by: INTERNAL MEDICINE

## 2022-11-02 PROCEDURE — 76040000019: Performed by: INTERNAL MEDICINE

## 2022-11-02 PROCEDURE — 74011000250 HC RX REV CODE- 250: Performed by: NURSE ANESTHETIST, CERTIFIED REGISTERED

## 2022-11-02 PROCEDURE — 88305 TISSUE EXAM BY PATHOLOGIST: CPT

## 2022-11-02 PROCEDURE — 74011250636 HC RX REV CODE- 250/636: Performed by: NURSE ANESTHETIST, CERTIFIED REGISTERED

## 2022-11-02 RX ORDER — SODIUM CHLORIDE 0.9 % (FLUSH) 0.9 %
5-40 SYRINGE (ML) INJECTION EVERY 8 HOURS
Status: DISCONTINUED | OUTPATIENT
Start: 2022-11-02 | End: 2022-11-02 | Stop reason: HOSPADM

## 2022-11-02 RX ORDER — SODIUM CHLORIDE 9 MG/ML
50 INJECTION, SOLUTION INTRAVENOUS CONTINUOUS
Status: DISCONTINUED | OUTPATIENT
Start: 2022-11-02 | End: 2022-11-02 | Stop reason: HOSPADM

## 2022-11-02 RX ORDER — EPINEPHRINE 0.1 MG/ML
1 INJECTION INTRACARDIAC; INTRAVENOUS
Status: DISCONTINUED | OUTPATIENT
Start: 2022-11-02 | End: 2022-11-02 | Stop reason: HOSPADM

## 2022-11-02 RX ORDER — PROPOFOL 10 MG/ML
INJECTION, EMULSION INTRAVENOUS AS NEEDED
Status: DISCONTINUED | OUTPATIENT
Start: 2022-11-02 | End: 2022-11-02 | Stop reason: HOSPADM

## 2022-11-02 RX ORDER — DEXTROMETHORPHAN/PSEUDOEPHED 2.5-7.5/.8
1.2 DROPS ORAL
Status: DISCONTINUED | OUTPATIENT
Start: 2022-11-02 | End: 2022-11-02 | Stop reason: HOSPADM

## 2022-11-02 RX ORDER — SODIUM CHLORIDE, SODIUM LACTATE, POTASSIUM CHLORIDE, CALCIUM CHLORIDE 600; 310; 30; 20 MG/100ML; MG/100ML; MG/100ML; MG/100ML
INJECTION, SOLUTION INTRAVENOUS
Status: DISCONTINUED | OUTPATIENT
Start: 2022-11-02 | End: 2022-11-02 | Stop reason: HOSPADM

## 2022-11-02 RX ORDER — NALOXONE HYDROCHLORIDE 0.4 MG/ML
0.4 INJECTION, SOLUTION INTRAMUSCULAR; INTRAVENOUS; SUBCUTANEOUS
Status: DISCONTINUED | OUTPATIENT
Start: 2022-11-02 | End: 2022-11-02 | Stop reason: HOSPADM

## 2022-11-02 RX ORDER — FLUMAZENIL 0.1 MG/ML
0.2 INJECTION INTRAVENOUS
Status: DISCONTINUED | OUTPATIENT
Start: 2022-11-02 | End: 2022-11-02 | Stop reason: HOSPADM

## 2022-11-02 RX ORDER — MIDAZOLAM HYDROCHLORIDE 1 MG/ML
.25-5 INJECTION, SOLUTION INTRAMUSCULAR; INTRAVENOUS
Status: DISCONTINUED | OUTPATIENT
Start: 2022-11-02 | End: 2022-11-02 | Stop reason: HOSPADM

## 2022-11-02 RX ORDER — FENTANYL CITRATE 50 UG/ML
25-200 INJECTION, SOLUTION INTRAMUSCULAR; INTRAVENOUS
Status: DISCONTINUED | OUTPATIENT
Start: 2022-11-02 | End: 2022-11-02 | Stop reason: HOSPADM

## 2022-11-02 RX ORDER — ATROPINE SULFATE 0.1 MG/ML
0.5 INJECTION INTRAVENOUS
Status: DISCONTINUED | OUTPATIENT
Start: 2022-11-02 | End: 2022-11-02 | Stop reason: HOSPADM

## 2022-11-02 RX ORDER — LIDOCAINE HYDROCHLORIDE 20 MG/ML
INJECTION, SOLUTION EPIDURAL; INFILTRATION; INTRACAUDAL; PERINEURAL AS NEEDED
Status: DISCONTINUED | OUTPATIENT
Start: 2022-11-02 | End: 2022-11-02 | Stop reason: HOSPADM

## 2022-11-02 RX ORDER — SODIUM CHLORIDE 0.9 % (FLUSH) 0.9 %
5-40 SYRINGE (ML) INJECTION AS NEEDED
Status: DISCONTINUED | OUTPATIENT
Start: 2022-11-02 | End: 2022-11-02 | Stop reason: HOSPADM

## 2022-11-02 RX ADMIN — PROPOFOL 20 MG: 10 INJECTION, EMULSION INTRAVENOUS at 08:08

## 2022-11-02 RX ADMIN — LIDOCAINE HYDROCHLORIDE 40 MG: 20 INJECTION, SOLUTION EPIDURAL; INFILTRATION; INTRACAUDAL; PERINEURAL at 08:00

## 2022-11-02 RX ADMIN — PROPOFOL 50 MG: 10 INJECTION, EMULSION INTRAVENOUS at 08:09

## 2022-11-02 RX ADMIN — PROPOFOL 100 MG: 10 INJECTION, EMULSION INTRAVENOUS at 08:00

## 2022-11-02 RX ADMIN — PROPOFOL 20 MG: 10 INJECTION, EMULSION INTRAVENOUS at 08:04

## 2022-11-02 RX ADMIN — SODIUM CHLORIDE, SODIUM LACTATE, POTASSIUM CHLORIDE, AND CALCIUM CHLORIDE: 600; 310; 30; 20 INJECTION, SOLUTION INTRAVENOUS at 07:58

## 2022-11-02 RX ADMIN — PROPOFOL 10 MG: 10 INJECTION, EMULSION INTRAVENOUS at 08:01

## 2022-11-02 NOTE — PROCEDURES
[address]  174 [facility], [address] [place] [address]      Colonoscopy Operative Report    Tyler Peralta  [id number]  1975      Procedure Type:   Colonoscopy with polypectomy (hot biopsy)     Indications:    Personal history of colon polyps (screening only)       Pre-operative Diagnosis: see indication above    Post-operative Diagnosis:  See findings below    :  Rico Guillen MD    Surgical Assistant: Endoscopy Technician-1: Lexus Garner  Endoscopy RN-1: Richar Smith RN    Implants:  None    Referring Provider: Killian Verma DO      Sedation:  MAC anesthesia Propofol      Procedure Details:  After informed consent was obtained with all risks and benefits of procedure explained and preoperative exam completed, the patient was taken to the endoscopy suite and placed in the left lateral decubitus position. Upon sequential sedation as per above, a digital rectal exam was performed demonstrating internal hemorrhoids. The Olympus videocolonoscope  was inserted in the rectum and carefully advanced to the cecum, which was identified by the ileocecal valve and appendiceal orifice. The cecum was identified by the ileocecal valve and appendiceal orifice. The quality of preparation was good. The colonoscope was slowly withdrawn with careful evaluation between folds. Retroflexion in the rectum was completed . Findings:   Rectum: normal  Small internal hemorrhoids  Sigmoid: 2 sessile polyps, around 1 cm in size each, removed by hot biopsies  Descending Colon: normal  Transverse Colon: normal  Ascending Colon: normal  Cecum: normal    Specimen Removed:   as above    Complications: None. EBL:  None. Impression:     see findings    Recommendations: --Await pathology.       Recommendation for next colonscopy in 3 versus 5  years  Signed By: Rico Guillen MD     11/2/2022  8:17 AM

## 2022-11-02 NOTE — H&P
[address]  [address]      History and Physical       NAME:  Wilfrid Monroy   :   1975   MRN:   [id number]             History of Present Illness:  Patient is a 49 y.o. who is seen for h/o colon polyps .      PMH:  Past Medical History:   Diagnosis Date    Acute renal failure (ARF) ([address]     2020-STONES AND PYELONEPHRITIS    Ankle fracture     Arrhythmia     PALPITATIONS    Asthma     Autoimmune disease (HCC)     MS    Calculus of gallbladder without cholecystitis without obstruction 2020    Celiac disease     Chronic pain     MS    Congenital sucrose isomaltose malabsorption     Depression     Diverticulosis of sigmoid colon     Dysplastic colon polyp     Dr. Lori Hall - last colonoscopy 12 - single polyp    Essential hypertension     Gestasional    GERD (gastroesophageal reflux disease)     Influenza B 2017    EDGARD virus antibody positive     Kidney stones     Miscarriage         MS (multiple sclerosis) (HCC)     Dr. James Bowden    Obstructive pyelonephritis 2020    Ovarian cyst     PUD (peptic ulcer disease)     Pyelonephritis     Routine gynecological examination     Dr. Gavino Sin    S/P appy     S/P almas     Sleep apnea     pt states she no longer has the problem since wt loss - intentional wt loss    Tubular adenoma of colon 2016    Uterine fibroid     Vitamin D deficiency 2011       PSH:  Past Surgical History:   Procedure Laterality Date    COLONOSCOPY N/A 2019    COLONOSCOPY/EGD (LATEX ALLERGY) performed by Benjamin Oliva MD at [facility] ENDOSCOPY    [address] [facility]      double compound fx of right lower leg and dislocated heel - has a plate and 13 screws    HX  SECTION          HX COLONOSCOPY      HX ENDOSCOPY      HX GYN      fibroid tumor ovarian cyst     HX LAP CHOLECYSTECTOMY  2020    HX ORTHOPAEDIC      1992 Left shoulder surgery    IA BIOPSY OF BREAST, INCISIONAL Allergies: Allergies   Allergen Reactions    Latex Rash    Adhesive Rash    Motrin [Ibuprofen] Nausea Only       Home Medications:  Prior to Admission Medications   Prescriptions Last Dose Informant Patient Reported? Taking? albuterol (PROVENTIL HFA, VENTOLIN HFA, PROAIR HFA) 90 mcg/actuation inhaler Unknown  No No   Sig: Take 2 Puffs by inhalation every four (4) hours as needed for Wheezing or Shortness of Breath. buPROPion XL (WELLBUTRIN XL) 300 mg XL tablet 2022  No Yes   Sig: TAKE 1 TABLET BY MOUTH EVERY DAY   cholecalciferol, vitamin D3, 50 mcg (2,000 unit) tab 2022  Yes Yes   Sig: Take 5,000 Units by mouth daily. cyclobenzaprine (FLEXERIL) 10 mg tablet Unknown  No No   Sig: Take 1 Tablet by mouth nightly. diclofenac (VOLTAREN) 1 % gel 10/26/2022  Yes Yes   Sig: APPLY TOPICALLY 3 TIMES A DAY AS NEEDED FOR PAIN   escitalopram oxalate (LEXAPRO) 20 mg tablet 2022  No Yes   Sig: TAKE 1 TABLET BY MOUTH EVERY DAY   fexofenadine (ALLEGRA) 180 mg tablet 2022  Yes Yes   Sig: Take 180 mg by mouth every evening. fluticasone (FLONASE) 50 mcg/actuation nasal spray Unknown  No No   Si Sprays by Both Nostrils route daily. Patient taking differently: 2 Sprays by Both Nostrils route as needed. gabapentin (NEURONTIN) 300 mg capsule 2022  Yes Yes   Sig: Take 600 mg by mouth two (2) times a day. 1caps twice a day   melatonin 3 mg tablet Unknown  Yes No   Sig: Take 3 mg by mouth daily as needed. metoprolol succinate (TOPROL-XL) 100 mg tablet 10/31/2022  No No   Sig: Take 1 Tablet by mouth daily. omega 3-dha-epa-fish oil 183.3 mg-75 mg -91.6 mg-306 mg cap Unknown  No No   Sig: Take 4 Caps by mouth daily. Patient taking differently: Take 2 Capsules by mouth two (2) times a day. teriflunomide (AUBAGIO) 14 mg tablet 10/31/2022  Yes No   Sig: Take 14 mg by mouth every evening.       Facility-Administered Medications: None       Hospital Medications:  No current facility-administered medications for this encounter. Social History:  Social History     Tobacco Use    Smoking status: Former     Types: Pipe     Quit date: 2000     Years since quittin.3    Smokeless tobacco: Former    Tobacco comments:     CBD vape   Substance Use Topics    Alcohol use: Yes     Comment: 2 a month       Family History:  Family History   Problem Relation Age of Onset    Cancer Father         appendix/cancerous colon polyps    Heart Disease Father         cabg age early 66's    Cancer Maternal Uncle         prostate/melanoma    Cancer Maternal Grandmother         cancerous colon polyps    Cancer Maternal Grandfather         prostate    Heart Disease Paternal Grandmother     Cancer Paternal Grandmother         cancerous colon polyps    No Known Problems Daughter     Colon Polyps Mother         precancerous    Atrial Fibrillation Mother     Heart Disease Paternal Aunt     Anesth Problems Neg Hx          The patient was counseled at length about the risks of johnathan Covid-19 in the noel-operative and post-operative states including the recovery window of their procedure. The patient was made aware that johnathan Covid-19 after a surgical procedure may worsen their prognosis for recovering from the virus and lend to a higher morbidity and or mortality risk. The patient was given the options of postponing their procedure. All of the risks, benefits, and alternatives were discussed. The patient does  wish to proceed with the procedure.     Review of Systems:      Constitutional: negative fever, negative chills, negative weight loss  Eyes:   negative visual changes  ENT:   negative sore throat, tongue or lip swelling  Respiratory:  negative cough, negative dyspnea  Cards:  negative for chest pain, palpitations, lower extremity edema  GI:   See HPI  :  negative for frequency, dysuria  Integument:  negative for rash and pruritus  Heme:  negative for easy bruising and gum/nose bleeding  Musculoskel: negative for myalgias,  back pain and muscle weakness  Neuro: negative for headaches, dizziness, vertigo  Psych:  negative for feelings of anxiety, depression       Objective:   Patient Vitals for the past 8 hrs:   Height Weight   22 0730 5' 6\" (1.676 m) 102.1 kg (225 lb)     No intake/output data recorded. No intake/output data recorded. EXAM:     NEURO-a&o   HEENT-wnl   LUNGS-clear    COR-regular rate and rhythym     ABD-soft , no tenderness, no rebound, good bs     EXT-no edema     Data Review     No results for input(s): WBC, HGB, HCT, PLT, HGBEXT, HCTEXT, PLTEXT in the last 72 hours. No results for input(s): NA, K, CL, CO2, BUN, CREA, GLU, PHOS, CA in the last 72 hours. No results for input(s): AP, TBIL, TP, ALB, GLOB, GGT, AML, LPSE in the last 72 hours. No lab exists for component: SGOT, GPT, AMYP, HLPSE  No results for input(s): INR, PTP, APTT, INREXT in the last 72 hours. Assessment:     H/o colon polyps      Patient Active Problem List   Diagnosis Code    MS (multiple sclerosis) ([facility] G35    Asthma J45.909    Elevated blood pressure [id number]    Vitamin D deficiency E55.9    Depression F32. A    Dysplastic colon polyp K63.5    Dysphagia R13.10    Meralgia paresthetica of right side G57.11    Migraine with aura, without mention of intractable migraine without mention of status migrainosus G43. 109    Dehydration, moderate E86.0    Multiple sclerosis exacerbation (HCC) G35    Optic neuritis, right H46.9    Gestational hypertension O13.9    AMA (advanced maternal age) primigravida 33+ O12.26    Single delivery by  O82    Tubular adenoma of colon D12.6    EDGARD virus antibody positive R76.8    Kidney stones N20.0    Depression with anxiety F41.8    Severe obesity (BMI 35.0-39. 9) E66.01    Essential hypertension I10    PUD (peptic ulcer disease) K27.9    Congenital sucrose isomaltose malabsorption E74.31    UTI (urinary tract infection) N39.0    Elevated lipase R74.8    Obstructive pyelonephritis N11.1    Pyelonephritis N12    Sepsis ([address] A41.9    Hyponatremia E87.1    Calculus of gallbladder without cholecystitis without obstruction K80.20    S/P almas Z90.49    S/P appy Z90.49         Plan:     Endoscopic procedure with sedation     Signed By: Stacey Singletary MD     11/2/2022  7:51 AM

## 2022-11-02 NOTE — ANESTHESIA POSTPROCEDURE EVALUATION
Procedure(s):  COLONOSCOPY  ENDOSCOPIC POLYPECTOMY. MAC    Anesthesia Post Evaluation      Multimodal analgesia: multimodal analgesia used between 6 hours prior to anesthesia start to PACU discharge  Patient location during evaluation: PACU  Level of consciousness: awake  Pain management: adequate  Airway patency: patent  Anesthetic complications: no  Cardiovascular status: acceptable  Respiratory status: acceptable  Hydration status: acceptable  Post anesthesia nausea and vomiting:  none  Final Post Anesthesia Temperature Assessment:  Normothermia (36.0-37.5 degrees C)      INITIAL Post-op Vital signs:   Vitals Value Taken Time   /63 11/02/22 0820   Temp     Pulse 66 11/02/22 0821   Resp 6 11/02/22 0821   SpO2 96 % 11/02/22 0821   Vitals shown include unvalidated device data.

## 2022-11-02 NOTE — DISCHARGE INSTRUCTIONS
[address], [address] [place] [address]    COLON DISCHARGE INSTRUCTIONS    Yarely Juarez  [id number]  1975    Discomfort:  Redness at IV site- apply warm compress to area; if redness or soreness persist- contact your physician  There may be a slight amount of blood passed from the rectum  Gaseous discomfort- walking, belching will help relieve any discomfort  You may not operate a vehicle for 12 hours  You may not engage in an occupation involving machinery or appliances for rest of today  You may not drink alcoholic beverages for at least 12 hours  Avoid making any critical decisions for at least 24 hour  DIET:  You may resume your regular diet - however -  remember your colon is empty and a heavy meal will produce gas. Avoid these foods:  vegetables, fried / greasy foods, carbonated drinks     ACTIVITY:  You may  resume your normal daily activities it is recommended that you spend the remainder of the day resting -  avoid any strenuous activity. CALL M.D. ANY SIGN OF:   Increasing pain, nausea, vomiting  Abdominal distension (swelling)  New increased bleeding (oral or rectal)  Fever (chills)  Pain in chest area  Bloody discharge from nose or mouth  Shortness of breath      Follow-up Instructions:   Call Dr. Elio Acuna for any questions or problems at [address]:   Your colonoscopy showed 2 small polyps, removed, rest of exam was normal.  Telephone # [phone]      Signed By: Elio Acuna MD     11/2/2022  8:19 AM       DISCHARGE SUMMARY from Nurse    The following personal items collected during your admission are returned to you:   Dental Appliance: Dental Appliances: None  Vision: Visual Aid: Glasses, At bedside  Hearing Aid:    Jewelry:    Clothing:    Other Valuables:    Valuables sent to safe:        Learning About Coronavirus (COVID-19)  Coronavirus [phone]): Overview  What is coronavirus (LPPXP-98)?   The coronavirus disease (COVID-19) is caused by a virus. It is an illness that was first found in [place], [place], in December 2019. It has since spread worldwide. The virus can cause fever, cough, and trouble breathing. In severe cases, it can cause pneumonia and make it hard to breathe without help. It can cause death. Coronaviruses are a large group of viruses. They cause the common cold. They also cause more serious illnesses like Middle East respiratory syndrome (MERS) and severe acute respiratory syndrome (SARS). COVID-19 is caused by a novel coronavirus. That means it's a new type that has not been seen in people before. This virus spreads person-to-person through droplets from coughing and sneezing. It can also spread when you are close to someone who is infected. And it can spread when you touch something that has the virus on it, such as a doorknob or a tabletop. What can you do to protect yourself from coronavirus (COVID-19)? The best way to protect yourself from getting sick is to: Avoid areas where there is an outbreak. Avoid contact with people who may be infected. Wash your hands often with soap or alcohol-based hand sanitizers. Avoid crowds and try to stay at least 6 feet away from other people. Wash your hands often, especially after you cough or sneeze. Use soap and water, and scrub for at least 20 seconds. If soap and water aren't available, use an alcohol-based hand . Avoid touching your mouth, nose, and eyes. What can you do to avoid spreading the virus to others? To help avoid spreading the virus to others:  Cover your mouth with a tissue when you cough or sneeze. Then throw the tissue in the trash. Use a disinfectant to clean things that you touch often. Stay home if you are sick or have been exposed to the virus. Don't go to school, work, or public areas. And don't use public transportation. If you are sick:  Leave your home only if you need to get medical care.  But call the doctor's office first so they know you're coming. And wear a face mask, if you have one. If you have a face mask, wear it whenever you're around other people. It can help stop the spread of the virus when you cough or sneeze. Clean and disinfect your home every day. Use household  and disinfectant wipes or sprays. Take special care to clean things that you grab with your hands. These include doorknobs, remote controls, phones, and handles on your refrigerator and microwave. And don't forget countertops, tabletops, bathrooms, and computer keyboards. When to call for help  Call 911 anytime you think you may need emergency care. For example, call if:  You have severe trouble breathing. (You can't talk at all.)  You have constant chest pain or pressure. You are severely dizzy or lightheaded. You are confused or can't think clearly. Your face and lips have a blue color. You pass out (lose consciousness) or are very hard to wake up. Call your doctor now if you develop symptoms such as:  Shortness of breath. Fever. Cough. If you need to get care, call ahead to the doctor's office for instructions before you go. Make sure you wear a face mask, if you have one, to prevent exposing other people to the virus. Where can you get the latest information? The following health organizations are tracking and studying this virus. Their websites contain the most up-to-date information. Lisset Kuldip also learn what to do if you think you may have been exposed to the virus. U.S. Centers for Disease Control and Prevention (CDC): The CDC provides updated news about the disease and travel advice. The website also tells you how to prevent the spread of infection. www.cdc.gov  World Health Organization [facility]): WHO offers information about the virus outbreaks. WHO also has travel advice. www.who.int  Current as of: April 1, 2020               Content Version: 12.4  © 9508-7162 [employer].    Care instructions adapted under license by your healthcare professional. If you have questions about a medical condition or this instruction, always ask your healthcare professional. [place] [address] any warranty or liability for your use of this information.

## 2022-11-02 NOTE — ANESTHESIA PREPROCEDURE EVALUATION
Relevant Problems   RESPIRATORY SYSTEM   (+) Asthma      NEUROLOGY   (+) Depression   (+) Depression with anxiety   (+) Migraine with aura, without mention of intractable migraine without mention of status migrainosus      CARDIOVASCULAR   (+) Essential hypertension   (+) Gestational hypertension      GASTROINTESTINAL   (+) PUD (peptic ulcer disease)      RENAL FAILURE   (+) Kidney stones   (+) Obstructive pyelonephritis      ENDOCRINE   (+) Severe obesity (BMI 35.0-39. 9)       Anesthetic History   No history of anesthetic complications            Review of Systems / Medical History  Patient summary reviewed, nursing notes reviewed and pertinent labs reviewed    Pulmonary  Within defined limits      Sleep apnea    Asthma        Neuro/Psych   Within defined limits           Cardiovascular  Within defined limits  Hypertension        Dysrhythmias            GI/Hepatic/Renal  Within defined limits   GERD      PUD     Endo/Other  Within defined limits      Morbid obesity     Other Findings   Comments: Multiple Sclerosis           Physical Exam    Airway  Mallampati: II  TM Distance: > 6 cm  Neck ROM: normal range of motion   Mouth opening: Normal     Cardiovascular  Regular rate and rhythm,  S1 and S2 normal,  no murmur, click, rub, or gallop             Dental  No notable dental hx       Pulmonary  Breath sounds clear to auscultation               Abdominal  GI exam deferred       Other Findings            Anesthetic Plan    ASA: 3  Anesthesia type: MAC          Induction: Intravenous  Anesthetic plan and risks discussed with: Patient

## 2023-04-19 ENCOUNTER — OFFICE VISIT (OUTPATIENT)
Dept: CARDIOLOGY CLINIC | Age: 48
End: 2023-04-19

## 2023-04-19 VITALS
HEIGHT: 66 IN | RESPIRATION RATE: 18 BRPM | WEIGHT: 220.4 LBS | HEART RATE: 95 BPM | BODY MASS INDEX: 35.42 KG/M2 | OXYGEN SATURATION: 96 % | DIASTOLIC BLOOD PRESSURE: 80 MMHG | SYSTOLIC BLOOD PRESSURE: 130 MMHG

## 2023-04-19 DIAGNOSIS — I10 ESSENTIAL HYPERTENSION: Primary | ICD-10-CM

## 2023-04-19 DIAGNOSIS — R00.2 PALPITATIONS: ICD-10-CM

## 2023-04-19 RX ORDER — METHOCARBAMOL 500 MG/1
TABLET, FILM COATED ORAL
COMMUNITY
Start: 2023-04-10

## 2023-04-19 RX ORDER — METOPROLOL SUCCINATE 50 MG/1
50 TABLET, EXTENDED RELEASE ORAL DAILY
Qty: 90 TABLET | Refills: 3 | Status: SHIPPED | OUTPATIENT
Start: 2023-04-19

## 2023-04-19 RX ORDER — CELECOXIB 200 MG/1
CAPSULE ORAL
COMMUNITY
Start: 2023-02-01

## 2023-04-19 NOTE — PROGRESS NOTES
HISTORY OF PRESENT ILLNESS  Gayle Kelley is a 51 y.o. female     SUMMARY:   Problem List  Date Reviewed: 2023            Codes Class Noted    S/P almas ICD-10-CM: Z90.49  ICD-9-CM: V45.79  Unknown        S/P appy ICD-10-CM: Z90.49  ICD-9-CM: V45.89  Unknown        Calculus of gallbladder without cholecystitis without obstruction ICD-10-CM: K80.20  ICD-9-CM: 574.20  2020        Hyponatremia ICD-10-CM: E87.1  ICD-9-CM: 276.1  2020        Pyelonephritis ICD-10-CM: N12  ICD-9-CM: 590.80  2020        Sepsis ([address] ICD-10-CM: A41.9  ICD-9-CM: 038.9, 995.91  2020        Obstructive pyelonephritis ICD-10-CM: N11.1  ICD-9-CM: 590.80  2020        UTI (urinary tract infection) ICD-10-CM: N39.0  ICD-9-CM: 599.0  2020        Elevated lipase ICD-10-CM: R74.8  ICD-9-CM: 790.5  2020        Congenital sucrose isomaltose malabsorption ICD-10-CM: E74.31  ICD-9-CM: 271.3  Unknown        PUD (peptic ulcer disease) ICD-10-CM: K27.9  ICD-9-CM: 533.90  Unknown        Essential hypertension ICD-10-CM: I10  ICD-9-CM: 401.9  10/28/2018        Severe obesity (BMI 35.0-39. 9) ICD-10-CM: E66.01  ICD-9-CM: 278.01  2018        Depression with anxiety ICD-10-CM: F41.8  ICD-9-CM: 300.4  2018        Kidney stones ICD-10-CM: N20.0  ICD-9-CM: 592.0  Unknown        EDGARD virus antibody positive ICD-10-CM: R76.8  ICD-9-CM: 795.79  Unknown        Tubular adenoma of colon ICD-10-CM: D12.6  ICD-9-CM: 211.3  Unknown        Single delivery by  ICD-10-CM: O82  ICD-9-CM: 669.71  2015        Gestational hypertension ICD-10-CM: O13.9  ICD-9-CM: 642.30  2/10/2015        AMA (advanced maternal age) primigravida 35+ ICD-10-CM: O09.519  ICD-9-CM: 659.50  2/10/2015        Multiple sclerosis exacerbation ([facility] ICD-10-CM: G35  ICD-9-CM: 340  2013        Optic neuritis, right ICD-10-CM: H46.9  ICD-9-CM: 377.30  2013        Dehydration, moderate ICD-10-CM: E86.0  ICD-9-CM: 276.51 8/12/2013        Dysphagia ICD-10-CM: R13.10  ICD-9-CM: 787.20  5/31/2013        Meralgia paresthetica of right side ICD-10-CM: G57.11  ICD-9-CM: 355.1  5/31/2013        Migraine with aura, without mention of intractable migraine without mention of status migrainosus ICD-10-CM: G43.109  ICD-9-CM: 346.00  5/31/2013        Dysplastic colon polyp ICD-10-CM: K63.5  ICD-9-CM: 211.3  Unknown        MS (multiple sclerosis) ([facility] ICD-10-CM: G35  ICD-9-CM: 340  Unknown        Asthma ICD-10-CM: J45.909  ICD-9-CM: 493.90  Unknown        Elevated blood pressure ICD-10-CM: [id number]  ICD-9-CM: Lily Hodges  7/20/2011        Vitamin D deficiency ICD-10-CM: E55.9  ICD-9-CM: 268.9  7/20/2011        Depression ICD-10-CM: F32. A  ICD-9-CM: 311  Unknown           Current Outpatient Medications on File Prior to Visit   Medication Sig    celecoxib (CELEBREX) 200 mg capsule     methocarbamoL (ROBAXIN) 500 mg tablet TAKE 1 TABLET BY MOUTH THREE TIMES A DAY AS NEEDED FOR MUSCLE SPASM    methylPREDNISolone, PF, (SOLU-MEDROL) 1,000 mg/8 mL solr injection Methylprednisolone 1000mg in 100cc NaCl once daily over 30 minutes x 3 days by home health nursing    buPROPion XL (WELLBUTRIN XL) 300 mg XL tablet Take 1 Tablet by mouth daily. APPT NEEDED FOR ADDITIONAL FILLS    escitalopram oxalate (LEXAPRO) 20 mg tablet TAKE 1 TABLET BY MOUTH EVERY DAY    metoprolol succinate (TOPROL-XL) 100 mg tablet Take 1 Tablet by mouth daily. diclofenac (VOLTAREN) 1 % gel APPLY TOPICALLY 3 TIMES A DAY AS NEEDED FOR PAIN    teriflunomide (AUBAGIO) 14 mg tablet Take 1 Tablet by mouth every evening. omega 3-dha-epa-fish oil 183.3 mg-75 mg -91.6 mg-306 mg cap Take 4 Caps by mouth daily. (Patient taking differently: Take 2 Capsules by mouth two (2) times a day.)    fexofenadine (ALLEGRA) 180 mg tablet Take 1 Tablet by mouth every evening.     albuterol (PROVENTIL HFA, VENTOLIN HFA, PROAIR HFA) 90 mcg/actuation inhaler Take 2 Puffs by inhalation every four (4) hours as needed for Wheezing or Shortness of Breath. cholecalciferol, vitamin D3, 50 mcg (2,000 unit) tab Take 2.5 Tablets by mouth daily. cyclobenzaprine (FLEXERIL) 10 mg tablet Take 1 Tablet by mouth nightly. melatonin 3 mg tablet Take 1 Tablet by mouth daily as needed. gabapentin (NEURONTIN) 300 mg capsule Take 3 Capsules by mouth two (2) times a day. 1.5 tab twice a day     No current facility-administered medications on file prior to visit. CARDIOLOGY STUDIES TO DATE:  3/18 48 hr holter, 1pvc, 10 pac's  3/18 normal echo    Chief Complaint   Patient presents with    Annual Exam    Follow-up     HPI :  Cardiac wise she is doing just fine with very rare palpitations but she is having a lot of trouble with her multiple sclerosis and her cervical spine disease. She is on high-dose steroids and it sounds like they are going to have to do surgery on her in the relatively near future. She is not really able to exercise. She has not lost any weight. No other cardiac type symptoms.     .    Family History   Problem Relation Age of Onset    Cancer Father         appendix/cancerous colon polyps    Heart Disease Father         cabg age early 66's    Cancer Maternal Uncle         prostate/melanoma    Cancer Maternal Grandmother         cancerous colon polyps    Cancer Maternal Grandfather         prostate    Heart Disease Paternal Grandmother     Cancer Paternal Grandmother         cancerous colon polyps    No Known Problems Daughter     Colon Polyps Mother         precancerous    Atrial Fibrillation Mother     Heart Disease Paternal Aunt     Anesth Problems Neg Hx        Past Medical History:   Diagnosis Date    Acute renal failure (ARF) ([address]     6/2020-STONES AND PYELONEPHRITIS    Ankle fracture     Arrhythmia     PALPITATIONS    Asthma     Autoimmune disease ([address]     MS    Calculus of gallbladder without cholecystitis without obstruction 7/20/2020    Celiac disease     Chronic pain     MS    Congenital sucrose isomaltose malabsorption     Depression     Diverticulosis of sigmoid colon     Dysplastic colon polyp     Dr. [facility] - last colonoscopy 11/7/12 - single polyp    Essential hypertension     Gestasional    GERD (gastroesophageal reflux disease)     Influenza B 03/07/2017    EDGARD virus antibody positive     Kidney stones     Miscarriage     11/13    MS (multiple sclerosis) ([facility])     Dr. Rossi Blair    Obstructive pyelonephritis 5/26/2020    Ovarian cyst     PUD (peptic ulcer disease)     Pyelonephritis     Routine gynecological examination     Dr. Jaswinder Hernandez    S/P tom     S/P almas     Sleep apnea     pt states she no longer has the problem since wt loss - intentional wt loss    Tubular adenoma of colon 04/18/2016    Uterine fibroid     Vitamin D deficiency 7/20/2011       GENERAL ROS:  All other systems reviewed and negative except as above.     Visit Vitals  /80 (BP 1 Location: Left upper arm, BP Patient Position: Sitting, BP Cuff Size: Adult)   Pulse 95   Resp 18   Ht 5' 6\" (1.676 m)   Wt 220 lb 6.4 oz (100 kg)   SpO2 96%   BMI 35.57 kg/m²       Wt Readings from Last 3 Encounters:   04/19/23 220 lb 6.4 oz (100 kg)   04/12/23 215 lb 6.2 oz (97.7 kg)   11/02/22 225 lb (102.1 kg)            BP Readings from Last 3 Encounters:   04/19/23 130/80   04/13/23 (!) 143/73   11/02/22 108/68       PHYSICAL EXAM  General appearance: alert, cooperative, no distress, appears stated age  Neurologic: Alert and oriented X 3  Neck: supple, symmetrical, trachea midline, no adenopathy, no carotid bruit, and no JVD  Lungs: clear to auscultation bilaterally  Heart: regular rate and rhythm, S1, S2 normal, no murmur, click, rub or gallop  Extremities: extremities normal, atraumatic, no cyanosis or edema    Lab Results   Component Value Date/Time    Cholesterol, total 188 12/13/2021 10:43 AM    Cholesterol, total 117 06/30/2020 09:02 AM    Cholesterol, total 200 (H) 08/02/2019 09:10 AM    Cholesterol, total 209 (H) 04/22/2019 09:40 AM Cholesterol, total 213 (H) 10/22/2018 10:59 AM    HDL Cholesterol 65 12/13/2021 10:43 AM    HDL Cholesterol 53 06/30/2020 09:02 AM    HDL Cholesterol 51 08/02/2019 09:10 AM    HDL Cholesterol 54 04/22/2019 09:40 AM    HDL Cholesterol 63 10/22/2018 10:59 AM    LDL, calculated 63 12/13/2021 10:43 AM    LDL, calculated 39 06/30/2020 09:02 AM    LDL, calculated 92 08/02/2019 09:10 AM    LDL, calculated 110 (H) 04/22/2019 09:40 AM    LDL, calculated 85 10/22/2018 10:59 AM    Triglyceride 300 (H) 12/13/2021 10:43 AM    Triglyceride 126 06/30/2020 09:02 AM    Triglyceride 287 (H) 08/02/2019 09:10 AM    Triglyceride 223 (H) 04/22/2019 09:40 AM    Triglyceride 327 (H) 10/22/2018 10:59 AM    CHOL/HDL Ratio 2.9 12/13/2021 10:43 AM     ASSESSMENT :      She is stable and asymptomatic from a cardiac standpoint on a reasonable medical regimen and needs no cardiac testing at this time. She should be fine for spine surgery from a cardiac perspective without special precautions. current treatment plan is effective, no change in therapy  lab results and schedule of future lab studies reviewed with patient  reviewed diet, exercise and weight control    Encounter Diagnoses   Name Primary? Essential hypertension Yes    Palpitations      Orders Placed This Encounter    celecoxib (CELEBREX) 200 mg capsule    methocarbamoL (ROBAXIN) 500 mg tablet    methylPREDNISolone, PF, (SOLU-MEDROL) 1,000 mg/8 mL solr injection       Follow-up and Dispositions    Return in about 1 year (around 4/19/2024). Gallo Noonan MD  4/19/2023  Please note that this dictation was completed with [employer], the [employer] voice recognition software. Quite often unanticipated grammatical, syntax, homophones, and other interpretive errors are inadvertently transcribed by the computer software. Please disregard these errors. Please excuse any errors that have escaped final proofreading. Thank you.

## 2023-05-02 RX ORDER — METHOCARBAMOL 500 MG/1
TABLET, FILM COATED ORAL
COMMUNITY
Start: 2023-04-10

## 2023-05-02 RX ORDER — METHYLPREDNISOLONE SODIUM SUCCINATE 1 G/16ML
INJECTION, POWDER, LYOPHILIZED, FOR SOLUTION INTRAMUSCULAR; INTRAVENOUS
COMMUNITY
Start: 2023-04-13 | End: 2023-05-05

## 2023-05-02 RX ORDER — CELECOXIB 200 MG/1
200 CAPSULE ORAL EVERY EVENING
COMMUNITY
Start: 2023-02-01

## 2023-05-03 ENCOUNTER — HOSPITAL ENCOUNTER (OUTPATIENT)
Dept: MRI IMAGING | Age: 48
Discharge: HOME | End: 2023-05-03
Attending: EMERGENCY MEDICINE
Payer: COMMERCIAL

## 2023-05-03 ENCOUNTER — HOSPITAL ENCOUNTER (OUTPATIENT)
Dept: PREADMISSION TESTING | Age: 48
Discharge: HOME | End: 2023-05-03
Attending: ORTHOPAEDIC SURGERY
Payer: COMMERCIAL

## 2023-05-03 ENCOUNTER — HOSPITAL ENCOUNTER (EMERGENCY)
Age: 48
Discharge: HOME | End: 2023-05-03
Attending: EMERGENCY MEDICINE
Payer: COMMERCIAL

## 2023-05-03 ENCOUNTER — HOSPITAL ENCOUNTER (OUTPATIENT)
Dept: GENERAL RADIOLOGY | Age: 48
Discharge: HOME | End: 2023-05-03
Attending: ORTHOPAEDIC SURGERY
Payer: COMMERCIAL

## 2023-05-03 VITALS
RESPIRATION RATE: 18 BRPM | HEART RATE: 70 BPM | TEMPERATURE: 97.5 F | WEIGHT: 216.49 LBS | OXYGEN SATURATION: 96 % | SYSTOLIC BLOOD PRESSURE: 134 MMHG | HEIGHT: 67 IN | BODY MASS INDEX: 33.98 KG/M2 | DIASTOLIC BLOOD PRESSURE: 75 MMHG

## 2023-05-03 VITALS
WEIGHT: 216.49 LBS | HEART RATE: 65 BPM | RESPIRATION RATE: 18 BRPM | HEIGHT: 67 IN | OXYGEN SATURATION: 95 % | SYSTOLIC BLOOD PRESSURE: 137 MMHG | BODY MASS INDEX: 33.98 KG/M2 | TEMPERATURE: 98.4 F | DIASTOLIC BLOOD PRESSURE: 95 MMHG

## 2023-05-03 DIAGNOSIS — R20.0 NUMBNESS: ICD-10-CM

## 2023-05-03 DIAGNOSIS — M54.42 CHRONIC MIDLINE LOW BACK PAIN WITH LEFT-SIDED SCIATICA: Primary | ICD-10-CM

## 2023-05-03 DIAGNOSIS — G89.29 CHRONIC MIDLINE LOW BACK PAIN WITH LEFT-SIDED SCIATICA: Primary | ICD-10-CM

## 2023-05-03 LAB
25(OH)D3 SERPL-MCNC: 77.3 NG/ML (ref 30–100)
ABO + RH BLD: NORMAL
ALBUMIN SERPL-MCNC: 3.3 G/DL (ref 3.5–5)
ALBUMIN/GLOB SERPL: 0.9 (ref 1.1–2.2)
ALP SERPL-CCNC: 80 U/L (ref 45–117)
ALT SERPL-CCNC: 34 U/L (ref 12–78)
AMPHET UR QL SCN: POSITIVE
ANION GAP SERPL CALC-SCNC: 3 MMOL/L (ref 5–15)
APPEARANCE UR: CLEAR
AST SERPL-CCNC: 13 U/L (ref 15–37)
BACTERIA URNS QL MICRO: ABNORMAL /HPF
BARBITURATES UR QL SCN: NEGATIVE
BENZODIAZ UR QL: NEGATIVE
BILIRUB SERPL-MCNC: 0.5 MG/DL (ref 0.2–1)
BILIRUB UR QL: NEGATIVE
BLOOD GROUP ANTIBODIES SERPL: NORMAL
BUN SERPL-MCNC: 14 MG/DL (ref 6–20)
BUN/CREAT SERPL: 18 (ref 12–20)
CALCIUM SERPL-MCNC: 9.1 MG/DL (ref 8.5–10.1)
CANNABINOIDS UR QL SCN: POSITIVE
CAOX CRY URNS QL MICRO: ABNORMAL
CHLORIDE SERPL-SCNC: 110 MMOL/L (ref 97–108)
CO2 SERPL-SCNC: 26 MMOL/L (ref 21–32)
COCAINE UR QL SCN: NEGATIVE
COLOR UR: ABNORMAL
CREAT SERPL-MCNC: 0.78 MG/DL (ref 0.55–1.02)
DRUG SCRN COMMENT,DRGCM: ABNORMAL
EPITH CASTS URNS QL MICRO: ABNORMAL /LPF
ERYTHROCYTE [DISTWIDTH] IN BLOOD BY AUTOMATED COUNT: 13.1 % (ref 11.5–14.5)
EST. AVERAGE GLUCOSE BLD GHB EST-MCNC: 103 MG/DL
GLOBULIN SER CALC-MCNC: 3.6 G/DL (ref 2–4)
GLUCOSE SERPL-MCNC: 72 MG/DL (ref 65–100)
GLUCOSE UR STRIP.AUTO-MCNC: NEGATIVE MG/DL
HBA1C MFR BLD: 5.2 % (ref 4–5.6)
HCG SERPL QL: NEGATIVE
HCT VFR BLD AUTO: 45.3 % (ref 35–47)
HGB BLD-MCNC: 14.8 G/DL (ref 11.5–16)
HGB UR QL STRIP: ABNORMAL
INR PPP: 1 (ref 0.9–1.1)
KETONES UR QL STRIP.AUTO: NEGATIVE MG/DL
LEUKOCYTE ESTERASE UR QL STRIP.AUTO: NEGATIVE
MCH RBC QN AUTO: 29.1 PG (ref 26–34)
MCHC RBC AUTO-ENTMCNC: 32.7 G/DL (ref 30–36.5)
MCV RBC AUTO: 89 FL (ref 80–99)
METHADONE UR QL: NEGATIVE
NITRITE UR QL STRIP.AUTO: NEGATIVE
NRBC # BLD: 0 K/UL (ref 0–0.01)
NRBC BLD-RTO: 0 PER 100 WBC
OPIATES UR QL: NEGATIVE
PCP UR QL: NEGATIVE
PH UR STRIP: 5.5 (ref 5–8)
PLATELET # BLD AUTO: 172 K/UL (ref 150–400)
PMV BLD AUTO: 10.3 FL (ref 8.9–12.9)
POTASSIUM SERPL-SCNC: 3.8 MMOL/L (ref 3.5–5.1)
PREALB SERPL-MCNC: 31.1 MG/DL (ref 20–40)
PROT SERPL-MCNC: 6.9 G/DL (ref 6.4–8.2)
PROT UR STRIP-MCNC: NEGATIVE MG/DL
PROTHROMBIN TIME: 10.3 SEC (ref 9–11.1)
RBC # BLD AUTO: 5.09 M/UL (ref 3.8–5.2)
RBC #/AREA URNS HPF: ABNORMAL /HPF (ref 0–5)
SODIUM SERPL-SCNC: 139 MMOL/L (ref 136–145)
SP GR UR REFRACTOMETRY: 1.02
SPECIMEN EXP DATE BLD: NORMAL
UA: UC IF INDICATED,UAUC: ABNORMAL
UROBILINOGEN UR QL STRIP.AUTO: 0.2 EU/DL (ref 0.2–1)
WBC # BLD AUTO: 7.9 K/UL (ref 3.6–11)
WBC URNS QL MICRO: ABNORMAL /HPF (ref 0–4)

## 2023-05-03 PROCEDURE — 84134 ASSAY OF PREALBUMIN: CPT

## 2023-05-03 PROCEDURE — 97116 GAIT TRAINING THERAPY: CPT

## 2023-05-03 PROCEDURE — 80053 COMPREHEN METABOLIC PANEL: CPT

## 2023-05-03 PROCEDURE — 82306 VITAMIN D 25 HYDROXY: CPT

## 2023-05-03 PROCEDURE — 36415 COLL VENOUS BLD VENIPUNCTURE: CPT

## 2023-05-03 PROCEDURE — 97161 PT EVAL LOW COMPLEX 20 MIN: CPT

## 2023-05-03 PROCEDURE — 93005 ELECTROCARDIOGRAM TRACING: CPT

## 2023-05-03 PROCEDURE — 85610 PROTHROMBIN TIME: CPT

## 2023-05-03 PROCEDURE — 85027 COMPLETE CBC AUTOMATED: CPT

## 2023-05-03 PROCEDURE — 71046 X-RAY EXAM CHEST 2 VIEWS: CPT

## 2023-05-03 PROCEDURE — 72148 MRI LUMBAR SPINE W/O DYE: CPT

## 2023-05-03 PROCEDURE — 81001 URINALYSIS AUTO W/SCOPE: CPT

## 2023-05-03 PROCEDURE — 80307 DRUG TEST PRSMV CHEM ANLYZR: CPT

## 2023-05-03 PROCEDURE — 84703 CHORIONIC GONADOTROPIN ASSAY: CPT

## 2023-05-03 PROCEDURE — 83036 HEMOGLOBIN GLYCOSYLATED A1C: CPT

## 2023-05-03 PROCEDURE — 99284 EMERGENCY DEPT VISIT MOD MDM: CPT

## 2023-05-03 PROCEDURE — 86900 BLOOD TYPING SEROLOGIC ABO: CPT

## 2023-05-03 PROCEDURE — 72146 MRI CHEST SPINE W/O DYE: CPT

## 2023-05-03 RX ORDER — GABAPENTIN 600 MG/1
900 TABLET ORAL 2 TIMES DAILY
COMMUNITY

## 2023-05-03 RX ORDER — SODIUM CHLORIDE, SODIUM LACTATE, POTASSIUM CHLORIDE, CALCIUM CHLORIDE 600; 310; 30; 20 MG/100ML; MG/100ML; MG/100ML; MG/100ML
INJECTION, SOLUTION INTRAVENOUS CONTINUOUS
Status: CANCELLED | OUTPATIENT
Start: 2023-05-09

## 2023-05-03 RX ORDER — CHOLECALCIFEROL TAB 125 MCG (5000 UNIT) 125 MCG
5000 TAB ORAL DAILY
COMMUNITY

## 2023-05-03 RX ORDER — OXYCODONE AND ACETAMINOPHEN 5; 325 MG/1; MG/1
1 TABLET ORAL
Qty: 15 TABLET | Refills: 0 | Status: SHIPPED | OUTPATIENT
Start: 2023-05-03 | End: 2023-05-06

## 2023-05-04 LAB
ATRIAL RATE: 61 BPM
BACTERIA SPEC CULT: NORMAL
BACTERIA SPEC CULT: NORMAL
CALCULATED P AXIS, ECG09: 59 DEGREES
CALCULATED R AXIS, ECG10: 75 DEGREES
CALCULATED T AXIS, ECG11: 54 DEGREES
DIAGNOSIS, 93000: NORMAL
P-R INTERVAL, ECG05: 138 MS
Q-T INTERVAL, ECG07: 404 MS
QRS DURATION, ECG06: 86 MS
QTC CALCULATION (BEZET), ECG08: 406 MS
SERVICE CMNT-IMP: NORMAL
VENTRICULAR RATE, ECG03: 61 BPM

## 2023-05-05 RX ORDER — METOPROLOL TARTRATE 50 MG/1
50 TABLET, FILM COATED ORAL 2 TIMES DAILY
Status: ON HOLD | COMMUNITY
End: 2023-05-10 | Stop reason: HOSPADM

## 2023-05-05 RX ORDER — OXYCODONE HYDROCHLORIDE AND ACETAMINOPHEN 5; 325 MG/1; MG/1
1 TABLET ORAL EVERY 6 HOURS PRN
Status: ON HOLD | COMMUNITY
End: 2023-05-10 | Stop reason: HOSPADM

## 2023-05-05 RX ORDER — METOPROLOL SUCCINATE 50 MG/1
50 TABLET, EXTENDED RELEASE ORAL DAILY
Status: ON HOLD | COMMUNITY

## 2023-05-05 RX ORDER — GABAPENTIN 600 MG/1
900 TABLET ORAL 2 TIMES DAILY
Status: ON HOLD | COMMUNITY

## 2023-05-08 ENCOUNTER — ANESTHESIA EVENT (OUTPATIENT)
Facility: HOSPITAL | Age: 48
DRG: 519 | End: 2023-05-08

## 2023-05-09 ENCOUNTER — ANESTHESIA (OUTPATIENT)
Facility: HOSPITAL | Age: 48
DRG: 519 | End: 2023-05-09

## 2023-05-09 ENCOUNTER — APPOINTMENT (OUTPATIENT)
Facility: HOSPITAL | Age: 48
DRG: 519 | End: 2023-05-09
Attending: ORTHOPAEDIC SURGERY

## 2023-05-09 ENCOUNTER — HOSPITAL ENCOUNTER (INPATIENT)
Facility: HOSPITAL | Age: 48
LOS: 1 days | Discharge: HOME | DRG: 519 | End: 2023-05-10
Attending: ORTHOPAEDIC SURGERY | Admitting: ORTHOPAEDIC SURGERY

## 2023-05-09 DIAGNOSIS — Z98.890 STATUS POST LUMBAR SPINE OPERATIVE PROCEDURE FOR DECOMPRESSION OF SPINAL CORD: Primary | ICD-10-CM

## 2023-05-09 PROBLEM — M51.26 HNP (HERNIATED NUCLEUS PULPOSUS), LUMBAR: Status: ACTIVE | Noted: 2023-05-09

## 2023-05-09 PROCEDURE — 6360000002 HC RX W HCPCS: Performed by: NURSE ANESTHETIST, CERTIFIED REGISTERED

## 2023-05-09 PROCEDURE — 6370000000 HC RX 637 (ALT 250 FOR IP): Performed by: ORTHOPAEDIC SURGERY

## 2023-05-09 PROCEDURE — 97162 PT EVAL MOD COMPLEX 30 MIN: CPT

## 2023-05-09 PROCEDURE — 6370000000 HC RX 637 (ALT 250 FOR IP): Performed by: ANESTHESIOLOGY

## 2023-05-09 PROCEDURE — 3600000014 HC SURGERY LEVEL 4 ADDTL 15MIN: Performed by: ORTHOPAEDIC SURGERY

## 2023-05-09 PROCEDURE — 7100000001 HC PACU RECOVERY - ADDTL 15 MIN: Performed by: ORTHOPAEDIC SURGERY

## 2023-05-09 PROCEDURE — 2580000003 HC RX 258: Performed by: ORTHOPAEDIC SURGERY

## 2023-05-09 PROCEDURE — 2500000003 HC RX 250 WO HCPCS: Performed by: NURSE ANESTHETIST, CERTIFIED REGISTERED

## 2023-05-09 PROCEDURE — 6360000002 HC RX W HCPCS: Performed by: ORTHOPAEDIC SURGERY

## 2023-05-09 PROCEDURE — 2580000003 HC RX 258: Performed by: ANESTHESIOLOGY

## 2023-05-09 PROCEDURE — 72020 X-RAY EXAM OF SPINE 1 VIEW: CPT

## 2023-05-09 PROCEDURE — 3700000000 HC ANESTHESIA ATTENDED CARE: Performed by: ORTHOPAEDIC SURGERY

## 2023-05-09 PROCEDURE — 2580000003 HC RX 258: Performed by: STUDENT IN AN ORGANIZED HEALTH CARE EDUCATION/TRAINING PROGRAM

## 2023-05-09 PROCEDURE — 2720000010 HC SURG SUPPLY STERILE: Performed by: ORTHOPAEDIC SURGERY

## 2023-05-09 PROCEDURE — 3600000004 HC SURGERY LEVEL 4 BASE: Performed by: ORTHOPAEDIC SURGERY

## 2023-05-09 PROCEDURE — 3700000001 HC ADD 15 MINUTES (ANESTHESIA): Performed by: ORTHOPAEDIC SURGERY

## 2023-05-09 PROCEDURE — 01NR0ZZ RELEASE SACRAL NERVE, OPEN APPROACH: ICD-10-PCS | Performed by: ORTHOPAEDIC SURGERY

## 2023-05-09 PROCEDURE — 97530 THERAPEUTIC ACTIVITIES: CPT

## 2023-05-09 PROCEDURE — 76000 FLUOROSCOPY <1 HR PHYS/QHP: CPT

## 2023-05-09 PROCEDURE — C1713 ANCHOR/SCREW BN/BN,TIS/BN: HCPCS | Performed by: ORTHOPAEDIC SURGERY

## 2023-05-09 PROCEDURE — 01NB0ZZ RELEASE LUMBAR NERVE, OPEN APPROACH: ICD-10-PCS | Performed by: ORTHOPAEDIC SURGERY

## 2023-05-09 PROCEDURE — 2709999900 HC NON-CHARGEABLE SUPPLY: Performed by: ORTHOPAEDIC SURGERY

## 2023-05-09 PROCEDURE — 97116 GAIT TRAINING THERAPY: CPT

## 2023-05-09 PROCEDURE — 1100000000 HC RM PRIVATE

## 2023-05-09 PROCEDURE — 7100000000 HC PACU RECOVERY - FIRST 15 MIN: Performed by: ORTHOPAEDIC SURGERY

## 2023-05-09 PROCEDURE — 00QT0ZZ REPAIR SPINAL MENINGES, OPEN APPROACH: ICD-10-PCS | Performed by: ORTHOPAEDIC SURGERY

## 2023-05-09 RX ORDER — ESCITALOPRAM OXALATE 10 MG/1
20 TABLET ORAL DAILY
Status: DISCONTINUED | OUTPATIENT
Start: 2023-05-10 | End: 2023-05-10 | Stop reason: HOSPADM

## 2023-05-09 RX ORDER — ONDANSETRON 2 MG/ML
INJECTION INTRAMUSCULAR; INTRAVENOUS PRN
Status: DISCONTINUED | OUTPATIENT
Start: 2023-05-09 | End: 2023-05-09 | Stop reason: SDUPTHER

## 2023-05-09 RX ORDER — OXYCODONE HYDROCHLORIDE 5 MG/1
10 TABLET ORAL
Status: DISCONTINUED | OUTPATIENT
Start: 2023-05-09 | End: 2023-05-10 | Stop reason: HOSPADM

## 2023-05-09 RX ORDER — ONDANSETRON 2 MG/ML
4 INJECTION INTRAMUSCULAR; INTRAVENOUS
Status: DISCONTINUED | OUTPATIENT
Start: 2023-05-09 | End: 2023-05-09 | Stop reason: HOSPADM

## 2023-05-09 RX ORDER — HYDROMORPHONE HYDROCHLORIDE 1 MG/ML
0.5 INJECTION, SOLUTION INTRAMUSCULAR; INTRAVENOUS; SUBCUTANEOUS
Status: DISCONTINUED | OUTPATIENT
Start: 2023-05-09 | End: 2023-05-10 | Stop reason: HOSPADM

## 2023-05-09 RX ORDER — FENTANYL CITRATE 50 UG/ML
INJECTION, SOLUTION INTRAMUSCULAR; INTRAVENOUS PRN
Status: DISCONTINUED | OUTPATIENT
Start: 2023-05-09 | End: 2023-05-09 | Stop reason: SDUPTHER

## 2023-05-09 RX ORDER — CELECOXIB 200 MG/1
200 CAPSULE ORAL EVERY EVENING
Status: DISCONTINUED | OUTPATIENT
Start: 2023-05-09 | End: 2023-05-10 | Stop reason: HOSPADM

## 2023-05-09 RX ORDER — ALBUTEROL SULFATE 2.5 MG/3ML
2.5 SOLUTION RESPIRATORY (INHALATION) EVERY 4 HOURS PRN
Status: DISCONTINUED | OUTPATIENT
Start: 2023-05-09 | End: 2023-05-10 | Stop reason: HOSPADM

## 2023-05-09 RX ORDER — SODIUM CHLORIDE 0.9 % (FLUSH) 0.9 %
5-40 SYRINGE (ML) INJECTION PRN
Status: DISCONTINUED | OUTPATIENT
Start: 2023-05-09 | End: 2023-05-10 | Stop reason: HOSPADM

## 2023-05-09 RX ORDER — SODIUM CHLORIDE 9 MG/ML
INJECTION, SOLUTION INTRAVENOUS PRN
Status: DISCONTINUED | OUTPATIENT
Start: 2023-05-09 | End: 2023-05-09 | Stop reason: HOSPADM

## 2023-05-09 RX ORDER — ROPIVACAINE HYDROCHLORIDE 5 MG/ML
INJECTION, SOLUTION EPIDURAL; INFILTRATION; PERINEURAL PRN
Status: DISCONTINUED | OUTPATIENT
Start: 2023-05-09 | End: 2023-05-09 | Stop reason: ALTCHOICE

## 2023-05-09 RX ORDER — LIDOCAINE HYDROCHLORIDE 10 MG/ML
1 INJECTION, SOLUTION EPIDURAL; INFILTRATION; INTRACAUDAL; PERINEURAL
Status: DISCONTINUED | OUTPATIENT
Start: 2023-05-09 | End: 2023-05-09 | Stop reason: HOSPADM

## 2023-05-09 RX ORDER — GABAPENTIN 300 MG/1
900 CAPSULE ORAL 2 TIMES DAILY
Status: DISCONTINUED | OUTPATIENT
Start: 2023-05-09 | End: 2023-05-10 | Stop reason: HOSPADM

## 2023-05-09 RX ORDER — FENTANYL CITRATE 50 UG/ML
25 INJECTION, SOLUTION INTRAMUSCULAR; INTRAVENOUS EVERY 5 MIN PRN
Status: DISCONTINUED | OUTPATIENT
Start: 2023-05-09 | End: 2023-05-09 | Stop reason: HOSPADM

## 2023-05-09 RX ORDER — ACETAMINOPHEN 500 MG
1000 TABLET ORAL ONCE
Status: COMPLETED | OUTPATIENT
Start: 2023-05-09 | End: 2023-05-09

## 2023-05-09 RX ORDER — OXYCODONE HYDROCHLORIDE 5 MG/1
5 TABLET ORAL
Status: DISCONTINUED | OUTPATIENT
Start: 2023-05-09 | End: 2023-05-10 | Stop reason: HOSPADM

## 2023-05-09 RX ORDER — BUPROPION HYDROCHLORIDE 150 MG/1
300 TABLET ORAL DAILY
Status: DISCONTINUED | OUTPATIENT
Start: 2023-05-10 | End: 2023-05-10 | Stop reason: HOSPADM

## 2023-05-09 RX ORDER — FENTANYL CITRATE 50 UG/ML
100 INJECTION, SOLUTION INTRAMUSCULAR; INTRAVENOUS
Status: DISCONTINUED | OUTPATIENT
Start: 2023-05-09 | End: 2023-05-09 | Stop reason: HOSPADM

## 2023-05-09 RX ORDER — OXYCODONE HYDROCHLORIDE 5 MG/1
5 TABLET ORAL
Status: DISCONTINUED | OUTPATIENT
Start: 2023-05-09 | End: 2023-05-09 | Stop reason: HOSPADM

## 2023-05-09 RX ORDER — SODIUM CHLORIDE 9 MG/ML
INJECTION, SOLUTION INTRAVENOUS CONTINUOUS
Status: DISCONTINUED | OUTPATIENT
Start: 2023-05-09 | End: 2023-05-10 | Stop reason: HOSPADM

## 2023-05-09 RX ORDER — MIDAZOLAM HYDROCHLORIDE 1 MG/ML
INJECTION INTRAMUSCULAR; INTRAVENOUS PRN
Status: DISCONTINUED | OUTPATIENT
Start: 2023-05-09 | End: 2023-05-09 | Stop reason: SDUPTHER

## 2023-05-09 RX ORDER — HYDROXYZINE HYDROCHLORIDE 10 MG/1
10 TABLET, FILM COATED ORAL EVERY 8 HOURS PRN
Status: DISCONTINUED | OUTPATIENT
Start: 2023-05-09 | End: 2023-05-10 | Stop reason: HOSPADM

## 2023-05-09 RX ORDER — HYDROMORPHONE HCL 110MG/55ML
PATIENT CONTROLLED ANALGESIA SYRINGE INTRAVENOUS PRN
Status: DISCONTINUED | OUTPATIENT
Start: 2023-05-09 | End: 2023-05-09 | Stop reason: SDUPTHER

## 2023-05-09 RX ORDER — DIAZEPAM 5 MG/1
5 TABLET ORAL EVERY 6 HOURS PRN
Status: DISCONTINUED | OUTPATIENT
Start: 2023-05-09 | End: 2023-05-10 | Stop reason: HOSPADM

## 2023-05-09 RX ORDER — PROCHLORPERAZINE EDISYLATE 5 MG/ML
5 INJECTION INTRAMUSCULAR; INTRAVENOUS
Status: DISCONTINUED | OUTPATIENT
Start: 2023-05-09 | End: 2023-05-09 | Stop reason: HOSPADM

## 2023-05-09 RX ORDER — DEXAMETHASONE SODIUM PHOSPHATE 4 MG/ML
INJECTION, SOLUTION INTRA-ARTICULAR; INTRALESIONAL; INTRAMUSCULAR; INTRAVENOUS; SOFT TISSUE PRN
Status: DISCONTINUED | OUTPATIENT
Start: 2023-05-09 | End: 2023-05-09 | Stop reason: SDUPTHER

## 2023-05-09 RX ORDER — MIDAZOLAM HYDROCHLORIDE 2 MG/2ML
2 INJECTION, SOLUTION INTRAMUSCULAR; INTRAVENOUS
Status: DISCONTINUED | OUTPATIENT
Start: 2023-05-09 | End: 2023-05-09 | Stop reason: HOSPADM

## 2023-05-09 RX ORDER — SODIUM CHLORIDE 0.9 % (FLUSH) 0.9 %
5-40 SYRINGE (ML) INJECTION EVERY 12 HOURS SCHEDULED
Status: DISCONTINUED | OUTPATIENT
Start: 2023-05-09 | End: 2023-05-10 | Stop reason: HOSPADM

## 2023-05-09 RX ORDER — SODIUM CHLORIDE, SODIUM LACTATE, POTASSIUM CHLORIDE, CALCIUM CHLORIDE 600; 310; 30; 20 MG/100ML; MG/100ML; MG/100ML; MG/100ML
INJECTION, SOLUTION INTRAVENOUS CONTINUOUS
Status: DISCONTINUED | OUTPATIENT
Start: 2023-05-09 | End: 2023-05-10 | Stop reason: HOSPADM

## 2023-05-09 RX ORDER — SODIUM CHLORIDE, SODIUM LACTATE, POTASSIUM CHLORIDE, CALCIUM CHLORIDE 600; 310; 30; 20 MG/100ML; MG/100ML; MG/100ML; MG/100ML
INJECTION, SOLUTION INTRAVENOUS CONTINUOUS
Status: DISCONTINUED | OUTPATIENT
Start: 2023-05-09 | End: 2023-05-09 | Stop reason: HOSPADM

## 2023-05-09 RX ORDER — METOPROLOL SUCCINATE 50 MG/1
50 TABLET, EXTENDED RELEASE ORAL DAILY
Status: DISCONTINUED | OUTPATIENT
Start: 2023-05-09 | End: 2023-05-09

## 2023-05-09 RX ORDER — HYDROMORPHONE HYDROCHLORIDE 1 MG/ML
0.5 INJECTION, SOLUTION INTRAMUSCULAR; INTRAVENOUS; SUBCUTANEOUS EVERY 5 MIN PRN
Status: DISCONTINUED | OUTPATIENT
Start: 2023-05-09 | End: 2023-05-09 | Stop reason: HOSPADM

## 2023-05-09 RX ORDER — FEXOFENADINE HCL 180 MG/1
180 TABLET ORAL EVERY EVENING
Status: DISCONTINUED | OUTPATIENT
Start: 2023-05-09 | End: 2023-05-09 | Stop reason: CLARIF

## 2023-05-09 RX ORDER — ONDANSETRON 4 MG/1
4 TABLET, ORALLY DISINTEGRATING ORAL EVERY 8 HOURS PRN
Status: DISCONTINUED | OUTPATIENT
Start: 2023-05-09 | End: 2023-05-10 | Stop reason: HOSPADM

## 2023-05-09 RX ORDER — SODIUM CHLORIDE 0.9 % (FLUSH) 0.9 %
5-40 SYRINGE (ML) INJECTION EVERY 12 HOURS SCHEDULED
Status: DISCONTINUED | OUTPATIENT
Start: 2023-05-09 | End: 2023-05-09 | Stop reason: HOSPADM

## 2023-05-09 RX ORDER — CETIRIZINE HYDROCHLORIDE 10 MG/1
10 TABLET ORAL EVERY EVENING
Status: DISCONTINUED | OUTPATIENT
Start: 2023-05-09 | End: 2023-05-10 | Stop reason: HOSPADM

## 2023-05-09 RX ORDER — LIDOCAINE HYDROCHLORIDE 20 MG/ML
INJECTION, SOLUTION EPIDURAL; INFILTRATION; INTRACAUDAL; PERINEURAL PRN
Status: DISCONTINUED | OUTPATIENT
Start: 2023-05-09 | End: 2023-05-09 | Stop reason: SDUPTHER

## 2023-05-09 RX ORDER — PROPOFOL 10 MG/ML
INJECTION, EMULSION INTRAVENOUS PRN
Status: DISCONTINUED | OUTPATIENT
Start: 2023-05-09 | End: 2023-05-09 | Stop reason: SDUPTHER

## 2023-05-09 RX ORDER — ALBUTEROL SULFATE 90 UG/1
2 AEROSOL, METERED RESPIRATORY (INHALATION) EVERY 4 HOURS PRN
Status: DISCONTINUED | OUTPATIENT
Start: 2023-05-09 | End: 2023-05-09 | Stop reason: CLARIF

## 2023-05-09 RX ORDER — SODIUM CHLORIDE 0.9 % (FLUSH) 0.9 %
5-40 SYRINGE (ML) INJECTION PRN
Status: DISCONTINUED | OUTPATIENT
Start: 2023-05-09 | End: 2023-05-09 | Stop reason: HOSPADM

## 2023-05-09 RX ORDER — HYDRALAZINE HYDROCHLORIDE 20 MG/ML
10 INJECTION INTRAMUSCULAR; INTRAVENOUS
Status: DISCONTINUED | OUTPATIENT
Start: 2023-05-09 | End: 2023-05-09 | Stop reason: HOSPADM

## 2023-05-09 RX ORDER — FAMOTIDINE 20 MG/1
20 TABLET, FILM COATED ORAL 2 TIMES DAILY
Status: DISCONTINUED | OUTPATIENT
Start: 2023-05-09 | End: 2023-05-10 | Stop reason: HOSPADM

## 2023-05-09 RX ORDER — POLYETHYLENE GLYCOL 3350 17 G/17G
17 POWDER, FOR SOLUTION ORAL DAILY
Status: DISCONTINUED | OUTPATIENT
Start: 2023-05-09 | End: 2023-05-10 | Stop reason: HOSPADM

## 2023-05-09 RX ORDER — HYDROMORPHONE HYDROCHLORIDE 1 MG/ML
1 INJECTION, SOLUTION INTRAMUSCULAR; INTRAVENOUS; SUBCUTANEOUS
Status: DISCONTINUED | OUTPATIENT
Start: 2023-05-09 | End: 2023-05-10 | Stop reason: HOSPADM

## 2023-05-09 RX ORDER — ROCURONIUM BROMIDE 10 MG/ML
INJECTION, SOLUTION INTRAVENOUS PRN
Status: DISCONTINUED | OUTPATIENT
Start: 2023-05-09 | End: 2023-05-09 | Stop reason: SDUPTHER

## 2023-05-09 RX ORDER — CYCLOBENZAPRINE HCL 10 MG
10 TABLET ORAL EVERY 12 HOURS PRN
Status: DISCONTINUED | OUTPATIENT
Start: 2023-05-09 | End: 2023-05-10 | Stop reason: HOSPADM

## 2023-05-09 RX ORDER — METOPROLOL SUCCINATE 50 MG/1
50 TABLET, EXTENDED RELEASE ORAL NIGHTLY
Status: DISCONTINUED | OUTPATIENT
Start: 2023-05-09 | End: 2023-05-10 | Stop reason: HOSPADM

## 2023-05-09 RX ORDER — PREGABALIN 75 MG/1
75 CAPSULE ORAL ONCE
Status: DISCONTINUED | OUTPATIENT
Start: 2023-05-09 | End: 2023-05-10 | Stop reason: HOSPADM

## 2023-05-09 RX ORDER — SENNA AND DOCUSATE SODIUM 50; 8.6 MG/1; MG/1
1 TABLET, FILM COATED ORAL 2 TIMES DAILY
Status: DISCONTINUED | OUTPATIENT
Start: 2023-05-09 | End: 2023-05-10 | Stop reason: HOSPADM

## 2023-05-09 RX ORDER — ACETAMINOPHEN 325 MG/1
650 TABLET ORAL EVERY 4 HOURS PRN
Status: DISCONTINUED | OUTPATIENT
Start: 2023-05-09 | End: 2023-05-10 | Stop reason: HOSPADM

## 2023-05-09 RX ORDER — ONDANSETRON 2 MG/ML
4 INJECTION INTRAMUSCULAR; INTRAVENOUS EVERY 6 HOURS PRN
Status: DISCONTINUED | OUTPATIENT
Start: 2023-05-09 | End: 2023-05-10 | Stop reason: HOSPADM

## 2023-05-09 RX ORDER — VITAMIN B COMPLEX
5000 TABLET ORAL DAILY
Status: DISCONTINUED | OUTPATIENT
Start: 2023-05-10 | End: 2023-05-10 | Stop reason: HOSPADM

## 2023-05-09 RX ORDER — ACETAMINOPHEN 500 MG
1000 TABLET ORAL EVERY 6 HOURS
Status: DISCONTINUED | OUTPATIENT
Start: 2023-05-09 | End: 2023-05-10 | Stop reason: HOSPADM

## 2023-05-09 RX ADMIN — MIDAZOLAM HYDROCHLORIDE 1 MG: 1 INJECTION, SOLUTION INTRAMUSCULAR; INTRAVENOUS at 08:56

## 2023-05-09 RX ADMIN — FAMOTIDINE 20 MG: 20 TABLET ORAL at 21:43

## 2023-05-09 RX ADMIN — ACETAMINOPHEN 1000 MG: 500 TABLET ORAL at 08:13

## 2023-05-09 RX ADMIN — FENTANYL CITRATE 25 MCG: 50 INJECTION, SOLUTION INTRAMUSCULAR; INTRAVENOUS at 08:56

## 2023-05-09 RX ADMIN — FENTANYL CITRATE 50 MCG: 50 INJECTION, SOLUTION INTRAMUSCULAR; INTRAVENOUS at 09:24

## 2023-05-09 RX ADMIN — HYDROMORPHONE HYDROCHLORIDE 0.5 MG: 2 INJECTION INTRAMUSCULAR; INTRAVENOUS; SUBCUTANEOUS at 10:15

## 2023-05-09 RX ADMIN — HYDROMORPHONE HYDROCHLORIDE 0.5 MG: 2 INJECTION INTRAMUSCULAR; INTRAVENOUS; SUBCUTANEOUS at 10:22

## 2023-05-09 RX ADMIN — ROCURONIUM BROMIDE 50 MG: 10 INJECTION INTRAVENOUS at 09:04

## 2023-05-09 RX ADMIN — GABAPENTIN 900 MG: 300 CAPSULE ORAL at 21:42

## 2023-05-09 RX ADMIN — CELECOXIB 200 MG: 200 CAPSULE ORAL at 17:42

## 2023-05-09 RX ADMIN — WATER 2000 MG: 1 INJECTION INTRAMUSCULAR; INTRAVENOUS; SUBCUTANEOUS at 09:13

## 2023-05-09 RX ADMIN — SUGAMMADEX 200 MG: 100 INJECTION, SOLUTION INTRAVENOUS at 11:05

## 2023-05-09 RX ADMIN — METOPROLOL SUCCINATE 50 MG: 50 TABLET, EXTENDED RELEASE ORAL at 21:45

## 2023-05-09 RX ADMIN — PROPOFOL 150 MG: 10 INJECTION, EMULSION INTRAVENOUS at 09:03

## 2023-05-09 RX ADMIN — FENTANYL CITRATE 25 MCG: 50 INJECTION, SOLUTION INTRAMUSCULAR; INTRAVENOUS at 09:03

## 2023-05-09 RX ADMIN — OXYCODONE HYDROCHLORIDE 10 MG: 5 TABLET ORAL at 21:43

## 2023-05-09 RX ADMIN — SODIUM CHLORIDE, POTASSIUM CHLORIDE, SODIUM LACTATE AND CALCIUM CHLORIDE: 600; 310; 30; 20 INJECTION, SOLUTION INTRAVENOUS at 08:51

## 2023-05-09 RX ADMIN — MIDAZOLAM HYDROCHLORIDE 1 MG: 1 INJECTION, SOLUTION INTRAMUSCULAR; INTRAVENOUS at 09:03

## 2023-05-09 RX ADMIN — LIDOCAINE HYDROCHLORIDE 60 MG: 20 INJECTION, SOLUTION EPIDURAL; INFILTRATION; INTRACAUDAL; PERINEURAL at 09:03

## 2023-05-09 RX ADMIN — Medication 3 AMPULE: at 08:12

## 2023-05-09 RX ADMIN — ACETAMINOPHEN 1000 MG: 500 TABLET ORAL at 17:41

## 2023-05-09 RX ADMIN — SODIUM CHLORIDE, PRESERVATIVE FREE 10 ML: 5 INJECTION INTRAVENOUS at 21:43

## 2023-05-09 RX ADMIN — ONDANSETRON HYDROCHLORIDE 4 MG: 2 INJECTION, SOLUTION INTRAMUSCULAR; INTRAVENOUS at 11:04

## 2023-05-09 RX ADMIN — CEFAZOLIN 2000 MG: 1 INJECTION, POWDER, FOR SOLUTION INTRAMUSCULAR; INTRAVENOUS at 17:41

## 2023-05-09 RX ADMIN — SODIUM CHLORIDE, POTASSIUM CHLORIDE, SODIUM LACTATE AND CALCIUM CHLORIDE: 600; 310; 30; 20 INJECTION, SOLUTION INTRAVENOUS at 08:40

## 2023-05-09 RX ADMIN — ROCURONIUM BROMIDE 20 MG: 10 INJECTION INTRAVENOUS at 09:49

## 2023-05-09 RX ADMIN — SODIUM CHLORIDE: 9 INJECTION, SOLUTION INTRAVENOUS at 12:07

## 2023-05-09 RX ADMIN — DEXAMETHASONE SODIUM PHOSPHATE 4 MG: 4 INJECTION, SOLUTION INTRAMUSCULAR; INTRAVENOUS at 09:03

## 2023-05-09 RX ADMIN — ROCURONIUM BROMIDE 20 MG: 10 INJECTION INTRAVENOUS at 10:22

## 2023-05-09 RX ADMIN — PROPOFOL 50 MG: 10 INJECTION, EMULSION INTRAVENOUS at 09:05

## 2023-05-09 ASSESSMENT — PAIN DESCRIPTION - ORIENTATION: ORIENTATION: MID

## 2023-05-09 ASSESSMENT — PAIN SCALES - GENERAL
PAINLEVEL_OUTOF10: 0
PAINLEVEL_OUTOF10: 0
PAINLEVEL_OUTOF10: 7

## 2023-05-09 ASSESSMENT — PAIN DESCRIPTION - LOCATION: LOCATION: BACK

## 2023-05-09 ASSESSMENT — PAIN - FUNCTIONAL ASSESSMENT: PAIN_FUNCTIONAL_ASSESSMENT: 0-10

## 2023-05-09 ASSESSMENT — PAIN DESCRIPTION - DESCRIPTORS: DESCRIPTORS: ACHING

## 2023-05-09 NOTE — BRIEF OP NOTE
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]    Post-Op Diagnosis: Post-Op Diagnosis Codes:     * Lumbar spondylosis [M47.816]     * DDD (degenerative disc disease), lumbar [M51.36]     * Lumbar pain [M54.50]     * Left sided sciatica [M54.32]     * Herniated lumbar intervertebral disc [M51.26]     * Spinal stenosis, lumbar region, with neurogenic claudication [M48.062]     * Lumbar radiculopathy [M54.16]     * 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

## 2023-05-09 NOTE — ANESTHESIA POSTPROCEDURE EVALUATION
Post-Anesthesia Evaluation and Assessment    Patient: Brianda Query MRN: [id number]  SSN: [id number]    YOB: 1975  Age: 51 y.o. Sex: female      I have evaluated the patient and they are stable and ready for discharge from the PACU. Cardiovascular Function/Vital Signs  Visit Vitals  BP (!) 140/93   Pulse 79   Temp 97.7 °F (36.5 °C) (Oral)   Resp 16   Ht 5' 6.5\" (1.689 m)   Wt 213 lb 6.5 oz (96.8 kg)   SpO2 96%   BMI 33.93 kg/m²       Patient is status post General anesthesia for Procedure(s):  LEFT L4-S1 DECOMPRESSION. Nausea/Vomiting: None    Postoperative hydration reviewed and adequate. Pain:      Managed    Neurological Status: At baseline    Mental Status, Level of Consciousness: Alert and  oriented to person, place, and time    Pulmonary Status:       Adequate oxygenation and airway patent    Complications related to anesthesia: None    Post-anesthesia assessment completed.  No concerns    Signed By: Dilan Swanson MD     May 9, 2023

## 2023-05-09 NOTE — ANESTHESIA PRE PROCEDURE
Department of Anesthesiology  Preprocedure Note       Name:  Jolie Atkinson   Age:  51 y.o.  :  1975                                          MRN:  [id number]         Date:  2023      Surgeon: Va Collins):  Kate Ma MD    Procedure: Procedure(s):  LEFT L4-S1 DECOMPRESSION    Medications prior to admission:   Prior to Admission medications    Medication Sig Start Date End Date Taking? Authorizing Provider   gabapentin (NEURONTIN) 600 MG tablet Take 1.5 tablets by mouth 2 times daily. Max Daily Amount: 1,800 mg   Yes Historical Provider, MD   metoprolol tartrate (LOPRESSOR) 50 MG tablet Take 1 tablet by mouth 2 times daily   Yes Historical Provider, MD   metoprolol succinate (TOPROL XL) 50 MG extended release tablet Take 1 tablet by mouth daily   Yes Historical Provider, MD   oxyCODONE-acetaminophen (PERCOCET) 5-325 MG per tablet Take 1 tablet by mouth every 6 hours as needed for Pain.  Max Daily Amount: 4 tablets   Yes Historical Provider, MD   celecoxib (CELEBREX) 200 MG capsule Take 1 capsule by mouth every evening ceived the following from [facility]: Outside name: celecoxib (CELEBREX) 200 mg capsule 23   Ar Automatic Reconciliation   methocarbamol (ROBAXIN) 500 MG tablet TAKE 1 TABLET BY MOUTH THREE TIMES A DAY AS NEEDED FOR MUSCLE SPASM 4/10/23   Ar Automatic Reconciliation   albuterol sulfate HFA (PROVENTIL;VENTOLIN;PROAIR) 108 (90 Base) MCG/ACT inhaler Inhale 2 puffs into the lungs every 4 hours as needed 17   Ar Automatic Reconciliation   buPROPion (WELLBUTRIN XL) 300 MG extended release tablet TAKE 1 TABLET BY MOUTH EVERY DAY 22   Ar Automatic Reconciliation   vitamin D (CHOLECALCIFEROL) 125 MCG (5000 UT) CAPS capsule Take 1 capsule by mouth daily    Ar Automatic Reconciliation   diclofenac sodium (VOLTAREN) 1 % GEL APPLY TOPICALLY 3 TIMES A DAY AS NEEDED FOR PAIN 22   Ar Automatic Reconciliation   escitalopram (LEXAPRO) 20 MG tablet TAKE 1 TABLET
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
## 2023-05-09 NOTE — DISCHARGE INSTRUCTIONS
well-balanced diet - High in protein, high in vitamins and minerals, especially vitamin C and zinc.     Restrictions:  Limit bending and twisting at waist  Wear your brace as instructed  Lift no more than 10 pounds    Warning signs: Please call your physician immediately at [phone] if you have  Bleeding from incision that is constant. Change in mental status (unusual behavior or confusion)  If your incision develops redness or swelling  Change in wound drainage (increase in amount, color, or foul odor)  Argillite over 101.5 degrees Fahrenheit   Headache that is not relieved with pain medication  Tenderness or redness in the calf of your leg    Emergency: CALL 911 if you have  Shortness of breath  Chest pain  Localized chest pain when coughing or taking a deep breath    Follow-up:  Please call Dr. Nick Lowry office for a follow up appointment in 2-3 weeks at [phone]. You can return to work when cleared by a physician. During normal business hours you may reach Dr. Colleen Mi' team directly at [phone] if you have concerns or questions.     Joshua Pulido
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
## 2023-05-10 VITALS
OXYGEN SATURATION: 99 % | RESPIRATION RATE: 17 BRPM | BODY MASS INDEX: 33.49 KG/M2 | HEIGHT: 67 IN | WEIGHT: 213.41 LBS | SYSTOLIC BLOOD PRESSURE: 138 MMHG | TEMPERATURE: 98.6 F | HEART RATE: 69 BPM | DIASTOLIC BLOOD PRESSURE: 85 MMHG

## 2023-05-10 LAB
ANION GAP SERPL CALC-SCNC: 1 MMOL/L (ref 5–15)
BUN SERPL-MCNC: 10 MG/DL (ref 6–20)
BUN/CREAT SERPL: 13 (ref 12–20)
CALCIUM SERPL-MCNC: 8.4 MG/DL (ref 8.5–10.1)
CHLORIDE SERPL-SCNC: 111 MMOL/L (ref 97–108)
CO2 SERPL-SCNC: 28 MMOL/L (ref 21–32)
CREAT SERPL-MCNC: 0.75 MG/DL (ref 0.55–1.02)
GLUCOSE SERPL-MCNC: 120 MG/DL (ref 65–100)
HCT VFR BLD AUTO: 35.3 % (ref 35–47)
HGB BLD-MCNC: 11.5 G/DL (ref 11.5–16)
POTASSIUM SERPL-SCNC: 3.7 MMOL/L (ref 3.5–5.1)
SODIUM SERPL-SCNC: 140 MMOL/L (ref 136–145)

## 2023-05-10 PROCEDURE — 2700000000 HC OXYGEN THERAPY PER DAY

## 2023-05-10 PROCEDURE — 97165 OT EVAL LOW COMPLEX 30 MIN: CPT

## 2023-05-10 PROCEDURE — 97116 GAIT TRAINING THERAPY: CPT

## 2023-05-10 PROCEDURE — 94760 N-INVAS EAR/PLS OXIMETRY 1: CPT

## 2023-05-10 PROCEDURE — 80048 BASIC METABOLIC PNL TOTAL CA: CPT

## 2023-05-10 PROCEDURE — 85014 HEMATOCRIT: CPT

## 2023-05-10 PROCEDURE — 2580000003 HC RX 258: Performed by: ORTHOPAEDIC SURGERY

## 2023-05-10 PROCEDURE — 97535 SELF CARE MNGMENT TRAINING: CPT

## 2023-05-10 PROCEDURE — 6370000000 HC RX 637 (ALT 250 FOR IP): Performed by: ORTHOPAEDIC SURGERY

## 2023-05-10 PROCEDURE — 36415 COLL VENOUS BLD VENIPUNCTURE: CPT

## 2023-05-10 PROCEDURE — 6360000002 HC RX W HCPCS: Performed by: ORTHOPAEDIC SURGERY

## 2023-05-10 PROCEDURE — 97530 THERAPEUTIC ACTIVITIES: CPT

## 2023-05-10 PROCEDURE — 85018 HEMOGLOBIN: CPT

## 2023-05-10 RX ORDER — OXYCODONE HYDROCHLORIDE 5 MG/1
5-10 TABLET ORAL EVERY 6 HOURS PRN
Qty: 20 TABLET | Refills: 0 | Status: ON HOLD | OUTPATIENT
Start: 2023-05-10 | End: 2023-05-17

## 2023-05-10 RX ORDER — ACETAMINOPHEN 500 MG
1000 TABLET ORAL EVERY 6 HOURS
Qty: 120 TABLET | Refills: 3 | Status: ON HOLD | COMMUNITY
Start: 2023-05-10

## 2023-05-10 RX ADMIN — BUPROPION HYDROCHLORIDE 300 MG: 150 TABLET, EXTENDED RELEASE ORAL at 09:40

## 2023-05-10 RX ADMIN — ACETAMINOPHEN 1000 MG: 500 TABLET ORAL at 05:53

## 2023-05-10 RX ADMIN — ACETAMINOPHEN 1000 MG: 500 TABLET ORAL at 00:09

## 2023-05-10 RX ADMIN — SODIUM CHLORIDE, PRESERVATIVE FREE 10 ML: 5 INJECTION INTRAVENOUS at 09:41

## 2023-05-10 RX ADMIN — GABAPENTIN 900 MG: 300 CAPSULE ORAL at 09:40

## 2023-05-10 RX ADMIN — OXYCODONE HYDROCHLORIDE 10 MG: 5 TABLET ORAL at 11:57

## 2023-05-10 RX ADMIN — FAMOTIDINE 20 MG: 20 TABLET ORAL at 09:40

## 2023-05-10 RX ADMIN — ESCITALOPRAM OXALATE 20 MG: 10 TABLET ORAL at 09:40

## 2023-05-10 RX ADMIN — POLYETHYLENE GLYCOL 3350 17 G: 17 POWDER, FOR SOLUTION ORAL at 09:41

## 2023-05-10 RX ADMIN — Medication 5000 UNITS: at 09:40

## 2023-05-10 RX ADMIN — DOCUSATE SODIUM - SENNOSIDES 1 TABLET: 50; 8.6 TABLET, FILM COATED ORAL at 09:40

## 2023-05-10 RX ADMIN — CEFAZOLIN 2000 MG: 1 INJECTION, POWDER, FOR SOLUTION INTRAMUSCULAR; INTRAVENOUS at 01:45

## 2023-05-10 RX ADMIN — ACETAMINOPHEN 1000 MG: 500 TABLET ORAL at 11:22

## 2023-05-10 ASSESSMENT — PAIN DESCRIPTION - PAIN TYPE: TYPE: ACUTE PAIN

## 2023-05-10 ASSESSMENT — PAIN SCALES - GENERAL
PAINLEVEL_OUTOF10: 0
PAINLEVEL_OUTOF10: 2
PAINLEVEL_OUTOF10: 8

## 2023-05-10 ASSESSMENT — PAIN DESCRIPTION - DESCRIPTORS: DESCRIPTORS: BURNING;ACHING

## 2023-05-10 ASSESSMENT — PAIN DESCRIPTION - ORIENTATION: ORIENTATION: POSTERIOR

## 2023-05-10 ASSESSMENT — PAIN SCALES - WONG BAKER: WONGBAKER_NUMERICALRESPONSE: 2

## 2023-05-10 ASSESSMENT — PAIN DESCRIPTION - LOCATION: LOCATION: BACK

## 2023-05-10 NOTE — PLAN OF CARE
Problem: Pain  Goal: Verbalizes/displays adequate comfort level or baseline comfort level  5/10/2023 1026 by Julien Flores RN  Outcome: Progressing  5/10/2023 0046 by Jakob Abraham RN  Outcome: Progressing  5/10/2023 0045 by Jakob Abraham RN  Outcome: Progressing     Problem: Safety - Adult  Goal: Free from fall injury  5/10/2023 1026 by Julien Flores RN  Outcome: Progressing  5/10/2023 0046 by Jakob Abraham RN  Outcome: Progressing  5/10/2023 0045 by Jakob Abraham RN  Outcome: Progressing     Problem: ABCDS Injury Assessment  Goal: Absence of physical injury  5/10/2023 0046 by Jakob Abraham RN  Outcome: Progressing  5/10/2023 0045 by Jakob Abraham RN  Outcome: Progressing     Problem: Skin/Tissue Integrity  Goal: Absence of new skin breakdown  Description: 1.  Monitor for areas of redness and/or skin breakdown  2.  Assess vascular access sites hourly  3.  Every 4-6 hours minimum:  Change oxygen saturation probe site  4.  Every 4-6 hours:  If on nasal continuous positive airway pressure, respiratory therapy assess nares and determine need for appliance change or resting period.  5/10/2023 1026 by Julien Flores RN  Outcome: Progressing  5/10/2023 0046 by Jakob Abraham RN  Outcome: Progressing  5/10/2023 0045 by Jakob Abraham RN  Outcome: Progressing     Problem: Safety - Adult  Goal: Free from fall injury  5/10/2023 1026 by Julien Flores RN  Outcome: Progressing  5/10/2023 0046 by Jakob Abraham RN  Outcome: Progressing  5/10/2023 0045 by Jakob Abraham RN  Outcome: Progressing     Problem: ABCDS Injury Assessment  Goal: Absence of physical injury  5/10/2023 0046 by Jakob Abraham RN  Outcome: Progressing  5/10/2023 0045 by Jakob Abraham RN  Outcome: Progressing     Problem: Skin/Tissue Integrity  Goal: Absence of new skin breakdown  Description: 1.  Monitor for areas of redness and/or skin breakdown  2.  Assess vascular access sites hourly  3.  Every 4-6 hours minimum:  Change oxygen saturation probe site  4.  Every 4-6

## 2023-05-10 NOTE — OP NOTE
process of the facet, freeing the L4 nerve root to the neuroforamen and then freeing the L5 nerve root on the lateral recess. I was able to retract the thecal sac, find a bulging disk in that area, used a 15-blade to perform a linear annulotomy and a micropituitary to remove the disk fragments from this level. Once this area was fully decompressed, I proceeded to move my retractor down to the L5-S1 level, proceeded to perform an U-cut laminectomy at L5, a J cut at S1 and removed the lamina with a pituitary. At that point, I noticed a small punctate incidental durotomy on the dorsal aspect of the dura. The ligamentum flavum was removed with Kerrison #3. I was able to remove the medial overhang of the facet for facetectomy and due to the location of the incidental durotomy, I decided to remove the small bridge between the L4-5 and L5-S1 laminectomies with a Kerrison #3 and #4. Once the dorsal elements had been removed, I was able then to use a 6-0 Prolene and perform a figure-of-eight suture on the dura to repair the incidental durotomy. Valsalva was then performed and noted to be fully sealed with no leakage at that time. I proceeded then to find the disk material.  The disk material was very large and anterior to the dura, I was able to use a 15-blade, performed a box annulotomy and removed it from ***. I followed the nerve root distally and found that there was a similar disk material distal to the S1 segment at S1-S2 level. In that area, I was able to remove the disk material as well and extend laminectomy to the S1, S2 segment for that purpose. Once that area was decompressed at the S1-S2 segment with a laminectomy, facetectomy and a foraminotomy, I proceeded then to inspect both the L5-S1 and S2 nerve roots and verified that they were all fully decompressed. Once satisfied with the decompression, I proceeded to irrigate the wound with Irrisept.   I was able to have hemostasis obtained, the nerve

## 2023-05-10 NOTE — CARE COORDINATION
CM following planned spinal surgery patient to assist with potential d/c needs.      [address], [place], [address]
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
## 2023-05-10 NOTE — DISCHARGE SUMMARY
Spine Discharge Summary    Patient ID:  Jolie Atkinson  [id number]  female  51 y.o.  1975    Admit date: 5/9/2023    Discharge date: 5/10/2023    Admitting Physician: Kate Ma MD     Consulting Physician(s):   Treatment Team: Attending Provider: Kate Ma MD; Surgeon: Kate Ma MD; : Griselda Mcalpine; Utilization Reviewer: Shana Chaidez RN; Registered Nurse: Radha Ang RN; Physical Therapist: Nikia Butterfield PT; Occupational Therapist: Rafia Edwards OT    Date of Surgery:   5/9/2023    Preoperative Diagnosis:  Lumbar spondylosis [M47.816]  DDD (degenerative disc disease), lumbar [M51.36]  Lumbar pain [M54.50]  Left sided sciatica [M54.32]  Herniated lumbar intervertebral disc [M51.26]  Spinal stenosis, lumbar region, with neurogenic claudication [M48.062]  Lumbar radiculopathy [M54.16]  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     PMH:   Past Medical History:   Diagnosis Date    Acute renal failure (ARF) ([address]     Acute renal failure (ARF) ([address]     6/2020-STONES AND PYELONEPHRITIS    Ankle fracture     Ankle fracture     Arrhythmia     Arrhythmia     PALPITATIONS    Asthma     Asthma     Autoimmune disease (HCC)     MS    Calculus of gallbladder without cholecystitis

## 2023-05-10 NOTE — CARE COORDINATION
Patient to d/c home today. CM informed patient in need of rollator, tub transfer bench, and HH. Pt voiced no preference in [facility]. Referral sent to [facility]. Referral for rollator and tub transfer bench sent to [facility] to be delivered to pt's home if approved. Pt reported she has a family members rollator she can use in the meantime.      [id number], [place], [address]
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
## 2023-05-12 ENCOUNTER — APPOINTMENT (OUTPATIENT)
Facility: HOSPITAL | Age: 48
DRG: 519 | End: 2023-05-12
Payer: COMMERCIAL

## 2023-05-12 ENCOUNTER — HOSPITAL ENCOUNTER (INPATIENT)
Facility: HOSPITAL | Age: 48
LOS: 5 days | Discharge: NURSING, REHAB OR LONG-TERM CARE | DRG: 519 | End: 2023-05-17
Attending: EMERGENCY MEDICINE | Admitting: ORTHOPAEDIC SURGERY
Payer: COMMERCIAL

## 2023-05-12 DIAGNOSIS — Z98.890 S/P LUMBAR LAMINECTOMY: Primary | ICD-10-CM

## 2023-05-12 DIAGNOSIS — Z98.890 STATUS POST LUMBAR SPINE SURGERY FOR DECOMPRESSION OF SPINAL CORD: ICD-10-CM

## 2023-05-12 DIAGNOSIS — R32 URINARY INCONTINENCE, UNSPECIFIED TYPE: ICD-10-CM

## 2023-05-12 DIAGNOSIS — F41.1 GENERALIZED ANXIETY DISORDER: Primary | ICD-10-CM

## 2023-05-12 DIAGNOSIS — R15.9 INCONTINENCE OF FECES, UNSPECIFIED FECAL INCONTINENCE TYPE: ICD-10-CM

## 2023-05-12 PROBLEM — R33.9 URINARY RETENTION: Status: ACTIVE | Noted: 2023-05-12

## 2023-05-12 LAB
ALBUMIN SERPL-MCNC: 2.9 G/DL (ref 3.5–5)
ALBUMIN/GLOB SERPL: 0.8 (ref 1.1–2.2)
ALP SERPL-CCNC: 76 U/L (ref 45–117)
ALT SERPL-CCNC: 22 U/L (ref 12–78)
ANION GAP SERPL CALC-SCNC: 6 MMOL/L (ref 5–15)
APPEARANCE UR: CLEAR
AST SERPL-CCNC: 27 U/L (ref 15–37)
BASOPHILS # BLD: 0.1 K/UL (ref 0–0.1)
BASOPHILS NFR BLD: 1 % (ref 0–1)
BILIRUB SERPL-MCNC: 0.7 MG/DL (ref 0.2–1)
BILIRUB UR QL: NEGATIVE
BUN SERPL-MCNC: 8 MG/DL (ref 6–20)
BUN/CREAT SERPL: 12 (ref 12–20)
CALCIUM SERPL-MCNC: 9 MG/DL (ref 8.5–10.1)
CHLORIDE SERPL-SCNC: 111 MMOL/L (ref 97–108)
CO2 SERPL-SCNC: 25 MMOL/L (ref 21–32)
COLOR UR: NORMAL
CREAT SERPL-MCNC: 0.69 MG/DL (ref 0.55–1.02)
DIFFERENTIAL METHOD BLD: ABNORMAL
EOSINOPHIL # BLD: 0.2 K/UL (ref 0–0.4)
EOSINOPHIL NFR BLD: 2 % (ref 0–7)
ERYTHROCYTE [DISTWIDTH] IN BLOOD BY AUTOMATED COUNT: 13.1 % (ref 11.5–14.5)
GLOBULIN SER CALC-MCNC: 3.7 G/DL (ref 2–4)
GLUCOSE SERPL-MCNC: 94 MG/DL (ref 65–100)
GLUCOSE UR STRIP.AUTO-MCNC: NEGATIVE MG/DL
HCT VFR BLD AUTO: 38.7 % (ref 35–47)
HGB BLD-MCNC: 12.8 G/DL (ref 11.5–16)
HGB UR QL STRIP: NEGATIVE
IMM GRANULOCYTES # BLD AUTO: 0.1 K/UL (ref 0–0.04)
IMM GRANULOCYTES NFR BLD AUTO: 1 % (ref 0–0.5)
INR PPP: 1.1 (ref 0.9–1.1)
KETONES UR QL STRIP.AUTO: NEGATIVE MG/DL
LEUKOCYTE ESTERASE UR QL STRIP.AUTO: NEGATIVE
LIPASE SERPL-CCNC: 131 U/L (ref 73–393)
LYMPHOCYTES # BLD: 1.8 K/UL (ref 0.8–3.5)
LYMPHOCYTES NFR BLD: 20 % (ref 12–49)
MCH RBC QN AUTO: 29 PG (ref 26–34)
MCHC RBC AUTO-ENTMCNC: 33.1 G/DL (ref 30–36.5)
MCV RBC AUTO: 87.8 FL (ref 80–99)
MONOCYTES # BLD: 0.7 K/UL (ref 0–1)
MONOCYTES NFR BLD: 8 % (ref 5–13)
NEUTS SEG # BLD: 6.2 K/UL (ref 1.8–8)
NEUTS SEG NFR BLD: 68 % (ref 32–75)
NITRITE UR QL STRIP.AUTO: NEGATIVE
NRBC # BLD: 0 K/UL (ref 0–0.01)
NRBC BLD-RTO: 0 PER 100 WBC
PH UR STRIP: 7.5 (ref 5–8)
PLATELET # BLD AUTO: 201 K/UL (ref 150–400)
PMV BLD AUTO: 10.9 FL (ref 8.9–12.9)
POTASSIUM SERPL-SCNC: 3.9 MMOL/L (ref 3.5–5.1)
PROT SERPL-MCNC: 6.6 G/DL (ref 6.4–8.2)
PROT UR STRIP-MCNC: NEGATIVE MG/DL
PROTHROMBIN TIME: 11 SEC (ref 9–11.1)
RBC # BLD AUTO: 4.41 M/UL (ref 3.8–5.2)
SODIUM SERPL-SCNC: 142 MMOL/L (ref 136–145)
SP GR UR REFRACTOMETRY: 1.01
UROBILINOGEN UR QL STRIP.AUTO: 0.2 EU/DL (ref 0.2–1)
WBC # BLD AUTO: 9 K/UL (ref 3.6–11)

## 2023-05-12 PROCEDURE — 2580000003 HC RX 258: Performed by: PHYSICIAN ASSISTANT

## 2023-05-12 PROCEDURE — 81002 URINALYSIS NONAUTO W/O SCOPE: CPT

## 2023-05-12 PROCEDURE — 6370000000 HC RX 637 (ALT 250 FOR IP): Performed by: PHYSICIAN ASSISTANT

## 2023-05-12 PROCEDURE — 99285 EMERGENCY DEPT VISIT HI MDM: CPT

## 2023-05-12 PROCEDURE — 2500000003 HC RX 250 WO HCPCS: Performed by: ORTHOPAEDIC SURGERY

## 2023-05-12 PROCEDURE — 36415 COLL VENOUS BLD VENIPUNCTURE: CPT

## 2023-05-12 PROCEDURE — 51702 INSERT TEMP BLADDER CATH: CPT

## 2023-05-12 PROCEDURE — A9579 GAD-BASE MR CONTRAST NOS,1ML: HCPCS | Performed by: PHYSICIAN ASSISTANT

## 2023-05-12 PROCEDURE — 85025 COMPLETE CBC W/AUTO DIFF WBC: CPT

## 2023-05-12 PROCEDURE — 85610 PROTHROMBIN TIME: CPT

## 2023-05-12 PROCEDURE — 1100000000 HC RM PRIVATE

## 2023-05-12 PROCEDURE — 0SB30ZZ EXCISION OF LUMBOSACRAL JOINT, OPEN APPROACH: ICD-10-PCS | Performed by: ORTHOPAEDIC SURGERY

## 2023-05-12 PROCEDURE — 83690 ASSAY OF LIPASE: CPT

## 2023-05-12 PROCEDURE — 80053 COMPREHEN METABOLIC PANEL: CPT

## 2023-05-12 PROCEDURE — 01NR0ZZ RELEASE SACRAL NERVE, OPEN APPROACH: ICD-10-PCS | Performed by: ORTHOPAEDIC SURGERY

## 2023-05-12 PROCEDURE — 6360000002 HC RX W HCPCS: Performed by: PHYSICIAN ASSISTANT

## 2023-05-12 PROCEDURE — 72158 MRI LUMBAR SPINE W/O & W/DYE: CPT

## 2023-05-12 PROCEDURE — 01NB0ZZ RELEASE LUMBAR NERVE, OPEN APPROACH: ICD-10-PCS | Performed by: ORTHOPAEDIC SURGERY

## 2023-05-12 PROCEDURE — 6360000004 HC RX CONTRAST MEDICATION: Performed by: PHYSICIAN ASSISTANT

## 2023-05-12 RX ORDER — 0.9 % SODIUM CHLORIDE 0.9 %
1000 INTRAVENOUS SOLUTION INTRAVENOUS ONCE
Status: COMPLETED | OUTPATIENT
Start: 2023-05-12 | End: 2023-05-12

## 2023-05-12 RX ORDER — ALBUTEROL SULFATE 2.5 MG/3ML
2.5 SOLUTION RESPIRATORY (INHALATION) EVERY 4 HOURS PRN
Status: DISCONTINUED | OUTPATIENT
Start: 2023-05-12 | End: 2023-05-17 | Stop reason: HOSPADM

## 2023-05-12 RX ORDER — ONDANSETRON 4 MG/1
4 TABLET, ORALLY DISINTEGRATING ORAL EVERY 8 HOURS PRN
Status: DISCONTINUED | OUTPATIENT
Start: 2023-05-12 | End: 2023-05-17 | Stop reason: HOSPADM

## 2023-05-12 RX ORDER — CETIRIZINE HYDROCHLORIDE 10 MG/1
10 TABLET ORAL DAILY
Status: DISCONTINUED | OUTPATIENT
Start: 2023-05-12 | End: 2023-05-17 | Stop reason: HOSPADM

## 2023-05-12 RX ORDER — ALBUTEROL SULFATE 90 UG/1
2 AEROSOL, METERED RESPIRATORY (INHALATION) EVERY 4 HOURS PRN
Status: DISCONTINUED | OUTPATIENT
Start: 2023-05-12 | End: 2023-05-12 | Stop reason: CLARIF

## 2023-05-12 RX ORDER — GABAPENTIN 300 MG/1
900 CAPSULE ORAL 2 TIMES DAILY
Status: DISCONTINUED | OUTPATIENT
Start: 2023-05-12 | End: 2023-05-17 | Stop reason: HOSPADM

## 2023-05-12 RX ORDER — MORPHINE SULFATE 2 MG/ML
4 INJECTION, SOLUTION INTRAMUSCULAR; INTRAVENOUS EVERY 4 HOURS PRN
Status: DISCONTINUED | OUTPATIENT
Start: 2023-05-12 | End: 2023-05-17 | Stop reason: HOSPADM

## 2023-05-12 RX ORDER — METHOCARBAMOL 500 MG/1
500 TABLET, FILM COATED ORAL 3 TIMES DAILY PRN
Status: DISCONTINUED | OUTPATIENT
Start: 2023-05-12 | End: 2023-05-13 | Stop reason: ALTCHOICE

## 2023-05-12 RX ORDER — SODIUM CHLORIDE 0.9 % (FLUSH) 0.9 %
5-40 SYRINGE (ML) INJECTION PRN
Status: DISCONTINUED | OUTPATIENT
Start: 2023-05-12 | End: 2023-05-13 | Stop reason: SDUPTHER

## 2023-05-12 RX ORDER — TAMSULOSIN HYDROCHLORIDE 0.4 MG/1
0.4 CAPSULE ORAL DAILY
Status: DISCONTINUED | OUTPATIENT
Start: 2023-05-12 | End: 2023-05-17 | Stop reason: HOSPADM

## 2023-05-12 RX ORDER — BUPROPION HYDROCHLORIDE 150 MG/1
300 TABLET ORAL DAILY
Status: DISCONTINUED | OUTPATIENT
Start: 2023-05-12 | End: 2023-05-17 | Stop reason: HOSPADM

## 2023-05-12 RX ORDER — ACETAMINOPHEN 500 MG
1000 TABLET ORAL EVERY 6 HOURS
Status: DISCONTINUED | OUTPATIENT
Start: 2023-05-12 | End: 2023-05-13 | Stop reason: SDUPTHER

## 2023-05-12 RX ORDER — ALBUTEROL SULFATE 2.5 MG/3ML
2.5 SOLUTION RESPIRATORY (INHALATION) EVERY 6 HOURS PRN
Status: DISCONTINUED | OUTPATIENT
Start: 2023-05-12 | End: 2023-05-12

## 2023-05-12 RX ORDER — SODIUM CHLORIDE 0.9 % (FLUSH) 0.9 %
5-40 SYRINGE (ML) INJECTION EVERY 12 HOURS SCHEDULED
Status: DISCONTINUED | OUTPATIENT
Start: 2023-05-12 | End: 2023-05-17 | Stop reason: HOSPADM

## 2023-05-12 RX ORDER — VITAMIN B COMPLEX
5000 TABLET ORAL DAILY
Status: DISCONTINUED | OUTPATIENT
Start: 2023-05-13 | End: 2023-05-17 | Stop reason: HOSPADM

## 2023-05-12 RX ORDER — METOPROLOL SUCCINATE 50 MG/1
50 TABLET, EXTENDED RELEASE ORAL DAILY
Status: DISCONTINUED | OUTPATIENT
Start: 2023-05-12 | End: 2023-05-17 | Stop reason: HOSPADM

## 2023-05-12 RX ORDER — ONDANSETRON 2 MG/ML
4 INJECTION INTRAMUSCULAR; INTRAVENOUS EVERY 6 HOURS PRN
Status: DISCONTINUED | OUTPATIENT
Start: 2023-05-12 | End: 2023-05-17 | Stop reason: HOSPADM

## 2023-05-12 RX ORDER — SODIUM CHLORIDE 9 MG/ML
INJECTION, SOLUTION INTRAVENOUS PRN
Status: DISCONTINUED | OUTPATIENT
Start: 2023-05-12 | End: 2023-05-17 | Stop reason: HOSPADM

## 2023-05-12 RX ORDER — OXYCODONE HYDROCHLORIDE 5 MG/1
5 TABLET ORAL EVERY 6 HOURS PRN
Status: DISCONTINUED | OUTPATIENT
Start: 2023-05-12 | End: 2023-05-15 | Stop reason: SDUPTHER

## 2023-05-12 RX ORDER — ESCITALOPRAM OXALATE 10 MG/1
20 TABLET ORAL DAILY
Status: DISCONTINUED | OUTPATIENT
Start: 2023-05-12 | End: 2023-05-17 | Stop reason: HOSPADM

## 2023-05-12 RX ADMIN — ACETAMINOPHEN 1000 MG: 500 TABLET ORAL at 20:53

## 2023-05-12 RX ADMIN — TAMSULOSIN HYDROCHLORIDE 0.4 MG: 0.4 CAPSULE ORAL at 15:10

## 2023-05-12 RX ADMIN — MORPHINE SULFATE 4 MG: 2 INJECTION, SOLUTION INTRAMUSCULAR; INTRAVENOUS at 15:10

## 2023-05-12 RX ADMIN — SODIUM CHLORIDE, PRESERVATIVE FREE 10 ML: 5 INJECTION INTRAVENOUS at 20:54

## 2023-05-12 RX ADMIN — GADOTERIDOL 20 ML: 279.3 INJECTION, SOLUTION INTRAVENOUS at 15:37

## 2023-05-12 RX ADMIN — GABAPENTIN 900 MG: 300 CAPSULE ORAL at 20:54

## 2023-05-12 RX ADMIN — METOPROLOL SUCCINATE 50 MG: 50 TABLET, EXTENDED RELEASE ORAL at 20:55

## 2023-05-12 RX ADMIN — SODIUM CHLORIDE 1000 ML: 9 INJECTION, SOLUTION INTRAVENOUS at 16:17

## 2023-05-12 ASSESSMENT — PAIN - FUNCTIONAL ASSESSMENT: PAIN_FUNCTIONAL_ASSESSMENT: 0-10

## 2023-05-12 ASSESSMENT — PAIN SCALES - GENERAL
PAINLEVEL_OUTOF10: 10
PAINLEVEL_OUTOF10: 10
PAINLEVEL_OUTOF10: 8
PAINLEVEL_OUTOF10: 0

## 2023-05-12 ASSESSMENT — ENCOUNTER SYMPTOMS: BACK PAIN: 1

## 2023-05-13 ENCOUNTER — ANESTHESIA EVENT (OUTPATIENT)
Facility: HOSPITAL | Age: 48
End: 2023-05-13
Payer: COMMERCIAL

## 2023-05-13 ENCOUNTER — ANESTHESIA (OUTPATIENT)
Facility: HOSPITAL | Age: 48
End: 2023-05-13
Payer: COMMERCIAL

## 2023-05-13 LAB — HCG SERPL QL: NEGATIVE

## 2023-05-13 PROCEDURE — 6370000000 HC RX 637 (ALT 250 FOR IP): Performed by: ORTHOPAEDIC SURGERY

## 2023-05-13 PROCEDURE — 3700000000 HC ANESTHESIA ATTENDED CARE: Performed by: ORTHOPAEDIC SURGERY

## 2023-05-13 PROCEDURE — 6360000002 HC RX W HCPCS: Performed by: NURSE ANESTHETIST, CERTIFIED REGISTERED

## 2023-05-13 PROCEDURE — 2580000003 HC RX 258: Performed by: ORTHOPAEDIC SURGERY

## 2023-05-13 PROCEDURE — 84703 CHORIONIC GONADOTROPIN ASSAY: CPT

## 2023-05-13 PROCEDURE — C1713 ANCHOR/SCREW BN/BN,TIS/BN: HCPCS | Performed by: ORTHOPAEDIC SURGERY

## 2023-05-13 PROCEDURE — 3600000004 HC SURGERY LEVEL 4 BASE: Performed by: ORTHOPAEDIC SURGERY

## 2023-05-13 PROCEDURE — 36415 COLL VENOUS BLD VENIPUNCTURE: CPT

## 2023-05-13 PROCEDURE — 7100000000 HC PACU RECOVERY - FIRST 15 MIN: Performed by: ORTHOPAEDIC SURGERY

## 2023-05-13 PROCEDURE — 2709999900 HC NON-CHARGEABLE SUPPLY: Performed by: ORTHOPAEDIC SURGERY

## 2023-05-13 PROCEDURE — 3600000014 HC SURGERY LEVEL 4 ADDTL 15MIN: Performed by: ORTHOPAEDIC SURGERY

## 2023-05-13 PROCEDURE — 2720000010 HC SURG SUPPLY STERILE: Performed by: ORTHOPAEDIC SURGERY

## 2023-05-13 PROCEDURE — 7100000001 HC PACU RECOVERY - ADDTL 15 MIN: Performed by: ORTHOPAEDIC SURGERY

## 2023-05-13 PROCEDURE — 2580000003 HC RX 258: Performed by: NURSE ANESTHETIST, CERTIFIED REGISTERED

## 2023-05-13 PROCEDURE — 1100000000 HC RM PRIVATE

## 2023-05-13 PROCEDURE — 2500000003 HC RX 250 WO HCPCS: Performed by: NURSE ANESTHETIST, CERTIFIED REGISTERED

## 2023-05-13 PROCEDURE — 6370000000 HC RX 637 (ALT 250 FOR IP): Performed by: PHYSICIAN ASSISTANT

## 2023-05-13 PROCEDURE — 3700000001 HC ADD 15 MINUTES (ANESTHESIA): Performed by: ORTHOPAEDIC SURGERY

## 2023-05-13 PROCEDURE — 6360000002 HC RX W HCPCS: Performed by: ORTHOPAEDIC SURGERY

## 2023-05-13 RX ORDER — HYDROMORPHONE HYDROCHLORIDE 1 MG/ML
1 INJECTION, SOLUTION INTRAMUSCULAR; INTRAVENOUS; SUBCUTANEOUS
Status: ACTIVE | OUTPATIENT
Start: 2023-05-13 | End: 2023-05-14

## 2023-05-13 RX ORDER — SODIUM CHLORIDE 0.9 % (FLUSH) 0.9 %
5-40 SYRINGE (ML) INJECTION PRN
Status: DISCONTINUED | OUTPATIENT
Start: 2023-05-13 | End: 2023-05-13

## 2023-05-13 RX ORDER — ESCITALOPRAM OXALATE 20 MG/1
20 TABLET ORAL DAILY
Qty: 30 TABLET | OUTPATIENT
Start: 2023-05-13

## 2023-05-13 RX ORDER — HYDRALAZINE HYDROCHLORIDE 20 MG/ML
10 INJECTION INTRAMUSCULAR; INTRAVENOUS
Status: DISCONTINUED | OUTPATIENT
Start: 2023-05-13 | End: 2023-05-13

## 2023-05-13 RX ORDER — LIDOCAINE HYDROCHLORIDE 20 MG/ML
INJECTION, SOLUTION EPIDURAL; INFILTRATION; INTRACAUDAL; PERINEURAL PRN
Status: DISCONTINUED | OUTPATIENT
Start: 2023-05-13 | End: 2023-05-13 | Stop reason: SDUPTHER

## 2023-05-13 RX ORDER — SODIUM CHLORIDE 0.9 % (FLUSH) 0.9 %
5-40 SYRINGE (ML) INJECTION EVERY 12 HOURS SCHEDULED
Status: DISCONTINUED | OUTPATIENT
Start: 2023-05-13 | End: 2023-05-13 | Stop reason: SDUPTHER

## 2023-05-13 RX ORDER — WATER 1000 ML/1000ML
INJECTION, SOLUTION INTRAVENOUS
Status: DISPENSED
Start: 2023-05-13 | End: 2023-05-13

## 2023-05-13 RX ORDER — FENTANYL CITRATE 50 UG/ML
25 INJECTION, SOLUTION INTRAMUSCULAR; INTRAVENOUS EVERY 5 MIN PRN
Status: DISCONTINUED | OUTPATIENT
Start: 2023-05-13 | End: 2023-05-13

## 2023-05-13 RX ORDER — DIAZEPAM 5 MG/1
5 TABLET ORAL EVERY 6 HOURS PRN
Status: DISCONTINUED | OUTPATIENT
Start: 2023-05-13 | End: 2023-05-17 | Stop reason: HOSPADM

## 2023-05-13 RX ORDER — HYDROXYZINE HYDROCHLORIDE 10 MG/1
10 TABLET, FILM COATED ORAL EVERY 8 HOURS PRN
Status: DISCONTINUED | OUTPATIENT
Start: 2023-05-13 | End: 2023-05-17 | Stop reason: HOSPADM

## 2023-05-13 RX ORDER — ONDANSETRON 2 MG/ML
4 INJECTION INTRAMUSCULAR; INTRAVENOUS ONCE
Status: DISCONTINUED | OUTPATIENT
Start: 2023-05-13 | End: 2023-05-13 | Stop reason: HOSPADM

## 2023-05-13 RX ORDER — DIPHENHYDRAMINE HYDROCHLORIDE 50 MG/ML
12.5 INJECTION INTRAMUSCULAR; INTRAVENOUS
Status: DISCONTINUED | OUTPATIENT
Start: 2023-05-13 | End: 2023-05-13

## 2023-05-13 RX ORDER — PROPOFOL 10 MG/ML
INJECTION, EMULSION INTRAVENOUS PRN
Status: DISCONTINUED | OUTPATIENT
Start: 2023-05-13 | End: 2023-05-13 | Stop reason: SDUPTHER

## 2023-05-13 RX ORDER — OXYCODONE HYDROCHLORIDE 5 MG/1
10 TABLET ORAL
Status: DISCONTINUED | OUTPATIENT
Start: 2023-05-13 | End: 2023-05-17 | Stop reason: HOSPADM

## 2023-05-13 RX ORDER — DEXAMETHASONE SODIUM PHOSPHATE 4 MG/ML
INJECTION, SOLUTION INTRA-ARTICULAR; INTRALESIONAL; INTRAMUSCULAR; INTRAVENOUS; SOFT TISSUE PRN
Status: DISCONTINUED | OUTPATIENT
Start: 2023-05-13 | End: 2023-05-13 | Stop reason: SDUPTHER

## 2023-05-13 RX ORDER — ONDANSETRON 4 MG/1
4 TABLET, ORALLY DISINTEGRATING ORAL EVERY 8 HOURS PRN
Status: DISCONTINUED | OUTPATIENT
Start: 2023-05-13 | End: 2023-05-15 | Stop reason: SDUPTHER

## 2023-05-13 RX ORDER — MIDAZOLAM HYDROCHLORIDE 1 MG/ML
2 INJECTION, SOLUTION INTRAMUSCULAR; INTRAVENOUS
Status: DISCONTINUED | OUTPATIENT
Start: 2023-05-13 | End: 2023-05-13

## 2023-05-13 RX ORDER — SODIUM CHLORIDE 9 MG/ML
INJECTION, SOLUTION INTRAVENOUS PRN
Status: DISCONTINUED | OUTPATIENT
Start: 2023-05-13 | End: 2023-05-13 | Stop reason: HOSPADM

## 2023-05-13 RX ORDER — GABAPENTIN 300 MG/1
900 CAPSULE ORAL ONCE
Status: COMPLETED | OUTPATIENT
Start: 2023-05-13 | End: 2023-05-13

## 2023-05-13 RX ORDER — SUCCINYLCHOLINE/SOD CL,ISO/PF 200MG/10ML
SYRINGE (ML) INTRAVENOUS PRN
Status: DISCONTINUED | OUTPATIENT
Start: 2023-05-13 | End: 2023-05-13 | Stop reason: SDUPTHER

## 2023-05-13 RX ORDER — OXYCODONE HYDROCHLORIDE 5 MG/1
5 TABLET ORAL
Status: DISCONTINUED | OUTPATIENT
Start: 2023-05-13 | End: 2023-05-17 | Stop reason: HOSPADM

## 2023-05-13 RX ORDER — ROCURONIUM BROMIDE 10 MG/ML
INJECTION, SOLUTION INTRAVENOUS PRN
Status: DISCONTINUED | OUTPATIENT
Start: 2023-05-13 | End: 2023-05-13 | Stop reason: SDUPTHER

## 2023-05-13 RX ORDER — ONDANSETRON 2 MG/ML
4 INJECTION INTRAMUSCULAR; INTRAVENOUS EVERY 6 HOURS PRN
Status: DISCONTINUED | OUTPATIENT
Start: 2023-05-13 | End: 2023-05-15 | Stop reason: SDUPTHER

## 2023-05-13 RX ORDER — DEXAMETHASONE SODIUM PHOSPHATE 4 MG/ML
4 INJECTION, SOLUTION INTRA-ARTICULAR; INTRALESIONAL; INTRAMUSCULAR; INTRAVENOUS; SOFT TISSUE
Status: DISCONTINUED | OUTPATIENT
Start: 2023-05-13 | End: 2023-05-13

## 2023-05-13 RX ORDER — SODIUM CHLORIDE 0.9 % (FLUSH) 0.9 %
5-40 SYRINGE (ML) INJECTION PRN
Status: DISCONTINUED | OUTPATIENT
Start: 2023-05-13 | End: 2023-05-13 | Stop reason: HOSPADM

## 2023-05-13 RX ORDER — FENTANYL CITRATE 50 UG/ML
100 INJECTION, SOLUTION INTRAMUSCULAR; INTRAVENOUS
Status: DISCONTINUED | OUTPATIENT
Start: 2023-05-13 | End: 2023-05-13 | Stop reason: HOSPADM

## 2023-05-13 RX ORDER — HYDROMORPHONE HYDROCHLORIDE 1 MG/ML
0.5 INJECTION, SOLUTION INTRAMUSCULAR; INTRAVENOUS; SUBCUTANEOUS
Status: ACTIVE | OUTPATIENT
Start: 2023-05-13 | End: 2023-05-14

## 2023-05-13 RX ORDER — CEFAZOLIN SODIUM 1 G/3ML
INJECTION, POWDER, FOR SOLUTION INTRAMUSCULAR; INTRAVENOUS PRN
Status: DISCONTINUED | OUTPATIENT
Start: 2023-05-13 | End: 2023-05-13 | Stop reason: SDUPTHER

## 2023-05-13 RX ORDER — MEPERIDINE HYDROCHLORIDE 25 MG/ML
12.5 INJECTION INTRAMUSCULAR; INTRAVENOUS; SUBCUTANEOUS EVERY 5 MIN PRN
Status: DISCONTINUED | OUTPATIENT
Start: 2023-05-13 | End: 2023-05-13

## 2023-05-13 RX ORDER — FAMOTIDINE 20 MG/1
20 TABLET, FILM COATED ORAL 2 TIMES DAILY
Status: DISCONTINUED | OUTPATIENT
Start: 2023-05-13 | End: 2023-05-17 | Stop reason: HOSPADM

## 2023-05-13 RX ORDER — MIDAZOLAM HYDROCHLORIDE 1 MG/ML
INJECTION INTRAMUSCULAR; INTRAVENOUS PRN
Status: DISCONTINUED | OUTPATIENT
Start: 2023-05-13 | End: 2023-05-13 | Stop reason: SDUPTHER

## 2023-05-13 RX ORDER — HYDROMORPHONE HYDROCHLORIDE 1 MG/ML
0.5 INJECTION, SOLUTION INTRAMUSCULAR; INTRAVENOUS; SUBCUTANEOUS EVERY 5 MIN PRN
Status: DISCONTINUED | OUTPATIENT
Start: 2023-05-13 | End: 2023-05-13

## 2023-05-13 RX ORDER — SENNA AND DOCUSATE SODIUM 50; 8.6 MG/1; MG/1
1 TABLET, FILM COATED ORAL 2 TIMES DAILY
Status: DISCONTINUED | OUTPATIENT
Start: 2023-05-13 | End: 2023-05-16

## 2023-05-13 RX ORDER — ONDANSETRON 2 MG/ML
INJECTION INTRAMUSCULAR; INTRAVENOUS PRN
Status: DISCONTINUED | OUTPATIENT
Start: 2023-05-13 | End: 2023-05-13 | Stop reason: SDUPTHER

## 2023-05-13 RX ORDER — POLYETHYLENE GLYCOL 3350 17 G/17G
17 POWDER, FOR SOLUTION ORAL DAILY
Status: DISCONTINUED | OUTPATIENT
Start: 2023-05-13 | End: 2023-05-16

## 2023-05-13 RX ORDER — SODIUM CHLORIDE, SODIUM LACTATE, POTASSIUM CHLORIDE, CALCIUM CHLORIDE 600; 310; 30; 20 MG/100ML; MG/100ML; MG/100ML; MG/100ML
INJECTION, SOLUTION INTRAVENOUS CONTINUOUS PRN
Status: DISCONTINUED | OUTPATIENT
Start: 2023-05-13 | End: 2023-05-13 | Stop reason: SDUPTHER

## 2023-05-13 RX ORDER — KETAMINE HCL IN NACL, ISO-OSM 100MG/10ML
SYRINGE (ML) INJECTION PRN
Status: DISCONTINUED | OUTPATIENT
Start: 2023-05-13 | End: 2023-05-13 | Stop reason: SDUPTHER

## 2023-05-13 RX ORDER — SODIUM CHLORIDE 0.9 % (FLUSH) 0.9 %
5-40 SYRINGE (ML) INJECTION EVERY 12 HOURS SCHEDULED
Status: DISCONTINUED | OUTPATIENT
Start: 2023-05-13 | End: 2023-05-13 | Stop reason: HOSPADM

## 2023-05-13 RX ORDER — OXYCODONE HYDROCHLORIDE 5 MG/1
5 TABLET ORAL PRN
Status: DISCONTINUED | OUTPATIENT
Start: 2023-05-13 | End: 2023-05-13

## 2023-05-13 RX ORDER — SODIUM CHLORIDE 0.9 % (FLUSH) 0.9 %
5-40 SYRINGE (ML) INJECTION PRN
Status: DISCONTINUED | OUTPATIENT
Start: 2023-05-13 | End: 2023-05-17 | Stop reason: HOSPADM

## 2023-05-13 RX ORDER — DEXMEDETOMIDINE HYDROCHLORIDE 100 UG/ML
INJECTION, SOLUTION INTRAVENOUS PRN
Status: DISCONTINUED | OUTPATIENT
Start: 2023-05-13 | End: 2023-05-13 | Stop reason: SDUPTHER

## 2023-05-13 RX ORDER — SODIUM CHLORIDE 9 MG/ML
INJECTION, SOLUTION INTRAVENOUS PRN
Status: DISCONTINUED | OUTPATIENT
Start: 2023-05-13 | End: 2023-05-13

## 2023-05-13 RX ORDER — ACETAMINOPHEN 500 MG
1000 TABLET ORAL EVERY 6 HOURS
Status: DISCONTINUED | OUTPATIENT
Start: 2023-05-13 | End: 2023-05-17 | Stop reason: HOSPADM

## 2023-05-13 RX ORDER — MIDAZOLAM HYDROCHLORIDE 2 MG/2ML
2 INJECTION, SOLUTION INTRAMUSCULAR; INTRAVENOUS
Status: DISCONTINUED | OUTPATIENT
Start: 2023-05-13 | End: 2023-05-13 | Stop reason: HOSPADM

## 2023-05-13 RX ORDER — CEFAZOLIN SODIUM 1 G/3ML
INJECTION, POWDER, FOR SOLUTION INTRAMUSCULAR; INTRAVENOUS
Status: COMPLETED
Start: 2023-05-13 | End: 2023-05-13

## 2023-05-13 RX ORDER — FENTANYL CITRATE 50 UG/ML
INJECTION, SOLUTION INTRAMUSCULAR; INTRAVENOUS PRN
Status: DISCONTINUED | OUTPATIENT
Start: 2023-05-13 | End: 2023-05-13 | Stop reason: SDUPTHER

## 2023-05-13 RX ORDER — SODIUM CHLORIDE 0.9 % (FLUSH) 0.9 %
5-40 SYRINGE (ML) INJECTION EVERY 12 HOURS SCHEDULED
Status: DISCONTINUED | OUTPATIENT
Start: 2023-05-13 | End: 2023-05-13

## 2023-05-13 RX ORDER — CYCLOBENZAPRINE HCL 10 MG
10 TABLET ORAL EVERY 12 HOURS PRN
Status: DISCONTINUED | OUTPATIENT
Start: 2023-05-13 | End: 2023-05-17 | Stop reason: HOSPADM

## 2023-05-13 RX ORDER — ESMOLOL HYDROCHLORIDE 10 MG/ML
INJECTION INTRAVENOUS PRN
Status: DISCONTINUED | OUTPATIENT
Start: 2023-05-13 | End: 2023-05-13 | Stop reason: SDUPTHER

## 2023-05-13 RX ORDER — HYDROMORPHONE HCL 110MG/55ML
PATIENT CONTROLLED ANALGESIA SYRINGE INTRAVENOUS PRN
Status: DISCONTINUED | OUTPATIENT
Start: 2023-05-13 | End: 2023-05-13 | Stop reason: SDUPTHER

## 2023-05-13 RX ORDER — OXYCODONE HYDROCHLORIDE 5 MG/1
10 TABLET ORAL PRN
Status: DISCONTINUED | OUTPATIENT
Start: 2023-05-13 | End: 2023-05-13

## 2023-05-13 RX ORDER — ACETAMINOPHEN 500 MG
1000 TABLET ORAL ONCE
Status: DISCONTINUED | OUTPATIENT
Start: 2023-05-13 | End: 2023-05-13 | Stop reason: HOSPADM

## 2023-05-13 RX ORDER — PROCHLORPERAZINE EDISYLATE 5 MG/ML
5 INJECTION INTRAMUSCULAR; INTRAVENOUS
Status: DISCONTINUED | OUTPATIENT
Start: 2023-05-13 | End: 2023-05-13

## 2023-05-13 RX ORDER — SODIUM CHLORIDE 9 MG/ML
INJECTION, SOLUTION INTRAVENOUS CONTINUOUS
Status: DISCONTINUED | OUTPATIENT
Start: 2023-05-13 | End: 2023-05-17 | Stop reason: HOSPADM

## 2023-05-13 RX ORDER — SODIUM CHLORIDE, SODIUM LACTATE, POTASSIUM CHLORIDE, CALCIUM CHLORIDE 600; 310; 30; 20 MG/100ML; MG/100ML; MG/100ML; MG/100ML
INJECTION, SOLUTION INTRAVENOUS CONTINUOUS
Status: DISCONTINUED | OUTPATIENT
Start: 2023-05-13 | End: 2023-05-17 | Stop reason: HOSPADM

## 2023-05-13 RX ADMIN — ESMOLOL HYDROCHLORIDE 10 MG: 10 INJECTION, SOLUTION INTRAVENOUS at 10:01

## 2023-05-13 RX ADMIN — LIDOCAINE HYDROCHLORIDE 100 MG: 20 INJECTION, SOLUTION EPIDURAL; INFILTRATION; INTRACAUDAL; PERINEURAL at 08:25

## 2023-05-13 RX ADMIN — CEFAZOLIN 2 G: 1 INJECTION, POWDER, FOR SOLUTION INTRAMUSCULAR; INTRAVENOUS; PARENTERAL at 08:55

## 2023-05-13 RX ADMIN — ROCURONIUM BROMIDE 10 MG: 10 INJECTION INTRAVENOUS at 09:30

## 2023-05-13 RX ADMIN — DEXMEDETOMIDINE HYDROCHLORIDE 4 MCG: 100 INJECTION, SOLUTION, CONCENTRATE INTRAVENOUS at 10:22

## 2023-05-13 RX ADMIN — FAMOTIDINE 20 MG: 20 TABLET ORAL at 14:08

## 2023-05-13 RX ADMIN — TAMSULOSIN HYDROCHLORIDE 0.4 MG: 0.4 CAPSULE ORAL at 14:20

## 2023-05-13 RX ADMIN — GABAPENTIN 900 MG: 300 CAPSULE ORAL at 20:37

## 2023-05-13 RX ADMIN — ROCURONIUM BROMIDE 10 MG: 10 INJECTION INTRAVENOUS at 10:00

## 2023-05-13 RX ADMIN — ROCURONIUM BROMIDE 5 MG: 10 INJECTION INTRAVENOUS at 08:25

## 2023-05-13 RX ADMIN — Medication 30 MG: at 08:30

## 2023-05-13 RX ADMIN — ACETAMINOPHEN 1000 MG: 500 TABLET ORAL at 06:23

## 2023-05-13 RX ADMIN — Medication 140 MG: at 08:25

## 2023-05-13 RX ADMIN — PROPOFOL 150 MG: 10 INJECTION, EMULSION INTRAVENOUS at 08:25

## 2023-05-13 RX ADMIN — HYDROMORPHONE HYDROCHLORIDE 0.5 MG: 2 INJECTION INTRAMUSCULAR; INTRAVENOUS; SUBCUTANEOUS at 09:50

## 2023-05-13 RX ADMIN — SODIUM CHLORIDE: 9 INJECTION, SOLUTION INTRAVENOUS at 13:08

## 2023-05-13 RX ADMIN — SODIUM CHLORIDE, POTASSIUM CHLORIDE, SODIUM LACTATE AND CALCIUM CHLORIDE: 600; 310; 30; 20 INJECTION, SOLUTION INTRAVENOUS at 08:20

## 2023-05-13 RX ADMIN — Medication 10 MG: at 09:29

## 2023-05-13 RX ADMIN — MIDAZOLAM HYDROCHLORIDE 2 MG: 1 INJECTION, SOLUTION INTRAMUSCULAR; INTRAVENOUS at 08:20

## 2023-05-13 RX ADMIN — GABAPENTIN 900 MG: 300 CAPSULE ORAL at 14:42

## 2023-05-13 RX ADMIN — OXYCODONE HYDROCHLORIDE 10 MG: 5 TABLET ORAL at 20:38

## 2023-05-13 RX ADMIN — CEFAZOLIN 2000 MG: 1 INJECTION, POWDER, FOR SOLUTION INTRAMUSCULAR; INTRAVENOUS at 16:35

## 2023-05-13 RX ADMIN — ROCURONIUM BROMIDE 10 MG: 10 INJECTION INTRAVENOUS at 09:45

## 2023-05-13 RX ADMIN — METOPROLOL SUCCINATE 50 MG: 50 TABLET, EXTENDED RELEASE ORAL at 20:38

## 2023-05-13 RX ADMIN — ACETAMINOPHEN 1000 MG: 500 TABLET ORAL at 14:08

## 2023-05-13 RX ADMIN — CETIRIZINE HYDROCHLORIDE 10 MG: 10 TABLET, FILM COATED ORAL at 14:21

## 2023-05-13 RX ADMIN — ONDANSETRON HYDROCHLORIDE 4 MG: 2 INJECTION, SOLUTION INTRAMUSCULAR; INTRAVENOUS at 10:10

## 2023-05-13 RX ADMIN — DEXMEDETOMIDINE HYDROCHLORIDE 4 MCG: 100 INJECTION, SOLUTION, CONCENTRATE INTRAVENOUS at 10:26

## 2023-05-13 RX ADMIN — FAMOTIDINE 20 MG: 20 TABLET ORAL at 20:37

## 2023-05-13 RX ADMIN — ACETAMINOPHEN 1000 MG: 500 TABLET ORAL at 01:00

## 2023-05-13 RX ADMIN — ESCITALOPRAM OXALATE 20 MG: 10 TABLET ORAL at 14:20

## 2023-05-13 RX ADMIN — FENTANYL CITRATE 100 MCG: 50 INJECTION, SOLUTION INTRAMUSCULAR; INTRAVENOUS at 08:25

## 2023-05-13 RX ADMIN — HYDROMORPHONE HYDROCHLORIDE 0.5 MG: 2 INJECTION INTRAMUSCULAR; INTRAVENOUS; SUBCUTANEOUS at 09:11

## 2023-05-13 RX ADMIN — SODIUM CHLORIDE, PRESERVATIVE FREE 10 ML: 5 INJECTION INTRAVENOUS at 20:39

## 2023-05-13 RX ADMIN — Medication 10 MG: at 10:10

## 2023-05-13 RX ADMIN — CHOLECALCIFEROL TAB 25 MCG (1000 UNIT) 5000 UNITS: 25 TAB at 14:17

## 2023-05-13 RX ADMIN — ROCURONIUM BROMIDE 45 MG: 10 INJECTION INTRAVENOUS at 08:30

## 2023-05-13 RX ADMIN — DEXAMETHASONE SODIUM PHOSPHATE 10 MG: 4 INJECTION, SOLUTION INTRAMUSCULAR; INTRAVENOUS at 08:35

## 2023-05-13 RX ADMIN — SUGAMMADEX 400 MG: 100 INJECTION, SOLUTION INTRAVENOUS at 10:31

## 2023-05-13 RX ADMIN — PHENYLEPHRINE HYDROCHLORIDE 20 MCG/MIN: 10 INJECTION INTRAVENOUS at 09:15

## 2023-05-13 RX ADMIN — BUPROPION HYDROCHLORIDE 300 MG: 150 TABLET, EXTENDED RELEASE ORAL at 14:19

## 2023-05-13 ASSESSMENT — PAIN DESCRIPTION - ORIENTATION
ORIENTATION: LEFT
ORIENTATION: MID

## 2023-05-13 ASSESSMENT — PAIN SCALES - GENERAL
PAINLEVEL_OUTOF10: 0
PAINLEVEL_OUTOF10: 7
PAINLEVEL_OUTOF10: 0
PAINLEVEL_OUTOF10: 7
PAINLEVEL_OUTOF10: 0

## 2023-05-13 ASSESSMENT — PAIN DESCRIPTION - DESCRIPTORS
DESCRIPTORS: ACHING
DESCRIPTORS: ACHING

## 2023-05-13 ASSESSMENT — PAIN DESCRIPTION - LOCATION
LOCATION: BACK
LOCATION: BACK

## 2023-05-13 NOTE — ANESTHESIA POSTPROCEDURE EVALUATION
Department of Anesthesiology  Postprocedure Note    Patient: Jim Parra  MRN: [id number]  YOB: 1975  Date of evaluation: 5/13/2023      Procedure Summary     Date: 05/13/23 Room / Location: [facility] MAIN OR  / [facility] MAIN OR    Anesthesia Start: 0820 Anesthesia Stop: 6138    Procedure: INCISION AND DRAINAGE, LUMBAR LAMINECTOMY DISCECTOMY (Back) Diagnosis:       History of lumbar discectomy      (History of lumbar discectomy [Z98.890])    Providers: Meli Quevedo MD Responsible Provider: Junaid Coughlin MD    Anesthesia Type: General ASA Status: 3          Anesthesia Type: General    Olga Phase I: Olga Score: 7    Olga Phase II:        Anesthesia Post Evaluation    Patient location during evaluation: PACU  Patient participation: complete - patient participated  Level of consciousness: awake  Airway patency: patent  Nausea & Vomiting: no vomiting  Complications: no  Cardiovascular status: hemodynamically stable  Respiratory status: acceptable  Hydration status: euvolemic
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
## 2023-05-14 LAB
ANION GAP SERPL CALC-SCNC: 5 MMOL/L (ref 5–15)
BUN SERPL-MCNC: 12 MG/DL (ref 6–20)
BUN/CREAT SERPL: 16 (ref 12–20)
CALCIUM SERPL-MCNC: 8.4 MG/DL (ref 8.5–10.1)
CHLORIDE SERPL-SCNC: 112 MMOL/L (ref 97–108)
CO2 SERPL-SCNC: 27 MMOL/L (ref 21–32)
CREAT SERPL-MCNC: 0.76 MG/DL (ref 0.55–1.02)
GLUCOSE SERPL-MCNC: 167 MG/DL (ref 65–100)
HCT VFR BLD AUTO: 32.9 % (ref 35–47)
HGB BLD-MCNC: 10.5 G/DL (ref 11.5–16)
POTASSIUM SERPL-SCNC: 4 MMOL/L (ref 3.5–5.1)
SODIUM SERPL-SCNC: 144 MMOL/L (ref 136–145)

## 2023-05-14 PROCEDURE — 97116 GAIT TRAINING THERAPY: CPT

## 2023-05-14 PROCEDURE — 2700000000 HC OXYGEN THERAPY PER DAY

## 2023-05-14 PROCEDURE — 97535 SELF CARE MNGMENT TRAINING: CPT

## 2023-05-14 PROCEDURE — 80048 BASIC METABOLIC PNL TOTAL CA: CPT

## 2023-05-14 PROCEDURE — 97530 THERAPEUTIC ACTIVITIES: CPT

## 2023-05-14 PROCEDURE — 6370000000 HC RX 637 (ALT 250 FOR IP): Performed by: ORTHOPAEDIC SURGERY

## 2023-05-14 PROCEDURE — 97166 OT EVAL MOD COMPLEX 45 MIN: CPT

## 2023-05-14 PROCEDURE — 36415 COLL VENOUS BLD VENIPUNCTURE: CPT

## 2023-05-14 PROCEDURE — 6360000002 HC RX W HCPCS: Performed by: ORTHOPAEDIC SURGERY

## 2023-05-14 PROCEDURE — 2580000003 HC RX 258: Performed by: ORTHOPAEDIC SURGERY

## 2023-05-14 PROCEDURE — 85018 HEMOGLOBIN: CPT

## 2023-05-14 PROCEDURE — 1100000000 HC RM PRIVATE

## 2023-05-14 PROCEDURE — 85014 HEMATOCRIT: CPT

## 2023-05-14 PROCEDURE — 97110 THERAPEUTIC EXERCISES: CPT

## 2023-05-14 RX ADMIN — ACETAMINOPHEN 1000 MG: 500 TABLET ORAL at 18:11

## 2023-05-14 RX ADMIN — SODIUM CHLORIDE, PRESERVATIVE FREE 10 ML: 5 INJECTION INTRAVENOUS at 08:51

## 2023-05-14 RX ADMIN — CHOLECALCIFEROL TAB 25 MCG (1000 UNIT) 5000 UNITS: 25 TAB at 08:40

## 2023-05-14 RX ADMIN — SENNOSIDES AND DOCUSATE SODIUM 1 TABLET: 50; 8.6 TABLET ORAL at 08:40

## 2023-05-14 RX ADMIN — CETIRIZINE HYDROCHLORIDE 10 MG: 10 TABLET, FILM COATED ORAL at 08:41

## 2023-05-14 RX ADMIN — SODIUM CHLORIDE, PRESERVATIVE FREE 10 ML: 5 INJECTION INTRAVENOUS at 21:39

## 2023-05-14 RX ADMIN — FAMOTIDINE 20 MG: 20 TABLET ORAL at 21:35

## 2023-05-14 RX ADMIN — GABAPENTIN 900 MG: 300 CAPSULE ORAL at 08:40

## 2023-05-14 RX ADMIN — ESCITALOPRAM OXALATE 20 MG: 10 TABLET ORAL at 08:40

## 2023-05-14 RX ADMIN — ACETAMINOPHEN 1000 MG: 500 TABLET ORAL at 12:35

## 2023-05-14 RX ADMIN — ACETAMINOPHEN 1000 MG: 500 TABLET ORAL at 06:32

## 2023-05-14 RX ADMIN — ACETAMINOPHEN 1000 MG: 500 TABLET ORAL at 00:52

## 2023-05-14 RX ADMIN — GABAPENTIN 900 MG: 300 CAPSULE ORAL at 21:35

## 2023-05-14 RX ADMIN — METOPROLOL SUCCINATE 50 MG: 50 TABLET, EXTENDED RELEASE ORAL at 21:36

## 2023-05-14 RX ADMIN — FAMOTIDINE 20 MG: 20 TABLET ORAL at 08:41

## 2023-05-14 RX ADMIN — OXYCODONE HYDROCHLORIDE 10 MG: 5 TABLET ORAL at 12:35

## 2023-05-14 RX ADMIN — CEFAZOLIN 2000 MG: 1 INJECTION, POWDER, FOR SOLUTION INTRAMUSCULAR; INTRAVENOUS at 00:52

## 2023-05-14 RX ADMIN — CYCLOBENZAPRINE 10 MG: 10 TABLET, FILM COATED ORAL at 21:39

## 2023-05-14 RX ADMIN — TAMSULOSIN HYDROCHLORIDE 0.4 MG: 0.4 CAPSULE ORAL at 08:40

## 2023-05-14 RX ADMIN — BUPROPION HYDROCHLORIDE 300 MG: 150 TABLET, EXTENDED RELEASE ORAL at 08:40

## 2023-05-14 ASSESSMENT — PAIN DESCRIPTION - ORIENTATION
ORIENTATION: MID;POSTERIOR
ORIENTATION: MID;POSTERIOR

## 2023-05-14 ASSESSMENT — PAIN SCALES - GENERAL
PAINLEVEL_OUTOF10: 0
PAINLEVEL_OUTOF10: 10
PAINLEVEL_OUTOF10: 0

## 2023-05-14 ASSESSMENT — PAIN DESCRIPTION - LOCATION
LOCATION: BACK
LOCATION: BACK

## 2023-05-14 ASSESSMENT — PAIN DESCRIPTION - PAIN TYPE: TYPE: SURGICAL PAIN

## 2023-05-14 ASSESSMENT — PAIN DESCRIPTION - DESCRIPTORS
DESCRIPTORS: ACHING
DESCRIPTORS: ACHING

## 2023-05-15 PROCEDURE — 97110 THERAPEUTIC EXERCISES: CPT

## 2023-05-15 PROCEDURE — 6370000000 HC RX 637 (ALT 250 FOR IP): Performed by: ORTHOPAEDIC SURGERY

## 2023-05-15 PROCEDURE — 97116 GAIT TRAINING THERAPY: CPT | Performed by: PHYSICAL THERAPIST

## 2023-05-15 PROCEDURE — 97535 SELF CARE MNGMENT TRAINING: CPT

## 2023-05-15 PROCEDURE — 2580000003 HC RX 258: Performed by: ORTHOPAEDIC SURGERY

## 2023-05-15 PROCEDURE — 1100000000 HC RM PRIVATE

## 2023-05-15 RX ADMIN — OXYCODONE HYDROCHLORIDE 10 MG: 5 TABLET ORAL at 05:08

## 2023-05-15 RX ADMIN — METOPROLOL SUCCINATE 50 MG: 50 TABLET, EXTENDED RELEASE ORAL at 20:44

## 2023-05-15 RX ADMIN — FAMOTIDINE 20 MG: 20 TABLET ORAL at 20:44

## 2023-05-15 RX ADMIN — SENNOSIDES AND DOCUSATE SODIUM 1 TABLET: 50; 8.6 TABLET ORAL at 20:44

## 2023-05-15 RX ADMIN — ESCITALOPRAM OXALATE 20 MG: 10 TABLET ORAL at 09:08

## 2023-05-15 RX ADMIN — CHOLECALCIFEROL TAB 25 MCG (1000 UNIT) 5000 UNITS: 25 TAB at 09:10

## 2023-05-15 RX ADMIN — ACETAMINOPHEN 1000 MG: 500 TABLET ORAL at 06:53

## 2023-05-15 RX ADMIN — BUPROPION HYDROCHLORIDE 300 MG: 150 TABLET, EXTENDED RELEASE ORAL at 09:09

## 2023-05-15 RX ADMIN — GABAPENTIN 900 MG: 300 CAPSULE ORAL at 20:44

## 2023-05-15 RX ADMIN — CETIRIZINE HYDROCHLORIDE 10 MG: 10 TABLET, FILM COATED ORAL at 09:09

## 2023-05-15 RX ADMIN — FAMOTIDINE 20 MG: 20 TABLET ORAL at 09:09

## 2023-05-15 RX ADMIN — ACETAMINOPHEN 1000 MG: 500 TABLET ORAL at 18:41

## 2023-05-15 RX ADMIN — SODIUM CHLORIDE, PRESERVATIVE FREE 10 ML: 5 INJECTION INTRAVENOUS at 09:13

## 2023-05-15 RX ADMIN — GABAPENTIN 900 MG: 300 CAPSULE ORAL at 09:09

## 2023-05-15 RX ADMIN — TAMSULOSIN HYDROCHLORIDE 0.4 MG: 0.4 CAPSULE ORAL at 09:09

## 2023-05-15 RX ADMIN — SODIUM CHLORIDE, PRESERVATIVE FREE 10 ML: 5 INJECTION INTRAVENOUS at 20:56

## 2023-05-15 RX ADMIN — ACETAMINOPHEN 1000 MG: 500 TABLET ORAL at 12:18

## 2023-05-15 RX ADMIN — SENNOSIDES AND DOCUSATE SODIUM 1 TABLET: 50; 8.6 TABLET ORAL at 09:09

## 2023-05-15 ASSESSMENT — PAIN DESCRIPTION - ORIENTATION
ORIENTATION: MID
ORIENTATION: MID

## 2023-05-15 ASSESSMENT — PAIN DESCRIPTION - DESCRIPTORS
DESCRIPTORS: ACHING
DESCRIPTORS: ACHING

## 2023-05-15 ASSESSMENT — PAIN DESCRIPTION - LOCATION
LOCATION: BACK
LOCATION: BACK

## 2023-05-15 ASSESSMENT — PAIN DESCRIPTION - PAIN TYPE: TYPE: ACUTE PAIN;SURGICAL PAIN

## 2023-05-15 ASSESSMENT — PAIN SCALES - GENERAL
PAINLEVEL_OUTOF10: 8
PAINLEVEL_OUTOF10: 8

## 2023-05-15 ASSESSMENT — PAIN DESCRIPTION - FREQUENCY: FREQUENCY: CONTINUOUS

## 2023-05-15 NOTE — PROGRESS NOTES
End of Shift Note    Bedside shift change report given to Lulu Choudhary RN (oncoming nurse) by Kaiden Salinas RN (offgoing nurse). Report included the following information SBAR, Kardex, Intake/Output, MAR, and Recent Results    Shift worked:  night     Shift summary and any significant changes:     Alert and oriented; vitally stable; on aguilar cathter; continue PT/OT; possible rehab/HH     Concerns for physician to address:       Zone phone for oncoming shift:          Activity:     Number times ambulated in hallways past shift: 0  Number of times OOB to chair past shift: 0    Cardiac:   Cardiac Monitoring: No           Access:  Current line(s): PIV     Genitourinary:   Urinary status: aguilar    Respiratory:      Chronic home O2 use?: NO  Incentive spirometer at bedside: YES       GI:     Current diet:  ADULT DIET; Regular  Passing flatus: YES  Tolerating current diet: YES       Pain Management:   Patient states pain is manageable on current regimen: YES    Skin:     Interventions: float heels, increase time out of bed, foam dressing, PT/OT consult, limit briefs, internal/external urinary devices, and nutritional support    Patient Safety:  Fall Score:    Interventions: bed/chair alarm, assistive device (walker, cane.  etc), gripper socks, pt to call before getting OOB, and stay with me (per policy)       Length of Stay:  Expected LOS: 3  Actual LOS: 3      Kaiden Salinas RN

## 2023-05-15 NOTE — PROGRESS NOTES
Ortho / Neurosurgery NP Note    POD# 2  s/p INCISION AND DRAINAGE, LUMBAR LAMINECTOMY DISCECTOMY   S/p  LEFT L4-S1 DECOMPRESSION  on 5/9/23, discharged home on 5/10/23. Readmitted for worsening LE numbness and worsening bladder incontinence. Pt seen with no visitor present. Pt siting up in chair. Awake and alert, smiling, in NAD. Reports postop pain well controlled with little use of oxycodone. Reports BLE numbness improved, still has numbness laterally down LLE to foot and left buttock   Denies numbness in RLE, reports tingling in right toes     Reports she was bed bound since last Tuesday, also reports she was walking over 100 ft with [employer] prior to admission. Also states she is unsure if she used LSO provided after discharge but believes she did not. Able to ambulate 30 ft with PT yesterday. VSS Afebrile. Room air. Visit Vitals  /87   Pulse 82   Temp 98.6 °F (37 °C) (Oral)   Resp 16   Ht 1.689 m (5' 6.5\")   Wt 101.2 kg (223 lb 1.7 oz)   SpO2 96%   BMI 35.47 kg/m²       Voiding status: Rivero inserted 5/10 for retention. Hx of MS with incontinence, renal stones, polynephritis and ARF in 2020, seen by [facility] in the past.          Labs    Lab Results   Component Value Date/Time    HGB 10.5 05/14/2023 01:08 AM      Lab Results   Component Value Date/Time    INR 1.1 05/12/2023 10:48 AM      Lab Results   Component Value Date/Time     05/14/2023 01:08 AM    K 4.0 05/14/2023 01:08 AM     05/14/2023 01:08 AM    CO2 27 05/14/2023 01:08 AM    BUN 12 05/14/2023 01:08 AM       Body mass index is 35.47 kg/m². : A BMI > 30 is classified as obesity and > 40 is classified as morbid obesity. JON dressing c.d.I (changed 5/14) scant medial light serosanguinous drainage   CARSON drain intact - minimal output   Calves soft and supple;  No pain with passive stretch  Sensation intact to light touch, motor intact - BLE 5/5  SCDs for mechanical DVT proph while in bed     PLAN:  1) Neurovascular

## 2023-05-15 NOTE — PROGRESS NOTES
End of Shift Note    Bedside shift change report given to Vannesa (oncoming nurse) by Sulaiman Pierce RN (offgoing nurse). Report included the following information SBAR and Kardex    Shift worked:  7a-7p     Shift summary and any significant changes:     Worked with therapy, pain controlled, incontient of stool twice, aguilar still in place. Patient volunteers that her numbness is present but improved     Concerns for physician to address:       Zone phone for oncoming shift:   [id number]       Activity:     Number times ambulated in hallways past shift: 1  Number of times OOB to chair past shift: 1    Cardiac:   Cardiac Monitoring: No           Access:  Current line(s): PIV     Genitourinary:   Urinary status: aguilar    Respiratory:      Chronic home O2 use?: NO  Incentive spirometer at bedside: YES       GI:     Current diet:  ADULT DIET; Regular  ADULT ORAL NUTRITION SUPPLEMENT; Breakfast, Lunch, Dinner; Low Calorie/High Protein Oral Supplement  Passing flatus: YES  Tolerating current diet: YES       Pain Management:   Patient states pain is manageable on current regimen: YES    Skin:     Interventions: float heels, increase time out of bed, PT/OT consult, and nutritional support    Patient Safety:  Fall Score:    Interventions: bed/chair alarm, assistive device (walker, cane.  etc), gripper socks, pt to call before getting OOB, and gait belt       Length of Stay:  Expected LOS: 3  Actual LOS: 3      Sulaiman Pierce RN

## 2023-05-15 NOTE — CARE COORDINATION
Transition of Care Plan:    RUR: 13%  Prior Level of Functioning: independent   Disposition: IPR  If SNF or IPR: Date FOC offered: 5/15  Date FOC received: 5/15  Accepting facility:  Date authorization started with reference number:  Date authorization received and expires: Follow up appointments: ortho  DME needed: n/a rehab  Transportation at discharge: family   Caregiver Contact: mother Ellwood Pallas [phone]  Discharge Caregiver contacted prior to discharge? To be contacted upon pt request   Care Conference needed? no  CM Barriers to discharge: placement, auth    CM acknowledged consult for IPR. CM made room visit with patient who is agreeable to IPR placement. Pt requesting [facility] choice and [facility]nd choice. Referrals sent and waiting for response.      [id number], [place], [address]

## 2023-05-16 PROCEDURE — 6370000000 HC RX 637 (ALT 250 FOR IP): Performed by: NURSE PRACTITIONER

## 2023-05-16 PROCEDURE — 97530 THERAPEUTIC ACTIVITIES: CPT

## 2023-05-16 PROCEDURE — 6370000000 HC RX 637 (ALT 250 FOR IP): Performed by: ORTHOPAEDIC SURGERY

## 2023-05-16 PROCEDURE — 1100000000 HC RM PRIVATE

## 2023-05-16 PROCEDURE — 97116 GAIT TRAINING THERAPY: CPT

## 2023-05-16 PROCEDURE — 2580000003 HC RX 258: Performed by: ORTHOPAEDIC SURGERY

## 2023-05-16 PROCEDURE — 97535 SELF CARE MNGMENT TRAINING: CPT

## 2023-05-16 RX ORDER — POLYETHYLENE GLYCOL 3350 17 G/17G
17 POWDER, FOR SOLUTION ORAL 3 TIMES DAILY
Status: DISCONTINUED | OUTPATIENT
Start: 2023-05-16 | End: 2023-05-16

## 2023-05-16 RX ORDER — ESCITALOPRAM OXALATE 20 MG/1
20 TABLET ORAL DAILY
Qty: 30 TABLET | Refills: 0 | Status: SHIPPED | OUTPATIENT
Start: 2023-05-16

## 2023-05-16 RX ORDER — POLYETHYLENE GLYCOL 3350 17 G/17G
17 POWDER, FOR SOLUTION ORAL
Status: COMPLETED | OUTPATIENT
Start: 2023-05-16 | End: 2023-05-16

## 2023-05-16 RX ORDER — L.ACID,PARA/B.BIFIDUM/S.THERM 8B CELL
1 CAPSULE ORAL DAILY
Status: DISCONTINUED | OUTPATIENT
Start: 2023-05-16 | End: 2023-05-17 | Stop reason: HOSPADM

## 2023-05-16 RX ADMIN — METOPROLOL SUCCINATE 50 MG: 50 TABLET, EXTENDED RELEASE ORAL at 21:21

## 2023-05-16 RX ADMIN — CETIRIZINE HYDROCHLORIDE 10 MG: 10 TABLET, FILM COATED ORAL at 09:06

## 2023-05-16 RX ADMIN — SODIUM CHLORIDE, PRESERVATIVE FREE 10 ML: 5 INJECTION INTRAVENOUS at 09:05

## 2023-05-16 RX ADMIN — FAMOTIDINE 20 MG: 20 TABLET ORAL at 09:06

## 2023-05-16 RX ADMIN — FAMOTIDINE 20 MG: 20 TABLET ORAL at 21:21

## 2023-05-16 RX ADMIN — POLYETHYLENE GLYCOL 3350 17 G: 17 POWDER, FOR SOLUTION ORAL at 18:23

## 2023-05-16 RX ADMIN — ACETAMINOPHEN 1000 MG: 500 TABLET ORAL at 13:06

## 2023-05-16 RX ADMIN — CHOLECALCIFEROL TAB 25 MCG (1000 UNIT) 5000 UNITS: 25 TAB at 09:05

## 2023-05-16 RX ADMIN — GABAPENTIN 900 MG: 300 CAPSULE ORAL at 21:21

## 2023-05-16 RX ADMIN — ACETAMINOPHEN 1000 MG: 500 TABLET ORAL at 09:06

## 2023-05-16 RX ADMIN — ACETAMINOPHEN 1000 MG: 500 TABLET ORAL at 01:22

## 2023-05-16 RX ADMIN — BUPROPION HYDROCHLORIDE 300 MG: 150 TABLET, EXTENDED RELEASE ORAL at 09:06

## 2023-05-16 RX ADMIN — Medication 1 CAPSULE: at 14:28

## 2023-05-16 RX ADMIN — POLYETHYLENE GLYCOL 3350 17 G: 17 POWDER, FOR SOLUTION ORAL at 12:49

## 2023-05-16 RX ADMIN — ESCITALOPRAM OXALATE 20 MG: 10 TABLET ORAL at 09:06

## 2023-05-16 RX ADMIN — TAMSULOSIN HYDROCHLORIDE 0.4 MG: 0.4 CAPSULE ORAL at 09:05

## 2023-05-16 RX ADMIN — GABAPENTIN 900 MG: 300 CAPSULE ORAL at 09:05

## 2023-05-16 RX ADMIN — POLYETHYLENE GLYCOL 3350 17 G: 17 POWDER, FOR SOLUTION ORAL at 14:28

## 2023-05-16 RX ADMIN — SODIUM CHLORIDE, PRESERVATIVE FREE 10 ML: 5 INJECTION INTRAVENOUS at 21:23

## 2023-05-16 ASSESSMENT — PAIN DESCRIPTION - LOCATION: LOCATION: BACK

## 2023-05-16 ASSESSMENT — PAIN DESCRIPTION - DESCRIPTORS: DESCRIPTORS: ACHING

## 2023-05-16 ASSESSMENT — PAIN SCALES - GENERAL
PAINLEVEL_OUTOF10: 3
PAINLEVEL_OUTOF10: 0
PAINLEVEL_OUTOF10: 5

## 2023-05-16 ASSESSMENT — PAIN - FUNCTIONAL ASSESSMENT: PAIN_FUNCTIONAL_ASSESSMENT: ACTIVITIES ARE NOT PREVENTED

## 2023-05-16 ASSESSMENT — PAIN DESCRIPTION - ORIENTATION: ORIENTATION: MID

## 2023-05-16 NOTE — DISCHARGE INSTRUCTIONS
[address] [facility]    Discharge Instruction Sheet: Lumbar Laminectomy      Pain control:  Typically, we will prescribe a narcotic usually 1-2 tabs every four hours is sufficient for the pain. Most patients need this only for the first few weeks. You should discontinue this as the pain decreases. You should not drive while taking any narcotic pain medications. Constipation:  Pain medicines and anesthesia can be constipating-this can be prevented by gentle physical activity and drinking plenty of fluid. It should be treated with over-the-counter medications such as Miralax or suppositories, and/or Fleets enema. You should have a bowel movement at least every other day following surgery. Incision care:  Keep this area clean and dry. Your dressing is designed to stay in place for 5-7 days. You will be sent home with one additional dressing to change at that time. Leave this new dressing in place until our follow up visit in the office in about 10-14 days. If staples are in place, they should be removed about 14-20 days after surgery. DO NOT take a tub bath or go swimming until cleared by your doctor. DO NOT apply lotions, oils, or creams to incision. Cover the wound with an impermiable dressing to shower for then next 5 days, then no cover is needed. To increase and promote healing:  Stop Smoking (or at least cut back on smoking). Eat a well-balanced diet (high in protein and vitamin C)  If your appetite is poor, consider nutritional supplements like Ensure, Glucerna, or Murray Instant Breakfast.  If you are diabetic, controlling you blood sugars is very important to prevent infection and promote wound healing. Nutrition:  If you were on a supplement such as Ensure or Glucerna) while in the hospital, please continue using them with each meal for the next 30 days.   Eat a

## 2023-05-16 NOTE — CONSULTS
Urology Consult    Patient: Najda Carrillo MRN: [id number]  SSN: [id number]    YOB: 1975  Age: 51 y.o. Sex: female          Date of Consultation:  May 16, 2023  Requesting Physician: Jessenia Duque MD  Reason for Consultation: urinary retention            Assessment/Plan:  Postoperative high volume urinary retention (1500ml)  Status post lumbar decompression with cauda equina syndrome- lower extremity numbness with bladder/bowel incontinence  History of renal stones    -Continue Aguilar through discharge to inpatient rehab, she will require outpatient follow-up after DC from rehab with urology for office voiding trial and further urologic work up for neurogenic bladder  -nursing to provide aguilar teaching   -will sign off and arrange OP follow up     Supervising MD, Dr. Dieudonne Lund        History of Present Illness:  Patient is a 51 y.o. female admitted 5/12/2023 to the hospital for Urinary retention [R33.9]  Urinary incontinence, unspecified type [R32]  Status post lumbar spine surgery for decompression of spinal cord [Z98.890]  Incontinence of feces, unspecified fecal incontinence type [R15.9]. She     Urology consulted for acute urinary retention. She is a known pt of Dr. Meryl Calderón at [address] [place] [address] followed for hx of obstructing renal stones, requiring stents and ureteroscopy, last in June 2020. She has been following on a PRN basis since then. She denies previous voiding dysfunction prior to surgery. She is currently POD 3 S3 laminectomy, facetectomy, foraminotomy bilaterally with orthopedic surgery. She has had an indwelling aguilar since 5/12,  pt reports PVR on bladder scan of 1500ml prior to aguilar placement. She continues with mild bladder spasms. Per chart review she remains af, vss. HD stable with creat NL. No recent abdominal imaging. She maintains excellent UOP. Past Medical History:   Allergies   Allergen Reactions    Latex Rash    Adhesive Tape Rash    Ibuprofen Nausea Only

## 2023-05-16 NOTE — CARE COORDINATION
Transition of Care Plan:     RUR: 13%  Prior Level of Functioning: independent   Disposition: IPR  If SNF or IPR: Date FOC offered: 5/15  Date FOC received: 5/15  Accepting facility:  Date authorization started with reference number:  Date authorization received and expires: Follow up appointments: ortho  DME needed: n/a rehab  Transportation at discharge: family   Caregiver Contact: mother Holley Alba [phone]  Discharge Caregiver contacted prior to discharge? To be contacted upon pt request   Care Conference needed? no  CM Barriers to discharge: [facility] accepted and to start auth this morning.      [id number], [place], [address]

## 2023-05-16 NOTE — PROGRESS NOTES
Ortho / Neurosurgery NP Note    POD# 3  s/p INCISION AND DRAINAGE, LUMBAR LAMINECTOMY DISCECTOMY   S/p  LEFT L4-S1 DECOMPRESSION  on 5/9/23, discharged home on 5/10/23. Readmitted for worsening LE numbness and worsening bladder incontinence. Pt with history of MS, followed by Dr. Vinnie Horowitz at [address] [place] [address]. Pt seen with no visitor present. Pt in bed, recently worked with therapy  Reports postop pain well controlled with little use of oxycodone. Reports RLE numbness has improved, still has numbness laterally down LLE to foot and left buttock. Continued bowel incontinence, states she does not feel when she needs to go or is going, but does feel some pressure. Pt seen by GI today, recommending bowel cleanse. She states she has loose stools at baseline related to celiac disease. Reports she was bed bound since last Tuesday, also reports she was walking over 100 ft with [facility] prior to admission. Also states she is unsure if she used LSO provided after discharge but believes she did not. Ambulating in [place] with therapy today. VSS Afebrile. Room air. Visit Vitals  BP (!) 146/84   Pulse 82   Temp 98.2 °F (36.8 °C) (Oral)   Resp 16   Ht 1.689 m (5' 6.5\")   Wt 101.2 kg (223 lb 1.7 oz)   SpO2 94%   BMI 35.47 kg/m²       Voiding status: Aguilar inserted 5/10 for retention. Hx of MS with incontinence, renal stones, polynephritis and ARF in 2020, seen by Madison Santana  Urology in the past. Urology recommending continuing aguilar with OP follow up. Labs    Lab Results   Component Value Date/Time    HGB 10.5 05/14/2023 01:08 AM      Lab Results   Component Value Date/Time    INR 1.1 05/12/2023 10:48 AM      Lab Results   Component Value Date/Time     05/14/2023 01:08 AM    K 4.0 05/14/2023 01:08 AM     05/14/2023 01:08 AM    CO2 27 05/14/2023 01:08 AM    BUN 12 05/14/2023 01:08 AM       Body mass index is 35.47 kg/m². : A BMI > 30 is classified as obesity and > 40 is classified as morbid obesity.        JON

## 2023-05-16 NOTE — PROGRESS NOTES
Bedside shift change report given to Narciso RODRIGUEZ RN(oncoming nurse) by Chayito Cabrera (offgoing nurse). Report included the following information Nurse Handoff Report and Intake/Output. Patient's vitals have been stable. She is still having numbness at the sacrum. She has had several stools with out knowing. Stools are brown and soft with very little form. Rivero is draining and intact.

## 2023-05-16 NOTE — PROGRESS NOTES
End of Shift Note    Bedside shift change report given to Alberta Croft RN (oncoming nurse) by Pb Machuca RN (offgoing nurse). Report included the following information SBAR and Kardex    Shift worked:  Day     Shift summary and any significant changes:     Vitals stable, worked with PT/OT incontient of stool, aguilar still in place. Patient still has sacral numbness. Concerns for physician to address:       Zone phone for oncoming shift:   [phone]       Activity:     Number times ambulated in hallways past shift: 2  Number of times OOB to chair past shift: 0    Cardiac:   Cardiac Monitoring: No           Access:  Current line(s): PIV     Genitourinary:   Urinary status: aguilar    Respiratory:      Chronic home O2 use?: NO  Incentive spirometer at bedside: YES       GI:     Current diet:  ADULT ORAL NUTRITION SUPPLEMENT; Breakfast, Lunch, Dinner; Low Calorie/High Protein Oral Supplement  ADULT DIET; Regular; Gluten Free  Passing flatus: YES  Tolerating current diet: YES       Pain Management:   Patient states pain is manageable on current regimen: YES    Skin:     Interventions: float heels, increase time out of bed, PT/OT consult, and nutritional support    Patient Safety:  Fall Score:    Interventions: bed/chair alarm, assistive device (walker, cane.  etc), gripper socks, pt to call before getting OOB, and gait belt       Length of Stay:  Expected LOS: 3  Actual LOS: [address], RN
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
## 2023-05-17 VITALS
SYSTOLIC BLOOD PRESSURE: 125 MMHG | RESPIRATION RATE: 18 BRPM | DIASTOLIC BLOOD PRESSURE: 87 MMHG | BODY MASS INDEX: 35.02 KG/M2 | HEART RATE: 100 BPM | TEMPERATURE: 98.1 F | OXYGEN SATURATION: 97 % | WEIGHT: 223.11 LBS | HEIGHT: 67 IN

## 2023-05-17 PROCEDURE — 6370000000 HC RX 637 (ALT 250 FOR IP): Performed by: ORTHOPAEDIC SURGERY

## 2023-05-17 PROCEDURE — 6370000000 HC RX 637 (ALT 250 FOR IP): Performed by: NURSE PRACTITIONER

## 2023-05-17 PROCEDURE — 97116 GAIT TRAINING THERAPY: CPT

## 2023-05-17 PROCEDURE — 97530 THERAPEUTIC ACTIVITIES: CPT

## 2023-05-17 RX ORDER — GABAPENTIN 300 MG/1
900 CAPSULE ORAL 2 TIMES DAILY
Qty: 84 CAPSULE | Refills: 0 | Status: SHIPPED | OUTPATIENT
Start: 2023-05-17 | End: 2023-05-31

## 2023-05-17 RX ORDER — OXYCODONE HYDROCHLORIDE 5 MG/1
5 TABLET ORAL EVERY 6 HOURS PRN
Qty: 28 TABLET | Refills: 0 | Status: SHIPPED | OUTPATIENT
Start: 2023-05-17 | End: 2023-05-24

## 2023-05-17 RX ORDER — L.ACID,PARA/B.BIFIDUM/S.THERM 8B CELL
1 CAPSULE ORAL DAILY
Qty: 30 CAPSULE | Refills: 0 | Status: SHIPPED
Start: 2023-05-18 | End: 2023-06-17

## 2023-05-17 RX ADMIN — FAMOTIDINE 20 MG: 20 TABLET ORAL at 09:39

## 2023-05-17 RX ADMIN — ESCITALOPRAM OXALATE 20 MG: 10 TABLET ORAL at 09:38

## 2023-05-17 RX ADMIN — TAMSULOSIN HYDROCHLORIDE 0.4 MG: 0.4 CAPSULE ORAL at 09:39

## 2023-05-17 RX ADMIN — GABAPENTIN 900 MG: 300 CAPSULE ORAL at 09:39

## 2023-05-17 RX ADMIN — PSYLLIUM HUSK 1 PACKET: 3.4 POWDER ORAL at 09:38

## 2023-05-17 RX ADMIN — CETIRIZINE HYDROCHLORIDE 10 MG: 10 TABLET, FILM COATED ORAL at 09:39

## 2023-05-17 RX ADMIN — ACETAMINOPHEN 1000 MG: 500 TABLET ORAL at 09:38

## 2023-05-17 RX ADMIN — Medication 1 CAPSULE: at 09:39

## 2023-05-17 RX ADMIN — ACETAMINOPHEN 1000 MG: 500 TABLET ORAL at 00:48

## 2023-05-17 RX ADMIN — BUPROPION HYDROCHLORIDE 300 MG: 150 TABLET, EXTENDED RELEASE ORAL at 09:38

## 2023-05-17 ASSESSMENT — PAIN SCALES - GENERAL: PAINLEVEL_OUTOF10: 0

## 2023-05-17 NOTE — PROGRESS NOTES
Spiritual Care Assessment/Progress Note  [address]    Name: Josué Calix MRN: [id number]    Age: 51 y.o. Sex: female   Language: English     Date: 5/17/2023            Total Time Calculated: 13 min              Spiritual Assessment begun in [facility]  Service Provided For[de-identified] Patient not available  Referral/Consult From[de-identified] Rounding  Encounter Overview/Reason : Initial Encounter    Spiritual beliefs:      [] Involved in a yadira tradition/spiritual practice:      [] Supported by a yadira community:      [] Claims no spiritual orientation:      [] Seeking spiritual identity:           [] Adheres to an individual form of spirituality:      [x] Not able to assess:                Identified resources for coping and support system:   Support System: Unknown       [] Prayer                  [] Devotional reading               [] Music                  [] Guided Imagery     [] Pet visits                                        [] Other: (COMMENT)     Specific area/focus of visit   Encounter: Type: Initial Screen/Assessment  Crisis:    Spiritual/Emotional needs: Type: Spiritual Support  Ritual, Rites and Sacraments:    Grief, Loss, and Adjustments:    Ethics/Mediation:    Behavioral Health:    Palliative Care: Advance Care Planning:           Narrative:  reviewed the patient's chart prior to the visit. Ms. Yobany Obrien was on her cell phone when the  entered her room.  waited for her but had to see another patient.  services are available 24 hours a day as requested. . JAYLEN Ramirez  [facility]   Paging Service [phone] ([phone])

## 2023-05-17 NOTE — PROGRESS NOTES
Skin assessed with Candie Coronel RN. Patient's bottom remains inflamed, red, broken in areas r/t stool incontinence. Briefs discouraged and zinc cream encouraged.

## 2023-05-17 NOTE — CARE COORDINATION
Transition of Care Plan:     RUR: 13%  Prior Level of Functioning: independent   Disposition: IPR - [facility] - call report [phone] - going to room 3020 - accepting physician Dr. Arpan Tadeo  If SNF or IPR: Date FOC offered: 5/15  Date FOC received: 5/15  Accepting facility:  [facility]  Date authorization started with reference number: 5/16  Date authorization received and expires: 5/17  Follow up appointments: ortho  DME needed: n/a rehab  Transportation at discharge: [facility] (family paid) @ 2pm  Caregiver Contact: mother Ebonie Munguia [phone]    Update  Pt requesting w/c transport as pt's mother does not feel comfortable transporting patient herself. CM contacted [facility] and obtained estimated cost of ~$90 from [address] to [facility]. Pt and mother aware of estimated ~$90 cost and agree. CM arranged transport for 2pm. RN notified. Initial note  [facility] obtained insurance auth and has a bed for patient today.  Pt reported her mother can transport her around 1:30pm.     Tara Gerber, [address]

## 2023-05-17 NOTE — PROGRESS NOTES
Bedside shift change report given to Gopi Walker RN (oncoming nurse) by Sherri Guadarrama RN (offgoing nurse). Report included the following information Nurse Handoff Report. Vitals are stable. She is still incontinent of bowel. She has very irritated buttocks r/t feces. Thick layers of  barrier cream has been used all shift long and the buttocks is looking better as well as she has not had diapers on to prevent the feces from just sitting on the buttocks.  She has sacral numbness
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
## 2023-05-17 NOTE — PROGRESS NOTES
Ortho / Neurosurgery NP Note    POD# 4  s/p INCISION AND DRAINAGE, LUMBAR LAMINECTOMY DISCECTOMY   S/p  LEFT L4-S1 DECOMPRESSION  on 5/9/23, discharged home on 5/10/23. Readmitted for worsening LE numbness and worsening bladder incontinence. Pt with history of MS, followed by Dr. Mick Eric at [facility]. Pt seen with no visitor present. Pt in bed, talking on cell phone. Reports postop pain, controlled and improving. Reports RLE numbness has improved, still has numbness laterally down LLE to foot and left buttock. Continued bowel incontinence with mild abdominal cramping, completed Miralax ordered by GI yesterday. Patient reports some irritation from stools, nursing using zinc cream with hygiene care. Reports she was bed bound since last Tuesday, also reports she was walking over 100 ft with [facility] prior to admission. Also states she is unsure if she used LSO provided after discharge but believes she did not. Ambulating in [place] with therapy today. VSS Afebrile. Room air. Visit Vitals  BP (!) 148/96   Pulse 95   Temp 98.1 °F (36.7 °C)   Resp 16   Ht 1.689 m (5' 6.5\")   Wt 101.2 kg (223 lb 1.7 oz)   SpO2 94%   BMI 35.47 kg/m²       Voiding status: Aguilar inserted 5/10 for retention. Hx of MS with incontinence, renal stones, polynephritis and ARF in 2020, seen by [facility] in the past. Urology recommending continuing aguilar with OP follow up. Labs    Lab Results   Component Value Date/Time    HGB 10.5 05/14/2023 01:08 AM      Lab Results   Component Value Date/Time    INR 1.1 05/12/2023 10:48 AM      Lab Results   Component Value Date/Time     05/14/2023 01:08 AM    K 4.0 05/14/2023 01:08 AM     05/14/2023 01:08 AM    CO2 27 05/14/2023 01:08 AM    BUN 12 05/14/2023 01:08 AM       Body mass index is 35.47 kg/m². : A BMI > 30 is classified as obesity and > 40 is classified as morbid obesity. Optifoam-CDI  Calves soft and supple;  No pain with passive stretch  Sensation

## 2023-05-17 NOTE — DISCHARGE SUMMARY
Spine Discharge Summary    Patient ID:  Iva Mirza  [id number]  female  51 y.o.  1975    Admit date: 5/12/2023    Discharge date: 5/17/2023    Admitting Physician: Cally Brady MD     Consulting Physician(s):   Treatment Team: Attending Provider: Cally Brady MD; Surgeon: Cally Brady MD; : Velia Oden; Utilization Reviewer: Avery Hussein RN; Consulting Physician: Daja Davis MD; Registered Nurse: Marvel Reynolds RN; Physical Therapist Assistant: Marcela Loo PTA; Occupational Therapist: Milagros Pringle OT    Date of Surgery:   5/13/2023    Preoperative Diagnosis:  History of lumbar discectomy [Z98.890]    Postoperative Diagnosis:   * No post-op diagnosis entered *    Procedure(s):  INCISION AND DRAINAGE, LUMBAR LAMINECTOMY DISCECTOMY     Anesthesia Type:   General     Surgeon: Cally Brady MD                            HPI:  Pt is a 51 y.o. female who has a history of History of lumbar discectomy [Z98.890]  with pain and limitations of activities of daily living who presents at this time for a INCISION AND DRAINAGE, LUMBAR LAMINECTOMY DISCECTOMY  following the failure of conservative management.     PMH:   Past Medical History:   Diagnosis Date    Acute renal failure (ARF) ([address]     Acute renal failure (ARF) ([address]     6/2020-STONES AND PYELONEPHRITIS    Ankle fracture     Ankle fracture     Arrhythmia     Arrhythmia     PALPITATIONS    Asthma     Asthma     Autoimmune disease ([address]     MS    Calculus of gallbladder without cholecystitis without obstruction 07/20/2020    Calculus of gallbladder without cholecystitis without obstruction 07/20/2020    Celiac disease     Celiac disease     Chronic pain     r/t MS    Chronic pain     MS    Congenital sucrose isomaltose malabsorption     Congenital sucrose isomaltose malabsorption     Depression     Depression     Diverticulosis     of sigmoid colon    Diverticulosis of sigmoid colon     Dysplastic colon polyp     Dr. Nicho Lott-

## 2023-05-17 NOTE — PROGRESS NOTES
GI PROGRESS NOTE  Himanshu Foy NP  [phone] NP in-hospital cell phone M-F until 4:30  After 5pm or on weekends, please call  for physician on call    Dianne Tapia :1975 GEM:[id number]   ATTG: Dr. Mary Flores  PCP: Yomaira Hurt DO  Date/Time:  2023 10:38 AM   Primary GI: Dr. Rachael Pérez  Reason for following: Fecal incontinence     Assessment:   Fecal incontinence   S/p lumbar laminectomy discectomy on 2023   States she is unable to feel when she is having BM   Last CLN in 2022 showing small internal hemorrhoids and 2 sessile sigmoid polyps  Weak rectal tone on NORMA    Plan: Bowel cleanse with MiraLax yesterday   Continue with Metamucil daily and probiotic   High fiber diet  Symptomatic care per primary team   Recommend outpatient follow-up with Dr. Rachael Pérez for further work-up  Nothing further to add from a GI standpoint. GI signing-off. Please call with any questions. Thank you for this consult. *Plan of care discussed with Dr. Paul Velasquez     Subjective:   Discussed with RN events overnight. Patient resting in bed with OT at bedside. States she is still incontinent of stool. Completed MiraLax doses yesterday. Review of Systems:  Symptom Y/N Comments  Symptom Y/N Comments   Fever/Chills n   Chest Pain n    Cough n   Headaches n    Sputum n   Joint Pain n    SOB/QUINONEZ n   Pruritis/Rash n    Tolerating Diet y   Other       Could NOT obtain due to:      Objective:   VITALS:   Last 24hrs VS reviewed since prior progress note. Most recent are:  Vitals:    23 0921   BP: (!) 148/96   Pulse: 95   Resp: 16   Temp: 98.1 °F (36.7 °C)   SpO2: 94%     No intake or output data in the 24 hours ending 23 1038  PHYSICAL EXAM:  General: Pleasant  female. NAD   HEENT: NC, Atraumatic. Anicteric sclerae. Lungs:  CTA Bilaterally. No Wheezing/Rhonchi/Rales.   Heart:  Regular  rhythm,  No murmur, No Rubs, No Gallops  Abdomen: Soft, Non distended, Non

## 2023-05-29 DIAGNOSIS — I10 ESSENTIAL (PRIMARY) HYPERTENSION: Primary | ICD-10-CM

## 2023-05-29 DIAGNOSIS — R00.2 PALPITATIONS: ICD-10-CM

## 2023-05-30 RX ORDER — METOPROLOL SUCCINATE 100 MG/1
TABLET, EXTENDED RELEASE ORAL
Qty: 90 TABLET | Refills: 3 | Status: SHIPPED | OUTPATIENT
Start: 2023-05-30

## 2023-05-30 NOTE — TELEPHONE ENCOUNTER
Requested Prescriptions     Signed Prescriptions Disp Refills    metoprolol succinate (TOPROL XL) 100 MG extended release tablet 90 tablet 3     Sig: TAKE 1 TABLET BY MOUTH EVERY DAY     Authorizing Provider: Mei Tierney     Ordering User: Sana Mario    Per Dr. Frey Card verbal order.

## 2023-06-16 ENCOUNTER — TELEPHONE (OUTPATIENT)
Dept: PRIMARY CARE CLINIC | Facility: CLINIC | Age: 48
End: 2023-06-16

## 2023-06-16 NOTE — TELEPHONE ENCOUNTER
----- Message from Carmencita Osman sent at 5/12/2023  8:23 AM EDT -----  Subject: Hospital Follow Up    QUESTIONS  What hospital was the Patient Discharged from? [address] [address]  Date of Discharge? 2023-05-10  Discharge Location? Home  Reason for hospitalization as patient stated? back surgery  What question does the patient have, if applicable?   ---------------------------------------------------------------------------  --------------  CALL BACK INFO  What is the best way for the office to contact you? OK to leave message on   voicemail  Preferred Call Back Phone Number? [phone]  ---------------------------------------------------------------------------  --------------  SCRIPT ANSWERS  Relationship to Patient? Covered Entity  Covered Entity Type? Hospital?  Representative Name?  Alan Mukherjee

## 2023-06-20 ENCOUNTER — OFFICE VISIT (OUTPATIENT)
Dept: PRIMARY CARE CLINIC | Facility: CLINIC | Age: 48
End: 2023-06-20
Payer: COMMERCIAL

## 2023-06-20 VITALS
HEIGHT: 66 IN | TEMPERATURE: 97.8 F | OXYGEN SATURATION: 98 % | WEIGHT: 215 LBS | RESPIRATION RATE: 18 BRPM | HEART RATE: 73 BPM | DIASTOLIC BLOOD PRESSURE: 80 MMHG | BODY MASS INDEX: 34.55 KG/M2 | SYSTOLIC BLOOD PRESSURE: 116 MMHG

## 2023-06-20 DIAGNOSIS — Z09 HOSPITAL DISCHARGE FOLLOW-UP: ICD-10-CM

## 2023-06-20 DIAGNOSIS — G62.9 NEUROPATHY: Primary | ICD-10-CM

## 2023-06-20 DIAGNOSIS — Z98.890 S/P LUMBAR LAMINECTOMY: ICD-10-CM

## 2023-06-20 DIAGNOSIS — G62.9 NEUROPATHY: ICD-10-CM

## 2023-06-20 DIAGNOSIS — F33.42 MAJOR DEPRESSIVE DISORDER, RECURRENT, IN FULL REMISSION (HCC): ICD-10-CM

## 2023-06-20 DIAGNOSIS — N39.498 OTHER URINARY INCONTINENCE: ICD-10-CM

## 2023-06-20 DIAGNOSIS — G35 MULTIPLE SCLEROSIS (HCC): ICD-10-CM

## 2023-06-20 PROCEDURE — 3074F SYST BP LT 130 MM HG: CPT | Performed by: FAMILY MEDICINE

## 2023-06-20 PROCEDURE — 3078F DIAST BP <80 MM HG: CPT | Performed by: FAMILY MEDICINE

## 2023-06-20 PROCEDURE — 1111F DSCHRG MED/CURRENT MED MERGE: CPT | Performed by: FAMILY MEDICINE

## 2023-06-20 PROCEDURE — 99213 OFFICE O/P EST LOW 20 MIN: CPT | Performed by: FAMILY MEDICINE

## 2023-06-20 SDOH — ECONOMIC STABILITY: INCOME INSECURITY: HOW HARD IS IT FOR YOU TO PAY FOR THE VERY BASICS LIKE FOOD, HOUSING, MEDICAL CARE, AND HEATING?: PATIENT DECLINED

## 2023-06-20 SDOH — ECONOMIC STABILITY: FOOD INSECURITY: WITHIN THE PAST 12 MONTHS, THE FOOD YOU BOUGHT JUST DIDN'T LAST AND YOU DIDN'T HAVE MONEY TO GET MORE.: PATIENT DECLINED

## 2023-06-20 SDOH — ECONOMIC STABILITY: FOOD INSECURITY: WITHIN THE PAST 12 MONTHS, YOU WORRIED THAT YOUR FOOD WOULD RUN OUT BEFORE YOU GOT MONEY TO BUY MORE.: PATIENT DECLINED

## 2023-06-20 SDOH — ECONOMIC STABILITY: HOUSING INSECURITY
IN THE LAST 12 MONTHS, WAS THERE A TIME WHEN YOU DID NOT HAVE A STEADY PLACE TO SLEEP OR SLEPT IN A SHELTER (INCLUDING NOW)?: PATIENT REFUSED

## 2023-06-20 NOTE — PROGRESS NOTES
Chief Complaint   Patient presents with    Follow-Up from Hospital       There were no vitals taken for this visit. 1. Have you been to the ER, urgent care clinic since your last visit? Hospitalized since your last visit? [facility]    2. Have you seen or consulted any other health care providers outside of the [address] [place] [address] since your last visit? Include any pap smears or colon screening. [facility] for PT and re[facility]      3. For patients aged 39-70: Has the patient had a colonoscopy / FIT/ Cologuard? yes      If the patient is female:    4. For patients aged 41-77: Has the patient had a mammogram within the past 2 years? Yes      5. For patients aged 21-65: Has the patient had a pap smear?  Yes
every evening      Teriflunomide 14 MG TABS Take 14 mg by mouth every evening          Medications patient taking as of now reconciled against medications ordered at time of hospital discharge: Yes    Objective:    /80 (Site: Left Upper Arm, Position: Sitting)   Pulse 73   Temp 97.8 °F (36.6 °C) (Temporal)   Resp 18   Ht 5' 6\" (1.676 m)   Wt 215 lb (97.5 kg)   SpO2 98%   BMI 34.70 kg/m²     WNWD, NAD  RRR, no murmur  CTA, no wheezes/ ronchi/ rales  Midline low lumbar incision is c/d/I  There is no pitting edema appreciated, no warmth, redness or signs of DVT  Monofilament testing of both feet reveal decreased sensation in lateral aspect of feet bilaterally, no wounds    An electronic signature was used to authenticate this note.   --Brian Gary, DO

## 2023-06-21 ENCOUNTER — TELEPHONE (OUTPATIENT)
Dept: PRIMARY CARE CLINIC | Facility: CLINIC | Age: 48
End: 2023-06-21

## 2023-06-21 PROBLEM — F33.42 MAJOR DEPRESSIVE DISORDER, RECURRENT, IN FULL REMISSION (HCC): Status: ACTIVE | Noted: 2023-06-21

## 2023-06-21 LAB
APPEARANCE UR: ABNORMAL
BACTERIA #/AREA URNS HPF: ABNORMAL /[HPF]
BILIRUB UR QL STRIP: NEGATIVE
CASTS URNS QL MICRO: ABNORMAL /LPF
COLOR UR: YELLOW
CRYSTALS URNS MICRO: ABNORMAL
EPI CELLS #/AREA URNS HPF: ABNORMAL /HPF (ref 0–10)
GLUCOSE UR QL STRIP: NEGATIVE
HGB UR QL STRIP: NEGATIVE
KETONES UR QL STRIP: NEGATIVE
LEUKOCYTE ESTERASE UR QL STRIP: ABNORMAL
MAGNESIUM SERPL-MCNC: 2.7 MG/DL (ref 1.6–2.3)
MICRO URNS: ABNORMAL
NITRITE UR QL STRIP: POSITIVE
PH UR STRIP: 6 [PH] (ref 5–7.5)
PROT UR QL STRIP: ABNORMAL
RBC #/AREA URNS HPF: ABNORMAL /HPF (ref 0–2)
SP GR UR STRIP: 1.02 (ref 1–1.03)
TSH SERPL DL<=0.005 MIU/L-ACNC: 1.12 UIU/ML (ref 0.45–4.5)
UNIDENT CRYS URNS QL MICRO: PRESENT
UROBILINOGEN UR STRIP-MCNC: 0.2 MG/DL (ref 0.2–1)
VIT B12 SERPL-MCNC: 963 PG/ML (ref 232–1245)
WBC #/AREA URNS HPF: >30 /HPF (ref 0–5)

## 2023-06-21 NOTE — TELEPHONE ENCOUNTER
Patient has some concerns about their lab results and would appreciate a call as soon as possible. Patient specifically asked if their high uric acid levels indicate gout.

## 2023-06-21 NOTE — TELEPHONE ENCOUNTER
Returned patients about discussing lab results. I told patient that Dr. Юлия Mattson has to review labs before nurses can let them know the results or any feedback. Patient understood clearly. Patient also stated that she finished the antibiotic for bacterial infection about 2 weeks ago.

## 2023-06-26 ENCOUNTER — TELEPHONE (OUTPATIENT)
Dept: PRIMARY CARE CLINIC | Facility: CLINIC | Age: 48
End: 2023-06-26

## 2023-06-26 LAB — BACTERIA UR CULT: ABNORMAL

## 2023-06-26 RX ORDER — BUPROPION HYDROCHLORIDE 300 MG/1
300 TABLET ORAL DAILY
Qty: 90 TABLET | Refills: 1 | Status: SHIPPED | OUTPATIENT
Start: 2023-06-26

## 2023-06-27 DIAGNOSIS — E83.41 HYPERMAGNESEMIA: ICD-10-CM

## 2023-06-27 RX ORDER — NITROFURANTOIN 25; 75 MG/1; MG/1
100 CAPSULE ORAL 2 TIMES DAILY
Qty: 14 CAPSULE | Refills: 0 | Status: SHIPPED | OUTPATIENT
Start: 2023-06-27 | End: 2023-07-04

## 2023-06-28 ENCOUNTER — CLINICAL DOCUMENTATION (OUTPATIENT)
Dept: PRIMARY CARE CLINIC | Facility: CLINIC | Age: 48
End: 2023-06-28

## 2023-06-28 ENCOUNTER — TELEPHONE (OUTPATIENT)
Dept: PRIMARY CARE CLINIC | Facility: CLINIC | Age: 48
End: 2023-06-28

## 2023-06-28 NOTE — TELEPHONE ENCOUNTER
Called this morning to speak with Patient and inform her that a new Antibiotic was sent to her pharmacy last night . As[facility] if [facility] or if they tried to locate it, she stated they told her it was on Backorder from [facility].     Patient is going to [facility] location and was preciously being seen at [facility] and [facility] location

## 2023-06-30 LAB — MAGNESIUM SERPL-MCNC: 2.2 MG/DL (ref 1.6–2.3)

## 2023-07-18 ENCOUNTER — TELEPHONE (OUTPATIENT)
Dept: PRIMARY CARE CLINIC | Facility: CLINIC | Age: 48
End: 2023-07-18

## 2023-07-18 NOTE — TELEPHONE ENCOUNTER
Patient called Dr. Shandra Medina to see if she seen her [employer] message.   She would like a call back either from doctor or nurse at Ph# [phone] on her condition or a response to here [employer] message

## 2023-08-02 ENCOUNTER — APPOINTMENT (OUTPATIENT)
Facility: HOSPITAL | Age: 48
DRG: 660 | End: 2023-08-02
Payer: COMMERCIAL

## 2023-08-02 ENCOUNTER — ANESTHESIA EVENT (OUTPATIENT)
Facility: HOSPITAL | Age: 48
End: 2023-08-02
Payer: COMMERCIAL

## 2023-08-02 ENCOUNTER — HOSPITAL ENCOUNTER (INPATIENT)
Facility: HOSPITAL | Age: 48
LOS: 5 days | Discharge: HOME | DRG: 660 | End: 2023-08-07
Attending: EMERGENCY MEDICINE | Admitting: HOSPITALIST
Payer: COMMERCIAL

## 2023-08-02 ENCOUNTER — ANESTHESIA (OUTPATIENT)
Facility: HOSPITAL | Age: 48
End: 2023-08-02
Payer: COMMERCIAL

## 2023-08-02 DIAGNOSIS — R91.1 NODULE OF LEFT LUNG: ICD-10-CM

## 2023-08-02 DIAGNOSIS — B99.9 FEVER DUE TO INFECTION: Primary | ICD-10-CM

## 2023-08-02 DIAGNOSIS — N83.202 LEFT OVARIAN CYST: ICD-10-CM

## 2023-08-02 DIAGNOSIS — N13.2 HYDRONEPHROSIS WITH RENAL CALCULOUS OBSTRUCTION: ICD-10-CM

## 2023-08-02 LAB
ALBUMIN SERPL-MCNC: 3.5 G/DL (ref 3.5–5)
ALBUMIN/GLOB SERPL: 0.8 (ref 1.1–2.2)
ALP SERPL-CCNC: 101 U/L (ref 45–117)
ALT SERPL-CCNC: 35 U/L (ref 12–78)
ANION GAP SERPL CALC-SCNC: 4 MMOL/L (ref 5–15)
AST SERPL-CCNC: 35 U/L (ref 15–37)
BASOPHILS # BLD: 0 K/UL (ref 0–0.1)
BASOPHILS NFR BLD: 0 % (ref 0–1)
BILIRUB SERPL-MCNC: 0.8 MG/DL (ref 0.2–1)
BUN SERPL-MCNC: 7 MG/DL (ref 6–20)
BUN/CREAT SERPL: 7 (ref 12–20)
CALCIUM SERPL-MCNC: 9.3 MG/DL (ref 8.5–10.1)
CHLORIDE SERPL-SCNC: 102 MMOL/L (ref 97–108)
CO2 SERPL-SCNC: 28 MMOL/L (ref 21–32)
COMMENT:: NORMAL
CREAT SERPL-MCNC: 1.07 MG/DL (ref 0.55–1.02)
DIFFERENTIAL METHOD BLD: ABNORMAL
EOSINOPHIL # BLD: 0 K/UL (ref 0–0.4)
EOSINOPHIL NFR BLD: 0 % (ref 0–7)
ERYTHROCYTE [DISTWIDTH] IN BLOOD BY AUTOMATED COUNT: 12.8 % (ref 11.5–14.5)
GLOBULIN SER CALC-MCNC: 4.3 G/DL (ref 2–4)
GLUCOSE SERPL-MCNC: 123 MG/DL (ref 65–100)
HCG SERPL QL: NEGATIVE
HCT VFR BLD AUTO: 42.4 % (ref 35–47)
HGB BLD-MCNC: 13.7 G/DL (ref 11.5–16)
IMM GRANULOCYTES # BLD AUTO: 0.1 K/UL (ref 0–0.04)
IMM GRANULOCYTES NFR BLD AUTO: 1 % (ref 0–0.5)
LACTATE SERPL-SCNC: 1.9 MMOL/L (ref 0.4–2)
LYMPHOCYTES # BLD: 1.5 K/UL (ref 0.8–3.5)
LYMPHOCYTES NFR BLD: 10 % (ref 12–49)
MCH RBC QN AUTO: 27.9 PG (ref 26–34)
MCHC RBC AUTO-ENTMCNC: 32.3 G/DL (ref 30–36.5)
MCV RBC AUTO: 86.4 FL (ref 80–99)
MONOCYTES # BLD: 1.6 K/UL (ref 0–1)
MONOCYTES NFR BLD: 11 % (ref 5–13)
NEUTS SEG # BLD: 11.8 K/UL (ref 1.8–8)
NEUTS SEG NFR BLD: 78 % (ref 32–75)
NRBC # BLD: 0 K/UL (ref 0–0.01)
NRBC BLD-RTO: 0 PER 100 WBC
PLATELET # BLD AUTO: 209 K/UL (ref 150–400)
PMV BLD AUTO: 12 FL (ref 8.9–12.9)
POTASSIUM SERPL-SCNC: 3.7 MMOL/L (ref 3.5–5.1)
PROT SERPL-MCNC: 7.8 G/DL (ref 6.4–8.2)
RBC # BLD AUTO: 4.91 M/UL (ref 3.8–5.2)
SODIUM SERPL-SCNC: 134 MMOL/L (ref 136–145)
SPECIMEN HOLD: NORMAL
WBC # BLD AUTO: 15.1 K/UL (ref 3.6–11)

## 2023-08-02 PROCEDURE — 87086 URINE CULTURE/COLONY COUNT: CPT

## 2023-08-02 PROCEDURE — 7100000000 HC PACU RECOVERY - FIRST 15 MIN: Performed by: UROLOGY

## 2023-08-02 PROCEDURE — 87040 BLOOD CULTURE FOR BACTERIA: CPT

## 2023-08-02 PROCEDURE — 0T778DZ DILATION OF LEFT URETER WITH INTRALUMINAL DEVICE, VIA NATURAL OR ARTIFICIAL OPENING ENDOSCOPIC: ICD-10-PCS | Performed by: UROLOGY

## 2023-08-02 PROCEDURE — C2617 STENT, NON-COR, TEM W/O DEL: HCPCS | Performed by: UROLOGY

## 2023-08-02 PROCEDURE — C1758 CATHETER, URETERAL: HCPCS | Performed by: UROLOGY

## 2023-08-02 PROCEDURE — 2500000003 HC RX 250 WO HCPCS: Performed by: NURSE ANESTHETIST, CERTIFIED REGISTERED

## 2023-08-02 PROCEDURE — 2709999900 HC NON-CHARGEABLE SUPPLY: Performed by: UROLOGY

## 2023-08-02 PROCEDURE — 87186 SC STD MICRODIL/AGAR DIL: CPT

## 2023-08-02 PROCEDURE — 6360000002 HC RX W HCPCS: Performed by: EMERGENCY MEDICINE

## 2023-08-02 PROCEDURE — 2580000003 HC RX 258: Performed by: EMERGENCY MEDICINE

## 2023-08-02 PROCEDURE — 2580000003 HC RX 258: Performed by: STUDENT IN AN ORGANIZED HEALTH CARE EDUCATION/TRAINING PROGRAM

## 2023-08-02 PROCEDURE — 3700000000 HC ANESTHESIA ATTENDED CARE: Performed by: UROLOGY

## 2023-08-02 PROCEDURE — 74177 CT ABD & PELVIS W/CONTRAST: CPT

## 2023-08-02 PROCEDURE — 6370000000 HC RX 637 (ALT 250 FOR IP): Performed by: STUDENT IN AN ORGANIZED HEALTH CARE EDUCATION/TRAINING PROGRAM

## 2023-08-02 PROCEDURE — 2580000003 HC RX 258: Performed by: HOSPITALIST

## 2023-08-02 PROCEDURE — 36415 COLL VENOUS BLD VENIPUNCTURE: CPT

## 2023-08-02 PROCEDURE — 6360000002 HC RX W HCPCS: Performed by: NURSE ANESTHETIST, CERTIFIED REGISTERED

## 2023-08-02 PROCEDURE — 96375 TX/PRO/DX INJ NEW DRUG ADDON: CPT

## 2023-08-02 PROCEDURE — 87150 DNA/RNA AMPLIFIED PROBE: CPT

## 2023-08-02 PROCEDURE — 1100000000 HC RM PRIVATE

## 2023-08-02 PROCEDURE — 87077 CULTURE AEROBIC IDENTIFY: CPT

## 2023-08-02 PROCEDURE — 84703 CHORIONIC GONADOTROPIN ASSAY: CPT

## 2023-08-02 PROCEDURE — 74420 UROGRAPHY RTRGR +-KUB: CPT

## 2023-08-02 PROCEDURE — 80053 COMPREHEN METABOLIC PANEL: CPT

## 2023-08-02 PROCEDURE — 96374 THER/PROPH/DIAG INJ IV PUSH: CPT

## 2023-08-02 PROCEDURE — 83605 ASSAY OF LACTIC ACID: CPT

## 2023-08-02 PROCEDURE — 85025 COMPLETE CBC W/AUTO DIFF WBC: CPT

## 2023-08-02 PROCEDURE — A4216 STERILE WATER/SALINE, 10 ML: HCPCS | Performed by: EMERGENCY MEDICINE

## 2023-08-02 PROCEDURE — 7100000001 HC PACU RECOVERY - ADDTL 15 MIN: Performed by: UROLOGY

## 2023-08-02 PROCEDURE — 6360000004 HC RX CONTRAST MEDICATION: Performed by: RADIOLOGY

## 2023-08-02 PROCEDURE — 3600000003 HC SURGERY LEVEL 3 BASE: Performed by: UROLOGY

## 2023-08-02 PROCEDURE — 3600000013 HC SURGERY LEVEL 3 ADDTL 15MIN: Performed by: UROLOGY

## 2023-08-02 PROCEDURE — 6370000000 HC RX 637 (ALT 250 FOR IP): Performed by: HOSPITALIST

## 2023-08-02 PROCEDURE — 3700000001 HC ADD 15 MINUTES (ANESTHESIA): Performed by: UROLOGY

## 2023-08-02 PROCEDURE — 99285 EMERGENCY DEPT VISIT HI MDM: CPT

## 2023-08-02 RX ORDER — SODIUM CHLORIDE 0.9 % (FLUSH) 0.9 %
5-40 SYRINGE (ML) INJECTION EVERY 12 HOURS SCHEDULED
Status: DISCONTINUED | OUTPATIENT
Start: 2023-08-02 | End: 2023-08-02 | Stop reason: HOSPADM

## 2023-08-02 RX ORDER — FENTANYL CITRATE 50 UG/ML
100 INJECTION, SOLUTION INTRAMUSCULAR; INTRAVENOUS
Status: DISCONTINUED | OUTPATIENT
Start: 2023-08-02 | End: 2023-08-02 | Stop reason: HOSPADM

## 2023-08-02 RX ORDER — 0.9 % SODIUM CHLORIDE 0.9 %
1000 INTRAVENOUS SOLUTION INTRAVENOUS ONCE
Status: COMPLETED | OUTPATIENT
Start: 2023-08-02 | End: 2023-08-02

## 2023-08-02 RX ORDER — SODIUM CHLORIDE 0.9 % (FLUSH) 0.9 %
5-40 SYRINGE (ML) INJECTION EVERY 12 HOURS SCHEDULED
Status: DISCONTINUED | OUTPATIENT
Start: 2023-08-02 | End: 2023-08-07 | Stop reason: HOSPADM

## 2023-08-02 RX ORDER — SODIUM CHLORIDE 9 MG/ML
INJECTION, SOLUTION INTRAVENOUS PRN
Status: DISCONTINUED | OUTPATIENT
Start: 2023-08-02 | End: 2023-08-07 | Stop reason: HOSPADM

## 2023-08-02 RX ORDER — LIDOCAINE HYDROCHLORIDE 20 MG/ML
INJECTION, SOLUTION EPIDURAL; INFILTRATION; INTRACAUDAL; PERINEURAL PRN
Status: DISCONTINUED | OUTPATIENT
Start: 2023-08-02 | End: 2023-08-02 | Stop reason: SDUPTHER

## 2023-08-02 RX ORDER — ONDANSETRON 2 MG/ML
4 INJECTION INTRAMUSCULAR; INTRAVENOUS
Status: DISCONTINUED | OUTPATIENT
Start: 2023-08-02 | End: 2023-08-02 | Stop reason: HOSPADM

## 2023-08-02 RX ORDER — SODIUM CHLORIDE 9 MG/ML
INJECTION, SOLUTION INTRAVENOUS CONTINUOUS
Status: DISCONTINUED | OUTPATIENT
Start: 2023-08-02 | End: 2023-08-03

## 2023-08-02 RX ORDER — KETOROLAC TROMETHAMINE 30 MG/ML
15 INJECTION, SOLUTION INTRAMUSCULAR; INTRAVENOUS
Status: COMPLETED | OUTPATIENT
Start: 2023-08-02 | End: 2023-08-02

## 2023-08-02 RX ORDER — BUPROPION HYDROCHLORIDE 150 MG/1
150 TABLET, EXTENDED RELEASE ORAL 2 TIMES DAILY
Status: DISCONTINUED | OUTPATIENT
Start: 2023-08-03 | End: 2023-08-07 | Stop reason: HOSPADM

## 2023-08-02 RX ORDER — METHOCARBAMOL 500 MG/1
500 TABLET, FILM COATED ORAL EVERY 4 HOURS PRN
Status: DISCONTINUED | OUTPATIENT
Start: 2023-08-02 | End: 2023-08-07 | Stop reason: HOSPADM

## 2023-08-02 RX ORDER — POLYETHYLENE GLYCOL 3350 17 G/17G
17 POWDER, FOR SOLUTION ORAL DAILY PRN
Status: DISCONTINUED | OUTPATIENT
Start: 2023-08-02 | End: 2023-08-07 | Stop reason: HOSPADM

## 2023-08-02 RX ORDER — SODIUM CHLORIDE 0.9 % (FLUSH) 0.9 %
5-40 SYRINGE (ML) INJECTION PRN
Status: DISCONTINUED | OUTPATIENT
Start: 2023-08-02 | End: 2023-08-02 | Stop reason: HOSPADM

## 2023-08-02 RX ORDER — MIDAZOLAM HYDROCHLORIDE 2 MG/2ML
2 INJECTION, SOLUTION INTRAMUSCULAR; INTRAVENOUS
Status: DISCONTINUED | OUTPATIENT
Start: 2023-08-02 | End: 2023-08-02 | Stop reason: HOSPADM

## 2023-08-02 RX ORDER — MORPHINE SULFATE 2 MG/ML
2 INJECTION, SOLUTION INTRAMUSCULAR; INTRAVENOUS EVERY 4 HOURS PRN
Status: DISCONTINUED | OUTPATIENT
Start: 2023-08-02 | End: 2023-08-03

## 2023-08-02 RX ORDER — HYDRALAZINE HYDROCHLORIDE 20 MG/ML
10 INJECTION INTRAMUSCULAR; INTRAVENOUS
Status: DISCONTINUED | OUTPATIENT
Start: 2023-08-02 | End: 2023-08-02 | Stop reason: HOSPADM

## 2023-08-02 RX ORDER — FENTANYL CITRATE 50 UG/ML
INJECTION, SOLUTION INTRAMUSCULAR; INTRAVENOUS PRN
Status: DISCONTINUED | OUTPATIENT
Start: 2023-08-02 | End: 2023-08-02 | Stop reason: SDUPTHER

## 2023-08-02 RX ORDER — FENTANYL CITRATE 50 UG/ML
25 INJECTION, SOLUTION INTRAMUSCULAR; INTRAVENOUS EVERY 5 MIN PRN
Status: DISCONTINUED | OUTPATIENT
Start: 2023-08-02 | End: 2023-08-02 | Stop reason: HOSPADM

## 2023-08-02 RX ORDER — SODIUM CHLORIDE 9 MG/ML
INJECTION, SOLUTION INTRAVENOUS PRN
Status: DISCONTINUED | OUTPATIENT
Start: 2023-08-02 | End: 2023-08-02 | Stop reason: HOSPADM

## 2023-08-02 RX ORDER — ACETAMINOPHEN 650 MG/1
650 SUPPOSITORY RECTAL EVERY 6 HOURS PRN
Status: DISCONTINUED | OUTPATIENT
Start: 2023-08-02 | End: 2023-08-07 | Stop reason: HOSPADM

## 2023-08-02 RX ORDER — ONDANSETRON 4 MG/1
4 TABLET, ORALLY DISINTEGRATING ORAL EVERY 8 HOURS PRN
Status: DISCONTINUED | OUTPATIENT
Start: 2023-08-02 | End: 2023-08-07 | Stop reason: HOSPADM

## 2023-08-02 RX ORDER — ACETAMINOPHEN 325 MG/1
650 TABLET ORAL EVERY 6 HOURS PRN
Status: DISCONTINUED | OUTPATIENT
Start: 2023-08-02 | End: 2023-08-07 | Stop reason: HOSPADM

## 2023-08-02 RX ORDER — ACETAMINOPHEN 500 MG
1000 TABLET ORAL ONCE
Status: COMPLETED | OUTPATIENT
Start: 2023-08-02 | End: 2023-08-02

## 2023-08-02 RX ORDER — SODIUM CHLORIDE, SODIUM LACTATE, POTASSIUM CHLORIDE, CALCIUM CHLORIDE 600; 310; 30; 20 MG/100ML; MG/100ML; MG/100ML; MG/100ML
INJECTION, SOLUTION INTRAVENOUS CONTINUOUS
Status: DISCONTINUED | OUTPATIENT
Start: 2023-08-02 | End: 2023-08-02 | Stop reason: HOSPADM

## 2023-08-02 RX ORDER — ONDANSETRON 2 MG/ML
4 INJECTION INTRAMUSCULAR; INTRAVENOUS
Status: COMPLETED | OUTPATIENT
Start: 2023-08-02 | End: 2023-08-02

## 2023-08-02 RX ORDER — ENOXAPARIN SODIUM 100 MG/ML
40 INJECTION SUBCUTANEOUS DAILY
Status: DISCONTINUED | OUTPATIENT
Start: 2023-08-02 | End: 2023-08-07 | Stop reason: HOSPADM

## 2023-08-02 RX ORDER — PROCHLORPERAZINE EDISYLATE 5 MG/ML
5 INJECTION INTRAMUSCULAR; INTRAVENOUS
Status: DISCONTINUED | OUTPATIENT
Start: 2023-08-02 | End: 2023-08-02 | Stop reason: HOSPADM

## 2023-08-02 RX ORDER — SODIUM CHLORIDE 0.9 % (FLUSH) 0.9 %
5-40 SYRINGE (ML) INJECTION PRN
Status: DISCONTINUED | OUTPATIENT
Start: 2023-08-02 | End: 2023-08-07 | Stop reason: HOSPADM

## 2023-08-02 RX ORDER — LIDOCAINE HYDROCHLORIDE 10 MG/ML
1 INJECTION, SOLUTION EPIDURAL; INFILTRATION; INTRACAUDAL; PERINEURAL
Status: DISCONTINUED | OUTPATIENT
Start: 2023-08-02 | End: 2023-08-02 | Stop reason: HOSPADM

## 2023-08-02 RX ORDER — GABAPENTIN 600 MG/1
900 TABLET ORAL 3 TIMES DAILY
COMMUNITY

## 2023-08-02 RX ORDER — HYDROMORPHONE HYDROCHLORIDE 1 MG/ML
0.5 INJECTION, SOLUTION INTRAMUSCULAR; INTRAVENOUS; SUBCUTANEOUS EVERY 5 MIN PRN
Status: DISCONTINUED | OUTPATIENT
Start: 2023-08-02 | End: 2023-08-02 | Stop reason: HOSPADM

## 2023-08-02 RX ORDER — OXYCODONE HYDROCHLORIDE 5 MG/1
5 TABLET ORAL
Status: DISCONTINUED | OUTPATIENT
Start: 2023-08-02 | End: 2023-08-02 | Stop reason: HOSPADM

## 2023-08-02 RX ORDER — IPRATROPIUM BROMIDE AND ALBUTEROL SULFATE 2.5; .5 MG/3ML; MG/3ML
1 SOLUTION RESPIRATORY (INHALATION) EVERY 4 HOURS PRN
Status: DISCONTINUED | OUTPATIENT
Start: 2023-08-02 | End: 2023-08-07 | Stop reason: HOSPADM

## 2023-08-02 RX ORDER — ONDANSETRON 2 MG/ML
4 INJECTION INTRAMUSCULAR; INTRAVENOUS EVERY 6 HOURS PRN
Status: DISCONTINUED | OUTPATIENT
Start: 2023-08-02 | End: 2023-08-07 | Stop reason: HOSPADM

## 2023-08-02 RX ORDER — METOPROLOL SUCCINATE 50 MG/1
100 TABLET, EXTENDED RELEASE ORAL DAILY
Status: DISCONTINUED | OUTPATIENT
Start: 2023-08-02 | End: 2023-08-03

## 2023-08-02 RX ORDER — CETIRIZINE HYDROCHLORIDE 10 MG/1
10 TABLET ORAL EVERY EVENING
Status: DISCONTINUED | OUTPATIENT
Start: 2023-08-02 | End: 2023-08-07 | Stop reason: HOSPADM

## 2023-08-02 RX ORDER — ALUMINUM ZIRCONIUM OCTACHLOROHYDREX GLY 16 G/100G
1 GEL TOPICAL DAILY
Status: DISCONTINUED | OUTPATIENT
Start: 2023-08-02 | End: 2023-08-07 | Stop reason: HOSPADM

## 2023-08-02 RX ORDER — ESCITALOPRAM OXALATE 10 MG/1
20 TABLET ORAL DAILY
Status: DISCONTINUED | OUTPATIENT
Start: 2023-08-03 | End: 2023-08-07 | Stop reason: HOSPADM

## 2023-08-02 RX ORDER — MIDAZOLAM HYDROCHLORIDE 1 MG/ML
INJECTION INTRAMUSCULAR; INTRAVENOUS PRN
Status: DISCONTINUED | OUTPATIENT
Start: 2023-08-02 | End: 2023-08-02 | Stop reason: SDUPTHER

## 2023-08-02 RX ORDER — TERIFLUNOMIDE 14 MG/1
14 TABLET, FILM COATED ORAL EVERY EVENING
Status: DISCONTINUED | OUTPATIENT
Start: 2023-08-04 | End: 2023-08-07 | Stop reason: HOSPADM

## 2023-08-02 RX ADMIN — SODIUM CHLORIDE: 9 INJECTION, SOLUTION INTRAVENOUS at 18:16

## 2023-08-02 RX ADMIN — PROPOFOL 25 MG: 10 INJECTION, EMULSION INTRAVENOUS at 17:01

## 2023-08-02 RX ADMIN — FENTANYL CITRATE 50 MCG: 50 INJECTION, SOLUTION INTRAMUSCULAR; INTRAVENOUS at 17:00

## 2023-08-02 RX ADMIN — Medication 20 ML: at 22:00

## 2023-08-02 RX ADMIN — ACETAMINOPHEN 1000 MG: 500 TABLET ORAL at 16:44

## 2023-08-02 RX ADMIN — ONDANSETRON 4 MG: 2 INJECTION INTRAMUSCULAR; INTRAVENOUS at 13:45

## 2023-08-02 RX ADMIN — SODIUM CHLORIDE 1000 MG: 9 INJECTION INTRAMUSCULAR; INTRAVENOUS; SUBCUTANEOUS at 13:48

## 2023-08-02 RX ADMIN — IOPAMIDOL 100 ML: 755 INJECTION, SOLUTION INTRAVENOUS at 12:41

## 2023-08-02 RX ADMIN — LIDOCAINE HYDROCHLORIDE 20 MG: 20 INJECTION, SOLUTION EPIDURAL; INFILTRATION; INTRACAUDAL; PERINEURAL at 17:00

## 2023-08-02 RX ADMIN — SODIUM CHLORIDE, POTASSIUM CHLORIDE, SODIUM LACTATE AND CALCIUM CHLORIDE: 600; 310; 30; 20 INJECTION, SOLUTION INTRAVENOUS at 16:35

## 2023-08-02 RX ADMIN — PROPOFOL 25 MG: 10 INJECTION, EMULSION INTRAVENOUS at 17:09

## 2023-08-02 RX ADMIN — SODIUM CHLORIDE 1000 ML: 9 INJECTION, SOLUTION INTRAVENOUS at 13:44

## 2023-08-02 RX ADMIN — PROPOFOL 50 MCG/KG/MIN: 10 INJECTION, EMULSION INTRAVENOUS at 17:15

## 2023-08-02 RX ADMIN — KETOROLAC TROMETHAMINE 15 MG: 30 INJECTION, SOLUTION INTRAMUSCULAR; INTRAVENOUS at 13:47

## 2023-08-02 RX ADMIN — FENTANYL CITRATE 50 MCG: 50 INJECTION, SOLUTION INTRAMUSCULAR; INTRAVENOUS at 17:12

## 2023-08-02 RX ADMIN — MIDAZOLAM 2 MG: 1 INJECTION INTRAMUSCULAR; INTRAVENOUS at 16:56

## 2023-08-02 RX ADMIN — CETIRIZINE HYDROCHLORIDE 10 MG: 10 TABLET, FILM COATED ORAL at 22:37

## 2023-08-02 RX ADMIN — MIDAZOLAM 3 MG: 1 INJECTION INTRAMUSCULAR; INTRAVENOUS at 16:53

## 2023-08-02 ASSESSMENT — PAIN - FUNCTIONAL ASSESSMENT
PAIN_FUNCTIONAL_ASSESSMENT: ACTIVITIES ARE NOT PREVENTED
PAIN_FUNCTIONAL_ASSESSMENT: 0-10
PAIN_FUNCTIONAL_ASSESSMENT: 0-10
PAIN_FUNCTIONAL_ASSESSMENT: PREVENTS OR INTERFERES SOME ACTIVE ACTIVITIES AND ADLS

## 2023-08-02 ASSESSMENT — ENCOUNTER SYMPTOMS
SHORTNESS OF BREATH: 0
NAUSEA: 1
DIARRHEA: 1
TROUBLE SWALLOWING: 0
BACK PAIN: 0
VOMITING: 1
COUGH: 0

## 2023-08-02 ASSESSMENT — PAIN DESCRIPTION - DESCRIPTORS
DESCRIPTORS: CRAMPING;ACHING;SHARP
DESCRIPTORS: ACHING
DESCRIPTORS: SHARP

## 2023-08-02 ASSESSMENT — PAIN SCALES - GENERAL
PAINLEVEL_OUTOF10: 7
PAINLEVEL_OUTOF10: 7
PAINLEVEL_OUTOF10: 4

## 2023-08-02 ASSESSMENT — PAIN DESCRIPTION - LOCATION
LOCATION: ABDOMEN

## 2023-08-02 ASSESSMENT — PAIN DESCRIPTION - ORIENTATION
ORIENTATION: LOWER
ORIENTATION: RIGHT;LEFT;LOWER

## 2023-08-02 ASSESSMENT — PAIN DESCRIPTION - ONSET: ONSET: PROGRESSIVE

## 2023-08-02 ASSESSMENT — PAIN DESCRIPTION - PAIN TYPE: TYPE: ACUTE PAIN

## 2023-08-02 NOTE — H&P
History and Physical    Date of Service:  8/2/2023  Primary Care Provider: Meryl Mccoy DO  Source of information: The patient reviewing her previous medical record    Chief Complaint: Fever and Diarrhea 2 to 3 days      History of Presenting Illness:   Ernie Rodriguez is a 51 y.o. female with PMH significant for kidney stones, MS, HTN, GERD, depression, recent C. difficile infection and completed antibiotics on 7/30/2023, and urinary incontinence following back surgery in May 2023 for spinal stenosis presented to [facility] ER with fever, chills and left-sided lower abdominal pain 2 to 3 days duration. She stated that her left lower abdominal pain is intermittent, 8/10 and radiating to her left groin. No hematuria, nausea or vomiting. She stated that she completed antibiotics 4 days ago but she had 5 times loose bowel movement yesterday. No left-sided chest pain, palpitation, diaphoresis, difficulty of breathing, hematemesis, melena or hematochezia. She ambulate independently. The patient denies any headache, blurry vision, sore throat, trouble swallowing, trouble with speech, sick contacts, falls, injuries, rashes, or contact with COVID-19 diagnosed patients. UA showed evidence of urinary tract infection. CT abdomen and pelvis showed 3 and 5 mm left ureterovesicle junction obstructing stone with left hydroureter and hydronephrosis and slight delayed left nephrogram.  Incidental 4.4 cm left ovarian cyst for which ultrasound follow-up can be considered and 3 mm left lower lobe pulmonary nodule. Urine culture and blood culture were sent  Received IV ceftriaxone  Urologist is consulted  Telemetry service is consulted for further evaluation and admission. REVIEW OF SYSTEMS:  A comprehensive review of systems was negative except for that written in the History of Present Illness.      Past Medical History:   Diagnosis Date    Acute renal failure (ARF) (HCC)     Acute renal failure

## 2023-08-02 NOTE — CONSULTS
Requesting Provider: Bud Moralez MD - Reason for Consultation: \"kidney stone\"  Pre-existing [facility] Patient:   yes                Patient: Toan Glover MRN: [id number]  SSN: [id number]    YOB: 1975  Age: 51 y.o. Sex: female     Location: R40/R40       Code Status: Prior   PCP: Javier Schroeder [address] [place] [address]   Emergency Contact:  Primary Emergency Contact: Kenneth, Dirk Phone: [phone]   Race/Roman Catholic/Language: [facility] (non-) / [facility] / [employer]   Payor: Payor: Colleen Gutierrez / Plan: Jo Ann Sas / Product Type: *No Product type* /    Prior Admission Data: 5/17/23 [facility] 1 ORTHOPEDICS     Hospitalized:  Hospital Day: 1 - Admitted 8/2/2023 12:54 PM     CONSULTANTS  IP CONSULT TO UROLOGY   ADMISSION DIAGNOSES  [unfilled]      Assessment/Plan:       50 yo F with fever, leukocytosis, suspected acute pyelonephritis with obstructing left distal ureteral calculi     - Recommend ureteral stent placement for obstruction in the setting of suspected urinary infection and history of urosepsis, risk for further decline without intervention. Risks and benefits reviewed and she agrees and wishes to proceed. She is NPO. Will arrange case with the OR. She understands that she will require stone treatment at a later date after her infection has been treated. - Please send UA and culture, may need to I/O cath for sample. - Check PVR bladder scan given hx of incontinence and retention. Place aguilar for elevated PVR > 350 cc. - Empiric abx, supportive care, AM labs     Following     Supervising MD, Dr. Giorgio Martinez      CC: [unfilled]   HPI: She is a 51 y.o. female with pmh of asthma, MS, celiac disease, diverticulosis, HTN, kidney stones requiring stents and ureteroscopy, last in 2020, and urinary retention s/p lumbar decompression with cauda equina syndrome in May 2023. Seen in consult by Urology for a kidney stone. Presented to the ER for N/V, chills, fever.

## 2023-08-02 NOTE — ED PROVIDER NOTES
appearance. She is not ill-appearing, toxic-appearing or diaphoretic. Comments: Female; former smoker   HENT:      Head: Normocephalic. Cardiovascular:      Rate and Rhythm: Normal rate and regular rhythm. Pulmonary:      Effort: Pulmonary effort is normal.      Breath sounds: Normal breath sounds. Abdominal:      General: Bowel sounds are normal. There is no distension. Palpations: Abdomen is soft. There is no mass. Tenderness: There is abdominal tenderness. There is no guarding or rebound. Hernia: No hernia is present. Comments: Generalized periumbilical with palpation   Musculoskeletal:         General: Normal range of motion. Cervical back: Normal range of motion and neck supple. Right lower leg: No edema. Left lower leg: No edema. Skin:     General: Skin is warm and dry. Findings: No bruising, erythema, lesion or rash. Neurological:      Mental Status: She is alert and oriented to person, place, and time. DIAGNOSTIC RESULTS     EKG: All EKG's are interpreted by the Emergency Department Physician who either signs or Co-signs this chart in the absence of a cardiologist.        RADIOLOGY:   Non-plain film images such as CT, Ultrasound and MRI are read by the radiologist. Plain radiographic images are visualized and preliminarily interpreted by the emergency physician with the below findings:        Interpretation per the Radiologist below, if available at the time of this note:    CT ABDOMEN PELVIS W IV CONTRAST Additional Contrast? None   Final Result   3 and 5 mm left ureterovesical junction obstructing stones with   upstream left hydroureter and hydronephrosis and slight delayed left nephrogram.   Incidentals as above including 4.4 cm left ovarian cyst for which ultrasound   follow-up can be considered and 3 mm left lower lobe pulmonary nodule.       Guidelines for Management of Incidental Pulmonary Nodules Detected on CT Images:   from the [facility]

## 2023-08-02 NOTE — ED TRIAGE NOTES
Patient ambulatory to triage with c diff diagnosis 3 weeks ago, taking antibiotics. She is nauseated and vomiting to day with chills and fever. Temp in triage 100.7     Patient continues to have liquid stool, 3 episodes this morning. She is pale in triage. Surgery on her back in April.  \"Complete bowel and bladder incontinence since surgery\"

## 2023-08-02 NOTE — OP NOTE
UROLOGY OPERATIVE NOTE    Patient: Deric Menon MRN: [id number]  SSN: [id number]    YOB: 1975  Age: 51 y.o. Sex: female          Pre-operative Diagnosis: infected distal left ureteral stone  Post-operative Diagnosis: Same  Procedure: Cystoscopy, Ureteral Stent                    Left 6x22     Surgeon: Deja Mckinney MD  Assistant: None  Anesthesia:  General    Procedure Details: The patient was seen in the pre-operative area. The risks, benefits, complications, alternative treatment options, and expected outcomes were again discussed with the patient. The possibilities of reaction to medication, pain, infection, bleeding, major cardiovascular event, death, damage to surrounding structures were specifically addressed. Informed consent was then obtained. The site of surgery properly noted/marked. Upon arrival to the operative suite, the patient, procedure, and side were confirmed via a pre-operative \"time-out\". All were in agreement. The patient was carefully placed  in a dorsal lithotomy position and anethesia was induced. Sterile prep and drape of genitalia and perineum was performed. The cystoscope was inserted through a normal urethra. Survey of the bladder surface revealed no abnormalities. A guide wire was advanced up the symptomatic side and seen it coil in the upper collecting system using fluoroscopy. The ureteral stent was then advanced over the wire. Coils were left to form in the renal pelvis and bladder nicely. The bladder was emptied, the scope was removed. At he conclusion of the case, all needle counts, instrument counts, and sponge counts were correct.     Findings:Obstructing distal stone  Estimated Blood Loss:  Minimal     Implants: * No implants in log *  Disposition: Admit for observation  Plan: KUB to assess stone opacity to allow for decision of ESWL vs ureteroscopy                     Deja Mckinney MD

## 2023-08-03 LAB
ACCESSION NUMBER, LLC1M: ABNORMAL
ACINETOBACTER CALCOAC BAUMANNII COMPLEX BY PCR: NOT DETECTED
ALBUMIN SERPL-MCNC: 2.8 G/DL (ref 3.5–5)
ALBUMIN/GLOB SERPL: 0.8 (ref 1.1–2.2)
ALP SERPL-CCNC: 87 U/L (ref 45–117)
ALT SERPL-CCNC: 24 U/L (ref 12–78)
ANION GAP SERPL CALC-SCNC: 6 MMOL/L (ref 5–15)
APPEARANCE UR: ABNORMAL
AST SERPL-CCNC: 20 U/L (ref 15–37)
B FRAGILIS DNA BLD POS QL NAA+NON-PROBE: NOT DETECTED
BACTERIA URNS QL MICRO: ABNORMAL /HPF
BASOPHILS # BLD: 0 K/UL (ref 0–0.1)
BASOPHILS NFR BLD: 0 % (ref 0–1)
BILIRUB SERPL-MCNC: 0.4 MG/DL (ref 0.2–1)
BILIRUB UR QL: NEGATIVE
BIOFIRE TEST COMMENT: ABNORMAL
BLACTX-M ISLT/SPM QL: NOT DETECTED
BLAIMP ISLT/SPM QL: NOT DETECTED
BLAKPC BLD POS QL NAA+NON-PROBE: NOT DETECTED
BLAOXA-48-LIKE ISLT/SPM QL: NOT DETECTED
BLAVIM ISLT/SPM QL: NOT DETECTED
BUN SERPL-MCNC: 9 MG/DL (ref 6–20)
BUN/CREAT SERPL: 9 (ref 12–20)
C ALBICANS DNA BLD POS QL NAA+NON-PROBE: NOT DETECTED
C AURIS DNA BLD POS QL NAA+NON-PROBE: NOT DETECTED
C GATTII+NEOFOR DNA BLD POS QL NAA+N-PRB: NOT DETECTED
C GLABRATA DNA BLD POS QL NAA+NON-PROBE: NOT DETECTED
C KRUSEI DNA BLD POS QL NAA+NON-PROBE: NOT DETECTED
C PARAP DNA BLD POS QL NAA+NON-PROBE: NOT DETECTED
C TROPICLS DNA BLD POS QL NAA+NON-PROBE: NOT DETECTED
CALCIUM SERPL-MCNC: 8.4 MG/DL (ref 8.5–10.1)
CHLORIDE SERPL-SCNC: 107 MMOL/L (ref 97–108)
CO2 SERPL-SCNC: 24 MMOL/L (ref 21–32)
COLISTIN RES MCR-1 ISLT/SPM QL: NOT DETECTED
COLOR UR: ABNORMAL
CREAT SERPL-MCNC: 0.98 MG/DL (ref 0.55–1.02)
DIFFERENTIAL METHOD BLD: ABNORMAL
E CLOAC COMP DNA BLD POS NAA+NON-PROBE: NOT DETECTED
E COLI DNA BLD POS QL NAA+NON-PROBE: DETECTED
E FAECALIS DNA BLD POS QL NAA+NON-PROBE: NOT DETECTED
E FAECIUM DNA BLD POS QL NAA+NON-PROBE: NOT DETECTED
ENTEROBACTERALES DNA BLD POS NAA+N-PRB: DETECTED
EOSINOPHIL # BLD: 0 K/UL (ref 0–0.4)
EOSINOPHIL NFR BLD: 0 % (ref 0–7)
EPITH CASTS URNS QL MICRO: ABNORMAL /LPF
ERYTHROCYTE [DISTWIDTH] IN BLOOD BY AUTOMATED COUNT: 12.8 % (ref 11.5–14.5)
GLOBULIN SER CALC-MCNC: 3.5 G/DL (ref 2–4)
GLUCOSE SERPL-MCNC: 108 MG/DL (ref 65–100)
GLUCOSE UR STRIP.AUTO-MCNC: NEGATIVE MG/DL
GP B STREP DNA BLD POS QL NAA+NON-PROBE: NOT DETECTED
HAEM INFLU DNA BLD POS QL NAA+NON-PROBE: NOT DETECTED
HCT VFR BLD AUTO: 34.7 % (ref 35–47)
HGB BLD-MCNC: 11.1 G/DL (ref 11.5–16)
HGB UR QL STRIP: ABNORMAL
IMM GRANULOCYTES # BLD AUTO: 0.1 K/UL (ref 0–0.04)
IMM GRANULOCYTES NFR BLD AUTO: 0 % (ref 0–0.5)
K OXYTOCA DNA BLD POS QL NAA+NON-PROBE: NOT DETECTED
KETONES UR QL STRIP.AUTO: 15 MG/DL
KLEBSIELLA SP DNA BLD POS QL NAA+NON-PRB: NOT DETECTED
KLEBSIELLA SP DNA BLD POS QL NAA+NON-PRB: NOT DETECTED
L MONOCYTOG DNA BLD POS QL NAA+NON-PROBE: NOT DETECTED
LEUKOCYTE ESTERASE UR QL STRIP.AUTO: ABNORMAL
LYMPHOCYTES # BLD: 0.8 K/UL (ref 0.8–3.5)
LYMPHOCYTES NFR BLD: 7 % (ref 12–49)
MAGNESIUM SERPL-MCNC: 2 MG/DL (ref 1.6–2.4)
MCH RBC QN AUTO: 28 PG (ref 26–34)
MCHC RBC AUTO-ENTMCNC: 32 G/DL (ref 30–36.5)
MCV RBC AUTO: 87.4 FL (ref 80–99)
MONOCYTES # BLD: 1.2 K/UL (ref 0–1)
MONOCYTES NFR BLD: 10 % (ref 5–13)
N MEN DNA BLD POS QL NAA+NON-PROBE: NOT DETECTED
NEUTS SEG # BLD: 9.8 K/UL (ref 1.8–8)
NEUTS SEG NFR BLD: 82 % (ref 32–75)
NITRITE UR QL STRIP.AUTO: NEGATIVE
NRBC # BLD: 0 K/UL (ref 0–0.01)
NRBC BLD-RTO: 0 PER 100 WBC
P AERUGINOSA DNA BLD POS NAA+NON-PROBE: NOT DETECTED
PH UR STRIP: 5.5 (ref 5–8)
PLATELET # BLD AUTO: 151 K/UL (ref 150–400)
PMV BLD AUTO: 11.7 FL (ref 8.9–12.9)
POTASSIUM SERPL-SCNC: 3.4 MMOL/L (ref 3.5–5.1)
PROT SERPL-MCNC: 6.3 G/DL (ref 6.4–8.2)
PROT UR STRIP-MCNC: 300 MG/DL
PROTEUS SP DNA BLD POS QL NAA+NON-PROBE: NOT DETECTED
RBC # BLD AUTO: 3.97 M/UL (ref 3.8–5.2)
RBC #/AREA URNS HPF: ABNORMAL /HPF (ref 0–5)
RESISTANT GENE NDM BY PCR: NOT DETECTED
RESISTANT GENE TARGETS: ABNORMAL
S AUREUS DNA BLD POS QL NAA+NON-PROBE: NOT DETECTED
S AUREUS+CONS DNA BLD POS NAA+NON-PROBE: NOT DETECTED
S EPIDERMIDIS DNA BLD POS QL NAA+NON-PRB: NOT DETECTED
S LUGDUNENSIS DNA BLD POS QL NAA+NON-PRB: NOT DETECTED
S MALTOPHILIA DNA BLD POS QL NAA+NON-PRB: NOT DETECTED
S MARCESCENS DNA BLD POS NAA+NON-PROBE: NOT DETECTED
S PNEUM DNA BLD POS QL NAA+NON-PROBE: NOT DETECTED
S PYO DNA BLD POS QL NAA+NON-PROBE: NOT DETECTED
SALMONELLA DNA BLD POS QL NAA+NON-PROBE: NOT DETECTED
SODIUM SERPL-SCNC: 137 MMOL/L (ref 136–145)
SP GR UR REFRACTOMETRY: 1.02
SPECIMEN HOLD: NORMAL
STREPTOCOCCUS DNA BLD POS NAA+NON-PROBE: NOT DETECTED
UROBILINOGEN UR QL STRIP.AUTO: 0.2 EU/DL (ref 0.2–1)
WBC # BLD AUTO: 11.9 K/UL (ref 3.6–11)
WBC URNS QL MICRO: >100 /HPF (ref 0–4)

## 2023-08-03 PROCEDURE — 83735 ASSAY OF MAGNESIUM: CPT

## 2023-08-03 PROCEDURE — 81001 URINALYSIS AUTO W/SCOPE: CPT

## 2023-08-03 PROCEDURE — 6360000002 HC RX W HCPCS: Performed by: NURSE PRACTITIONER

## 2023-08-03 PROCEDURE — 36415 COLL VENOUS BLD VENIPUNCTURE: CPT

## 2023-08-03 PROCEDURE — 80053 COMPREHEN METABOLIC PANEL: CPT

## 2023-08-03 PROCEDURE — 6360000002 HC RX W HCPCS: Performed by: HOSPITALIST

## 2023-08-03 PROCEDURE — 85025 COMPLETE CBC W/AUTO DIFF WBC: CPT

## 2023-08-03 PROCEDURE — 51701 INSERT BLADDER CATHETER: CPT

## 2023-08-03 PROCEDURE — 2580000003 HC RX 258: Performed by: HOSPITALIST

## 2023-08-03 PROCEDURE — 2580000003 HC RX 258: Performed by: NURSE PRACTITIONER

## 2023-08-03 PROCEDURE — 1100000000 HC RM PRIVATE

## 2023-08-03 PROCEDURE — 6370000000 HC RX 637 (ALT 250 FOR IP): Performed by: HOSPITALIST

## 2023-08-03 PROCEDURE — 51798 US URINE CAPACITY MEASURE: CPT

## 2023-08-03 PROCEDURE — 6370000000 HC RX 637 (ALT 250 FOR IP): Performed by: NURSE PRACTITIONER

## 2023-08-03 RX ORDER — SODIUM CHLORIDE 9 MG/ML
INJECTION, SOLUTION INTRAVENOUS CONTINUOUS
Status: DISCONTINUED | OUTPATIENT
Start: 2023-08-03 | End: 2023-08-06

## 2023-08-03 RX ORDER — TRAMADOL HYDROCHLORIDE 50 MG/1
50 TABLET ORAL EVERY 6 HOURS PRN
Status: DISCONTINUED | OUTPATIENT
Start: 2023-08-03 | End: 2023-08-07 | Stop reason: HOSPADM

## 2023-08-03 RX ORDER — GABAPENTIN 300 MG/1
900 CAPSULE ORAL 3 TIMES DAILY
Status: DISCONTINUED | OUTPATIENT
Start: 2023-08-03 | End: 2023-08-07 | Stop reason: HOSPADM

## 2023-08-03 RX ORDER — GUAIFENESIN 600 MG/1
600 TABLET, EXTENDED RELEASE ORAL 2 TIMES DAILY
Status: DISCONTINUED | OUTPATIENT
Start: 2023-08-03 | End: 2023-08-03

## 2023-08-03 RX ORDER — METOPROLOL SUCCINATE 50 MG/1
50 TABLET, EXTENDED RELEASE ORAL DAILY
Status: DISCONTINUED | OUTPATIENT
Start: 2023-08-03 | End: 2023-08-07 | Stop reason: HOSPADM

## 2023-08-03 RX ORDER — BENZONATATE 100 MG/1
100 CAPSULE ORAL 3 TIMES DAILY PRN
Status: DISCONTINUED | OUTPATIENT
Start: 2023-08-03 | End: 2023-08-03

## 2023-08-03 RX ORDER — POTASSIUM CHLORIDE 750 MG/1
20 TABLET, FILM COATED, EXTENDED RELEASE ORAL ONCE
Status: COMPLETED | OUTPATIENT
Start: 2023-08-03 | End: 2023-08-03

## 2023-08-03 RX ADMIN — ESCITALOPRAM OXALATE 20 MG: 10 TABLET ORAL at 08:45

## 2023-08-03 RX ADMIN — ONDANSETRON 4 MG: 2 INJECTION INTRAMUSCULAR; INTRAVENOUS at 10:37

## 2023-08-03 RX ADMIN — Medication 1 CAPSULE: at 08:45

## 2023-08-03 RX ADMIN — GABAPENTIN 900 MG: 300 CAPSULE ORAL at 10:37

## 2023-08-03 RX ADMIN — ACETAMINOPHEN 650 MG: 325 TABLET ORAL at 11:13

## 2023-08-03 RX ADMIN — PIPERACILLIN AND TAZOBACTAM 4500 MG: 4; .5 INJECTION, POWDER, LYOPHILIZED, FOR SOLUTION INTRAVENOUS at 12:01

## 2023-08-03 RX ADMIN — CETIRIZINE HYDROCHLORIDE 10 MG: 10 TABLET, FILM COATED ORAL at 17:40

## 2023-08-03 RX ADMIN — GABAPENTIN 900 MG: 300 CAPSULE ORAL at 14:16

## 2023-08-03 RX ADMIN — PIPERACILLIN AND TAZOBACTAM 3375 MG: 3; .375 INJECTION, POWDER, LYOPHILIZED, FOR SOLUTION INTRAVENOUS at 17:37

## 2023-08-03 RX ADMIN — Medication 10 ML: at 08:46

## 2023-08-03 RX ADMIN — BUPROPION HYDROCHLORIDE 150 MG: 150 TABLET, EXTENDED RELEASE ORAL at 08:45

## 2023-08-03 RX ADMIN — GABAPENTIN 900 MG: 300 CAPSULE ORAL at 22:22

## 2023-08-03 RX ADMIN — METOPROLOL SUCCINATE 50 MG: 50 TABLET, EXTENDED RELEASE ORAL at 10:38

## 2023-08-03 RX ADMIN — POTASSIUM CHLORIDE 20 MEQ: 750 TABLET, FILM COATED, EXTENDED RELEASE ORAL at 08:45

## 2023-08-03 RX ADMIN — ACETAMINOPHEN 650 MG: 325 TABLET ORAL at 04:05

## 2023-08-03 RX ADMIN — ACETAMINOPHEN 650 MG: 325 TABLET ORAL at 20:30

## 2023-08-03 RX ADMIN — ENOXAPARIN SODIUM 40 MG: 100 INJECTION SUBCUTANEOUS at 08:46

## 2023-08-03 RX ADMIN — BUPROPION HYDROCHLORIDE 150 MG: 150 TABLET, EXTENDED RELEASE ORAL at 22:22

## 2023-08-03 ASSESSMENT — PAIN DESCRIPTION - LOCATION: LOCATION: ABDOMEN

## 2023-08-03 ASSESSMENT — PAIN SCALES - GENERAL: PAINLEVEL_OUTOF10: 6

## 2023-08-03 ASSESSMENT — PAIN DESCRIPTION - DESCRIPTORS: DESCRIPTORS: SORE;OTHER (COMMENT)

## 2023-08-03 NOTE — CARE COORDINATION
Care Management Initial Assessment       RUR: 18% Medium   Readmission? No  1st IM letter given? NA  1st  letter given: NA    CM met with patient at bedside to introduce self and explain role. Patient lives with her significant other and 6year old daughter in a 1 story home with 4 steps to enter. Patient reports she modified independent at baseline as of about 2-3 months ago. Patient reports she has been hospitalized and has underwent several surgeries within the past few months. Patient's daughter and significant other (quad amputee) assist as able with IADL tasks. Patient owns a RW, BSC, cane and shower chair. Patient reports [facility] and expressed she would not like to use them if [facility] is recommended. Patient's mother will provide transport home once medically stable. 08/03/23 1102   Service Assessment   Patient Orientation Alert and Oriented;Person;Situation;Place; Self   Cognition Alert   History Provided By Patient   Primary Caregiver Self   Accompanied By/Relationship Self   Support Systems Spouse/Significant Other;Children;Parent   Patient's Healthcare Decision Maker is: Legal Next of Kin   PCP Verified by CM Yes  Ja Oates, DO)   Last Visit to PCP Within last 3 months   Prior Functional Level Assistance with the following:;Housework   Current Functional Level Assistance with the following:;Housework; Bathing   Can patient return to prior living arrangement Yes   Ability to make needs known: Good   Family able to assist with home care needs: Yes   Would you like for me to discuss the discharge plan with any other family members/significant others, and if so, who?  Yes  (Upon patient request)   Financial Resources Other (Comment)   Community Resources None   Social/Functional History   Lives With Significant other;Daughter   Type of [address] [place] [address]  One level   Home Equipment Cane;[address], [address]

## 2023-08-04 LAB
ANION GAP SERPL CALC-SCNC: 8 MMOL/L (ref 5–15)
BACTERIA SPEC CULT: NORMAL
BASOPHILS # BLD: 0 K/UL (ref 0–0.1)
BASOPHILS NFR BLD: 0 % (ref 0–1)
BUN SERPL-MCNC: 6 MG/DL (ref 6–20)
BUN/CREAT SERPL: 7 (ref 12–20)
C DIFF GDH STL QL: NEGATIVE
C DIFF TOX A+B STL QL IA: NEGATIVE
C DIFF TOXIN INTERPRETATION: NORMAL
CALCIUM SERPL-MCNC: 8.7 MG/DL (ref 8.5–10.1)
CHLORIDE SERPL-SCNC: 109 MMOL/L (ref 97–108)
CO2 SERPL-SCNC: 25 MMOL/L (ref 21–32)
CREAT SERPL-MCNC: 0.81 MG/DL (ref 0.55–1.02)
DIFFERENTIAL METHOD BLD: ABNORMAL
EOSINOPHIL # BLD: 0.1 K/UL (ref 0–0.4)
EOSINOPHIL NFR BLD: 1 % (ref 0–7)
ERYTHROCYTE [DISTWIDTH] IN BLOOD BY AUTOMATED COUNT: 12.8 % (ref 11.5–14.5)
GLUCOSE SERPL-MCNC: 83 MG/DL (ref 65–100)
HCT VFR BLD AUTO: 35.8 % (ref 35–47)
HGB BLD-MCNC: 11 G/DL (ref 11.5–16)
IMM GRANULOCYTES # BLD AUTO: 0 K/UL (ref 0–0.04)
IMM GRANULOCYTES NFR BLD AUTO: 0 % (ref 0–0.5)
LYMPHOCYTES # BLD: 1.5 K/UL (ref 0.8–3.5)
LYMPHOCYTES NFR BLD: 20 % (ref 12–49)
MAGNESIUM SERPL-MCNC: 2.2 MG/DL (ref 1.6–2.4)
MCH RBC QN AUTO: 27.6 PG (ref 26–34)
MCHC RBC AUTO-ENTMCNC: 30.7 G/DL (ref 30–36.5)
MCV RBC AUTO: 89.7 FL (ref 80–99)
MONOCYTES # BLD: 0.7 K/UL (ref 0–1)
MONOCYTES NFR BLD: 10 % (ref 5–13)
NEUTS SEG # BLD: 5.1 K/UL (ref 1.8–8)
NEUTS SEG NFR BLD: 69 % (ref 32–75)
NRBC # BLD: 0 K/UL (ref 0–0.01)
NRBC BLD-RTO: 0 PER 100 WBC
PLATELET # BLD AUTO: 153 K/UL (ref 150–400)
PMV BLD AUTO: 11.9 FL (ref 8.9–12.9)
POTASSIUM SERPL-SCNC: 3 MMOL/L (ref 3.5–5.1)
RBC # BLD AUTO: 3.99 M/UL (ref 3.8–5.2)
SERVICE CMNT-IMP: NORMAL
SODIUM SERPL-SCNC: 142 MMOL/L (ref 136–145)
WBC # BLD AUTO: 7.4 K/UL (ref 3.6–11)

## 2023-08-04 PROCEDURE — 85025 COMPLETE CBC W/AUTO DIFF WBC: CPT

## 2023-08-04 PROCEDURE — 87324 CLOSTRIDIUM AG IA: CPT

## 2023-08-04 PROCEDURE — 6360000002 HC RX W HCPCS: Performed by: NURSE PRACTITIONER

## 2023-08-04 PROCEDURE — 2580000003 HC RX 258: Performed by: NURSE PRACTITIONER

## 2023-08-04 PROCEDURE — 51798 US URINE CAPACITY MEASURE: CPT

## 2023-08-04 PROCEDURE — 80048 BASIC METABOLIC PNL TOTAL CA: CPT

## 2023-08-04 PROCEDURE — 36415 COLL VENOUS BLD VENIPUNCTURE: CPT

## 2023-08-04 PROCEDURE — 1100000000 HC RM PRIVATE

## 2023-08-04 PROCEDURE — 83735 ASSAY OF MAGNESIUM: CPT

## 2023-08-04 PROCEDURE — 87449 NOS EACH ORGANISM AG IA: CPT

## 2023-08-04 PROCEDURE — 87040 BLOOD CULTURE FOR BACTERIA: CPT

## 2023-08-04 PROCEDURE — 6370000000 HC RX 637 (ALT 250 FOR IP): Performed by: NURSE PRACTITIONER

## 2023-08-04 PROCEDURE — 6360000002 HC RX W HCPCS: Performed by: HOSPITALIST

## 2023-08-04 PROCEDURE — 6370000000 HC RX 637 (ALT 250 FOR IP): Performed by: HOSPITALIST

## 2023-08-04 RX ORDER — POTASSIUM CHLORIDE 750 MG/1
40 TABLET, FILM COATED, EXTENDED RELEASE ORAL ONCE
Status: COMPLETED | OUTPATIENT
Start: 2023-08-04 | End: 2023-08-04

## 2023-08-04 RX ADMIN — Medication 1 CAPSULE: at 10:49

## 2023-08-04 RX ADMIN — TERIFLUNOMIDE 14 MG: 14 TABLET, FILM COATED ORAL at 18:43

## 2023-08-04 RX ADMIN — GABAPENTIN 900 MG: 300 CAPSULE ORAL at 10:49

## 2023-08-04 RX ADMIN — ESCITALOPRAM OXALATE 20 MG: 10 TABLET ORAL at 10:49

## 2023-08-04 RX ADMIN — ACETAMINOPHEN 650 MG: 325 TABLET ORAL at 18:54

## 2023-08-04 RX ADMIN — BUPROPION HYDROCHLORIDE 150 MG: 150 TABLET, EXTENDED RELEASE ORAL at 21:20

## 2023-08-04 RX ADMIN — ACETAMINOPHEN 650 MG: 325 TABLET ORAL at 12:36

## 2023-08-04 RX ADMIN — PIPERACILLIN AND TAZOBACTAM 3375 MG: 3; .375 INJECTION, POWDER, LYOPHILIZED, FOR SOLUTION INTRAVENOUS at 02:31

## 2023-08-04 RX ADMIN — CETIRIZINE HYDROCHLORIDE 10 MG: 10 TABLET, FILM COATED ORAL at 18:43

## 2023-08-04 RX ADMIN — ACETAMINOPHEN 650 MG: 325 TABLET ORAL at 04:45

## 2023-08-04 RX ADMIN — GABAPENTIN 900 MG: 300 CAPSULE ORAL at 21:20

## 2023-08-04 RX ADMIN — ENOXAPARIN SODIUM 40 MG: 100 INJECTION SUBCUTANEOUS at 10:47

## 2023-08-04 RX ADMIN — METOPROLOL SUCCINATE 50 MG: 50 TABLET, EXTENDED RELEASE ORAL at 10:49

## 2023-08-04 RX ADMIN — PIPERACILLIN AND TAZOBACTAM 3375 MG: 3; .375 INJECTION, POWDER, LYOPHILIZED, FOR SOLUTION INTRAVENOUS at 18:43

## 2023-08-04 RX ADMIN — BUPROPION HYDROCHLORIDE 150 MG: 150 TABLET, EXTENDED RELEASE ORAL at 10:49

## 2023-08-04 RX ADMIN — POTASSIUM CHLORIDE 40 MEQ: 750 TABLET, FILM COATED, EXTENDED RELEASE ORAL at 10:49

## 2023-08-04 RX ADMIN — PIPERACILLIN AND TAZOBACTAM 3375 MG: 3; .375 INJECTION, POWDER, LYOPHILIZED, FOR SOLUTION INTRAVENOUS at 10:50

## 2023-08-04 RX ADMIN — SODIUM CHLORIDE: 9 INJECTION, SOLUTION INTRAVENOUS at 16:40

## 2023-08-04 ASSESSMENT — PAIN - FUNCTIONAL ASSESSMENT: PAIN_FUNCTIONAL_ASSESSMENT: ACTIVITIES ARE NOT PREVENTED

## 2023-08-04 ASSESSMENT — PAIN SCALES - GENERAL
PAINLEVEL_OUTOF10: 6
PAINLEVEL_OUTOF10: 6
PAINLEVEL_OUTOF10: 10

## 2023-08-04 ASSESSMENT — PAIN DESCRIPTION - LOCATION
LOCATION: HEAD
LOCATION: ABDOMEN
LOCATION: ABDOMEN

## 2023-08-04 ASSESSMENT — PAIN DESCRIPTION - FREQUENCY: FREQUENCY: CONTINUOUS

## 2023-08-04 ASSESSMENT — PAIN DESCRIPTION - ORIENTATION
ORIENTATION: LOWER
ORIENTATION: LOWER;LEFT

## 2023-08-04 ASSESSMENT — PAIN DESCRIPTION - DESCRIPTORS
DESCRIPTORS: CRAMPING
DESCRIPTORS: CRAMPING

## 2023-08-04 ASSESSMENT — PAIN DESCRIPTION - PAIN TYPE: TYPE: ACUTE PAIN

## 2023-08-04 NOTE — CONSULTS
Nutrition Note    MD consult received for food preferences. Pt on Regular diet. Spoke with pt via phone, she states RN provided her with a menu and she was already able to call and order her dinner for tonight and meals for tomorrow. She had no further questions/concerns.     Electronically signed by Yamilex Carolina RD on 8/4/23   Contact via [employer]

## 2023-08-05 ENCOUNTER — APPOINTMENT (OUTPATIENT)
Facility: HOSPITAL | Age: 48
DRG: 660 | End: 2023-08-05
Payer: COMMERCIAL

## 2023-08-05 LAB
ANION GAP SERPL CALC-SCNC: 4 MMOL/L (ref 5–15)
BACTERIA SPEC CULT: ABNORMAL
BASOPHILS # BLD: 0 K/UL (ref 0–0.1)
BASOPHILS NFR BLD: 1 % (ref 0–1)
BUN SERPL-MCNC: 4 MG/DL (ref 6–20)
BUN/CREAT SERPL: 5 (ref 12–20)
CALCIUM SERPL-MCNC: 8.8 MG/DL (ref 8.5–10.1)
CHLORIDE SERPL-SCNC: 111 MMOL/L (ref 97–108)
CO2 SERPL-SCNC: 25 MMOL/L (ref 21–32)
CREAT SERPL-MCNC: 0.75 MG/DL (ref 0.55–1.02)
DIFFERENTIAL METHOD BLD: ABNORMAL
EOSINOPHIL # BLD: 0.1 K/UL (ref 0–0.4)
EOSINOPHIL NFR BLD: 2 % (ref 0–7)
ERYTHROCYTE [DISTWIDTH] IN BLOOD BY AUTOMATED COUNT: 12.8 % (ref 11.5–14.5)
GLUCOSE SERPL-MCNC: 87 MG/DL (ref 65–100)
HCT VFR BLD AUTO: 34.1 % (ref 35–47)
HGB BLD-MCNC: 10.9 G/DL (ref 11.5–16)
IMM GRANULOCYTES # BLD AUTO: 0 K/UL (ref 0–0.04)
IMM GRANULOCYTES NFR BLD AUTO: 0 % (ref 0–0.5)
LYMPHOCYTES # BLD: 1.4 K/UL (ref 0.8–3.5)
LYMPHOCYTES NFR BLD: 26 % (ref 12–49)
MAGNESIUM SERPL-MCNC: 2.1 MG/DL (ref 1.6–2.4)
MCH RBC QN AUTO: 28.2 PG (ref 26–34)
MCHC RBC AUTO-ENTMCNC: 32 G/DL (ref 30–36.5)
MCV RBC AUTO: 88.3 FL (ref 80–99)
MONOCYTES # BLD: 0.7 K/UL (ref 0–1)
MONOCYTES NFR BLD: 13 % (ref 5–13)
NEUTS SEG # BLD: 3 K/UL (ref 1.8–8)
NEUTS SEG NFR BLD: 58 % (ref 32–75)
NRBC # BLD: 0 K/UL (ref 0–0.01)
NRBC BLD-RTO: 0 PER 100 WBC
PLATELET # BLD AUTO: 172 K/UL (ref 150–400)
PMV BLD AUTO: 11.6 FL (ref 8.9–12.9)
POTASSIUM SERPL-SCNC: 3.5 MMOL/L (ref 3.5–5.1)
RBC # BLD AUTO: 3.86 M/UL (ref 3.8–5.2)
SERVICE CMNT-IMP: ABNORMAL
SERVICE CMNT-IMP: ABNORMAL
SODIUM SERPL-SCNC: 140 MMOL/L (ref 136–145)
WBC # BLD AUTO: 5.3 K/UL (ref 3.6–11)

## 2023-08-05 PROCEDURE — 6360000002 HC RX W HCPCS: Performed by: HOSPITALIST

## 2023-08-05 PROCEDURE — 99254 IP/OBS CNSLTJ NEW/EST MOD 60: CPT | Performed by: INTERNAL MEDICINE

## 2023-08-05 PROCEDURE — 6360000002 HC RX W HCPCS: Performed by: NURSE PRACTITIONER

## 2023-08-05 PROCEDURE — 85025 COMPLETE CBC W/AUTO DIFF WBC: CPT

## 2023-08-05 PROCEDURE — 2580000003 HC RX 258: Performed by: NURSE PRACTITIONER

## 2023-08-05 PROCEDURE — 6370000000 HC RX 637 (ALT 250 FOR IP): Performed by: HOSPITALIST

## 2023-08-05 PROCEDURE — 1100000000 HC RM PRIVATE

## 2023-08-05 PROCEDURE — 51702 INSERT TEMP BLADDER CATH: CPT

## 2023-08-05 PROCEDURE — 74176 CT ABD & PELVIS W/O CONTRAST: CPT

## 2023-08-05 PROCEDURE — 76937 US GUIDE VASCULAR ACCESS: CPT

## 2023-08-05 PROCEDURE — 87506 IADNA-DNA/RNA PROBE TQ 6-11: CPT

## 2023-08-05 PROCEDURE — 6370000000 HC RX 637 (ALT 250 FOR IP)

## 2023-08-05 PROCEDURE — 80048 BASIC METABOLIC PNL TOTAL CA: CPT

## 2023-08-05 PROCEDURE — 05HC33Z INSERTION OF INFUSION DEVICE INTO LEFT BASILIC VEIN, PERCUTANEOUS APPROACH: ICD-10-PCS | Performed by: NURSE PRACTITIONER

## 2023-08-05 PROCEDURE — 2709999900 HC NON-CHARGEABLE SUPPLY

## 2023-08-05 PROCEDURE — 6370000000 HC RX 637 (ALT 250 FOR IP): Performed by: NURSE PRACTITIONER

## 2023-08-05 PROCEDURE — 36415 COLL VENOUS BLD VENIPUNCTURE: CPT

## 2023-08-05 PROCEDURE — C1751 CATH, INF, PER/CENT/MIDLINE: HCPCS

## 2023-08-05 PROCEDURE — 83735 ASSAY OF MAGNESIUM: CPT

## 2023-08-05 RX ORDER — SIMETHICONE 80 MG
80 TABLET,CHEWABLE ORAL EVERY 6 HOURS PRN
Status: DISCONTINUED | OUTPATIENT
Start: 2023-08-05 | End: 2023-08-07 | Stop reason: HOSPADM

## 2023-08-05 RX ORDER — TAMSULOSIN HYDROCHLORIDE 0.4 MG/1
0.4 CAPSULE ORAL DAILY
Status: DISCONTINUED | OUTPATIENT
Start: 2023-08-05 | End: 2023-08-07 | Stop reason: HOSPADM

## 2023-08-05 RX ADMIN — ESCITALOPRAM OXALATE 20 MG: 10 TABLET ORAL at 08:37

## 2023-08-05 RX ADMIN — GABAPENTIN 900 MG: 300 CAPSULE ORAL at 08:37

## 2023-08-05 RX ADMIN — GABAPENTIN 900 MG: 300 CAPSULE ORAL at 15:18

## 2023-08-05 RX ADMIN — SIMETHICONE 80 MG: 80 TABLET, CHEWABLE ORAL at 04:26

## 2023-08-05 RX ADMIN — GABAPENTIN 900 MG: 300 CAPSULE ORAL at 21:13

## 2023-08-05 RX ADMIN — METOPROLOL SUCCINATE 50 MG: 50 TABLET, EXTENDED RELEASE ORAL at 08:38

## 2023-08-05 RX ADMIN — BUPROPION HYDROCHLORIDE 150 MG: 150 TABLET, EXTENDED RELEASE ORAL at 21:13

## 2023-08-05 RX ADMIN — TAMSULOSIN HYDROCHLORIDE 0.4 MG: 0.4 CAPSULE ORAL at 15:17

## 2023-08-05 RX ADMIN — CETIRIZINE HYDROCHLORIDE 10 MG: 10 TABLET, FILM COATED ORAL at 17:53

## 2023-08-05 RX ADMIN — PIPERACILLIN AND TAZOBACTAM 3375 MG: 3; .375 INJECTION, POWDER, LYOPHILIZED, FOR SOLUTION INTRAVENOUS at 02:17

## 2023-08-05 RX ADMIN — TERIFLUNOMIDE 14 MG: 14 TABLET, FILM COATED ORAL at 17:54

## 2023-08-05 RX ADMIN — SODIUM CHLORIDE: 9 INJECTION, SOLUTION INTRAVENOUS at 02:45

## 2023-08-05 RX ADMIN — BUPROPION HYDROCHLORIDE 150 MG: 150 TABLET, EXTENDED RELEASE ORAL at 08:37

## 2023-08-05 RX ADMIN — ENOXAPARIN SODIUM 40 MG: 100 INJECTION SUBCUTANEOUS at 08:37

## 2023-08-05 RX ADMIN — PIPERACILLIN AND TAZOBACTAM 3375 MG: 3; .375 INJECTION, POWDER, LYOPHILIZED, FOR SOLUTION INTRAVENOUS at 15:20

## 2023-08-05 RX ADMIN — Medication 1 CAPSULE: at 08:38

## 2023-08-05 RX ADMIN — ONDANSETRON 4 MG: 4 TABLET, ORALLY DISINTEGRATING ORAL at 08:49

## 2023-08-05 ASSESSMENT — PAIN - FUNCTIONAL ASSESSMENT
PAIN_FUNCTIONAL_ASSESSMENT: ACTIVITIES ARE NOT PREVENTED
PAIN_FUNCTIONAL_ASSESSMENT: ACTIVITIES ARE NOT PREVENTED

## 2023-08-05 ASSESSMENT — PAIN DESCRIPTION - ORIENTATION
ORIENTATION: LOWER
ORIENTATION: LOWER

## 2023-08-05 ASSESSMENT — PAIN SCALES - GENERAL
PAINLEVEL_OUTOF10: 3
PAINLEVEL_OUTOF10: 8
PAINLEVEL_OUTOF10: 6

## 2023-08-05 ASSESSMENT — PAIN DESCRIPTION - LOCATION
LOCATION: ABDOMEN
LOCATION: ABDOMEN

## 2023-08-05 ASSESSMENT — PAIN DESCRIPTION - DESCRIPTORS
DESCRIPTORS: CRAMPING
DESCRIPTORS: ACHING;PRESSURE

## 2023-08-05 ASSESSMENT — PAIN DESCRIPTION - PAIN TYPE
TYPE: CHRONIC PAIN
TYPE: CHRONIC PAIN

## 2023-08-05 NOTE — CONSULTS
Infectious Disease Consult Note    Reason for Consult: E.coli bacteremia  Date of Consultation: August 5, 2023  Date of Admission: 8/2/2023  Referring Physician: Elder Ellison      HPI:    The patient was admitted via the emergency room 08/2/23 with a chief complaint of fever and diarrhea. Has a history of C. difficile diarrhea in the past and has continued to have loose nonbloody stools. Her temperature was 100.7 with a WBC 15.1. The patient was seen in consultation by urology with suspected pyelonephritis with obstructing left distal ureteral calculi.   Endoscopy was performed in 08/202/23 with stenting of L. ureteral abnormality    Repeat blood cultures were drawn 08/04/2023 with NGTD  Remains afebrile without leukocytosis on ceftriaxone  Patient currently without abdominal pain/ mild tenderness of right abdomen and left flank      Past Medical History:  Past Medical History:   Diagnosis Date    Acute renal failure (ARF) ([address])     Acute renal failure (ARF) ([address])     6/2020-STONES AND PYELONEPHRITIS    Ankle fracture     Ankle fracture     Arrhythmia     Arrhythmia     PALPITATIONS    Asthma     Asthma     Autoimmune disease ([address])     MS    Calculus of gallbladder without cholecystitis without obstruction 07/20/2020    Calculus of gallbladder without cholecystitis without obstruction 07/20/2020    Celiac disease     Celiac disease     Chronic pain     r/t MS    Chronic pain     MS    Congenital sucrose isomaltose malabsorption     Congenital sucrose isomaltose malabsorption     Depression     Depression     Diverticulosis     of sigmoid colon    Diverticulosis of sigmoid colon     Dysplastic colon polyp     Dr. Delarosa Standing- last colonoscopy 11/7/12 single polyp    Dysplastic colon polyp     Dr. Delarosa Standing - last colonoscopy 11/7/12 - single polyp    Essential hypertension     Gestasional    GERD (gastroesophageal reflux disease)     GERD (gastroesophageal reflux disease)     Hx of sleep apnea     resolved after weight

## 2023-08-06 LAB
ANION GAP SERPL CALC-SCNC: 6 MMOL/L (ref 5–15)
BASOPHILS # BLD: 0 K/UL (ref 0–0.1)
BASOPHILS NFR BLD: 1 % (ref 0–1)
BUN SERPL-MCNC: 3 MG/DL (ref 6–20)
BUN/CREAT SERPL: 4 (ref 12–20)
C COLI+JEJUNI TUF STL QL NAA+PROBE: NEGATIVE
CALCIUM SERPL-MCNC: 8.6 MG/DL (ref 8.5–10.1)
CHLORIDE SERPL-SCNC: 109 MMOL/L (ref 97–108)
CO2 SERPL-SCNC: 26 MMOL/L (ref 21–32)
CREAT SERPL-MCNC: 0.71 MG/DL (ref 0.55–1.02)
DIFFERENTIAL METHOD BLD: ABNORMAL
EC STX1+STX2 GENES STL QL NAA+PROBE: NEGATIVE
EOSINOPHIL # BLD: 0.1 K/UL (ref 0–0.4)
EOSINOPHIL NFR BLD: 2 % (ref 0–7)
ERYTHROCYTE [DISTWIDTH] IN BLOOD BY AUTOMATED COUNT: 12.7 % (ref 11.5–14.5)
ETEC ELTA+ESTB GENES STL QL NAA+PROBE: NEGATIVE
GLUCOSE SERPL-MCNC: 86 MG/DL (ref 65–100)
HCT VFR BLD AUTO: 31.2 % (ref 35–47)
HGB BLD-MCNC: 10 G/DL (ref 11.5–16)
IMM GRANULOCYTES # BLD AUTO: 0 K/UL (ref 0–0.04)
IMM GRANULOCYTES NFR BLD AUTO: 1 % (ref 0–0.5)
IRON SATN MFR SERPL: 13 % (ref 20–50)
IRON SERPL-MCNC: 24 UG/DL (ref 35–150)
LYMPHOCYTES # BLD: 1.3 K/UL (ref 0.8–3.5)
LYMPHOCYTES NFR BLD: 25 % (ref 12–49)
MAGNESIUM SERPL-MCNC: 1.8 MG/DL (ref 1.6–2.4)
MCH RBC QN AUTO: 27.9 PG (ref 26–34)
MCHC RBC AUTO-ENTMCNC: 32.1 G/DL (ref 30–36.5)
MCV RBC AUTO: 86.9 FL (ref 80–99)
MONOCYTES # BLD: 0.7 K/UL (ref 0–1)
MONOCYTES NFR BLD: 13 % (ref 5–13)
NEUTS SEG # BLD: 3.1 K/UL (ref 1.8–8)
NEUTS SEG NFR BLD: 58 % (ref 32–75)
NRBC # BLD: 0 K/UL (ref 0–0.01)
NRBC BLD-RTO: 0 PER 100 WBC
P SHIGELLOIDES DNA STL QL NAA+PROBE: NEGATIVE
PLATELET # BLD AUTO: 167 K/UL (ref 150–400)
PMV BLD AUTO: 10.6 FL (ref 8.9–12.9)
POTASSIUM SERPL-SCNC: 3.2 MMOL/L (ref 3.5–5.1)
RBC # BLD AUTO: 3.59 M/UL (ref 3.8–5.2)
SALMONELLA SP SPAO STL QL NAA+PROBE: NEGATIVE
SHIGELLA SP+EIEC IPAH STL QL NAA+PROBE: NEGATIVE
SODIUM SERPL-SCNC: 141 MMOL/L (ref 136–145)
TIBC SERPL-MCNC: 192 UG/DL (ref 250–450)
V CHOL+PARA+VUL DNA STL QL NAA+NON-PROBE: NEGATIVE
WBC # BLD AUTO: 5.3 K/UL (ref 3.6–11)
Y ENTEROCOL DNA STL QL NAA+NON-PROBE: NEGATIVE

## 2023-08-06 PROCEDURE — 80048 BASIC METABOLIC PNL TOTAL CA: CPT

## 2023-08-06 PROCEDURE — 6370000000 HC RX 637 (ALT 250 FOR IP): Performed by: HOSPITALIST

## 2023-08-06 PROCEDURE — 2580000003 HC RX 258: Performed by: HOSPITALIST

## 2023-08-06 PROCEDURE — 6370000000 HC RX 637 (ALT 250 FOR IP): Performed by: NURSE PRACTITIONER

## 2023-08-06 PROCEDURE — 2580000003 HC RX 258

## 2023-08-06 PROCEDURE — 6370000000 HC RX 637 (ALT 250 FOR IP)

## 2023-08-06 PROCEDURE — 85025 COMPLETE CBC W/AUTO DIFF WBC: CPT

## 2023-08-06 PROCEDURE — 83540 ASSAY OF IRON: CPT

## 2023-08-06 PROCEDURE — 36415 COLL VENOUS BLD VENIPUNCTURE: CPT

## 2023-08-06 PROCEDURE — 83735 ASSAY OF MAGNESIUM: CPT

## 2023-08-06 PROCEDURE — 2580000003 HC RX 258: Performed by: NURSE PRACTITIONER

## 2023-08-06 PROCEDURE — 6360000002 HC RX W HCPCS: Performed by: NURSE PRACTITIONER

## 2023-08-06 PROCEDURE — 83550 IRON BINDING TEST: CPT

## 2023-08-06 PROCEDURE — 1100000000 HC RM PRIVATE

## 2023-08-06 PROCEDURE — 6360000002 HC RX W HCPCS

## 2023-08-06 PROCEDURE — 6360000002 HC RX W HCPCS: Performed by: HOSPITALIST

## 2023-08-06 RX ORDER — FERROUS SULFATE 325(65) MG
325 TABLET ORAL 2 TIMES DAILY WITH MEALS
Status: DISCONTINUED | OUTPATIENT
Start: 2023-08-06 | End: 2023-08-07 | Stop reason: HOSPADM

## 2023-08-06 RX ORDER — POTASSIUM CHLORIDE 750 MG/1
40 TABLET, FILM COATED, EXTENDED RELEASE ORAL ONCE
Status: COMPLETED | OUTPATIENT
Start: 2023-08-06 | End: 2023-08-06

## 2023-08-06 RX ORDER — LOPERAMIDE HYDROCHLORIDE 2 MG/1
2 CAPSULE ORAL 4 TIMES DAILY PRN
Status: DISCONTINUED | OUTPATIENT
Start: 2023-08-06 | End: 2023-08-07 | Stop reason: HOSPADM

## 2023-08-06 RX ADMIN — GABAPENTIN 900 MG: 300 CAPSULE ORAL at 15:44

## 2023-08-06 RX ADMIN — GABAPENTIN 900 MG: 300 CAPSULE ORAL at 21:25

## 2023-08-06 RX ADMIN — BUPROPION HYDROCHLORIDE 150 MG: 150 TABLET, EXTENDED RELEASE ORAL at 21:25

## 2023-08-06 RX ADMIN — WATER 2000 MG: 1 INJECTION INTRAMUSCULAR; INTRAVENOUS; SUBCUTANEOUS at 09:58

## 2023-08-06 RX ADMIN — FERROUS SULFATE TAB 325 MG (65 MG ELEMENTAL FE) 325 MG: 325 (65 FE) TAB at 18:05

## 2023-08-06 RX ADMIN — GABAPENTIN 900 MG: 300 CAPSULE ORAL at 10:03

## 2023-08-06 RX ADMIN — POTASSIUM CHLORIDE 40 MEQ: 750 TABLET, FILM COATED, EXTENDED RELEASE ORAL at 10:02

## 2023-08-06 RX ADMIN — TRAMADOL HYDROCHLORIDE 50 MG: 50 TABLET ORAL at 21:58

## 2023-08-06 RX ADMIN — BUPROPION HYDROCHLORIDE 150 MG: 150 TABLET, EXTENDED RELEASE ORAL at 10:03

## 2023-08-06 RX ADMIN — Medication 1 CAPSULE: at 10:03

## 2023-08-06 RX ADMIN — ENOXAPARIN SODIUM 40 MG: 100 INJECTION SUBCUTANEOUS at 10:03

## 2023-08-06 RX ADMIN — ESCITALOPRAM OXALATE 20 MG: 10 TABLET ORAL at 10:03

## 2023-08-06 RX ADMIN — CETIRIZINE HYDROCHLORIDE 10 MG: 10 TABLET, FILM COATED ORAL at 18:05

## 2023-08-06 RX ADMIN — SODIUM CHLORIDE: 9 INJECTION, SOLUTION INTRAVENOUS at 01:42

## 2023-08-06 RX ADMIN — PIPERACILLIN AND TAZOBACTAM 3375 MG: 3; .375 INJECTION, POWDER, LYOPHILIZED, FOR SOLUTION INTRAVENOUS at 01:41

## 2023-08-06 RX ADMIN — METOPROLOL SUCCINATE 50 MG: 50 TABLET, EXTENDED RELEASE ORAL at 10:03

## 2023-08-06 RX ADMIN — SODIUM CHLORIDE: 9 INJECTION, SOLUTION INTRAVENOUS at 09:34

## 2023-08-06 RX ADMIN — TERIFLUNOMIDE 14 MG: 14 TABLET, FILM COATED ORAL at 18:05

## 2023-08-06 RX ADMIN — TAMSULOSIN HYDROCHLORIDE 0.4 MG: 0.4 CAPSULE ORAL at 10:02

## 2023-08-06 ASSESSMENT — PAIN SCALES - GENERAL: PAINLEVEL_OUTOF10: 9

## 2023-08-06 ASSESSMENT — PAIN DESCRIPTION - ORIENTATION: ORIENTATION: LEFT

## 2023-08-06 ASSESSMENT — PAIN DESCRIPTION - LOCATION: LOCATION: ABDOMEN

## 2023-08-06 ASSESSMENT — PAIN DESCRIPTION - DESCRIPTORS: DESCRIPTORS: ACHING

## 2023-08-06 NOTE — PLAN OF CARE
INFECTIOUS DISEASE discharge plan:        PICC line insertion for outpatient antibiotic        PICC line removal after last dose 08/18/23    2. PICC care per infusion protocol    3. Ceftriaxone 2 g Q 24 H with last dose 08/18/23    4.    Weekly labs CBC w/diff, Chem 7 with results sent to PMD            5.   Follow up with PMD in 5 to 10 days after discharge      For ID Questions Only    Dominick   Call  [phone]

## 2023-08-07 ENCOUNTER — APPOINTMENT (OUTPATIENT)
Facility: HOSPITAL | Age: 48
DRG: 660 | End: 2023-08-07
Payer: COMMERCIAL

## 2023-08-07 VITALS
BODY MASS INDEX: 34.63 KG/M2 | RESPIRATION RATE: 16 BRPM | HEART RATE: 58 BPM | TEMPERATURE: 97.7 F | DIASTOLIC BLOOD PRESSURE: 86 MMHG | OXYGEN SATURATION: 96 % | WEIGHT: 215.5 LBS | SYSTOLIC BLOOD PRESSURE: 149 MMHG | HEIGHT: 66 IN

## 2023-08-07 LAB
ANION GAP SERPL CALC-SCNC: 5 MMOL/L (ref 5–15)
BASOPHILS # BLD: 0 K/UL (ref 0–0.1)
BASOPHILS NFR BLD: 1 % (ref 0–1)
BUN SERPL-MCNC: 3 MG/DL (ref 6–20)
BUN/CREAT SERPL: 5 (ref 12–20)
CALCIUM SERPL-MCNC: 8.7 MG/DL (ref 8.5–10.1)
CHLORIDE SERPL-SCNC: 108 MMOL/L (ref 97–108)
CO2 SERPL-SCNC: 29 MMOL/L (ref 21–32)
CREAT SERPL-MCNC: 0.66 MG/DL (ref 0.55–1.02)
DIFFERENTIAL METHOD BLD: ABNORMAL
EOSINOPHIL # BLD: 0.2 K/UL (ref 0–0.4)
EOSINOPHIL NFR BLD: 4 % (ref 0–7)
ERYTHROCYTE [DISTWIDTH] IN BLOOD BY AUTOMATED COUNT: 12.6 % (ref 11.5–14.5)
GLUCOSE SERPL-MCNC: 81 MG/DL (ref 65–100)
HCT VFR BLD AUTO: 31.7 % (ref 35–47)
HGB BLD-MCNC: 10.3 G/DL (ref 11.5–16)
IMM GRANULOCYTES # BLD AUTO: 0 K/UL (ref 0–0.04)
IMM GRANULOCYTES NFR BLD AUTO: 0 % (ref 0–0.5)
LYMPHOCYTES # BLD: 1.5 K/UL (ref 0.8–3.5)
LYMPHOCYTES NFR BLD: 31 % (ref 12–49)
MCH RBC QN AUTO: 28.1 PG (ref 26–34)
MCHC RBC AUTO-ENTMCNC: 32.5 G/DL (ref 30–36.5)
MCV RBC AUTO: 86.4 FL (ref 80–99)
MONOCYTES # BLD: 0.7 K/UL (ref 0–1)
MONOCYTES NFR BLD: 13 % (ref 5–13)
NEUTS SEG # BLD: 2.5 K/UL (ref 1.8–8)
NEUTS SEG NFR BLD: 51 % (ref 32–75)
NRBC # BLD: 0 K/UL (ref 0–0.01)
NRBC BLD-RTO: 0 PER 100 WBC
PLATELET # BLD AUTO: 179 K/UL (ref 150–400)
PMV BLD AUTO: 10.5 FL (ref 8.9–12.9)
POTASSIUM SERPL-SCNC: 3.6 MMOL/L (ref 3.5–5.1)
RBC # BLD AUTO: 3.67 M/UL (ref 3.8–5.2)
SODIUM SERPL-SCNC: 142 MMOL/L (ref 136–145)
WBC # BLD AUTO: 4.8 K/UL (ref 3.6–11)

## 2023-08-07 PROCEDURE — 02HV33Z INSERTION OF INFUSION DEVICE INTO SUPERIOR VENA CAVA, PERCUTANEOUS APPROACH: ICD-10-PCS | Performed by: STUDENT IN AN ORGANIZED HEALTH CARE EDUCATION/TRAINING PROGRAM

## 2023-08-07 PROCEDURE — 36415 COLL VENOUS BLD VENIPUNCTURE: CPT

## 2023-08-07 PROCEDURE — 6370000000 HC RX 637 (ALT 250 FOR IP)

## 2023-08-07 PROCEDURE — 80048 BASIC METABOLIC PNL TOTAL CA: CPT

## 2023-08-07 PROCEDURE — C1751 CATH, INF, PER/CENT/MIDLINE: HCPCS

## 2023-08-07 PROCEDURE — 85025 COMPLETE CBC W/AUTO DIFF WBC: CPT

## 2023-08-07 PROCEDURE — 76937 US GUIDE VASCULAR ACCESS: CPT

## 2023-08-07 PROCEDURE — 6370000000 HC RX 637 (ALT 250 FOR IP): Performed by: HOSPITALIST

## 2023-08-07 PROCEDURE — 2709999900 HC NON-CHARGEABLE SUPPLY

## 2023-08-07 PROCEDURE — 6370000000 HC RX 637 (ALT 250 FOR IP): Performed by: NURSE PRACTITIONER

## 2023-08-07 PROCEDURE — 6360000002 HC RX W HCPCS: Performed by: HOSPITALIST

## 2023-08-07 PROCEDURE — 2580000003 HC RX 258: Performed by: HOSPITALIST

## 2023-08-07 PROCEDURE — 6360000002 HC RX W HCPCS

## 2023-08-07 PROCEDURE — 76770 US EXAM ABDO BACK WALL COMP: CPT

## 2023-08-07 PROCEDURE — 2580000003 HC RX 258

## 2023-08-07 RX ORDER — ACETAMINOPHEN 500 MG
1000 TABLET ORAL EVERY 6 HOURS PRN
Qty: 120 TABLET | Refills: 3 | Status: SHIPPED
Start: 2023-08-07

## 2023-08-07 RX ORDER — FERROUS SULFATE 325(65) MG
325 TABLET ORAL 2 TIMES DAILY WITH MEALS
Qty: 60 TABLET | Refills: 0 | Status: SHIPPED | OUTPATIENT
Start: 2023-08-07

## 2023-08-07 RX ORDER — TAMSULOSIN HYDROCHLORIDE 0.4 MG/1
0.4 CAPSULE ORAL DAILY
Qty: 30 CAPSULE | Refills: 0 | Status: SHIPPED | OUTPATIENT
Start: 2023-08-08

## 2023-08-07 RX ORDER — METOPROLOL SUCCINATE 50 MG/1
50 TABLET, EXTENDED RELEASE ORAL DAILY
Qty: 30 TABLET | Refills: 3 | Status: SHIPPED
Start: 2023-08-07

## 2023-08-07 RX ORDER — TRAMADOL HYDROCHLORIDE 50 MG/1
50 TABLET ORAL EVERY 6 HOURS PRN
Qty: 4 TABLET | Refills: 0 | Status: SHIPPED | OUTPATIENT
Start: 2023-08-07 | End: 2023-08-08

## 2023-08-07 RX ADMIN — GABAPENTIN 900 MG: 300 CAPSULE ORAL at 16:11

## 2023-08-07 RX ADMIN — Medication 1 CAPSULE: at 09:24

## 2023-08-07 RX ADMIN — METOPROLOL SUCCINATE 50 MG: 50 TABLET, EXTENDED RELEASE ORAL at 09:24

## 2023-08-07 RX ADMIN — CETIRIZINE HYDROCHLORIDE 10 MG: 10 TABLET, FILM COATED ORAL at 18:19

## 2023-08-07 RX ADMIN — BUPROPION HYDROCHLORIDE 150 MG: 150 TABLET, EXTENDED RELEASE ORAL at 09:24

## 2023-08-07 RX ADMIN — ENOXAPARIN SODIUM 40 MG: 100 INJECTION SUBCUTANEOUS at 09:23

## 2023-08-07 RX ADMIN — Medication 10 ML: at 09:25

## 2023-08-07 RX ADMIN — GABAPENTIN 900 MG: 300 CAPSULE ORAL at 09:23

## 2023-08-07 RX ADMIN — FERROUS SULFATE TAB 325 MG (65 MG ELEMENTAL FE) 325 MG: 325 (65 FE) TAB at 07:32

## 2023-08-07 RX ADMIN — TAMSULOSIN HYDROCHLORIDE 0.4 MG: 0.4 CAPSULE ORAL at 09:24

## 2023-08-07 RX ADMIN — ESCITALOPRAM OXALATE 20 MG: 10 TABLET ORAL at 09:24

## 2023-08-07 RX ADMIN — WATER 2000 MG: 1 INJECTION INTRAMUSCULAR; INTRAVENOUS; SUBCUTANEOUS at 09:23

## 2023-08-07 RX ADMIN — FERROUS SULFATE TAB 325 MG (65 MG ELEMENTAL FE) 325 MG: 325 (65 FE) TAB at 18:19

## 2023-08-07 ASSESSMENT — PAIN SCALES - GENERAL
PAINLEVEL_OUTOF10: 0
PAINLEVEL_OUTOF10: 0

## 2023-08-07 NOTE — CARE COORDINATION
Transition of Care Plan:    RUR: 17% Medium   Prior Level of Functioning: Modified independent   Disposition: Home health SN and IV abs w/ [facility] (p: [phone])  Follow up appointments: PCP, specialist   DME needed: Has needed DME   Transportation at discharge: Family   IM/IMM Medicare/ letter given: NA  Is patient a [facility] and [facility] with [facility]? NA   If yes, was [employer] transfer form completed and [facility] notified? NA  Caregiver Contact: Mother, Rinaldo Opitz, [phone]  Discharge Caregiver contacted prior to discharge? Upon patient request  Care Conference needed? No   Barriers to discharge: Medical, PICC placement    9:50AM - CM reviewed chart. Per review, patient will need home IV abx. Awaiting PICC placement. CM discussed with patient; no IV infusion company preference per patient. CM sent referral to [address] and accepted. CM requested for [facility] to provide SN piece; awaiting response. MARIA ESTHER spoke with Ritchie Espinoza. Celeste RN Liaison (p: [phone]) who stated she will provide education once PICC is placed. CM will continue to follow as needed.     Laura Kramer, MSW   [phone]

## 2023-08-07 NOTE — DISCHARGE INSTRUCTIONS
scheduled appointment. INFECTIOUS DISEASE discharge plan:      PICC line insertion for outpatient antibiotic        PICC line removal after last dose 08/18/23     2. PICC care per infusion protocol     3. Ceftriaxone 2 g Q 24 H with last dose 08/18/23     4. Weekly labs CBC w/diff, Chem 7 with results sent to PMD           5. Follow up with PMD in 5 to 10 days after discharge     For ID Questions Only    Dominick   Call  [phone]    DIET: regular diet    ACTIVITY: activity as tolerated    WOUND CARE: none    EQUIPMENT needed: none      DISCHARGE MEDICATIONS:   See Medication Reconciliation Form    It is important that you take the medication exactly as they are prescribed. Keep your medication in the bottles provided by the pharmacist and keep a list of the medication names, dosages, and times to be taken in your wallet. Do not take other medications without consulting your doctor. NOTIFY YOUR PHYSICIAN FOR ANY OF THE FOLLOWING:   Fever over 101 degrees for 24 hours. Chest pain, shortness of breath, fever, chills, nausea, vomiting, diarrhea, change in mentation, falling, weakness, bleeding. Severe pain or pain not relieved by medications. Or, any other signs or symptoms that you may have questions about. DISPOSITION:   X Home With:   OT  PT X HH  RN       SNF/Inpatient Rehab/LTAC    Independent/assisted living    Hospice    Other:     CDMP Checked: Yes X     PROBLEM LIST Updated:   Yes X       Signed:   IRA Naidu NP  8/7/2023  4:02 PM

## 2023-08-07 NOTE — DISCHARGE SUMMARY
take fiber if needed for loose stools. If you experience any fever or chills, please call your doctor. You are being prescribed Flomax - this may cause dizziness or a drop in your blood pressure. Please be cautious when moving from sitting to standing. You are being prescribed ferrous sulfate (iron). This may cause dark stools and/or constipation. Please follow up with [facility] at [facility] at your next scheduled appointment. INFECTIOUS DISEASE discharge plan:      PICC line insertion for outpatient antibiotic        PICC line removal after last dose 08/18/23     2. PICC care per infusion protocol     3. Ceftriaxone 2 g Q 24 H with last dose 08/18/23     4. Weekly labs CBC w/diff, Chem 7 with results sent to PMD           5. Follow up with PMD in 5 to 10 days after discharge     For ID Questions Only    Dominick   Call  [phone]    DIET: regular diet    ACTIVITY: activity as tolerated, continue with scheduled physical therapy    WOUND CARE: none    EQUIPMENT needed: none      DISCHARGE MEDICATIONS:     Medication List        START taking these medications      ferrous sulfate 325 (65 Fe) MG tablet  Commonly known as: IRON 325  Take 1 tablet by mouth 2 times daily (with meals)     metoprolol succinate 50 MG extended release tablet  Commonly known as: TOPROL XL  Take 1 tablet by mouth daily     tamsulosin 0.4 MG capsule  Commonly known as: FLOMAX  Take 1 capsule by mouth daily  Start taking on: August 8, 2023     traMADol 50 MG tablet  Commonly known as: ULTRAM  Take 1 tablet by mouth every 6 hours as needed for Pain for up to 1 day.  Max Daily Amount: 200 mg            CHANGE how you take these medications      acetaminophen 500 MG tablet  Commonly known as: TYLENOL  Take 2 tablets by mouth every 6 hours as needed for Pain  What changed:   when to take this  reasons to take this            CONTINUE taking these medications      albuterol sulfate  (90 Base)

## 2023-08-09 LAB
BACTERIA SPEC CULT: NORMAL
BACTERIA SPEC CULT: NORMAL
SERVICE CMNT-IMP: NORMAL
SERVICE CMNT-IMP: NORMAL

## 2023-08-10 ENCOUNTER — TELEPHONE (OUTPATIENT)
Facility: CLINIC | Age: 48
End: 2023-08-10

## 2023-08-10 NOTE — TELEPHONE ENCOUNTER
[facility] to state patient reached out to them stating she is having a terrible possible allergic reaction to the antibiotics. She is on a picc line.      Please reach out to 3 Ila Kevin

## 2023-08-10 NOTE — TELEPHONE ENCOUNTER
Called patient at 12:30 pm. Unable to reach, left message number [facility]. Positive for specific symptoms regarding allergic reaction. Discussed with Dr. Veronica Jason.

## 2023-08-10 NOTE — TELEPHONE ENCOUNTER
Patient called back. Patient states that hives started in abdominal area around 2 days ago and is spreading to arms and legs. Associated with itching. Has been taken Benadryl elixir with some relief. Discussed with Dr. Tali Hernandez. Patient was on Zosyn during hospitalization. Switching to Zosyn with possibility to run continuous. Patient aware and agreeable. [facility] called and new order given for Zosyn. Advised patient to go to emergency room if experiencing shortness of breath or trouble swallowing. Patient stated she has appointment with PCP on Monday.

## 2023-08-14 ENCOUNTER — OFFICE VISIT (OUTPATIENT)
Dept: PRIMARY CARE CLINIC | Facility: CLINIC | Age: 48
End: 2023-08-14

## 2023-08-14 VITALS
BODY MASS INDEX: 33.91 KG/M2 | HEART RATE: 79 BPM | DIASTOLIC BLOOD PRESSURE: 84 MMHG | OXYGEN SATURATION: 95 % | HEIGHT: 66 IN | SYSTOLIC BLOOD PRESSURE: 125 MMHG | TEMPERATURE: 98.4 F | RESPIRATION RATE: 16 BRPM | WEIGHT: 211 LBS

## 2023-08-14 DIAGNOSIS — Z79.2 RECEIVING INTRAVENOUS ANTIBIOTIC TREATMENT AS OUTPATIENT: ICD-10-CM

## 2023-08-14 DIAGNOSIS — N13.30 HYDRONEPHROSIS, UNSPECIFIED HYDRONEPHROSIS TYPE: ICD-10-CM

## 2023-08-14 DIAGNOSIS — Z09 HOSPITAL DISCHARGE FOLLOW-UP: ICD-10-CM

## 2023-08-14 DIAGNOSIS — R91.1 LUNG NODULE: ICD-10-CM

## 2023-08-14 DIAGNOSIS — N30.00 ACUTE CYSTITIS WITHOUT HEMATURIA: Primary | ICD-10-CM

## 2023-08-14 DIAGNOSIS — R19.7 DIARRHEA, UNSPECIFIED TYPE: ICD-10-CM

## 2023-08-14 DIAGNOSIS — N30.00 ACUTE CYSTITIS WITHOUT HEMATURIA: ICD-10-CM

## 2023-08-14 RX ORDER — BUTALBITAL, ACETAMINOPHEN AND CAFFEINE 50; 325; 40 MG/1; MG/1; MG/1
TABLET ORAL
COMMUNITY
Start: 2021-07-13

## 2023-08-14 NOTE — PROGRESS NOTES
Post-Discharge Transitional Care  Follow Up      Nancy Torres   YOB: 1975    Date of Office Visit:  8/14/2023  Date of Hospital Admission: 8/2/23  Date of Hospital Discharge: 8/7/23  Risk of hospital readmission (high >=14%. Medium >=10%) :Readmission Risk Score: 17      Care management risk score Rising risk (score 2-5) and Complex Care (Scores >=6): No Risk Score On File     Non face to face  following discharge, date last encounter closed (first attempt may have been earlier): 08/08/2023    Call initiated 2 business days of discharge: Yes    ASSESSMENT/PLAN:   Acute cystitis without hematuria - Cont Zosyn. Follow up with Urology as instructed. Check CBC, CMP. She is working with home health. -     CBC with Auto Differential; Future  -     Comprehensive Metabolic Panel; Future  Hospital discharge follow-up  -     TX DISCHARGE MEDS RECONCILED W/ CURRENT OUTPATIENT MED LIST  Receiving intravenous antibiotic treatment as outpatient  - PICC needs pulled 8/18/23.   -     CBC with Auto Differential; Future  -     Comprehensive Metabolic Panel; Future  Hydronephrosis, unspecified hydronephrosis type  Lung nodule - needs repeat CT scan in 1 year. Diarrhea, unspecified type - She declines C diff testing at this time. Feels it is a little better. Advised to take probiotic. No follow-ups on file. Subjective:   HPI:  Follow up of Hospital problems/diagnosis(es): UTI, L hydronephrosis, R hydronephrosis. Inpatient course: Discharge summary reviewed- see chart. Presented to ER for fever, chills, L sided lower abdominal pain. Had CT Abd Pelv that showed 3 and 5mm L ureterovesicle junction obstructing stone with L hydroureter and L hydronephrosis. Patient was seen by Urology and underwent L ureteral placement 8/2/23. She then developed lower abd pain 8/5 and underwent CT scan which showed bladder distention and R hydronephrosis.  She was discharged with aguilar for plans to do

## 2023-08-15 LAB
ALBUMIN SERPL-MCNC: 4 G/DL (ref 3.9–4.9)
ALBUMIN/GLOB SERPL: 1.5 {RATIO} (ref 1.2–2.2)
ALP SERPL-CCNC: 80 IU/L (ref 44–121)
ALT SERPL-CCNC: 21 IU/L (ref 0–32)
AST SERPL-CCNC: 15 IU/L (ref 0–40)
BASOPHILS # BLD AUTO: 0.1 X10E3/UL (ref 0–0.2)
BASOPHILS NFR BLD AUTO: 1 %
BILIRUB SERPL-MCNC: <0.2 MG/DL (ref 0–1.2)
BUN SERPL-MCNC: 5 MG/DL (ref 6–24)
BUN/CREAT SERPL: 6 (ref 9–23)
CALCIUM SERPL-MCNC: 9.1 MG/DL (ref 8.7–10.2)
CHLORIDE SERPL-SCNC: 105 MMOL/L (ref 96–106)
CO2 SERPL-SCNC: 22 MMOL/L (ref 20–29)
CREAT SERPL-MCNC: 0.84 MG/DL (ref 0.57–1)
EGFRCR SERPLBLD CKD-EPI 2021: 85 ML/MIN/1.73
EOSINOPHIL # BLD AUTO: 0.2 X10E3/UL (ref 0–0.4)
EOSINOPHIL NFR BLD AUTO: 2 %
ERYTHROCYTE [DISTWIDTH] IN BLOOD BY AUTOMATED COUNT: 13.2 % (ref 11.7–15.4)
GLOBULIN SER CALC-MCNC: 2.6 G/DL (ref 1.5–4.5)
GLUCOSE SERPL-MCNC: 92 MG/DL (ref 70–99)
HCT VFR BLD AUTO: 37.8 % (ref 34–46.6)
HGB BLD-MCNC: 12.4 G/DL (ref 11.1–15.9)
IMM GRANULOCYTES # BLD AUTO: 0.1 X10E3/UL (ref 0–0.1)
IMM GRANULOCYTES NFR BLD AUTO: 1 %
LYMPHOCYTES # BLD AUTO: 1.9 X10E3/UL (ref 0.7–3.1)
LYMPHOCYTES NFR BLD AUTO: 21 %
MCH RBC QN AUTO: 27.9 PG (ref 26.6–33)
MCHC RBC AUTO-ENTMCNC: 32.8 G/DL (ref 31.5–35.7)
MCV RBC AUTO: 85 FL (ref 79–97)
MONOCYTES # BLD AUTO: 0.8 X10E3/UL (ref 0.1–0.9)
MONOCYTES NFR BLD AUTO: 9 %
NEUTROPHILS # BLD AUTO: 5.9 X10E3/UL (ref 1.4–7)
NEUTROPHILS NFR BLD AUTO: 66 %
PLATELET # BLD AUTO: 265 X10E3/UL (ref 150–450)
POTASSIUM SERPL-SCNC: 3.7 MMOL/L (ref 3.5–5.2)
PROT SERPL-MCNC: 6.6 G/DL (ref 6–8.5)
RBC # BLD AUTO: 4.44 X10E6/UL (ref 3.77–5.28)
SODIUM SERPL-SCNC: 144 MMOL/L (ref 134–144)
WBC # BLD AUTO: 8.8 X10E3/UL (ref 3.4–10.8)

## 2023-09-01 PROBLEM — N39.0 UTI (URINARY TRACT INFECTION): Status: RESOLVED | Noted: 2020-05-24 | Resolved: 2023-09-01

## 2023-09-26 ENCOUNTER — PATIENT MESSAGE (OUTPATIENT)
Dept: PRIMARY CARE CLINIC | Facility: CLINIC | Age: 48
End: 2023-09-26

## 2023-09-26 NOTE — TELEPHONE ENCOUNTER
From: Celian Lopez  To: Dr. Milagro Solomon  Sent: 9/26/2023 10:30 AM EDT  Subject: Incontinence underwear    I've been approved for Medicaid ([id number]) as of May 1, 2023. I would like someone to contact whomever to get size XL delivered to me ASAP. I have some that should last me 1-2 more days max. If you have any questions, please feel free to call me!

## 2023-09-26 NOTE — TELEPHONE ENCOUNTER
Spoke with  patient on the phone. Explained to her that this is not a process that will be completed in 1-2 days,  Advised her to reach out to her insurance to see what company accepts her insurance and she should reach out to them and they will take her information and let her know what her insurance will cover and then they will send over to the office a form for the provider to complete.   She stated that she understood and said she will do that

## 2023-10-10 ENCOUNTER — APPOINTMENT (OUTPATIENT)
Facility: HOSPITAL | Age: 48
End: 2023-10-10
Payer: COMMERCIAL

## 2023-10-10 ENCOUNTER — HOSPITAL ENCOUNTER (OUTPATIENT)
Facility: HOSPITAL | Age: 48
Setting detail: OBSERVATION
Discharge: HOME | End: 2023-10-12
Attending: EMERGENCY MEDICINE | Admitting: STUDENT IN AN ORGANIZED HEALTH CARE EDUCATION/TRAINING PROGRAM
Payer: COMMERCIAL

## 2023-10-10 DIAGNOSIS — G35 MULTIPLE SCLEROSIS (HCC): ICD-10-CM

## 2023-10-10 DIAGNOSIS — R19.7 DIARRHEA, UNSPECIFIED TYPE: ICD-10-CM

## 2023-10-10 DIAGNOSIS — N39.42 URINARY INCONTINENCE WITHOUT SENSORY AWARENESS: Primary | ICD-10-CM

## 2023-10-10 DIAGNOSIS — N30.00 ACUTE CYSTITIS WITHOUT HEMATURIA: ICD-10-CM

## 2023-10-10 DIAGNOSIS — R15.9 INCONTINENCE OF FECES, UNSPECIFIED FECAL INCONTINENCE TYPE: ICD-10-CM

## 2023-10-10 LAB
ALBUMIN SERPL-MCNC: 3.7 G/DL (ref 3.5–5)
ALBUMIN/GLOB SERPL: 1 (ref 1.1–2.2)
ALP SERPL-CCNC: 103 U/L (ref 45–117)
ALT SERPL-CCNC: 30 U/L (ref 12–78)
AMORPH CRY URNS QL MICRO: ABNORMAL
ANION GAP SERPL CALC-SCNC: 6 MMOL/L (ref 5–15)
APPEARANCE UR: ABNORMAL
AST SERPL-CCNC: 30 U/L (ref 15–37)
BACTERIA URNS QL MICRO: ABNORMAL /HPF
BASOPHILS # BLD: 0 K/UL (ref 0–0.1)
BASOPHILS NFR BLD: 0 % (ref 0–1)
BILIRUB SERPL-MCNC: 0.7 MG/DL (ref 0.2–1)
BILIRUB UR QL: NEGATIVE
BUN SERPL-MCNC: 12 MG/DL (ref 6–20)
BUN/CREAT SERPL: 13 (ref 12–20)
CALCIUM SERPL-MCNC: 9.2 MG/DL (ref 8.5–10.1)
CAOX CRY URNS QL MICRO: ABNORMAL
CHLORIDE SERPL-SCNC: 108 MMOL/L (ref 97–108)
CO2 SERPL-SCNC: 27 MMOL/L (ref 21–32)
COLOR UR: ABNORMAL
COMMENT:: NORMAL
CREAT SERPL-MCNC: 0.95 MG/DL (ref 0.55–1.02)
DIFFERENTIAL METHOD BLD: NORMAL
EOSINOPHIL # BLD: 0 K/UL (ref 0–0.4)
EOSINOPHIL NFR BLD: 1 % (ref 0–7)
EPITH CASTS URNS QL MICRO: ABNORMAL /LPF
ERYTHROCYTE [DISTWIDTH] IN BLOOD BY AUTOMATED COUNT: 14.3 % (ref 11.5–14.5)
FLUAV AG NPH QL IA: NEGATIVE
FLUBV AG NOSE QL IA: NEGATIVE
GLOBULIN SER CALC-MCNC: 3.8 G/DL (ref 2–4)
GLUCOSE SERPL-MCNC: 86 MG/DL (ref 65–100)
GLUCOSE UR STRIP.AUTO-MCNC: NEGATIVE MG/DL
HCT VFR BLD AUTO: 43 % (ref 35–47)
HGB BLD-MCNC: 14.2 G/DL (ref 11.5–16)
HGB UR QL STRIP: ABNORMAL
IMM GRANULOCYTES # BLD AUTO: 0 K/UL (ref 0–0.04)
IMM GRANULOCYTES NFR BLD AUTO: 0 % (ref 0–0.5)
KETONES UR QL STRIP.AUTO: 80 MG/DL
LACTATE SERPL-SCNC: 0.8 MMOL/L (ref 0.4–2)
LEUKOCYTE ESTERASE UR QL STRIP.AUTO: NEGATIVE
LIPASE SERPL-CCNC: 32 U/L (ref 13–75)
LYMPHOCYTES # BLD: 1.9 K/UL (ref 0.8–3.5)
LYMPHOCYTES NFR BLD: 28 % (ref 12–49)
MAGNESIUM SERPL-MCNC: 2 MG/DL (ref 1.6–2.4)
MCH RBC QN AUTO: 28 PG (ref 26–34)
MCHC RBC AUTO-ENTMCNC: 33 G/DL (ref 30–36.5)
MCV RBC AUTO: 84.6 FL (ref 80–99)
MONOCYTES # BLD: 0.8 K/UL (ref 0–1)
MONOCYTES NFR BLD: 12 % (ref 5–13)
NEUTS SEG # BLD: 4.1 K/UL (ref 1.8–8)
NEUTS SEG NFR BLD: 59 % (ref 32–75)
NITRITE UR QL STRIP.AUTO: POSITIVE
NRBC # BLD: 0 K/UL (ref 0–0.01)
NRBC BLD-RTO: 0 PER 100 WBC
PH UR STRIP: 6 (ref 5–8)
PLATELET # BLD AUTO: 194 K/UL (ref 150–400)
PMV BLD AUTO: 11.8 FL (ref 8.9–12.9)
POTASSIUM SERPL-SCNC: 3.4 MMOL/L (ref 3.5–5.1)
PROT SERPL-MCNC: 7.5 G/DL (ref 6.4–8.2)
PROT UR STRIP-MCNC: 100 MG/DL
RBC # BLD AUTO: 5.08 M/UL (ref 3.8–5.2)
RBC #/AREA URNS HPF: ABNORMAL /HPF (ref 0–5)
SARS-COV-2 RDRP RESP QL NAA+PROBE: NOT DETECTED
SODIUM SERPL-SCNC: 141 MMOL/L (ref 136–145)
SOURCE: NORMAL
SP GR UR REFRACTOMETRY: >1.03 (ref 1–1.03)
SPECIMEN HOLD: NORMAL
SPECIMEN HOLD: NORMAL
UROBILINOGEN UR QL STRIP.AUTO: 0.2 EU/DL (ref 0.2–1)
WBC # BLD AUTO: 7 K/UL (ref 3.6–11)
WBC URNS QL MICRO: ABNORMAL /HPF (ref 0–4)

## 2023-10-10 PROCEDURE — 99285 EMERGENCY DEPT VISIT HI MDM: CPT

## 2023-10-10 PROCEDURE — 2580000003 HC RX 258: Performed by: EMERGENCY MEDICINE

## 2023-10-10 PROCEDURE — 93005 ELECTROCARDIOGRAM TRACING: CPT | Performed by: EMERGENCY MEDICINE

## 2023-10-10 PROCEDURE — G0378 HOSPITAL OBSERVATION PER HR: HCPCS

## 2023-10-10 PROCEDURE — 6370000000 HC RX 637 (ALT 250 FOR IP): Performed by: EMERGENCY MEDICINE

## 2023-10-10 PROCEDURE — 83605 ASSAY OF LACTIC ACID: CPT

## 2023-10-10 PROCEDURE — 83735 ASSAY OF MAGNESIUM: CPT

## 2023-10-10 PROCEDURE — 81001 URINALYSIS AUTO W/SCOPE: CPT

## 2023-10-10 PROCEDURE — 87804 INFLUENZA ASSAY W/OPTIC: CPT

## 2023-10-10 PROCEDURE — 96361 HYDRATE IV INFUSION ADD-ON: CPT

## 2023-10-10 PROCEDURE — 87086 URINE CULTURE/COLONY COUNT: CPT

## 2023-10-10 PROCEDURE — 87040 BLOOD CULTURE FOR BACTERIA: CPT

## 2023-10-10 PROCEDURE — 71045 X-RAY EXAM CHEST 1 VIEW: CPT

## 2023-10-10 PROCEDURE — 80053 COMPREHEN METABOLIC PANEL: CPT

## 2023-10-10 PROCEDURE — 96360 HYDRATION IV INFUSION INIT: CPT

## 2023-10-10 PROCEDURE — 87635 SARS-COV-2 COVID-19 AMP PRB: CPT

## 2023-10-10 PROCEDURE — 85025 COMPLETE CBC W/AUTO DIFF WBC: CPT

## 2023-10-10 PROCEDURE — 36415 COLL VENOUS BLD VENIPUNCTURE: CPT

## 2023-10-10 PROCEDURE — 83690 ASSAY OF LIPASE: CPT

## 2023-10-10 RX ORDER — SODIUM CHLORIDE 0.9 % (FLUSH) 0.9 %
5-40 SYRINGE (ML) INJECTION EVERY 12 HOURS SCHEDULED
Status: DISCONTINUED | OUTPATIENT
Start: 2023-10-10 | End: 2023-10-12 | Stop reason: HOSPADM

## 2023-10-10 RX ORDER — 0.9 % SODIUM CHLORIDE 0.9 %
1000 INTRAVENOUS SOLUTION INTRAVENOUS ONCE
Status: COMPLETED | OUTPATIENT
Start: 2023-10-10 | End: 2023-10-10

## 2023-10-10 RX ORDER — GABAPENTIN 600 MG/1
900 TABLET ORAL 3 TIMES DAILY
Status: DISCONTINUED | OUTPATIENT
Start: 2023-10-11 | End: 2023-10-12 | Stop reason: HOSPADM

## 2023-10-10 RX ORDER — SODIUM CHLORIDE 9 MG/ML
INJECTION, SOLUTION INTRAVENOUS CONTINUOUS
Status: DISCONTINUED | OUTPATIENT
Start: 2023-10-10 | End: 2023-10-12

## 2023-10-10 RX ORDER — SODIUM CHLORIDE 9 MG/ML
INJECTION, SOLUTION INTRAVENOUS PRN
Status: DISCONTINUED | OUTPATIENT
Start: 2023-10-10 | End: 2023-10-12 | Stop reason: HOSPADM

## 2023-10-10 RX ORDER — ACETAMINOPHEN 650 MG/1
650 SUPPOSITORY RECTAL EVERY 6 HOURS PRN
Status: DISCONTINUED | OUTPATIENT
Start: 2023-10-10 | End: 2023-10-12 | Stop reason: HOSPADM

## 2023-10-10 RX ORDER — METHOCARBAMOL 500 MG/1
500 TABLET, FILM COATED ORAL 3 TIMES DAILY PRN
Status: DISCONTINUED | OUTPATIENT
Start: 2023-10-10 | End: 2023-10-12 | Stop reason: HOSPADM

## 2023-10-10 RX ORDER — SODIUM CHLORIDE 0.9 % (FLUSH) 0.9 %
5-40 SYRINGE (ML) INJECTION PRN
Status: DISCONTINUED | OUTPATIENT
Start: 2023-10-10 | End: 2023-10-12 | Stop reason: HOSPADM

## 2023-10-10 RX ORDER — POLYETHYLENE GLYCOL 3350 17 G/17G
17 POWDER, FOR SOLUTION ORAL DAILY PRN
Status: DISCONTINUED | OUTPATIENT
Start: 2023-10-10 | End: 2023-10-12 | Stop reason: HOSPADM

## 2023-10-10 RX ORDER — IPRATROPIUM BROMIDE AND ALBUTEROL SULFATE 2.5; .5 MG/3ML; MG/3ML
1 SOLUTION RESPIRATORY (INHALATION)
Status: COMPLETED | OUTPATIENT
Start: 2023-10-10 | End: 2023-10-10

## 2023-10-10 RX ORDER — BUPROPION HYDROCHLORIDE 150 MG/1
150 TABLET, EXTENDED RELEASE ORAL 2 TIMES DAILY
Status: DISCONTINUED | OUTPATIENT
Start: 2023-10-11 | End: 2023-10-12 | Stop reason: HOSPADM

## 2023-10-10 RX ORDER — INFANT FORMULA, IRON/DHA/ARA 2.07G/1
1 POWDER (GRAM) ORAL DAILY
Status: DISCONTINUED | OUTPATIENT
Start: 2023-10-11 | End: 2023-10-12 | Stop reason: HOSPADM

## 2023-10-10 RX ORDER — ESCITALOPRAM OXALATE 20 MG/1
20 TABLET ORAL DAILY
Qty: 30 TABLET | Refills: 0 | OUTPATIENT
Start: 2023-10-10

## 2023-10-10 RX ORDER — ONDANSETRON 4 MG/1
4 TABLET, ORALLY DISINTEGRATING ORAL EVERY 8 HOURS PRN
Status: DISCONTINUED | OUTPATIENT
Start: 2023-10-10 | End: 2023-10-12 | Stop reason: HOSPADM

## 2023-10-10 RX ORDER — ESCITALOPRAM OXALATE 10 MG/1
20 TABLET ORAL DAILY
Status: DISCONTINUED | OUTPATIENT
Start: 2023-10-11 | End: 2023-10-12 | Stop reason: HOSPADM

## 2023-10-10 RX ORDER — TAMSULOSIN HYDROCHLORIDE 0.4 MG/1
0.4 CAPSULE ORAL DAILY
Status: DISCONTINUED | OUTPATIENT
Start: 2023-10-11 | End: 2023-10-12 | Stop reason: HOSPADM

## 2023-10-10 RX ORDER — LEVOFLOXACIN 5 MG/ML
750 INJECTION, SOLUTION INTRAVENOUS EVERY 24 HOURS
Status: DISCONTINUED | OUTPATIENT
Start: 2023-10-10 | End: 2023-10-12 | Stop reason: HOSPADM

## 2023-10-10 RX ORDER — METOPROLOL SUCCINATE 50 MG/1
50 TABLET, EXTENDED RELEASE ORAL DAILY
Status: DISCONTINUED | OUTPATIENT
Start: 2023-10-11 | End: 2023-10-12 | Stop reason: HOSPADM

## 2023-10-10 RX ORDER — ONDANSETRON 2 MG/ML
4 INJECTION INTRAMUSCULAR; INTRAVENOUS EVERY 6 HOURS PRN
Status: DISCONTINUED | OUTPATIENT
Start: 2023-10-10 | End: 2023-10-12 | Stop reason: HOSPADM

## 2023-10-10 RX ORDER — ACETAMINOPHEN 325 MG/1
650 TABLET ORAL EVERY 6 HOURS PRN
Status: DISCONTINUED | OUTPATIENT
Start: 2023-10-10 | End: 2023-10-12 | Stop reason: HOSPADM

## 2023-10-10 RX ORDER — TERIFLUNOMIDE 14 MG/1
14 TABLET, FILM COATED ORAL EVERY EVENING
Status: DISCONTINUED | OUTPATIENT
Start: 2023-10-10 | End: 2023-10-12 | Stop reason: HOSPADM

## 2023-10-10 RX ORDER — ENOXAPARIN SODIUM 100 MG/ML
40 INJECTION SUBCUTANEOUS DAILY
Status: DISCONTINUED | OUTPATIENT
Start: 2023-10-11 | End: 2023-10-12 | Stop reason: HOSPADM

## 2023-10-10 RX ADMIN — IPRATROPIUM BROMIDE AND ALBUTEROL SULFATE 1 DOSE: .5; 3 SOLUTION RESPIRATORY (INHALATION) at 17:34

## 2023-10-10 RX ADMIN — SODIUM CHLORIDE 1000 ML: 9 INJECTION, SOLUTION INTRAVENOUS at 17:34

## 2023-10-10 ASSESSMENT — ENCOUNTER SYMPTOMS
SORE THROAT: 0
NAUSEA: 0
DIARRHEA: 1
SINUS PRESSURE: 0
ABDOMINAL PAIN: 0
EYES NEGATIVE: 1
WHEEZING: 0
STRIDOR: 0
BACK PAIN: 0
SINUS PAIN: 0
COUGH: 0
TROUBLE SWALLOWING: 0
RHINORRHEA: 0
CHEST TIGHTNESS: 0
COLOR CHANGE: 0
VOMITING: 0
BLOOD IN STOOL: 0
SHORTNESS OF BREATH: 0

## 2023-10-10 NOTE — ED TRIAGE NOTES
Brought by EMS. Recently diagnosed with UTI and taking macrobid. Over the past couple of days has developed SOB, cough, wheezing, diarrhea, nausea, and vomiting. Hx c diff.

## 2023-10-10 NOTE — ED PROVIDER NOTES
[facility] EMERGENCY DEP  EMERGENCY DEPARTMENT ENCOUNTER      Pt Name: Nancy Torres  MRN: [id number]  [address] 1975  Date of evaluation: 10/10/2023  Provider: Yousif Klein MD    CHIEF COMPLAINT       Chief Complaint   Patient presents with    Diarrhea    Shortness of Breath         HISTORY OF PRESENT ILLNESS    This is a 69-year-old female who has a history of MS and states that she has had several back surgeries. After the second when she lost all control of bowels and bladder. She has had several bouts of E. coli urinary infections and C. difficile. Beginning on Saturday of this past weekend, she started with fever and chills as well as increased diarrhea. She was seen by [place] [facility] over the weekend and was diagnosed with an E. coli urinary tract infection and placed on Macrodantin. She states that usually with these infection if she needs Zosyn to get it clear. She is also complaining of generalized cough which has been nonproductive and that started about the same time. She has a little tightness and some wheezing. She used to smoke but does not anymore. Patient denies any other acute symptomatology. She is concerned that with these infections she \"generally will going to sepsis. She is concerned that is where she has had it now. Review of External Medical Records:     Nursing Notes were reviewed. REVIEW OF SYSTEMS       Review of Systems   Constitutional:  Positive for chills and fever. Negative for activity change and fatigue. HENT:  Negative for congestion, ear pain, rhinorrhea, sinus pressure, sinus pain, sore throat and trouble swallowing. Eyes: Negative. Respiratory:  Negative for cough, chest tightness, shortness of breath, wheezing and stridor. Cardiovascular:  Negative for chest pain, palpitations and leg swelling. Gastrointestinal:  Positive for diarrhea. Negative for abdominal pain, blood in stool, nausea and vomiting.    Endocrine:

## 2023-10-11 ENCOUNTER — APPOINTMENT (OUTPATIENT)
Facility: HOSPITAL | Age: 48
End: 2023-10-11
Payer: COMMERCIAL

## 2023-10-11 LAB
BASOPHILS # BLD: 0 K/UL (ref 0–0.1)
BASOPHILS NFR BLD: 1 % (ref 0–1)
C COLI+JEJUNI TUF STL QL NAA+PROBE: NEGATIVE
C DIFF GDH STL QL: NEGATIVE
C DIFF TOX A+B STL QL IA: NEGATIVE
C DIFF TOXIN INTERPRETATION: NORMAL
COMMENT:: NORMAL
DIFFERENTIAL METHOD BLD: NORMAL
EC STX1+STX2 GENES STL QL NAA+PROBE: NEGATIVE
EKG ATRIAL RATE: 73 BPM
EKG DIAGNOSIS: NORMAL
EKG P AXIS: 36 DEGREES
EKG P-R INTERVAL: 130 MS
EKG Q-T INTERVAL: 406 MS
EKG QRS DURATION: 94 MS
EKG QTC CALCULATION (BAZETT): 447 MS
EKG R AXIS: 88 DEGREES
EKG T AXIS: 40 DEGREES
EKG VENTRICULAR RATE: 73 BPM
EOSINOPHIL # BLD: 0 K/UL (ref 0–0.4)
EOSINOPHIL NFR BLD: 1 % (ref 0–7)
ERYTHROCYTE [DISTWIDTH] IN BLOOD BY AUTOMATED COUNT: 14.3 % (ref 11.5–14.5)
ETEC ELTA+ESTB GENES STL QL NAA+PROBE: NEGATIVE
HCT VFR BLD AUTO: 40.1 % (ref 35–47)
HGB BLD-MCNC: 13.4 G/DL (ref 11.5–16)
IMM GRANULOCYTES # BLD AUTO: 0 K/UL (ref 0–0.04)
IMM GRANULOCYTES NFR BLD AUTO: 0 % (ref 0–0.5)
LYMPHOCYTES # BLD: 1.3 K/UL (ref 0.8–3.5)
LYMPHOCYTES NFR BLD: 22 % (ref 12–49)
MCH RBC QN AUTO: 28.5 PG (ref 26–34)
MCHC RBC AUTO-ENTMCNC: 33.4 G/DL (ref 30–36.5)
MCV RBC AUTO: 85.3 FL (ref 80–99)
MONOCYTES # BLD: 0.6 K/UL (ref 0–1)
MONOCYTES NFR BLD: 11 % (ref 5–13)
NEUTS SEG # BLD: 3.8 K/UL (ref 1.8–8)
NEUTS SEG NFR BLD: 65 % (ref 32–75)
NRBC # BLD: 0 K/UL (ref 0–0.01)
NRBC BLD-RTO: 0 PER 100 WBC
P SHIGELLOIDES DNA STL QL NAA+PROBE: NEGATIVE
PLATELET # BLD AUTO: 159 K/UL (ref 150–400)
PMV BLD AUTO: 11.6 FL (ref 8.9–12.9)
RBC # BLD AUTO: 4.7 M/UL (ref 3.8–5.2)
SALMONELLA SP SPAO STL QL NAA+PROBE: NEGATIVE
SHIGELLA SP+EIEC IPAH STL QL NAA+PROBE: NEGATIVE
SPECIMEN HOLD: NORMAL
V CHOL+PARA+VUL DNA STL QL NAA+NON-PROBE: NEGATIVE
WBC # BLD AUTO: 5.8 K/UL (ref 3.6–11)
Y ENTEROCOL DNA STL QL NAA+NON-PROBE: NEGATIVE

## 2023-10-11 PROCEDURE — 87449 NOS EACH ORGANISM AG IA: CPT

## 2023-10-11 PROCEDURE — 87324 CLOSTRIDIUM AG IA: CPT

## 2023-10-11 PROCEDURE — 6370000000 HC RX 637 (ALT 250 FOR IP): Performed by: HOSPITALIST

## 2023-10-11 PROCEDURE — 96372 THER/PROPH/DIAG INJ SC/IM: CPT

## 2023-10-11 PROCEDURE — 87506 IADNA-DNA/RNA PROBE TQ 6-11: CPT

## 2023-10-11 PROCEDURE — 2580000003 HC RX 258: Performed by: STUDENT IN AN ORGANIZED HEALTH CARE EDUCATION/TRAINING PROGRAM

## 2023-10-11 PROCEDURE — 94640 AIRWAY INHALATION TREATMENT: CPT

## 2023-10-11 PROCEDURE — 85025 COMPLETE CBC W/AUTO DIFF WBC: CPT

## 2023-10-11 PROCEDURE — 76770 US EXAM ABDO BACK WALL COMP: CPT

## 2023-10-11 PROCEDURE — 6370000000 HC RX 637 (ALT 250 FOR IP): Performed by: STUDENT IN AN ORGANIZED HEALTH CARE EDUCATION/TRAINING PROGRAM

## 2023-10-11 PROCEDURE — G0378 HOSPITAL OBSERVATION PER HR: HCPCS

## 2023-10-11 PROCEDURE — 36415 COLL VENOUS BLD VENIPUNCTURE: CPT

## 2023-10-11 PROCEDURE — 93010 ELECTROCARDIOGRAM REPORT: CPT | Performed by: INTERNAL MEDICINE

## 2023-10-11 PROCEDURE — 96365 THER/PROPH/DIAG IV INF INIT: CPT

## 2023-10-11 PROCEDURE — 6360000002 HC RX W HCPCS: Performed by: STUDENT IN AN ORGANIZED HEALTH CARE EDUCATION/TRAINING PROGRAM

## 2023-10-11 RX ORDER — IPRATROPIUM BROMIDE AND ALBUTEROL SULFATE 2.5; .5 MG/3ML; MG/3ML
1 SOLUTION RESPIRATORY (INHALATION)
Status: DISCONTINUED | OUTPATIENT
Start: 2023-10-11 | End: 2023-10-12 | Stop reason: HOSPADM

## 2023-10-11 RX ORDER — POTASSIUM CHLORIDE 750 MG/1
40 TABLET, FILM COATED, EXTENDED RELEASE ORAL ONCE
Status: CANCELLED | OUTPATIENT
Start: 2023-10-11 | End: 2023-10-11

## 2023-10-11 RX ADMIN — GABAPENTIN 900 MG: 600 TABLET, FILM COATED ORAL at 10:25

## 2023-10-11 RX ADMIN — SODIUM CHLORIDE, PRESERVATIVE FREE 10 ML: 5 INJECTION INTRAVENOUS at 21:18

## 2023-10-11 RX ADMIN — METOPROLOL SUCCINATE 50 MG: 50 TABLET, EXTENDED RELEASE ORAL at 10:25

## 2023-10-11 RX ADMIN — LEVOFLOXACIN 750 MG: 750 INJECTION, SOLUTION INTRAVENOUS at 02:35

## 2023-10-11 RX ADMIN — ESCITALOPRAM OXALATE 20 MG: 10 TABLET ORAL at 10:24

## 2023-10-11 RX ADMIN — BUPROPION HYDROCHLORIDE 150 MG: 150 TABLET, EXTENDED RELEASE ORAL at 10:25

## 2023-10-11 RX ADMIN — ACETAMINOPHEN 650 MG: 325 TABLET ORAL at 04:24

## 2023-10-11 RX ADMIN — ENOXAPARIN SODIUM 40 MG: 100 INJECTION SUBCUTANEOUS at 10:26

## 2023-10-11 RX ADMIN — GABAPENTIN 900 MG: 600 TABLET, FILM COATED ORAL at 02:34

## 2023-10-11 RX ADMIN — BUPROPION HYDROCHLORIDE 150 MG: 150 TABLET, EXTENDED RELEASE ORAL at 21:18

## 2023-10-11 RX ADMIN — METHOCARBAMOL 500 MG: 500 TABLET ORAL at 02:59

## 2023-10-11 RX ADMIN — SODIUM CHLORIDE: 9 INJECTION, SOLUTION INTRAVENOUS at 02:35

## 2023-10-11 RX ADMIN — IPRATROPIUM BROMIDE AND ALBUTEROL SULFATE 1 DOSE: 2.5; .5 SOLUTION RESPIRATORY (INHALATION) at 19:46

## 2023-10-11 RX ADMIN — GABAPENTIN 900 MG: 600 TABLET, FILM COATED ORAL at 21:18

## 2023-10-11 RX ADMIN — IPRATROPIUM BROMIDE AND ALBUTEROL SULFATE 1 DOSE: 2.5; .5 SOLUTION RESPIRATORY (INHALATION) at 15:31

## 2023-10-11 RX ADMIN — GABAPENTIN 900 MG: 600 TABLET, FILM COATED ORAL at 15:31

## 2023-10-11 ASSESSMENT — PAIN SCALES - GENERAL
PAINLEVEL_OUTOF10: 6
PAINLEVEL_OUTOF10: 10
PAINLEVEL_OUTOF10: 7

## 2023-10-11 NOTE — PROGRESS NOTES
Hospitalist Progress Note  Claudia Barger MD  Answering service:         Date of Service:  10/11/2023  NAME:  Joann Gutierrez  :  1975  MRN:  [id number]      Admission Summary:   Joann Gutierrez is a 51 y.o. female with history of multiple sclerosis, C. difficile colitis, hypertension, GERD, major depressive disorder who presents to hospital with complaints of UTI, urinary and bowel incontinence. Known history of chronic urinary and bowel incontinence which is unchanged from her baseline. States she was seen by outpatient urology recently and started on p.o. antibiotics for suspected UTI. She denies any new urinary symptoms stating she is unable to expand any symptoms change due to her urinary incontinence and multiple sclerosis. She expresses frustration with rate of recurrent UTIs. Has previous history of urosepsis and she is concerned for repeat episode of sepsis. The patient denies any fever, chills, chest or abdominal pain, nausea, vomiting, cough, congestion, recent illness, palpitations, or dysuria. Interval history / Subjective:   Reviewed admission history with patient. Admitted for UTI. Patient seen and examined in the ED room #16. She sought consultation with her urology when she developed nausea, vomiting, chills and feeling sick. Her UA was said to be abnormal so she was started on Macrodantin. She denied ever experiencing fever. She said she she was septic twice from the UTI. She would not have the conventional URI symptoms as she has neurogenic bladder, is incontinent. She says she was also having loose bowel movements. She has history of C. difficile. Assessment & Plan:     Acute complicated cystitis. Neurogenic bladder. Incontinent. History of obstructive uropathy  -Patient is nonseptic  -Continue Levaquin.   No MDR infection based on

## 2023-10-11 NOTE — ED NOTES
Change of shift report given to Michelle Valdivia RN by Hermelinda Singer RN.      Julissa Lopez, XAVIER  10/11/23 0800

## 2023-10-11 NOTE — ED NOTES
Pt cleaned and changed. Call light within reach. No further questions or concerns.       Kerin Kanner, RN  10/11/23 2053

## 2023-10-11 NOTE — ED NOTES
Bedside and Verbal shift change report given to  XAVIER Laughlin (oncoming nurse) by Salomon Francis RN (offgoing nurse). Report included the following information Nurse Handoff Report, ED SBAR, [facility], Recent Results, Cardiac Rhythm SR, and Neuro Assessment.            Pau Bardales RN  10/11/23 0401

## 2023-10-11 NOTE — ED NOTES
Incontinence care provided, purewick changed. Stool sample obtained and sent to lab at this time.      Lucian Patricia RN  10/11/23 6834

## 2023-10-11 NOTE — H&P
History & Physical    Primary Care Provider: Basia Seaman DO  Source of Information: Patient and chart review    History of Presenting Illness:   Delaney Wilkerson is a 51 y.o. female with history of multiple sclerosis, C. difficile colitis, hypertension, GERD, major depressive disorder who presents to hospital with complaints of UTI, urinary and bowel incontinence. Known history of chronic urinary and bowel incontinence which is unchanged from her baseline. States she was seen by outpatient urology recently and started on p.o. antibiotics for suspected UTI. She denies any new urinary symptoms stating she is unable to expand any symptoms change due to her urinary incontinence and multiple sclerosis. She expresses frustration with rate of recurrent UTIs. Has previous history of urosepsis and she is concerned for repeat episode of sepsis. The patient denies any fever, chills, chest or abdominal pain, nausea, vomiting, cough, congestion, recent illness, palpitations, or dysuria. Remarkable vitals on ER Presentation: Vital signs stable  Labs Remarkable for: Urinalysis showed cloudy urine with small blood, bacteria and nitrites  ER Images: Chest x-ray showed no acute process  ER Rx: None. Review of Systems:  Pertinent items are noted in the History of Present Illness.      Past Medical History:   Diagnosis Date    Acute renal failure (ARF) ([address])     Acute renal failure (ARF) ([address])     6/2020-STONES AND PYELONEPHRITIS    Ankle fracture     Ankle fracture     Arrhythmia     Arrhythmia     PALPITATIONS    Asthma     Asthma     Autoimmune disease ([address])     MS    Calculus of gallbladder without cholecystitis without obstruction 07/20/2020    Calculus of gallbladder without cholecystitis without obstruction 07/20/2020    Celiac disease     Celiac disease     Chronic pain     r/t MS    Chronic pain     MS    Congenital sucrose isomaltose malabsorption     Congenital sucrose isomaltose malabsorption

## 2023-10-12 VITALS
BODY MASS INDEX: 31.98 KG/M2 | HEART RATE: 69 BPM | OXYGEN SATURATION: 96 % | TEMPERATURE: 97.5 F | SYSTOLIC BLOOD PRESSURE: 159 MMHG | DIASTOLIC BLOOD PRESSURE: 96 MMHG | RESPIRATION RATE: 20 BRPM | HEIGHT: 66 IN | WEIGHT: 199 LBS

## 2023-10-12 PROBLEM — N30.00 ACUTE INFECTIVE CYSTITIS: Status: RESOLVED | Noted: 2023-10-10 | Resolved: 2023-10-12

## 2023-10-12 PROCEDURE — 6360000002 HC RX W HCPCS: Performed by: STUDENT IN AN ORGANIZED HEALTH CARE EDUCATION/TRAINING PROGRAM

## 2023-10-12 PROCEDURE — 6370000000 HC RX 637 (ALT 250 FOR IP): Performed by: HOSPITALIST

## 2023-10-12 PROCEDURE — 6370000000 HC RX 637 (ALT 250 FOR IP): Performed by: STUDENT IN AN ORGANIZED HEALTH CARE EDUCATION/TRAINING PROGRAM

## 2023-10-12 PROCEDURE — G0378 HOSPITAL OBSERVATION PER HR: HCPCS

## 2023-10-12 PROCEDURE — 96366 THER/PROPH/DIAG IV INF ADDON: CPT

## 2023-10-12 PROCEDURE — 2580000003 HC RX 258: Performed by: STUDENT IN AN ORGANIZED HEALTH CARE EDUCATION/TRAINING PROGRAM

## 2023-10-12 PROCEDURE — 96372 THER/PROPH/DIAG INJ SC/IM: CPT

## 2023-10-12 PROCEDURE — 2500000003 HC RX 250 WO HCPCS: Performed by: STUDENT IN AN ORGANIZED HEALTH CARE EDUCATION/TRAINING PROGRAM

## 2023-10-12 PROCEDURE — 94640 AIRWAY INHALATION TREATMENT: CPT

## 2023-10-12 RX ORDER — LEVOFLOXACIN 750 MG/1
750 TABLET ORAL DAILY
Qty: 5 TABLET | Refills: 0 | Status: SHIPPED | OUTPATIENT
Start: 2023-10-12 | End: 2023-10-17

## 2023-10-12 RX ORDER — INFANT FORMULA, IRON/DHA/ARA 2.07G/1
1 POWDER (GRAM) ORAL DAILY
Qty: 5 EACH | Refills: 0 | Status: SHIPPED | OUTPATIENT
Start: 2023-10-13 | End: 2023-10-18

## 2023-10-12 RX ADMIN — GABAPENTIN 900 MG: 600 TABLET, FILM COATED ORAL at 15:11

## 2023-10-12 RX ADMIN — GABAPENTIN 900 MG: 600 TABLET, FILM COATED ORAL at 10:20

## 2023-10-12 RX ADMIN — METOPROLOL SUCCINATE 50 MG: 50 TABLET, EXTENDED RELEASE ORAL at 10:17

## 2023-10-12 RX ADMIN — BUPROPION HYDROCHLORIDE 150 MG: 150 TABLET, EXTENDED RELEASE ORAL at 10:21

## 2023-10-12 RX ADMIN — Medication 1 PACKET: at 10:21

## 2023-10-12 RX ADMIN — TAMSULOSIN HYDROCHLORIDE 0.4 MG: 0.4 CAPSULE ORAL at 10:20

## 2023-10-12 RX ADMIN — IPRATROPIUM BROMIDE AND ALBUTEROL SULFATE 1 DOSE: 2.5; .5 SOLUTION RESPIRATORY (INHALATION) at 15:27

## 2023-10-12 RX ADMIN — ESCITALOPRAM OXALATE 20 MG: 10 TABLET ORAL at 10:20

## 2023-10-12 RX ADMIN — SODIUM CHLORIDE, PRESERVATIVE FREE 10 ML: 5 INJECTION INTRAVENOUS at 10:21

## 2023-10-12 RX ADMIN — LEVOFLOXACIN 750 MG: 750 INJECTION, SOLUTION INTRAVENOUS at 00:15

## 2023-10-12 RX ADMIN — ENOXAPARIN SODIUM 40 MG: 100 INJECTION SUBCUTANEOUS at 10:20

## 2023-10-12 NOTE — DISCHARGE SUMMARY
Discharge Summary       PATIENT ID: Claudio Cintron  MRN: [id number]   YOB: 1975    DATE OF ADMISSION: 10/10/2023  5:22 PM    DATE OF DISCHARGE: 10/12/2023   PRIMARY CARE PROVIDER: Roel Ambriz DO     ATTENDING PHYSICIAN: Ambrocio  DISCHARGING PROVIDER: Filemon Owens MD    To contact this individual call [phone] and ask the  to page. If unavailable ask to be transferred the Adult Hospitalist Department. CONSULTATIONS: None    PROCEDURES/SURGERIES: * No surgery found *     ADMITTING DIAGNOSES & HOSPITAL COURSE:   UTI  History of MS      51 y.o lady w/ MS who presented with urinary frequency and incontinence. Was seen recently at a urology clinic and reportedly started on Macrodantin for UTI. Reports a history of recurrent UTI. Had sepsis and c diff as well in the past.     Urine culture sent here. She was started on levofloxacin on admission, reports significant improvement in symptoms after 48 hours of IV abx. Discharged on the same. I called the micro lab but they do not have any update for the urine culture today. She is requesting discharge this afternoon. DISCHARGE DIAGNOSES / PLAN:      FOLLOW UP APPOINTMENTS:    Follow-up Information       Follow up With Specialties Details Why [address] [facility], Lady Juan DO Family Medicine Schedule an appointment as soon as possible for a visit in 1 week(s)  [address] [place] [address]  [phone]              DISCHARGE MEDICATIONS:     Medication List        START taking these medications      B. lactis-B.inf-S.thermophl Pack powder  Take 1 packet by mouth daily for 5 days  Start taking on: October 13, 2023     levoFLOXacin 750 MG tablet  Commonly known as: LEVAQUIN  Take 1 tablet by mouth daily for 5 days            CHANGE how you take these medications      gabapentin 600 MG tablet  Commonly known as: NEURONTIN  What changed: Another medication with the same name was removed.  Continue

## 2023-10-12 NOTE — DISCHARGE INSTRUCTIONS
Urine culture here is still pending. Complete the antibiotics as prescribed. Probiotics prescribed with it to avoid gastrointestinal adverse effects.

## 2023-10-13 ENCOUNTER — NURSE TRIAGE (OUTPATIENT)
Dept: OTHER | Facility: CLINIC | Age: 48
End: 2023-10-13

## 2023-10-13 LAB
BACTERIA SPEC CULT: NORMAL
CC UR VC: NORMAL
SERVICE CMNT-IMP: NORMAL

## 2023-10-13 NOTE — TELEPHONE ENCOUNTER
[facility]    Received call from Frida Dobson at [facility] with [employer]. Subjective: Caller states \"Feeling like elephant standing on my chest when I take deep breaths. I have tightness in my chest. I get into coughing fits and my side and stomach hurt so bad from coughing. I was in the ED for over 24 hours, they transfered me to a floor for observation. They gave be breathing tx's in the hospital but sent me home without anything. Theres times when I lay down and it feel like I'm drowning. I tested negative for flu and covid. \"     Current Symptoms: Wheezing, chest tightness, coughing, thick mucous mostly clear with slight yellow tinge, some lightheadedness/dizziness unsure if caused by MS or current problem    Pt states her symptoms are not worsened since she first went to the ED. Onset: 6 days ago;     Associated Symptoms: reduced appetite, reduced fluid intake    Pain Severity: 8/10; in center of chest only with deep breath and coughing    Temperature:  denies    What has been tried: inhaler    LMP:  1.5 years ago  Pregnant: No    Recommended disposition: See HCP within 4 Hours (or PCP triage). Called [facility] office and they do not have any providers available for triage d/t multiple physicians out ill. Pt instructed to go to [address]. Pt states that she has an urgent care about 10 minutes from her house and she will proceed to go there at this time. Care advice provided, patient verbalizes understanding; denies any other questions or concerns; instructed to call back for any new or worsening symptoms. Patient/caller agrees to proceed to nearest [facility]     Attention Provider: Thank you for allowing me to participate in the care of your patient. The patient was connected to triage in response to information provided to the ECC/PSC. Please do not respond through this encounter as the response is not directed to a shared pool.   Reason for Disposition   [1] MILD difficulty

## 2023-10-14 RX ORDER — BUPROPION HYDROCHLORIDE 300 MG/1
300 TABLET ORAL DAILY
Qty: 90 TABLET | Refills: 1 | Status: SHIPPED | OUTPATIENT
Start: 2023-10-14

## 2023-10-16 DIAGNOSIS — F41.9 ANXIETY AND DEPRESSION: Primary | ICD-10-CM

## 2023-10-16 DIAGNOSIS — F41.9 ANXIETY AND DEPRESSION: ICD-10-CM

## 2023-10-16 DIAGNOSIS — F32.A ANXIETY AND DEPRESSION: ICD-10-CM

## 2023-10-16 DIAGNOSIS — F32.A ANXIETY AND DEPRESSION: Primary | ICD-10-CM

## 2023-10-16 RX ORDER — ESCITALOPRAM OXALATE 20 MG/1
20 TABLET ORAL DAILY
Qty: 30 TABLET | Refills: 0 | Status: SHIPPED | OUTPATIENT
Start: 2023-10-16 | End: 2023-10-17 | Stop reason: SDUPTHER

## 2023-10-16 RX ORDER — ESCITALOPRAM OXALATE 20 MG/1
20 TABLET ORAL DAILY
Qty: 10 TABLET | Refills: 0 | Status: SHIPPED | OUTPATIENT
Start: 2023-10-16 | End: 2023-10-16 | Stop reason: SDUPTHER

## 2023-10-16 NOTE — TELEPHONE ENCOUNTER
[id number] Received call from answering service. Returned page to patient. Patient is very angry that she has not had a refill of her Lexapro sent in. She states she has requested this refill multiples times without medication being sent, and called the office today and was assured it would be sent. When asked if this is the reason for paging the emergency line she states \"I think suicide is a good reason\". I asked if she was having suicidal ideations and she states \"If I don't get my medication\", then continued to relay frustrations about process of getting refills of medications. I told her I was sending her medication. She then stated \"So you are not going to listen to me either! I was going to apologize to you but actually, I'm good\" and hung up the phone.

## 2023-10-16 NOTE — PROGRESS NOTES
Spoke with patient after receiving message from ANSON Corral. Discussed that I had just finished seeing patients and was not ignoring her I just had not opened my basket until now. She was very tearful. Discussed I was going to send a 30 day supply to her pharmacy so she didn't have to pay for 10 tabs. She denied any SI. Discussed we would discuss things further but again apologized for the confusion just from my end looked like she had not had refills in months and was out and wanted to discuss in case she was restarting. States she has only been out a few days and feels like it is catching up to her. Sent in refill. Will see patient tomorrow. She voiced understanding and apologized and stated she understood that I was not ignoring her.

## 2023-10-17 ENCOUNTER — OFFICE VISIT (OUTPATIENT)
Dept: PRIMARY CARE CLINIC | Facility: CLINIC | Age: 48
End: 2023-10-17

## 2023-10-17 VITALS
RESPIRATION RATE: 18 BRPM | DIASTOLIC BLOOD PRESSURE: 89 MMHG | HEIGHT: 66 IN | SYSTOLIC BLOOD PRESSURE: 138 MMHG | HEART RATE: 70 BPM | OXYGEN SATURATION: 98 % | BODY MASS INDEX: 31.98 KG/M2 | WEIGHT: 199 LBS | TEMPERATURE: 98.2 F

## 2023-10-17 DIAGNOSIS — J45.909 MODERATE ASTHMA WITHOUT COMPLICATION, UNSPECIFIED WHETHER PERSISTENT: ICD-10-CM

## 2023-10-17 DIAGNOSIS — E61.1 IRON DEFICIENCY: ICD-10-CM

## 2023-10-17 DIAGNOSIS — Z09 HOSPITAL DISCHARGE FOLLOW-UP: ICD-10-CM

## 2023-10-17 DIAGNOSIS — N39.0 RECURRENT UTI: Primary | ICD-10-CM

## 2023-10-17 DIAGNOSIS — F32.A ANXIETY AND DEPRESSION: ICD-10-CM

## 2023-10-17 DIAGNOSIS — F41.9 ANXIETY AND DEPRESSION: ICD-10-CM

## 2023-10-17 RX ORDER — CHLORAL HYDRATE 500 MG
2000 CAPSULE ORAL DAILY
COMMUNITY

## 2023-10-17 RX ORDER — LANOLIN ALCOHOL/MO/W.PET/CERES
1000 CREAM (GRAM) TOPICAL DAILY
COMMUNITY

## 2023-10-17 RX ORDER — FLUTICASONE PROPIONATE 110 UG/1
1 AEROSOL, METERED RESPIRATORY (INHALATION) 2 TIMES DAILY
Qty: 12 G | Refills: 2 | Status: SHIPPED | OUTPATIENT
Start: 2023-10-17

## 2023-10-17 RX ORDER — ESCITALOPRAM OXALATE 20 MG/1
20 TABLET ORAL DAILY
Qty: 90 TABLET | Refills: 3 | Status: SHIPPED | OUTPATIENT
Start: 2023-10-17

## 2023-10-17 RX ORDER — BUPROPION HYDROCHLORIDE 300 MG/1
300 TABLET ORAL DAILY
Qty: 90 TABLET | Refills: 3 | Status: SHIPPED | OUTPATIENT
Start: 2023-10-17

## 2023-10-17 SDOH — ECONOMIC STABILITY: HOUSING INSECURITY
IN THE LAST 12 MONTHS, WAS THERE A TIME WHEN YOU DID NOT HAVE A STEADY PLACE TO SLEEP OR SLEPT IN A SHELTER (INCLUDING NOW)?: NO

## 2023-10-17 SDOH — ECONOMIC STABILITY: FOOD INSECURITY: WITHIN THE PAST 12 MONTHS, THE FOOD YOU BOUGHT JUST DIDN'T LAST AND YOU DIDN'T HAVE MONEY TO GET MORE.: NEVER TRUE

## 2023-10-17 SDOH — ECONOMIC STABILITY: FOOD INSECURITY: WITHIN THE PAST 12 MONTHS, YOU WORRIED THAT YOUR FOOD WOULD RUN OUT BEFORE YOU GOT MONEY TO BUY MORE.: NEVER TRUE

## 2023-10-17 SDOH — ECONOMIC STABILITY: INCOME INSECURITY: HOW HARD IS IT FOR YOU TO PAY FOR THE VERY BASICS LIKE FOOD, HOUSING, MEDICAL CARE, AND HEATING?: NOT HARD AT ALL

## 2023-10-17 ASSESSMENT — PATIENT HEALTH QUESTIONNAIRE - PHQ9
6. FEELING BAD ABOUT YOURSELF - OR THAT YOU ARE A FAILURE OR HAVE LET YOURSELF OR YOUR FAMILY DOWN: 0
9. THOUGHTS THAT YOU WOULD BE BETTER OFF DEAD, OR OF HURTING YOURSELF: 0
SUM OF ALL RESPONSES TO PHQ QUESTIONS 1-9: 0
1. LITTLE INTEREST OR PLEASURE IN DOING THINGS: 0
7. TROUBLE CONCENTRATING ON THINGS, SUCH AS READING THE NEWSPAPER OR WATCHING TELEVISION: 0
4. FEELING TIRED OR HAVING LITTLE ENERGY: 0
8. MOVING OR SPEAKING SO SLOWLY THAT OTHER PEOPLE COULD HAVE NOTICED. OR THE OPPOSITE, BEING SO FIGETY OR RESTLESS THAT YOU HAVE BEEN MOVING AROUND A LOT MORE THAN USUAL: 0
2. FEELING DOWN, DEPRESSED OR HOPELESS: 0
3. TROUBLE FALLING OR STAYING ASLEEP: 0
10. IF YOU CHECKED OFF ANY PROBLEMS, HOW DIFFICULT HAVE THESE PROBLEMS MADE IT FOR YOU TO DO YOUR WORK, TAKE CARE OF THINGS AT HOME, OR GET ALONG WITH OTHER PEOPLE: 0
SUM OF ALL RESPONSES TO PHQ QUESTIONS 1-9: 0
SUM OF ALL RESPONSES TO PHQ QUESTIONS 1-9: 0
SUM OF ALL RESPONSES TO PHQ9 QUESTIONS 1 & 2: 0
SUM OF ALL RESPONSES TO PHQ QUESTIONS 1-9: 0
5. POOR APPETITE OR OVEREATING: 0

## 2023-10-18 ENCOUNTER — PATIENT MESSAGE (OUTPATIENT)
Age: 48
End: 2023-10-18

## 2023-10-18 DIAGNOSIS — I10 ESSENTIAL (PRIMARY) HYPERTENSION: Primary | ICD-10-CM

## 2023-10-18 RX ORDER — METOPROLOL SUCCINATE 100 MG/1
100 TABLET, EXTENDED RELEASE ORAL DAILY
Qty: 90 TABLET | Refills: 1 | Status: SHIPPED | OUTPATIENT
Start: 2023-10-18

## 2023-10-18 NOTE — TELEPHONE ENCOUNTER
Requested Prescriptions     Signed Prescriptions Disp Refills    metoprolol succinate (TOPROL XL) 100 MG extended release tablet 90 tablet 1     Sig: Take 1 tablet by mouth daily     Authorizing Provider: Mirela Boykin     Ordering User: Travis Broussard   Per Dr. Verena Elizabeth verbal order.

## 2023-12-30 ENCOUNTER — OFFICE VISIT (OUTPATIENT)
Age: 48
End: 2023-12-30

## 2023-12-30 VITALS
HEIGHT: 66 IN | SYSTOLIC BLOOD PRESSURE: 149 MMHG | DIASTOLIC BLOOD PRESSURE: 85 MMHG | WEIGHT: 196 LBS | OXYGEN SATURATION: 98 % | TEMPERATURE: 100.5 F | HEART RATE: 105 BPM | BODY MASS INDEX: 31.5 KG/M2

## 2023-12-30 DIAGNOSIS — J10.1 INFLUENZA A: Primary | ICD-10-CM

## 2023-12-30 DIAGNOSIS — J20.9 BRONCHITIS WITH BRONCHOSPASM: ICD-10-CM

## 2023-12-30 LAB
INFLUENZA A ANTIGEN, POC: POSITIVE
INFLUENZA B ANTIGEN, POC: NEGATIVE
Lab: NORMAL
QC PASS/FAIL: NORMAL
SARS-COV-2 RDRP RESP QL NAA+PROBE: NEGATIVE
VALID INTERNAL CONTROL, POC: ABNORMAL

## 2023-12-30 RX ORDER — OSELTAMIVIR PHOSPHATE 75 MG/1
75 CAPSULE ORAL 2 TIMES DAILY
Qty: 10 CAPSULE | Refills: 0 | Status: SHIPPED | OUTPATIENT
Start: 2023-12-30 | End: 2024-01-04

## 2023-12-30 RX ORDER — ALBUTEROL SULFATE 90 UG/1
2 AEROSOL, METERED RESPIRATORY (INHALATION) EVERY 6 HOURS PRN
Qty: 18 G | Refills: 0 | Status: SHIPPED | OUTPATIENT
Start: 2023-12-30

## 2023-12-30 RX ORDER — DOXYCYCLINE HYCLATE 100 MG
100 TABLET ORAL 2 TIMES DAILY
Qty: 14 TABLET | Refills: 0 | Status: SHIPPED | OUTPATIENT
Start: 2023-12-30 | End: 2024-01-06

## 2023-12-30 RX ORDER — BENZONATATE 200 MG/1
200 CAPSULE ORAL 3 TIMES DAILY PRN
Qty: 30 CAPSULE | Refills: 0 | Status: SHIPPED | OUTPATIENT
Start: 2023-12-30

## 2023-12-30 NOTE — PROGRESS NOTES
Chief Complaint   Patient presents with    Fever     Back pain, cough, chest congestion x1 month. Headache. Fever   This is a new problem. The current episode started today. The problem occurs constantly. The maximum temperature noted was 101 to 101.9 F. Associated symptoms include congestion, coughing, headaches and muscle aches. Associated symptoms comments: Has h/o cough and congestion for past 2-3 weeks . She has tried acetaminophen for the symptoms.    Risk factors: immunosuppression        Past Medical History:   Diagnosis Date    Acute renal failure (ARF) ([address])     Acute renal failure (ARF) ([address])     6/2020-STONES AND PYELONEPHRITIS    Ankle fracture     Ankle fracture     Arrhythmia     Arrhythmia     PALPITATIONS    Asthma     Asthma     Autoimmune disease ([address])     MS    Calculus of gallbladder without cholecystitis without obstruction 07/20/2020    Calculus of gallbladder without cholecystitis without obstruction 07/20/2020    Celiac disease     Celiac disease     Chronic pain     r/t MS    Chronic pain     MS    Congenital sucrose isomaltose malabsorption     Congenital sucrose isomaltose malabsorption     Depression     Depression     Diverticulosis     of sigmoid colon    Diverticulosis of sigmoid colon     Dysplastic colon polyp     Dr. Christi Gomez- last colonoscopy 11/7/12 single polyp    Dysplastic colon polyp     Dr. Christi Gomez - last colonoscopy 11/7/12 - single polyp    Essential hypertension     Gestasional    GERD (gastroesophageal reflux disease)     GERD (gastroesophageal reflux disease)     Hx of sleep apnea     resolved after weight loss    Hypertension     gestational    Influenza B     Influenza B 03/07/2017    YIN virus antibody positive     YIN virus antibody positive     Kidney stones     Kidney stones     Miscarriage     Miscarriage     11/13    MS (multiple sclerosis) ([facility])     Dr. Nadja Powers    Multiple sclerosis [facility])     Dr. Nadja Powers    Obstructive pyelonephritis     Obstructive

## 2023-12-31 ENCOUNTER — TELEPHONE (OUTPATIENT)
Age: 48
End: 2023-12-31

## 2023-12-31 NOTE — TELEPHONE ENCOUNTER
Patient was seen yesterday and tested positive for Flu A, asking for anti nausea medication to be sent to pharmacy.

## 2024-03-13 ENCOUNTER — OFFICE VISIT (OUTPATIENT)
Age: 49
End: 2024-03-13

## 2024-03-13 VITALS
SYSTOLIC BLOOD PRESSURE: 151 MMHG | WEIGHT: 210 LBS | BODY MASS INDEX: 33.89 KG/M2 | RESPIRATION RATE: 19 BRPM | OXYGEN SATURATION: 94 % | DIASTOLIC BLOOD PRESSURE: 89 MMHG | TEMPERATURE: 98 F | HEART RATE: 71 BPM

## 2024-03-13 DIAGNOSIS — B96.89 ACUTE BACTERIAL BRONCHITIS: Primary | ICD-10-CM

## 2024-03-13 DIAGNOSIS — J45.31 MILD PERSISTENT ASTHMA WITH EXACERBATION: ICD-10-CM

## 2024-03-13 DIAGNOSIS — J20.8 ACUTE BACTERIAL BRONCHITIS: Primary | ICD-10-CM

## 2024-03-13 RX ORDER — INHALER,ASSIST DEVICE,LG MASK
1 SPACER (EA) MISCELLANEOUS PRN
Qty: 1 EACH | Refills: 0 | Status: SHIPPED | OUTPATIENT
Start: 2024-03-13

## 2024-03-13 RX ORDER — PREDNISONE 20 MG/1
TABLET ORAL
Qty: 10 TABLET | Refills: 0 | Status: SHIPPED | OUTPATIENT
Start: 2024-03-13

## 2024-03-13 RX ORDER — DOXYCYCLINE HYCLATE 100 MG
100 TABLET ORAL 2 TIMES DAILY
Qty: 14 TABLET | Refills: 0 | Status: SHIPPED | OUTPATIENT
Start: 2024-03-13 | End: 2024-03-20

## 2024-03-13 RX ORDER — VIBEGRON 75 MG/1
1 TABLET, FILM COATED ORAL DAILY
COMMUNITY
Start: 2023-12-21

## 2024-03-13 NOTE — PROGRESS NOTES
(AEROCHAMBER PLUS VON-VU LARGE) GISELA; 1 each by Does not apply route as needed (to use with albuterol inhaler), Disp-1 each, R-0Normal       No evidence suggesting pneumonia  Start doxycycline for possible bacterial bronchitis  Start prednisone for asthma exacerbation. Continue at home albuterol. Spacing devise sent in to use with inhaler.     Follow up:  Follow up immediately for any new, worsening or changes or if symptoms are not improving over the next 5 days.         Jose Faulkner, APRN - NP

## 2024-03-15 DIAGNOSIS — F41.9 ANXIETY AND DEPRESSION: ICD-10-CM

## 2024-03-15 DIAGNOSIS — F32.A ANXIETY AND DEPRESSION: ICD-10-CM

## 2024-03-15 RX ORDER — ESCITALOPRAM OXALATE 20 MG/1
20 TABLET ORAL DAILY
Qty: 90 TABLET | Refills: 0 | Status: SHIPPED | OUTPATIENT
Start: 2024-03-15

## 2024-04-19 ENCOUNTER — OFFICE VISIT (OUTPATIENT)
Age: 49
End: 2024-04-19
Payer: MEDICAID

## 2024-04-19 VITALS
SYSTOLIC BLOOD PRESSURE: 130 MMHG | DIASTOLIC BLOOD PRESSURE: 72 MMHG | HEART RATE: 69 BPM | BODY MASS INDEX: 33.68 KG/M2 | OXYGEN SATURATION: 96 % | WEIGHT: 209.6 LBS | HEIGHT: 66 IN

## 2024-04-19 DIAGNOSIS — R00.2 PALPITATIONS: ICD-10-CM

## 2024-04-19 DIAGNOSIS — I10 ESSENTIAL (PRIMARY) HYPERTENSION: Primary | ICD-10-CM

## 2024-04-19 PROCEDURE — 99214 OFFICE O/P EST MOD 30 MIN: CPT | Performed by: SPECIALIST

## 2024-04-19 PROCEDURE — 3075F SYST BP GE 130 - 139MM HG: CPT | Performed by: SPECIALIST

## 2024-04-19 PROCEDURE — 3078F DIAST BP <80 MM HG: CPT | Performed by: SPECIALIST

## 2024-04-19 RX ORDER — PANTOPRAZOLE SODIUM 40 MG/1
TABLET, DELAYED RELEASE ORAL
COMMUNITY
Start: 2020-12-30

## 2024-04-19 ASSESSMENT — PATIENT HEALTH QUESTIONNAIRE - PHQ9
SUM OF ALL RESPONSES TO PHQ QUESTIONS 1-9: 0
SUM OF ALL RESPONSES TO PHQ QUESTIONS 1-9: 0
2. FEELING DOWN, DEPRESSED OR HOPELESS: NOT AT ALL
SUM OF ALL RESPONSES TO PHQ9 QUESTIONS 1 & 2: 0
1. LITTLE INTEREST OR PLEASURE IN DOING THINGS: NOT AT ALL
SUM OF ALL RESPONSES TO PHQ QUESTIONS 1-9: 0
SUM OF ALL RESPONSES TO PHQ QUESTIONS 1-9: 0

## 2024-04-19 NOTE — PROGRESS NOTES
HISTORY OF PRESENT ILLNESS  Tammie Lopez is a 49 y.o. female     SUMMARY:   Patient Active Problem List   Diagnosis    Depression    Vitamin D deficiency    Kidney stones    S/P fredrick    Hyponatremia    Dysphagia    Depression with anxiety    Tubular adenoma of colon    Calculus of gallbladder without cholecystitis without obstruction    AMA (advanced maternal age) primigravida 35+    Dehydration, moderate    Asthma    Congenital sucrose isomaltose malabsorption    Multiple sclerosis exacerbation (HCC)    Optic neuritis, right    Pyelonephritis    Sepsis (HCC)    YIN virus antibody positive    PUD (peptic ulcer disease)    S/P appy    Meralgia paresthetica of right side    Essential hypertension    Dysplastic colon polyp    Obstructive pyelonephritis    Single delivery by     Elevated lipase    Gestational hypertension    MS (multiple sclerosis) (HCC)    HNP (herniated nucleus pulposus), lumbar    Urinary retention    Major depressive disorder, recurrent, in full remission (HCC)            CARDIOLOGY STUDIES TO DATE:  3/18 48 hr holter, 1pvc, 10 pac's  3/18 normal echo    Chief Complaint   Patient presents with    Hypertension     1 year follow up       HPI :  She has had a rough year since we last met.  She had 2 back surgeries and several hospitalizations for complicated UTIs and at one point she had C. difficile.  That is all gone well now and they are in the process of moving up to [place] acres with a pond in [place].  Blood pressure is well-controlled and she is having minimal amounts of palpitations.  She has chronic sinus drainage and GERD which is causing a intermittent cough and wheeze so she is trying to get that sorted out.    CARDIAC ROS:   negative for chest pain, claudication, dyspnea, lower extremity edema, orthopnea, paroxysmal nocturnal dyspnea, and syncope    Family History   Problem Relation Age of Onset    Colon Polyps Mother         precancerous    Atrial Fibrillation

## 2024-05-20 ENCOUNTER — HOSPITAL ENCOUNTER (OUTPATIENT)
Facility: HOSPITAL | Age: 49
Setting detail: OUTPATIENT SURGERY
Discharge: HOME | End: 2024-05-20
Attending: INTERNAL MEDICINE | Admitting: INTERNAL MEDICINE
Payer: MEDICAID

## 2024-05-20 ENCOUNTER — ANESTHESIA (OUTPATIENT)
Facility: HOSPITAL | Age: 49
End: 2024-05-20
Payer: MEDICAID

## 2024-05-20 ENCOUNTER — ANESTHESIA EVENT (OUTPATIENT)
Facility: HOSPITAL | Age: 49
End: 2024-05-20
Payer: MEDICAID

## 2024-05-20 VITALS
OXYGEN SATURATION: 94 % | SYSTOLIC BLOOD PRESSURE: 133 MMHG | TEMPERATURE: 97.9 F | RESPIRATION RATE: 18 BRPM | HEART RATE: 65 BPM | WEIGHT: 210.9 LBS | HEIGHT: 66 IN | BODY MASS INDEX: 33.89 KG/M2 | DIASTOLIC BLOOD PRESSURE: 72 MMHG

## 2024-05-20 PROCEDURE — 3600007502: Performed by: INTERNAL MEDICINE

## 2024-05-20 PROCEDURE — 88305 TISSUE EXAM BY PATHOLOGIST: CPT

## 2024-05-20 PROCEDURE — 6360000002 HC RX W HCPCS: Performed by: NURSE ANESTHETIST, CERTIFIED REGISTERED

## 2024-05-20 PROCEDURE — 2500000003 HC RX 250 WO HCPCS: Performed by: NURSE ANESTHETIST, CERTIFIED REGISTERED

## 2024-05-20 PROCEDURE — 2580000003 HC RX 258: Performed by: NURSE ANESTHETIST, CERTIFIED REGISTERED

## 2024-05-20 PROCEDURE — 2709999900 HC NON-CHARGEABLE SUPPLY: Performed by: INTERNAL MEDICINE

## 2024-05-20 PROCEDURE — 7100000011 HC PHASE II RECOVERY - ADDTL 15 MIN: Performed by: INTERNAL MEDICINE

## 2024-05-20 PROCEDURE — 3600007512: Performed by: INTERNAL MEDICINE

## 2024-05-20 PROCEDURE — 7100000010 HC PHASE II RECOVERY - FIRST 15 MIN: Performed by: INTERNAL MEDICINE

## 2024-05-20 PROCEDURE — 3700000000 HC ANESTHESIA ATTENDED CARE: Performed by: INTERNAL MEDICINE

## 2024-05-20 PROCEDURE — 3700000001 HC ADD 15 MINUTES (ANESTHESIA): Performed by: INTERNAL MEDICINE

## 2024-05-20 RX ORDER — SODIUM CHLORIDE 9 MG/ML
INJECTION, SOLUTION INTRAVENOUS CONTINUOUS PRN
Status: DISCONTINUED | OUTPATIENT
Start: 2024-05-20 | End: 2024-05-20 | Stop reason: SDUPTHER

## 2024-05-20 RX ORDER — SODIUM CHLORIDE 9 MG/ML
INJECTION, SOLUTION INTRAVENOUS CONTINUOUS
Status: DISCONTINUED | OUTPATIENT
Start: 2024-05-20 | End: 2024-05-20 | Stop reason: HOSPADM

## 2024-05-20 RX ORDER — SODIUM CHLORIDE 0.9 % (FLUSH) 0.9 %
5-40 SYRINGE (ML) INJECTION EVERY 12 HOURS SCHEDULED
Status: DISCONTINUED | OUTPATIENT
Start: 2024-05-20 | End: 2024-05-20 | Stop reason: HOSPADM

## 2024-05-20 RX ORDER — SODIUM CHLORIDE 0.9 % (FLUSH) 0.9 %
5-40 SYRINGE (ML) INJECTION PRN
Status: DISCONTINUED | OUTPATIENT
Start: 2024-05-20 | End: 2024-05-20 | Stop reason: HOSPADM

## 2024-05-20 RX ORDER — LIDOCAINE HYDROCHLORIDE 20 MG/ML
INJECTION, SOLUTION EPIDURAL; INFILTRATION; INTRACAUDAL; PERINEURAL PRN
Status: DISCONTINUED | OUTPATIENT
Start: 2024-05-20 | End: 2024-05-20 | Stop reason: SDUPTHER

## 2024-05-20 RX ORDER — SODIUM CHLORIDE 9 MG/ML
25 INJECTION, SOLUTION INTRAVENOUS PRN
Status: DISCONTINUED | OUTPATIENT
Start: 2024-05-20 | End: 2024-05-20 | Stop reason: HOSPADM

## 2024-05-20 RX ORDER — PHENYLEPHRINE HCL IN 0.9% NACL 0.4MG/10ML
SYRINGE (ML) INTRAVENOUS PRN
Status: DISCONTINUED | OUTPATIENT
Start: 2024-05-20 | End: 2024-05-20 | Stop reason: SDUPTHER

## 2024-05-20 RX ADMIN — PROPOFOL 20 MG: 10 INJECTION, EMULSION INTRAVENOUS at 16:54

## 2024-05-20 RX ADMIN — SODIUM CHLORIDE: 9 INJECTION, SOLUTION INTRAVENOUS at 16:41

## 2024-05-20 RX ADMIN — PROPOFOL 20 MG: 10 INJECTION, EMULSION INTRAVENOUS at 16:46

## 2024-05-20 RX ADMIN — PROPOFOL 20 MG: 10 INJECTION, EMULSION INTRAVENOUS at 16:47

## 2024-05-20 RX ADMIN — PROPOFOL 20 MG: 10 INJECTION, EMULSION INTRAVENOUS at 16:50

## 2024-05-20 RX ADMIN — PROPOFOL 20 MG: 10 INJECTION, EMULSION INTRAVENOUS at 16:53

## 2024-05-20 RX ADMIN — Medication 200 MCG: at 16:47

## 2024-05-20 RX ADMIN — PROPOFOL 20 MG: 10 INJECTION, EMULSION INTRAVENOUS at 16:55

## 2024-05-20 RX ADMIN — PROPOFOL 100 MG: 10 INJECTION, EMULSION INTRAVENOUS at 16:45

## 2024-05-20 RX ADMIN — Medication 200 MCG: at 16:51

## 2024-05-20 RX ADMIN — LIDOCAINE HYDROCHLORIDE 100 MG: 20 INJECTION, SOLUTION EPIDURAL; INFILTRATION; INTRACAUDAL; PERINEURAL at 16:45

## 2024-05-20 RX ADMIN — PROPOFOL 20 MG: 10 INJECTION, EMULSION INTRAVENOUS at 16:52

## 2024-05-20 RX ADMIN — PROPOFOL 20 MG: 10 INJECTION, EMULSION INTRAVENOUS at 16:51

## 2024-05-20 RX ADMIN — PROPOFOL 20 MG: 10 INJECTION, EMULSION INTRAVENOUS at 16:49

## 2024-05-20 RX ADMIN — PROPOFOL 20 MG: 10 INJECTION, EMULSION INTRAVENOUS at 16:48

## 2024-05-20 ASSESSMENT — PAIN SCALES - GENERAL: PAINLEVEL_OUTOF10: 0

## 2024-05-20 ASSESSMENT — PAIN - FUNCTIONAL ASSESSMENT: PAIN_FUNCTIONAL_ASSESSMENT: 0-10

## 2024-05-20 NOTE — OP NOTE
[address] [address]      Colonoscopy Operative Report    Tammie Lopez  [id number]  1975      Procedure Type:   Colonoscopy with biopsy     Indications:    Change in bowel habits, Diarrhea       Pre-operative Diagnosis: see indication above    Post-operative Diagnosis:  See findings below    :  Kimmy Corral MD    Surgical Assistant: Circulator: Gretchen Zacarias RN  Circulator Assist: Vivienne Paulino RN    Implants:  None    Referring Provider: Tammie Valentino DO      Sedation:  MAC anesthesia Propofol      Procedure Details:  After informed consent was obtained with all risks and benefits of procedure explained and preoperative exam completed, the patient was taken to the endoscopy suite and placed in the left lateral decubitus position.  Upon sequential sedation as per above, a digital rectal exam was performed demonstrating internal hemorrhoids.  The Olympus videocolonoscope  was inserted in the rectum and carefully advanced to the cecum, which was identified by the ileocecal valve and appendiceal orifice.  The cecum was identified by the ileocecal valve and appendiceal orifice.  The quality of preparation was good.  The colonoscope was slowly withdrawn with careful evaluation between folds. Retroflexion in the rectum was completed .     Findings:   Rectum: normal  Small internal hemorrhoids   Sigmoid: normal  Descending Colon: normal  Transverse Colon: normal  Ascending Colon: normal  Cecum: normal  Terminal Ileum: normal    Random colonic biopsies taken to r/o microscopic colitis       Specimen Removed:   as above     Complications: None.     EBL:  None.    Impression:     see findings     Recommendations: --Await pathology.      Recommendation for next colonscopy in 5 years because of h/o polyps   Follow up in 2 to 3 months     Signed By: Kimmy Corral MD     5/20/2024  4:59 PM

## 2024-05-20 NOTE — DISCHARGE INSTRUCTIONS
[facility]  [address], [place] [address]    COLON DISCHARGE INSTRUCTIONS    Tammie Lopez  [id number]  1975    Discomfort:  Redness at IV site- apply warm compress to area; if redness or soreness persist- contact your physician  There may be a slight amount of blood passed from the rectum  Gaseous discomfort- walking, belching will help relieve any discomfort  You may not operate a vehicle for 12 hours  You may not engage in an occupation involving machinery or appliances for rest of today  You may not drink alcoholic beverages for at least 12 hours  Avoid making any critical decisions for at least 24 hour  DIET:  You may resume your regular diet - however -  remember your colon is empty and a heavy meal will produce gas.  Avoid these foods:  vegetables, fried / greasy foods, carbonated drinks     ACTIVITY:  You may  resume your normal daily activities it is recommended that you spend the remainder of the day resting -  avoid any strenuous activity.    CALL M.D.  ANY SIGN OF:   Increasing pain, nausea, vomiting  Abdominal distension (swelling)  New increased bleeding (oral or rectal)  Fever (chills)  Pain in chest area  Bloody discharge from nose or mouth  Shortness of breath      Follow-up Instructions:   Call Dr. Kimmy Corral for any questions or problems at [phone] and follow up in 2 to 3 months           ENDOSCOPY FINDINGS:   Your colonoscopy was all normal, biopsies stake to look for any inflammation .  Telephone # [phone]      Signed By: Kimmy Corral MD     5/20/2024  5:02 PM       DISCHARGE SUMMARY from Nurse    The following personal items collected during your admission are returned to you:   Dental Appliance:    Vision:    Hearing Aid:    Jewelry:    Clothing:    Other Valuables:    Valuables sent to safe:        Learning About Coronavirus (COVID-19)  Coronavirus (COVID-19): Overview  What is coronavirus (COVID-19)?  The coronavirus disease

## 2024-05-20 NOTE — PROGRESS NOTES

## 2024-05-20 NOTE — ANESTHESIA POSTPROCEDURE EVALUATION
Department of Anesthesiology  Postprocedure Note    Patient: Tammie Lopez  MRN: [id number]  YOB: 1975  Date of evaluation: 5/20/2024    Procedure Summary       Date: 05/20/24 Room / Location: [facility] ENDO 02 / [facility] ENDOSCOPY    Anesthesia Start: 1641 Anesthesia Stop: 1657    Procedures:       COLONOSCOPY (Lower GI Region)      COLONOSCOPY BIOPSY/STOMA (Lower GI Region) Diagnosis:       Altered bowel habits      (Altered bowel habits [R19.4])    Surgeons: Kimmy Corral MD Responsible Provider: Marvin Rogers MD    Anesthesia Type: MAC ASA Status: 2            Anesthesia Type: MAC    Olga Phase I: Olga Score: 10    Olga Phase II:      Anesthesia Post Evaluation    Patient location during evaluation: bedside  Nausea & Vomiting: no nausea  Cardiovascular status: blood pressure returned to baseline  Respiratory status: acceptable  Hydration status: euvolemic    No notable events documented.

## 2024-05-20 NOTE — ANESTHESIA PRE PROCEDURE
• Celiac disease    • Chronic pain     r/t MS   • Chronic pain     MS   • Congenital sucrose isomaltose malabsorption    • Congenital sucrose isomaltose malabsorption    • Depression    • Depression    • Depression    • Diverticulosis     of sigmoid colon   • Diverticulosis of sigmoid colon    • Dysplastic colon polyp     Dr. Perkins- last colonoscopy 11/7/12 single polyp   • Dysplastic colon polyp     Dr. Perkins - last colonoscopy 11/7/12 - single polyp   • Essential hypertension     Gestasional   • GERD (gastroesophageal reflux disease)    • GERD (gastroesophageal reflux disease)    • Hx of sleep apnea     resolved after weight loss   • Hypertension     gestational   • Influenza B    • Influenza B 03/07/2017   • YIN virus antibody positive    • YIN virus antibody positive    • Kidney stones    • Kidney stones    • Miscarriage    • Miscarriage     11/13   • MS (multiple sclerosis) (HCC)     Dr. cMneill   • Multiple sclerosis (HCC)     Dr. Mcneill   • Obstructive pyelonephritis    • Obstructive pyelonephritis 05/26/2020   • Ovarian cyst    • Ovarian cyst    • PUD (peptic ulcer disease)    • PUD (peptic ulcer disease)    • Pyelonephritis    • Pyelonephritis    • Routine gynecological examination     Dr. Huerta   • S/P appy    • S/P fredrick    • Sleep apnea     pt states she no longer has the problem since wt loss - intentional wt loss   • Tubular adenoma of colon 04/18/2016   • Tubular adenoma of colon 04/18/2016   • Uterine fibroid    • Uterine fibroid    • Vitamin D deficiency 07/20/2011   • Vitamin D deficiency 07/20/2011       Past Surgical History:        Procedure Laterality Date   • ANKLE FRACTURE SURGERY      double compound fx of right lower leg and dislocated heel - has a plate and 13 screws   • ANKLE FRACTURE SURGERY      double compound fx of right lower leg and dislocated heel - has a plate and 13 screws   • APPENDECTOMY  08/14/2020   • APPENDECTOMY  08/14/2020   • BIOPSY OF BREAST, INCISIONAL     • BREAST 
BIOPSY     •  SECTION     •  SECTION      2015   • CHOLECYSTECTOMY  2020   • CHOLECYSTECTOMY, LAPAROSCOPIC  2020   • COLONOSCOPY  2019    COLONOSCOPY/EGD (LATEX ALLERGY) performed by Kimmy Corral MD at [facility] ENDOSCOPY   • COLONOSCOPY  2022    COLONOSCOPY performed by Kimmy Corral MD at [facility] ENDOSCOPY   • COLONOSCOPY N/A 2022    COLONOSCOPY performed by Kimmy Corral MD at [facility] ENDOSCOPY   • COLONOSCOPY N/A 2019    COLONOSCOPY/EGD (LATEX ALLERGY) performed by Kimmy Corral MD at [facility] ENDOSCOPY   • COLONOSCOPY     • CYSTOSCOPY Left 2023    CYSTOSCOPY LEFT URETERAL STENT INSERTION performed by Jean Pierre Morocho MD at [facility] MAIN OR   • ENDOSCOPY, COLON, DIAGNOSTIC     • GYN      fibroid tumor ovarian cyst    • LUMBAR SPINE SURGERY Left 2023    LEFT L4-S1 DECOMPRESSION performed by Krishan Omer MD at [facility] MAIN OR   • LUMBAR SPINE SURGERY N/A 2023    INCISION AND DRAINAGE, LUMBAR LAMINECTOMY DISCECTOMY performed by Krishan Omer MD at [facility] MAIN OR   • ORTHOPEDIC SURGERY       Left shoulder surgery   • SHOULDER SURGERY Left    • UPPER GASTROINTESTINAL ENDOSCOPY     • UTERINE FIBROID SURGERY      tumor ovarian cyst       Social History:    Social History     Tobacco Use   • Smoking status: Former     Current packs/day: 0.00     Types: Cigarettes     Quit date: 2000     Years since quittin.8     Passive exposure: Past   • Smokeless tobacco: Former   • Tobacco comments:     CBD vape   Substance Use Topics   • Alcohol use: Yes     Comment: rare                                Counseling given: Not Answered  Tobacco comments: CBD vape      Vital Signs (Current):   Vitals:    24 1710 24 1715 24 1720 24 1725   BP: 118/70 136/72 138/71 133/72   Pulse: 66 64 63 63   Resp: 22 19 18 20   Temp:       TempSrc:       SpO2: 93% 90% 92% 94%   Weight:       Height:

## 2024-05-23 ENCOUNTER — TELEPHONE (OUTPATIENT)
Dept: PRIMARY CARE CLINIC | Facility: CLINIC | Age: 49
End: 2024-05-23

## 2024-05-23 NOTE — TELEPHONE ENCOUNTER
Patient calling because she fell couple day ago and having muscles soreness. She want to know what she can take over the counter that is safe with her current medication.

## 2024-05-23 NOTE — TELEPHONE ENCOUNTER
Called patient. Patient stated she fell today, and caught her fall. Patient stated she is having muscle soreness in her shoulder and hips.     Patient stated she would like to know what medication she can take to help with the muscle soreness since she is taking Celebrex and Neurontin. Patient stated she has tried Methocarbamol and Diclofenac in the past but does not know if she can take these now with current medications.     Asked patient if she was requesting prescription medication, or if she would like OTC recommendations. Patient would like prescription medication. Advised patient that if she would like to discuss prescription medications she would need an office appointment for this as the provider could not send in prescription medication without seeing her. However, I could send a message to the provider to see what OTC medication options they recommended.     Patient stated she does not have time to come in for an appointment. She stated she will take something over the counter and if it interacts with her current medications she will \"have to deal with that\"     Asked patient again if she wanted me to send a message to the provider to see what OTC medications they would recommend for this. Patient declined this.    Patient verbalized frustration with needing an appointment for this type of medication. Patient stated she called Ortho today, and they advised she would need an appt as well before medication could be sent in.     Patient then hung up the phone before the conversation was over.

## 2024-05-25 DIAGNOSIS — I10 ESSENTIAL (PRIMARY) HYPERTENSION: ICD-10-CM

## 2024-05-28 RX ORDER — METOPROLOL SUCCINATE 100 MG/1
100 TABLET, EXTENDED RELEASE ORAL DAILY
Qty: 90 TABLET | Refills: 1 | Status: SHIPPED | OUTPATIENT
Start: 2024-05-28

## 2024-05-28 NOTE — TELEPHONE ENCOUNTER
Per VO of MD    LOV: 4/19/2024     Future Appointments   Date Time Provider Department Center   4/18/2025  8:40 AM Alexandre Morocho III, MD [facility]       Requested Prescriptions     Signed Prescriptions Disp Refills    metoprolol succinate (TOPROL XL) 100 MG extended release tablet 90 tablet 1     Sig: TAKE 1 TABLET BY MOUTH EVERY DAY     Authorizing Provider: ALEXANDRE MOROCHO III     Ordering User: YVAN ANDERSON

## 2024-08-07 ENCOUNTER — HOSPITAL ENCOUNTER (EMERGENCY)
Facility: HOSPITAL | Age: 49
Discharge: HOME | End: 2024-08-07
Attending: STUDENT IN AN ORGANIZED HEALTH CARE EDUCATION/TRAINING PROGRAM
Payer: MEDICAID

## 2024-08-07 VITALS
RESPIRATION RATE: 12 BRPM | TEMPERATURE: 98 F | SYSTOLIC BLOOD PRESSURE: 135 MMHG | OXYGEN SATURATION: 96 % | WEIGHT: 200.18 LBS | HEART RATE: 60 BPM | BODY MASS INDEX: 32.31 KG/M2 | DIASTOLIC BLOOD PRESSURE: 85 MMHG

## 2024-08-07 DIAGNOSIS — R11.2 NAUSEA AND VOMITING, UNSPECIFIED VOMITING TYPE: ICD-10-CM

## 2024-08-07 DIAGNOSIS — N30.00 ACUTE CYSTITIS WITHOUT HEMATURIA: Primary | ICD-10-CM

## 2024-08-07 LAB
ALBUMIN SERPL-MCNC: 4 G/DL (ref 3.5–5)
ALBUMIN/GLOB SERPL: 1 (ref 1.1–2.2)
ALP SERPL-CCNC: 112 U/L (ref 45–117)
ALT SERPL-CCNC: 30 U/L (ref 12–78)
ANION GAP SERPL CALC-SCNC: 8 MMOL/L (ref 5–15)
APPEARANCE UR: CLEAR
AST SERPL-CCNC: 27 U/L (ref 15–37)
BACTERIA URNS QL MICRO: ABNORMAL /HPF
BASOPHILS # BLD: 0.1 K/UL (ref 0–0.1)
BASOPHILS NFR BLD: 1 % (ref 0–1)
BILIRUB SERPL-MCNC: 0.9 MG/DL (ref 0.2–1)
BILIRUB UR QL: NEGATIVE
BUN SERPL-MCNC: 15 MG/DL (ref 6–20)
BUN/CREAT SERPL: 14 (ref 12–20)
CALCIUM SERPL-MCNC: 9.6 MG/DL (ref 8.5–10.1)
CHLORIDE SERPL-SCNC: 105 MMOL/L (ref 97–108)
CO2 SERPL-SCNC: 28 MMOL/L (ref 21–32)
COLOR UR: ABNORMAL
CREAT SERPL-MCNC: 1.11 MG/DL (ref 0.55–1.02)
DIFFERENTIAL METHOD BLD: ABNORMAL
EOSINOPHIL # BLD: 0.1 K/UL (ref 0–0.4)
EOSINOPHIL NFR BLD: 1 % (ref 0–7)
EPITH CASTS URNS QL MICRO: ABNORMAL /LPF
ERYTHROCYTE [DISTWIDTH] IN BLOOD BY AUTOMATED COUNT: 12.8 % (ref 11.5–14.5)
GLOBULIN SER CALC-MCNC: 3.9 G/DL (ref 2–4)
GLUCOSE SERPL-MCNC: 87 MG/DL (ref 65–100)
GLUCOSE UR STRIP.AUTO-MCNC: NEGATIVE MG/DL
HCT VFR BLD AUTO: 46.3 % (ref 35–47)
HGB BLD-MCNC: 16.1 G/DL (ref 11.5–16)
HGB UR QL STRIP: NEGATIVE
IMM GRANULOCYTES # BLD AUTO: 0 K/UL (ref 0–0.04)
IMM GRANULOCYTES NFR BLD AUTO: 0 % (ref 0–0.5)
KETONES UR QL STRIP.AUTO: ABNORMAL MG/DL
LEUKOCYTE ESTERASE UR QL STRIP.AUTO: ABNORMAL
LIPASE SERPL-CCNC: 54 U/L (ref 13–75)
LYMPHOCYTES # BLD: 2.4 K/UL (ref 0.8–3.5)
LYMPHOCYTES NFR BLD: 34 % (ref 12–49)
MAGNESIUM SERPL-MCNC: 2.1 MG/DL (ref 1.6–2.4)
MCH RBC QN AUTO: 29.6 PG (ref 26–34)
MCHC RBC AUTO-ENTMCNC: 34.8 G/DL (ref 30–36.5)
MCV RBC AUTO: 85.1 FL (ref 80–99)
MONOCYTES # BLD: 0.9 K/UL (ref 0–1)
MONOCYTES NFR BLD: 12 % (ref 5–13)
NEUTS SEG # BLD: 3.7 K/UL (ref 1.8–8)
NEUTS SEG NFR BLD: 52 % (ref 32–75)
NITRITE UR QL STRIP.AUTO: POSITIVE
NRBC # BLD: 0 K/UL (ref 0–0.01)
NRBC BLD-RTO: 0 PER 100 WBC
PH UR STRIP: 6 (ref 5–8)
PMV BLD AUTO: 12.4 FL (ref 8.9–12.9)
POTASSIUM SERPL-SCNC: 3.8 MMOL/L (ref 3.5–5.1)
PROT SERPL-MCNC: 7.9 G/DL (ref 6.4–8.2)
PROT UR STRIP-MCNC: NEGATIVE MG/DL
RBC # BLD AUTO: 5.44 M/UL (ref 3.8–5.2)
RBC #/AREA URNS HPF: ABNORMAL /HPF (ref 0–5)
SODIUM SERPL-SCNC: 141 MMOL/L (ref 136–145)
SP GR UR REFRACTOMETRY: 1.02 (ref 1–1.03)
SPECIMEN HOLD: NORMAL
UROBILINOGEN UR QL STRIP.AUTO: 0.2 EU/DL (ref 0.2–1)
WBC # BLD AUTO: 7.1 K/UL (ref 3.6–11)
WBC URNS QL MICRO: ABNORMAL /HPF (ref 0–4)

## 2024-08-07 PROCEDURE — 83735 ASSAY OF MAGNESIUM: CPT

## 2024-08-07 PROCEDURE — 6360000002 HC RX W HCPCS: Performed by: STUDENT IN AN ORGANIZED HEALTH CARE EDUCATION/TRAINING PROGRAM

## 2024-08-07 PROCEDURE — 87088 URINE BACTERIA CULTURE: CPT

## 2024-08-07 PROCEDURE — 96374 THER/PROPH/DIAG INJ IV PUSH: CPT

## 2024-08-07 PROCEDURE — 83690 ASSAY OF LIPASE: CPT

## 2024-08-07 PROCEDURE — 81001 URINALYSIS AUTO W/SCOPE: CPT

## 2024-08-07 PROCEDURE — 36415 COLL VENOUS BLD VENIPUNCTURE: CPT

## 2024-08-07 PROCEDURE — 2580000003 HC RX 258: Performed by: STUDENT IN AN ORGANIZED HEALTH CARE EDUCATION/TRAINING PROGRAM

## 2024-08-07 PROCEDURE — 85025 COMPLETE CBC W/AUTO DIFF WBC: CPT

## 2024-08-07 PROCEDURE — 87086 URINE CULTURE/COLONY COUNT: CPT

## 2024-08-07 PROCEDURE — 99284 EMERGENCY DEPT VISIT MOD MDM: CPT

## 2024-08-07 PROCEDURE — 87186 SC STD MICRODIL/AGAR DIL: CPT

## 2024-08-07 PROCEDURE — 6370000000 HC RX 637 (ALT 250 FOR IP): Performed by: EMERGENCY MEDICINE

## 2024-08-07 PROCEDURE — 80053 COMPREHEN METABOLIC PANEL: CPT

## 2024-08-07 RX ORDER — ONDANSETRON 2 MG/ML
4 INJECTION INTRAMUSCULAR; INTRAVENOUS ONCE
Status: COMPLETED | OUTPATIENT
Start: 2024-08-07 | End: 2024-08-07

## 2024-08-07 RX ORDER — 0.9 % SODIUM CHLORIDE 0.9 %
1000 INTRAVENOUS SOLUTION INTRAVENOUS ONCE
Status: COMPLETED | OUTPATIENT
Start: 2024-08-07 | End: 2024-08-07

## 2024-08-07 RX ORDER — DOXYCYCLINE HYCLATE 100 MG
100 TABLET ORAL 2 TIMES DAILY
Qty: 14 TABLET | Refills: 0 | Status: SHIPPED | OUTPATIENT
Start: 2024-08-07 | End: 2024-08-14

## 2024-08-07 RX ORDER — ONDANSETRON 4 MG/1
4 TABLET, ORALLY DISINTEGRATING ORAL 3 TIMES DAILY PRN
Qty: 21 TABLET | Refills: 0 | Status: SHIPPED | OUTPATIENT
Start: 2024-08-07

## 2024-08-07 RX ORDER — DOXYCYCLINE HYCLATE 100 MG
100 TABLET ORAL
Status: COMPLETED | OUTPATIENT
Start: 2024-08-07 | End: 2024-08-07

## 2024-08-07 RX ADMIN — DOXYCYCLINE HYCLATE 100 MG: 100 TABLET, COATED ORAL at 16:17

## 2024-08-07 RX ADMIN — ONDANSETRON 4 MG: 2 INJECTION INTRAMUSCULAR; INTRAVENOUS at 13:50

## 2024-08-07 RX ADMIN — SODIUM CHLORIDE 1000 ML: 9 INJECTION, SOLUTION INTRAVENOUS at 13:50

## 2024-08-07 ASSESSMENT — ENCOUNTER SYMPTOMS
RHINORRHEA: 0
ABDOMINAL PAIN: 0
DIARRHEA: 0
EYE REDNESS: 0
EYE DISCHARGE: 0
VOMITING: 1
NAUSEA: 1
COUGH: 0

## 2024-08-07 ASSESSMENT — PAIN - FUNCTIONAL ASSESSMENT: PAIN_FUNCTIONAL_ASSESSMENT: NONE - DENIES PAIN

## 2024-08-07 NOTE — DISCHARGE INSTRUCTIONS
Your urine is consistent with infection, a culture has been sent. Take medications as directed. Follow up with Urology as scheduled.    If you have worsening symptoms, return to ER.

## 2024-08-07 NOTE — ED PROVIDER NOTES
COMPREHENSIVE METABOLIC PANEL - Abnormal; Notable for the following components:    Creatinine 1.11 (*)     Albumin/Globulin Ratio 1.0 (*)     All other components within normal limits   URINE CULTURE HOLD SAMPLE   MAGNESIUM   LIPASE   URINALYSIS WITH MICROSCOPIC       All other labs were within normal range or not returned as of this dictation.    EMERGENCY DEPARTMENT COURSE and DIFFERENTIAL DIAGNOSIS/MDM:   Vitals:    Vitals:    08/07/24 1321   BP: (!) 173/115   Pulse: 65   Resp: 12   Temp: 97.6 °F (36.4 °C)   TempSrc: Tympanic   SpO2: 97%   Weight: 90.8 kg (200 lb 2.8 oz)           Medical Decision Making      DECISION MAKING:  Tammie Lopez is a 49 y.o. female who comes in as above. Vitals reviewed, patient afebrile. /115, vitals otherwise stable. On my exam she is non-toxic appearing. Abdomen soft and non-tender. Has some chronic urinary and bowel incontinence and paraesthesias; notes incontinence has been worse recently. Differential diagnosis includes, but not limited to, cystitis, pyelonephritis, dehydration, electrolyte abnormality, viral illness.    Labs reviewed.  CBC without leukocytosis.  Electrolytes are stable.  BUN is normal, creatinine 1.11, patient was hydrated with IV fluids.  UA pending.      Amount and/or Complexity of Data Reviewed  Labs: ordered. Decision-making details documented in ED Course.    Risk  Prescription drug management.        CONSULTS:  None        PROCEDURES:  Unless otherwise noted below, none     Procedures      FINAL IMPRESSION      1. Nausea and vomiting, unspecified vomiting type    2. Urinary frequency    3. Urinary incontinence, unspecified type          3:00 PM  Change of shift.  Care of patient signed over to Dr. Perales pending UA.    (Please note that portions of this note were completed with a voice recognition program.  Efforts were made to edit the dictations but occasionally words are mis-transcribed.)    Tammie Canada,  (electronically

## 2024-08-07 NOTE — ED NOTES
ED SIGN OUT NOTE  Care assumed at [facility] 3:12 PM EDT    Patient was signed out to me by Dr. Canada.     Patient is awaiting UA.    /83   Pulse 60   Temp 97.6 °F (36.4 °C) (Tympanic)   Resp 14   Wt 90.8 kg (200 lb 2.8 oz)   SpO2 97%   BMI 32.31 kg/m²     Labs Reviewed   CBC WITH AUTO DIFFERENTIAL - Abnormal; Notable for the following components:       Result Value    RBC 5.44 (*)     Hemoglobin 16.1 (*)     All other components within normal limits   COMPREHENSIVE METABOLIC PANEL - Abnormal; Notable for the following components:    Creatinine 1.11 (*)     Albumin/Globulin Ratio 1.0 (*)     All other components within normal limits   URINALYSIS WITH MICROSCOPIC - Abnormal; Notable for the following components:    Ketones, Urine TRACE (*)     Nitrite, Urine Positive (*)     Leukocyte Esterase, Urine TRACE (*)     BACTERIA, URINE 2+ (*)     All other components within normal limits   URINE CULTURE HOLD SAMPLE   CULTURE, URINE   MAGNESIUM   LIPASE     No orders to display       ED Course as of 08/07/24 1624   Wed Aug 07, 2024   1543 Nitrite, Urine(!): Positive [RS]   1543 Leukocyte Esterase, Urine(!): TRACE [RS]   1543 WBC, UA: 5-10 [RS]   1543 Bacteria, UA(!): 2+ [RS]      ED Course User Index  [RS] Kj Perales MD       Diagnosis:   1. Acute cystitis without hematuria    2. Nausea and vomiting, unspecified vomiting type        Disposition:   Decision To Discharge 08/07/2024 04:05:20 PM    Plan:   DC with op abx and f/u with [facility] as planned    MD Diaz Durbin Ryland, MD  08/07/24 8678

## 2024-08-07 NOTE — ED TRIAGE NOTES
49 year old comes in with CC Chills, sweats, nausea. Pt states that this has been going on since Monday. Pt states that she has a history of a back surgery a year ago and has a hard time with feeling belly button down so she has a hard time feeling if she has a UTI since the surgery. Pt states that she has a hard time keeping food down. Pt is A&Ox4

## 2024-08-09 ENCOUNTER — PATIENT MESSAGE (OUTPATIENT)
Dept: PRIMARY CARE CLINIC | Facility: CLINIC | Age: 49
End: 2024-08-09

## 2024-08-09 DIAGNOSIS — F32.A ANXIETY AND DEPRESSION: ICD-10-CM

## 2024-08-09 DIAGNOSIS — F41.9 ANXIETY AND DEPRESSION: ICD-10-CM

## 2024-08-09 LAB
BACTERIA SPEC CULT: ABNORMAL
CC UR VC: ABNORMAL
SERVICE CMNT-IMP: ABNORMAL

## 2024-08-09 RX ORDER — ESCITALOPRAM OXALATE 20 MG/1
20 TABLET ORAL DAILY
Qty: 90 TABLET | Refills: 0 | Status: SHIPPED | OUTPATIENT
Start: 2024-08-09

## 2024-08-09 RX ORDER — NITROFURANTOIN 25; 75 MG/1; MG/1
100 CAPSULE ORAL 2 TIMES DAILY
Qty: 14 CAPSULE | Refills: 0 | Status: SHIPPED | OUTPATIENT
Start: 2024-08-09 | End: 2024-08-16

## 2024-08-09 NOTE — TELEPHONE ENCOUNTER
From: Tammie Lopez  To: Dr. Tammie Valentino  Sent: 8/9/2024 10:00 AM EDT  Subject: MEDICATION PROBLEMS    I haven't been able to get an appointment until September. I'm having problems getting refills of:     Gabapentin 600 mg 2 tab 3x daily   Escitlopram  Celebrex    I've been out for at least 2 weeks trying to fight and get them filled. I'm completely out and I have an infection in my body right now, see records from ER yesterday.     I need help!!

## 2024-09-12 ENCOUNTER — OFFICE VISIT (OUTPATIENT)
Dept: PRIMARY CARE CLINIC | Facility: CLINIC | Age: 49
End: 2024-09-12

## 2024-09-12 VITALS
HEIGHT: 66 IN | BODY MASS INDEX: 33.11 KG/M2 | RESPIRATION RATE: 16 BRPM | WEIGHT: 206 LBS | DIASTOLIC BLOOD PRESSURE: 86 MMHG | SYSTOLIC BLOOD PRESSURE: 147 MMHG | OXYGEN SATURATION: 94 % | HEART RATE: 65 BPM | TEMPERATURE: 97.3 F

## 2024-09-12 DIAGNOSIS — Z00.00 WELL WOMAN EXAM (NO GYNECOLOGICAL EXAM): ICD-10-CM

## 2024-09-12 DIAGNOSIS — E55.9 VITAMIN D DEFICIENCY: ICD-10-CM

## 2024-09-12 DIAGNOSIS — R06.2 WHEEZING: ICD-10-CM

## 2024-09-12 DIAGNOSIS — Z12.31 ENCOUNTER FOR SCREENING MAMMOGRAM FOR MALIGNANT NEOPLASM OF BREAST: ICD-10-CM

## 2024-09-12 DIAGNOSIS — Z23 ENCOUNTER FOR IMMUNIZATION: ICD-10-CM

## 2024-09-12 DIAGNOSIS — Z01.84 IMMUNITY STATUS TESTING: ICD-10-CM

## 2024-09-12 DIAGNOSIS — F32.A ANXIETY AND DEPRESSION: Primary | ICD-10-CM

## 2024-09-12 DIAGNOSIS — R91.1 LUNG NODULE: ICD-10-CM

## 2024-09-12 DIAGNOSIS — Z72.0 TOBACCO USE: ICD-10-CM

## 2024-09-12 DIAGNOSIS — F41.9 ANXIETY AND DEPRESSION: Primary | ICD-10-CM

## 2024-09-12 DIAGNOSIS — Z12.4 ENCOUNTER FOR SCREENING FOR CERVICAL CANCER: ICD-10-CM

## 2024-09-12 RX ORDER — ESCITALOPRAM OXALATE 20 MG/1
20 TABLET ORAL DAILY
Qty: 90 TABLET | Refills: 3 | Status: SHIPPED | OUTPATIENT
Start: 2024-09-12

## 2024-09-12 RX ORDER — ALBUTEROL SULFATE 90 UG/1
2 AEROSOL, METERED RESPIRATORY (INHALATION) 4 TIMES DAILY PRN
Qty: 54 G | Refills: 1 | Status: SHIPPED | OUTPATIENT
Start: 2024-09-12

## 2024-09-12 RX ORDER — BUPROPION HYDROCHLORIDE 300 MG/1
300 TABLET ORAL DAILY
Qty: 90 TABLET | Refills: 3 | Status: SHIPPED | OUTPATIENT
Start: 2024-09-12

## 2024-09-12 ASSESSMENT — PATIENT HEALTH QUESTIONNAIRE - PHQ9
SUM OF ALL RESPONSES TO PHQ QUESTIONS 1-9: 0
2. FEELING DOWN, DEPRESSED OR HOPELESS: NOT AT ALL
SUM OF ALL RESPONSES TO PHQ QUESTIONS 1-9: 0
1. LITTLE INTEREST OR PLEASURE IN DOING THINGS: NOT AT ALL
SUM OF ALL RESPONSES TO PHQ QUESTIONS 1-9: 0
SUM OF ALL RESPONSES TO PHQ9 QUESTIONS 1 & 2: 0
SUM OF ALL RESPONSES TO PHQ QUESTIONS 1-9: 0

## 2024-09-13 LAB
25(OH)D3+25(OH)D2 SERPL-MCNC: 71.1 NG/ML (ref 30–100)
CHOLEST SERPL-MCNC: 236 MG/DL (ref 100–199)
ERYTHROCYTE [DISTWIDTH] IN BLOOD BY AUTOMATED COUNT: 13.1 % (ref 11.7–15.4)
HBA1C MFR BLD: 5.5 % (ref 4.8–5.6)
HCT VFR BLD AUTO: 48.8 % (ref 34–46.6)
HDLC SERPL-MCNC: 68 MG/DL
HGB BLD-MCNC: 15.9 G/DL (ref 11.1–15.9)
LDLC SERPL CALC-MCNC: 128 MG/DL (ref 0–99)
MCH RBC QN AUTO: 29.7 PG (ref 26.6–33)
MCHC RBC AUTO-ENTMCNC: 32.6 G/DL (ref 31.5–35.7)
MCV RBC AUTO: 91 FL (ref 79–97)
PLATELET # BLD AUTO: 235 X10E3/UL (ref 150–450)
RBC # BLD AUTO: 5.36 X10E6/UL (ref 3.77–5.28)
TRIGL SERPL-MCNC: 228 MG/DL (ref 0–149)
VLDLC SERPL CALC-MCNC: 40 MG/DL (ref 5–40)
VZV IGG SER IA-ACNC: 838 INDEX
WBC # BLD AUTO: 10.6 X10E3/UL (ref 3.4–10.8)

## 2024-09-19 ENCOUNTER — HOSPITAL ENCOUNTER (OUTPATIENT)
Facility: HOSPITAL | Age: 49
Discharge: HOME | End: 2024-09-22
Payer: MEDICAID

## 2024-09-19 DIAGNOSIS — R06.2 WHEEZING: ICD-10-CM

## 2024-09-19 DIAGNOSIS — Z72.0 TOBACCO USE: ICD-10-CM

## 2024-09-19 DIAGNOSIS — R91.1 LUNG NODULE: ICD-10-CM

## 2024-09-19 PROCEDURE — 71250 CT THORAX DX C-: CPT

## 2024-09-20 ENCOUNTER — HOSPITAL ENCOUNTER (OUTPATIENT)
Facility: HOSPITAL | Age: 49
Discharge: HOME | End: 2024-09-23
Payer: MEDICAID

## 2024-09-20 DIAGNOSIS — Z98.890 HISTORY OF LUMBAR LAMINECTOMY: ICD-10-CM

## 2024-09-20 DIAGNOSIS — M47.816 LUMBAR SPONDYLOSIS: ICD-10-CM

## 2024-09-20 DIAGNOSIS — M54.16 RADICULOPATHY OF LUMBAR REGION: ICD-10-CM

## 2024-09-20 DIAGNOSIS — M43.16 SPONDYLOLISTHESIS OF LUMBAR REGION: ICD-10-CM

## 2024-09-20 DIAGNOSIS — M51.36 DDD (DEGENERATIVE DISC DISEASE), LUMBAR: ICD-10-CM

## 2024-09-20 PROCEDURE — 72158 MRI LUMBAR SPINE W/O & W/DYE: CPT

## 2024-09-20 PROCEDURE — 6360000004 HC RX CONTRAST MEDICATION: Performed by: PHYSICIAN ASSISTANT

## 2024-09-20 PROCEDURE — A9579 GAD-BASE MR CONTRAST NOS,1ML: HCPCS | Performed by: PHYSICIAN ASSISTANT

## 2024-09-20 RX ADMIN — GADOTERIDOL 18 ML: 279.3 INJECTION, SOLUTION INTRAVENOUS at 15:16

## 2024-09-24 ENCOUNTER — TELEPHONE (OUTPATIENT)
Dept: PRIMARY CARE CLINIC | Facility: CLINIC | Age: 49
End: 2024-09-24

## 2024-10-19 DIAGNOSIS — I10 ESSENTIAL (PRIMARY) HYPERTENSION: ICD-10-CM

## 2024-10-22 RX ORDER — METOPROLOL SUCCINATE 100 MG/1
100 TABLET, EXTENDED RELEASE ORAL DAILY
Qty: 90 TABLET | Refills: 1 | Status: SHIPPED | OUTPATIENT
Start: 2024-10-22

## 2024-10-22 NOTE — TELEPHONE ENCOUNTER
Cardiologist: Dr. Morocho    Future Appointments   Date Time Provider Department Center   4/18/2025  8:40 AM Alexandre Morocho III, MD [facility]       Requested Prescriptions     Signed Prescriptions Disp Refills    metoprolol succinate (TOPROL XL) 100 MG extended release tablet 90 tablet 1     Sig: TAKE 1 TABLET BY MOUTH EVERY DAY     Authorizing Provider: ALEXANDRE MOROCHO III     Ordering User: FIDEL AGUILAR         Refills VO per Dr. Morocho.

## 2024-10-25 DIAGNOSIS — R93.89 ABNORMAL CHEST CT: Primary | ICD-10-CM

## 2024-11-18 ENCOUNTER — TELEPHONE (OUTPATIENT)
Dept: PRIMARY CARE CLINIC | Facility: CLINIC | Age: 49
End: 2024-11-18

## 2024-11-18 NOTE — TELEPHONE ENCOUNTER
----- Message from Perez NICOLE sent at 11/18/2024  9:28 AM EST -----  Regarding: [facility] Referral Request  [facility] Referral Request    Reason for referral request: Lab/Test Order    Specialist/Lab/Test patient is requesting (if known): CT scan for lungs    Specialist Phone Number (if applicable): [phone]    Additional Information, pt called in because she would like to get an order for a CT scan for her lungs to be sent over to [facility] since she has an appt on December 1st for her CT scan for her back.  --------------------------------------------------------------------------------------------------------------------------    Relationship to Patient: Self     Call Back Information: OK to leave message on voicemail  Preferred Call Back Number: Phone   [phone]

## 2025-03-13 ENCOUNTER — HOSPITAL ENCOUNTER (EMERGENCY)
Facility: HOSPITAL | Age: 50
Discharge: HOME | End: 2025-03-13
Attending: STUDENT IN AN ORGANIZED HEALTH CARE EDUCATION/TRAINING PROGRAM
Payer: MEDICAID

## 2025-03-13 VITALS
TEMPERATURE: 97.6 F | DIASTOLIC BLOOD PRESSURE: 99 MMHG | WEIGHT: 205.03 LBS | BODY MASS INDEX: 32.95 KG/M2 | SYSTOLIC BLOOD PRESSURE: 136 MMHG | HEART RATE: 67 BPM | HEIGHT: 66 IN | OXYGEN SATURATION: 95 % | RESPIRATION RATE: 16 BRPM

## 2025-03-13 DIAGNOSIS — Z71.1 CONCERN ABOUT URINARY TRACT DISEASE WITHOUT DIAGNOSIS: Primary | ICD-10-CM

## 2025-03-13 LAB
ALBUMIN SERPL-MCNC: 3.7 G/DL (ref 3.5–5)
ALBUMIN/GLOB SERPL: 1.1 (ref 1.1–2.2)
ALP SERPL-CCNC: 98 U/L (ref 45–117)
ALT SERPL-CCNC: 33 U/L (ref 12–78)
ANION GAP SERPL CALC-SCNC: 9 MMOL/L (ref 2–12)
APPEARANCE UR: CLEAR
AST SERPL-CCNC: 21 U/L (ref 15–37)
BACTERIA URNS QL MICRO: NEGATIVE /HPF
BASOPHILS # BLD: 0.06 K/UL (ref 0–0.1)
BASOPHILS NFR BLD: 1 % (ref 0–1)
BILIRUB SERPL-MCNC: 0.7 MG/DL (ref 0.2–1)
BILIRUB UR QL: NEGATIVE
BUN SERPL-MCNC: 13 MG/DL (ref 6–20)
BUN/CREAT SERPL: 13 (ref 12–20)
CALCIUM SERPL-MCNC: 9.2 MG/DL (ref 8.5–10.1)
CHLORIDE SERPL-SCNC: 104 MMOL/L (ref 97–108)
CO2 SERPL-SCNC: 29 MMOL/L (ref 21–32)
COLOR UR: NORMAL
COMMENT:: NORMAL
CREAT SERPL-MCNC: 0.97 MG/DL (ref 0.55–1.02)
DIFFERENTIAL METHOD BLD: NORMAL
EOSINOPHIL # BLD: 0.12 K/UL (ref 0–0.4)
EOSINOPHIL NFR BLD: 2 % (ref 0–7)
EPITH CASTS URNS QL MICRO: NORMAL /LPF
ERYTHROCYTE [DISTWIDTH] IN BLOOD BY AUTOMATED COUNT: 13 % (ref 11.5–14.5)
GLOBULIN SER CALC-MCNC: 3.5 G/DL (ref 2–4)
GLUCOSE SERPL-MCNC: 85 MG/DL (ref 65–100)
GLUCOSE UR STRIP.AUTO-MCNC: NEGATIVE MG/DL
HCT VFR BLD AUTO: 44.2 % (ref 35–47)
HGB BLD-MCNC: 15 G/DL (ref 11.5–16)
HGB UR QL STRIP: NEGATIVE
IMM GRANULOCYTES # BLD AUTO: 0.01 K/UL (ref 0–0.04)
IMM GRANULOCYTES NFR BLD AUTO: 0.2 % (ref 0–0.5)
KETONES UR QL STRIP.AUTO: NEGATIVE MG/DL
LEUKOCYTE ESTERASE UR QL STRIP.AUTO: NEGATIVE
LYMPHOCYTES # BLD: 2.09 K/UL (ref 0.8–3.5)
LYMPHOCYTES NFR BLD: 34.9 % (ref 12–49)
MCH RBC QN AUTO: 29.3 PG (ref 26–34)
MCHC RBC AUTO-ENTMCNC: 33.9 G/DL (ref 30–36.5)
MCV RBC AUTO: 86.3 FL (ref 80–99)
MONOCYTES # BLD: 0.62 K/UL (ref 0–1)
MONOCYTES NFR BLD: 10.4 % (ref 5–13)
NEUTS SEG # BLD: 3.08 K/UL (ref 1.8–8)
NEUTS SEG NFR BLD: 51.5 % (ref 32–75)
NITRITE UR QL STRIP.AUTO: NEGATIVE
NRBC # BLD: 0 K/UL (ref 0–0.01)
NRBC BLD-RTO: 0 PER 100 WBC
PH UR STRIP: 5.5 (ref 5–8)
PLATELET # BLD AUTO: 193 K/UL (ref 150–400)
PMV BLD AUTO: 12.1 FL (ref 8.9–12.9)
POTASSIUM SERPL-SCNC: 3.6 MMOL/L (ref 3.5–5.1)
PROT SERPL-MCNC: 7.2 G/DL (ref 6.4–8.2)
PROT UR STRIP-MCNC: NEGATIVE MG/DL
RBC # BLD AUTO: 5.12 M/UL (ref 3.8–5.2)
RBC #/AREA URNS HPF: NORMAL /HPF (ref 0–5)
SODIUM SERPL-SCNC: 142 MMOL/L (ref 136–145)
SP GR UR REFRACTOMETRY: 1.02 (ref 1–1.03)
SPECIMEN HOLD: NORMAL
SPECIMEN HOLD: NORMAL
UROBILINOGEN UR QL STRIP.AUTO: 0.2 EU/DL (ref 0.2–1)
WBC # BLD AUTO: 6 K/UL (ref 3.6–11)
WBC URNS QL MICRO: NORMAL /HPF (ref 0–4)

## 2025-03-13 PROCEDURE — 87449 NOS EACH ORGANISM AG IA: CPT

## 2025-03-13 PROCEDURE — 87324 CLOSTRIDIUM AG IA: CPT

## 2025-03-13 PROCEDURE — 36415 COLL VENOUS BLD VENIPUNCTURE: CPT

## 2025-03-13 PROCEDURE — 85025 COMPLETE CBC W/AUTO DIFF WBC: CPT

## 2025-03-13 PROCEDURE — 99283 EMERGENCY DEPT VISIT LOW MDM: CPT

## 2025-03-13 PROCEDURE — 80053 COMPREHEN METABOLIC PANEL: CPT

## 2025-03-13 PROCEDURE — 81001 URINALYSIS AUTO W/SCOPE: CPT

## 2025-03-13 PROCEDURE — 51701 INSERT BLADDER CATHETER: CPT

## 2025-03-13 ASSESSMENT — PAIN DESCRIPTION - DESCRIPTORS: DESCRIPTORS: SORE

## 2025-03-13 ASSESSMENT — PAIN DESCRIPTION - LOCATION: LOCATION: ABDOMEN

## 2025-03-13 ASSESSMENT — PAIN DESCRIPTION - ORIENTATION: ORIENTATION: LOWER

## 2025-03-13 ASSESSMENT — PAIN SCALES - GENERAL: PAINLEVEL_OUTOF10: 6

## 2025-03-13 NOTE — ED TRIAGE NOTES
ED triage note: ambulatory with a steady gait. Patient reports has a bladder infection and saw [facility] for that.  just finished antibiotics Sunday or Monday.  at baseline usually does not have bowel or bladder sensation.  is concerned that still has UTI because still having sweats and chills but no fever.  is also having very soft and mucousy bowel movements.    has had c diff 2 years ago.

## 2025-03-13 NOTE — ED NOTES
While getting ready for discharge, Patient had liquid bowel movement on herself that spread from buttocks to vaginal area. Notified VALDEZ Pastor. Sample collected for c diff testing. Assisted patient with cleaning up

## 2025-03-13 NOTE — DISCHARGE INSTRUCTIONS
Please follow up with primary care to discuss todays visit.  If symptoms change or worsen please do not hesitate to return to the ED

## 2025-03-13 NOTE — ED PROVIDER NOTES
interpreted by the emergency physician with the below findings:        Interpretation per the Radiologist below, if available at the time of this note:    No orders to display        LABS:  Labs Reviewed   URINE CULTURE HOLD SAMPLE   C DIFF TOXIN/ANTIGEN   URINALYSIS WITH MICROSCOPIC   CBC WITH AUTO DIFFERENTIAL   COMPREHENSIVE METABOLIC PANEL   EXTRA TUBES HOLD       All other labs were within normal range or not returned as of this dictation.    EMERGENCY DEPARTMENT COURSE and DIFFERENTIAL DIAGNOSIS/MDM:   Vitals:    Vitals:    03/13/25 1105 03/13/25 1400 03/13/25 1500 03/13/25 1530   BP: (!) 163/110 (!) 152/93 (!) 147/82 (!) 136/99   Pulse: 75   67   Resp: 18   16   Temp: 97.6 °F (36.4 °C)      TempSrc: Tympanic      SpO2: 98% 96% 95% 95%   Weight: 93 kg (205 lb 0.4 oz)      Height: 1.676 m (5' 6\")              Medical Decision Making  50-year-old female presenting with chills and concerns for UTI and C. difficile.  History, physical, workup not consistent with UTI, pyelonephritis, appendicitis, systemic infection or other emergent cause.  Urinalysis unremarkable.  Labs without elevation in white count and vitals stable, not meeting any SIRS criteria.  Upon discharge patient had a bowel movement and stated that it was more watery than her last one and requested it be sent off for C. difficile testing.  Given her recent antibiotic use will do this.  She otherwise appears well, is afebrile, vital signs stable for discharge.  Advised her to follow-up with primary care to discuss today's visit as well as return to the ED if symptoms change or worsen.    Amount and/or Complexity of Data Reviewed  Labs: ordered.            REASSESSMENT      Patient resting comfortably.  Agreeable and understanding to discharge plan.    FINAL IMPRESSION      1. Concern about urinary tract disease without diagnosis          DISPOSITION/PLAN   DISPOSITION Decision To Discharge 03/13/2025 03:22:59 PM      PATIENT REFERRED TO:  Pauline

## 2025-03-14 LAB
C DIFF GDH STL QL: NEGATIVE
C DIFF TOX A+B STL QL IA: NEGATIVE
C DIFF TOXIN INTERPRETATION: NORMAL

## 2025-04-21 ENCOUNTER — TELEPHONE (OUTPATIENT)
Dept: PRIMARY CARE CLINIC | Facility: CLINIC | Age: 50
End: 2025-04-21

## 2025-04-21 NOTE — TELEPHONE ENCOUNTER
Called pt and no answer. Message left that we have you on the schedule today for pre-op but if your surgery is in August we cannot do it this early. Has to be within 30 days. So call back to re-schedule if so, thank you.

## 2025-05-08 ENCOUNTER — RESULTS FOLLOW-UP (OUTPATIENT)
Dept: PRIMARY CARE CLINIC | Facility: CLINIC | Age: 50
End: 2025-05-08

## 2025-05-09 NOTE — TELEPHONE ENCOUNTER
Went through inbox and was cleaning out old results. Noticed patient had not completed her imaging that was ordered in October 2024. She said she was going to get lung imaging done at [facility] with her lumbar spine but she did not complete it. Discussed imaging of her spine only incidentally imaged partial lung. Still recommend she get the lung scan done that has been in system since October 2024. She states she will call to get this done.

## 2025-06-05 DIAGNOSIS — I10 ESSENTIAL (PRIMARY) HYPERTENSION: ICD-10-CM

## 2025-06-05 RX ORDER — METOPROLOL SUCCINATE 100 MG/1
100 TABLET, EXTENDED RELEASE ORAL DAILY
Qty: 30 TABLET | Refills: 1 | Status: SHIPPED | OUTPATIENT
Start: 2025-06-05 | End: 2025-08-04

## 2025-07-06 DIAGNOSIS — F32.A ANXIETY AND DEPRESSION: ICD-10-CM

## 2025-07-06 DIAGNOSIS — F41.9 ANXIETY AND DEPRESSION: ICD-10-CM

## 2025-07-08 RX ORDER — ESCITALOPRAM OXALATE 20 MG/1
20 TABLET ORAL DAILY
Qty: 90 TABLET | Refills: 3 | OUTPATIENT
Start: 2025-07-08

## 2025-08-02 DIAGNOSIS — I10 ESSENTIAL (PRIMARY) HYPERTENSION: ICD-10-CM

## 2025-08-04 RX ORDER — METOPROLOL SUCCINATE 100 MG/1
100 TABLET, EXTENDED RELEASE ORAL DAILY
Qty: 30 TABLET | Refills: 1 | Status: SHIPPED | OUTPATIENT
Start: 2025-08-04

## (undated) DEVICE — DERMABOND SKIN ADH 0.7ML -- DERMABOND ADVANCED 12/BX

## (undated) DEVICE — SYR 20ML LL STRL LF --

## (undated) DEVICE — 3M™ TEGADERM™ TRANSPARENT FILM DRESSING FRAME STYLE, 1624W, 2-3/8 IN X 2-3/4 IN (6 CM X 7 CM), 100/CT 4CT/CASE: Brand: 3M™ TEGADERM™

## (undated) DEVICE — Device

## (undated) DEVICE — CYSTO/BLADDER IRRIGATION SET, REGULATING CLAMP

## (undated) DEVICE — STERILE POLYISOPRENE POWDER-FREE SURGICAL GLOVES WITH EMOLLIENT COATING: Brand: PROTEXIS

## (undated) DEVICE — ALCOHOL  RUBBING  70% ISOPROPYL  4-OZ

## (undated) DEVICE — DRAPE XR C ARM 41X74IN LF --

## (undated) DEVICE — SPONGE GZ W4XL4IN COT 12 PLY TYP VII WVN C FLD DSGN STERILE

## (undated) DEVICE — OPEN-END URETERAL CATHETER: Brand: COOK

## (undated) DEVICE — FORCEPS BX L240CM JAW DIA2.8MM L CAP W/ NDL MIC MESH TOOTH

## (undated) DEVICE — SUTURE VCRL SZ 1 L18IN ABSRB VLT CT-1 L36MM 1/2 CIR J741D

## (undated) DEVICE — RELOAD STPL L45MM H1-2.6MM MESENTERY THN TISS WHT GRIPPING

## (undated) DEVICE — KIT JACK TBL PT CARE

## (undated) DEVICE — TRANSFER SET 3": Brand: MEDLINE INDUSTRIES, INC.

## (undated) DEVICE — ELECTRODE PT RET AD L9FT HI MOIST COND ADH HYDRGEL CORDED

## (undated) DEVICE — SUTURE SZ 0 27IN 5/8 CIR UR-6  TAPER PT VIOLET ABSRB VICRYL J603H

## (undated) DEVICE — NEEDLE HYPO 25GA L1.5IN BVL ORIENTED ECLIPSE

## (undated) DEVICE — APPLICATOR SURG XL L38CM FOR ARISTA ABSRB HEMSTAT FLEXITIP

## (undated) DEVICE — LAPAROSCOPIC TROCAR SLEEVE/SINGLE USE: Brand: KII® OPTICAL ACCESS SYSTEM

## (undated) DEVICE — ESOPHAGEAL/PYLORIC/COLONIC/BILIARY WIREGUIDED BALLOON DILATATION CATHETER: Brand: CRE™ PRO

## (undated) DEVICE — PICO 7 DUAL 20X20CM: Brand: PICO™ 7

## (undated) DEVICE — SOLUTION IRRIG 1000ML 0.9% SOD CHL USP POUR PLAS BTL

## (undated) DEVICE — SNAP KOVER: Brand: UNBRANDED

## (undated) DEVICE — STAPLER INT L340MM 45MM STD 12 FIRING B FRM PWR + GRIPPING

## (undated) DEVICE — MARKER,SKIN,WI/RULER AND LABELS: Brand: MEDLINE

## (undated) DEVICE — NEEDLE SPNL L3.5IN PNK HUB S STL REG WALL FIT STYL W/ QNCKE

## (undated) DEVICE — REM POLYHESIVE ADULT PATIENT RETURN ELECTRODE: Brand: VALLEYLAB

## (undated) DEVICE — 450 ML BOTTLE OF 0.05% CHLORHEXIDINE GLUCONATE IN 99.95% STERILE WATER FOR IRRIGATION, USP AND APPLICATOR.: Brand: IRRISEPT ANTIMICROBIAL WOUND LAVAGE

## (undated) DEVICE — SUTURE STRATAFIX SYMMETRIC SZ 1 L18IN ABSRB VLT CT1 L36CM SXPP1A404

## (undated) DEVICE — TUBING HYDR IRR --

## (undated) DEVICE — SUTURE STRATAFIX SPRL MCRYL + SZ 2-0 L18IN ABSRB UD CT-1 SXMP1B413

## (undated) DEVICE — APPLIER CLP M L L11.4IN DIA10MM ENDOSCP ROT MULT FOR LIG

## (undated) DEVICE — STERILE POLYISOPRENE POWDER-FREE SURGICAL GLOVES: Brand: PROTEXIS

## (undated) DEVICE — SUTURE MCRYL SZ 4-0 L27IN ABSRB UD L19MM PS-2 1/2 CIR PRIM Y426H

## (undated) DEVICE — SHEARS ENDOSCP L36CM DIA5MM ULTRASONIC CRV TIP W/ ADV

## (undated) DEVICE — SUTURE NONABSORBABLE MONOFILAMENT 3-0 PS-1 18 IN BLK ETHILON 1663H

## (undated) DEVICE — LAPAROSCOPIC TROCAR SLEEVE/SINGLE USE: Brand: KII® SLEEVE

## (undated) DEVICE — 3.0MM PRECISION NEURO (MATCH HEAD)

## (undated) DEVICE — GLOVE SURG SZ 75 L12IN FNGR THK79MIL GRN LTX FREE

## (undated) DEVICE — DRAPE,REIN 53X77,STERILE: Brand: MEDLINE

## (undated) DEVICE — PREP SKN CHLRAPRP APL 26ML STR --

## (undated) DEVICE — GARMENT,MEDLINE,DVT,INT,CALF,MED, GEN2: Brand: MEDLINE

## (undated) DEVICE — GOWN,SIRUS,NONRNF,SETINSLV,2XL,18/CS: Brand: MEDLINE

## (undated) DEVICE — GLOVE ORTHO 7 1/2   MSG9475

## (undated) DEVICE — LAMINECTOMY-MRMC: Brand: MEDLINE INDUSTRIES, INC.

## (undated) DEVICE — CYSTO-SMH: Brand: MEDLINE INDUSTRIES, INC.

## (undated) DEVICE — GLOVE SURG SZ 8 L12IN FNGR THK94MIL STD WHT LTX FREE

## (undated) DEVICE — SOLUTION IRRIGATION H2O 0797305] ICU MEDICAL INC]

## (undated) DEVICE — APPLICATOR MEDICATED 26 CC SOLUTION HI LT ORNG CHLORAPREP

## (undated) DEVICE — CLICKLINE SCISSORS INSERT: Brand: CLICKLINE

## (undated) DEVICE — TUBING, SUCTION, 1/4" X 12', STRAIGHT: Brand: MEDLINE

## (undated) DEVICE — TROCAR: Brand: KII® OPTICAL ACCESS SYSTEM

## (undated) DEVICE — ELECTRODE BLDE L4IN NONINSULATED EDGE

## (undated) DEVICE — SYR ASSEMB INFL BLLN 60ML --

## (undated) DEVICE — SUTURE PROL SZ 6-0 L18IN NONABSORBABLE BLU RB-2 L13MM 1/2 8714H

## (undated) DEVICE — FILTER SMK EVAC FLO CLR MEGADYNE

## (undated) DEVICE — GLOVE SURG SZ 85 L12IN FNGR THK79MIL GRN LTX FREE

## (undated) DEVICE — AEGIS 1" DISK 4MM HOLE, PEEL OPEN: Brand: MEDLINE

## (undated) DEVICE — SUTURE ABSORBABLE MONOFILAMENT 0 CT-1 36 MM DYED SPRL PDS + SXPP1B450

## (undated) DEVICE — DRAPE,LAPAROTOMY,PCH,STERILE: Brand: MEDLINE

## (undated) DEVICE — STRAP,POSITIONING,KNEE/BODY,FOAM,4X60": Brand: MEDLINE

## (undated) DEVICE — FLOSEAL MATRIX IS INDICATED IN SURGICAL PROCEDURES (OTHER THAN IN OPHTHALMIC) AS AN ADJUNCT TO HEMOSTASIS WHEN CONTROL OF BLEEDING BY LIGATURE OR CONVENTIONALPROCEDURES IS INEFFECTIVE OR IMPRACTICAL.: Brand: FLOSEAL HEMOSTATIC MATRIX

## (undated) DEVICE — FCPS BX HOT RJ4 2.2MMX240CM -- RADIAL JAW 4 BX/40

## (undated) DEVICE — STOPCOCK IV 3W --

## (undated) DEVICE — AIR SHEET,LAT,COMFORT GLIDE, BLEND 40X80: Brand: MEDLINE

## (undated) DEVICE — SUTURE VCRL SZ 2-0 L18IN ABSRB UD CT-1 L36MM 1/2 CIR J839D

## (undated) DEVICE — SUPPLEMENT DIGESTIVE H2O SOL GI-EASE

## (undated) DEVICE — SYRINGE MED 10ML LUERLOCK TIP W/O SFTY DISP

## (undated) DEVICE — SHEET,DRAPE,UNDERBUTTOCK,GRAD POUCH,PORT: Brand: MEDLINE

## (undated) DEVICE — PMI OPERATIVE CHOLANGIOGRAM CATHETER; TUBING IS 76CM IN LENGTH, 16GA WITH A: Brand: PMI

## (undated) DEVICE — RELOAD STPL L45MM H1.5-3.6MM REG TISS BLU GRIPPING SURF B

## (undated) DEVICE — SYSTEM SKIN CLSR 22CM DERMBND PRINEO

## (undated) DEVICE — INFECTION CONTROL KIT SYS

## (undated) DEVICE — DRAIN SURG 10FR SIL RND HUBLESS W/ 1/8IN TRCR BLAK

## (undated) DEVICE — URETERAL STENT: Brand: CONTOUR™

## (undated) DEVICE — SURGICAL PROCEDURE KIT GEN LAPAROSCOPY LF

## (undated) DEVICE — ELECTRODE ES 36CM LAP FLAT L HK COAT DISP CLEANCOAT

## (undated) DEVICE — CATHETER ENDOSCP L13IN DIA5FR CHOLGM W/ RADLUC INTRO SHTH

## (undated) DEVICE — MARKER SURG SKIN UTIL REGULAR/FINE 2 TIP W/ RUL AND 9 LBL

## (undated) DEVICE — AGENT HEMSTAT 3GM PURIFIED PLNT STARCH PWD ABSRB ARISTA AH

## (undated) DEVICE — PICO 7 10CM X 20CM: Brand: PICO™ 7

## (undated) DEVICE — DISSECTOR LAP DIA5MM BLNT TIP ENDOPATH

## (undated) DEVICE — TISSUE RETRIEVAL SYSTEM: Brand: INZII RETRIEVAL SYSTEM